# Patient Record
Sex: MALE | Race: WHITE | NOT HISPANIC OR LATINO | Employment: OTHER | URBAN - METROPOLITAN AREA
[De-identification: names, ages, dates, MRNs, and addresses within clinical notes are randomized per-mention and may not be internally consistent; named-entity substitution may affect disease eponyms.]

---

## 2018-07-25 ENCOUNTER — HOSPITAL ENCOUNTER (EMERGENCY)
Facility: HOSPITAL | Age: 53
Discharge: HOME | End: 2018-07-25
Payer: MEDICAID

## 2018-07-25 VITALS
WEIGHT: 181 LBS | SYSTOLIC BLOOD PRESSURE: 153 MMHG | RESPIRATION RATE: 16 BRPM | DIASTOLIC BLOOD PRESSURE: 96 MMHG | OXYGEN SATURATION: 97 % | TEMPERATURE: 97 F | HEIGHT: 65 IN | BODY MASS INDEX: 30.16 KG/M2 | HEART RATE: 67 BPM

## 2018-07-25 DIAGNOSIS — R52 PAIN: ICD-10-CM

## 2018-07-25 DIAGNOSIS — M25.562 LEFT KNEE PAIN, UNSPECIFIED CHRONICITY: Primary | ICD-10-CM

## 2018-07-25 PROCEDURE — 99283 EMERGENCY DEPT VISIT LOW MDM: CPT | Mod: 25

## 2018-07-25 RX ORDER — NAPROXEN 500 MG/1
500 TABLET ORAL 2 TIMES DAILY WITH MEALS
Qty: 20 TABLET | Refills: 0 | OUTPATIENT
Start: 2018-07-25 | End: 2020-12-06

## 2018-07-26 NOTE — ED PROVIDER NOTES
Encounter Date: 7/25/2018       History     Chief Complaint   Patient presents with    Knee Pain     L knee. Onset approx 3-4 days ago. Denies injury or trauma. Hx of knee surgery to same knee. No treatment pta.      54 yo presents with left knee pain for several days. Had previous fx and orif in 2005. Has had problems with knee since with knee popping, but not usually associated with pain.  Was told he may need left knee replacement          Review of patient's allergies indicates:  No Known Allergies  Past Medical History:   Diagnosis Date    Diabetes mellitus     GERD (gastroesophageal reflux disease)     High cholesterol     Hypertension      Past Surgical History:   Procedure Laterality Date    ANKLE SURGERY      right    ELBOW SURGERY Left     KNEE SURGERY      TOTAL HIP ARTHROPLASTY      ines     History reviewed. No pertinent family history.  Social History   Substance Use Topics    Smoking status: Never Smoker    Smokeless tobacco: Never Used    Alcohol use No     Review of Systems   Constitutional: Negative for fever.   HENT: Negative for sore throat.    Respiratory: Negative for shortness of breath.    Cardiovascular: Negative for chest pain.   Gastrointestinal: Negative for nausea.   Genitourinary: Negative for dysuria.   Musculoskeletal: Negative for back pain.   Skin: Negative for rash.   Neurological: Negative for weakness.   Hematological: Does not bruise/bleed easily.       Physical Exam     Initial Vitals [07/25/18 2015]   BP Pulse Resp Temp SpO2   (!) 153/96 67 16 97.3 °F (36.3 °C) 97 %      MAP       --         Physical Exam    Nursing note and vitals reviewed.  Constitutional: He appears well-developed and well-nourished. No distress.   HENT:   Head: Normocephalic and atraumatic.   Mouth/Throat: Oropharynx is clear and moist.   Eyes: Conjunctivae and EOM are normal.   Neck: Normal range of motion. Neck supple.   Cardiovascular: Normal rate, regular rhythm and normal heart sounds.   No  murmur heard.  Pulmonary/Chest: Breath sounds normal. No respiratory distress. He has no wheezes. He has no rhonchi. He has no rales.   Abdominal: Soft. Bowel sounds are normal. There is no tenderness. There is no rebound and no guarding.   Musculoskeletal: Normal range of motion. He exhibits tenderness (mild tenderness to left knee with surgical lateral deformity. no swellin, no erythema, full rom. distal pulses intact, sensation intact). He exhibits no edema.   Neurological: He is alert and oriented to person, place, and time. He has normal strength. No sensory deficit.   Skin: Skin is warm and dry.   Psychiatric: He has a normal mood and affect. Thought content normal.         ED Course   Procedures  Labs Reviewed - No data to display       Imaging Results    None                               Clinical Impression:   The primary encounter diagnosis was Left knee pain, unspecified chronicity. A diagnosis of Pain was also pertinent to this visit.      Disposition:   Disposition: Discharged  Condition: Stable                        FELIX Hamm  07/25/18 2053       FELIX Hamm  07/25/18 2114

## 2020-12-06 ENCOUNTER — HOSPITAL ENCOUNTER (EMERGENCY)
Facility: HOSPITAL | Age: 55
Discharge: HOME | End: 2020-12-06
Attending: EMERGENCY MEDICINE
Payer: MEDICAID

## 2020-12-06 VITALS
DIASTOLIC BLOOD PRESSURE: 87 MMHG | TEMPERATURE: 98 F | OXYGEN SATURATION: 96 % | SYSTOLIC BLOOD PRESSURE: 141 MMHG | WEIGHT: 184.06 LBS | RESPIRATION RATE: 20 BRPM | BODY MASS INDEX: 28.89 KG/M2 | HEIGHT: 67 IN | HEART RATE: 81 BPM

## 2020-12-06 DIAGNOSIS — S39.012A STRAIN OF LUMBAR REGION, INITIAL ENCOUNTER: Primary | ICD-10-CM

## 2020-12-06 PROCEDURE — 99284 EMERGENCY DEPT VISIT MOD MDM: CPT | Mod: 25,ER

## 2020-12-06 PROCEDURE — 86803 HEPATITIS C AB TEST: CPT

## 2020-12-06 PROCEDURE — 86703 HIV-1/HIV-2 1 RESULT ANTBDY: CPT

## 2020-12-06 PROCEDURE — 63600175 PHARM REV CODE 636 W HCPCS: Mod: ER | Performed by: NURSE PRACTITIONER

## 2020-12-06 PROCEDURE — 96372 THER/PROPH/DIAG INJ SC/IM: CPT | Mod: ER

## 2020-12-06 RX ORDER — ORPHENADRINE CITRATE 30 MG/ML
60 INJECTION INTRAMUSCULAR; INTRAVENOUS
Status: COMPLETED | OUTPATIENT
Start: 2020-12-06 | End: 2020-12-06

## 2020-12-06 RX ORDER — KETOROLAC TROMETHAMINE 30 MG/ML
10 INJECTION, SOLUTION INTRAMUSCULAR; INTRAVENOUS
Status: COMPLETED | OUTPATIENT
Start: 2020-12-06 | End: 2020-12-06

## 2020-12-06 RX ORDER — NAPROXEN 500 MG/1
500 TABLET ORAL
Status: DISCONTINUED | OUTPATIENT
Start: 2020-12-06 | End: 2020-12-06

## 2020-12-06 RX ORDER — NAPROXEN 500 MG/1
500 TABLET ORAL 2 TIMES DAILY WITH MEALS
Qty: 14 TABLET | Refills: 0 | Status: ON HOLD | OUTPATIENT
Start: 2020-12-06 | End: 2023-01-28 | Stop reason: HOSPADM

## 2020-12-06 RX ORDER — CYCLOBENZAPRINE HCL 10 MG
10 TABLET ORAL 3 TIMES DAILY PRN
Qty: 15 TABLET | Refills: 0 | Status: SHIPPED | OUTPATIENT
Start: 2020-12-06 | End: 2020-12-11

## 2020-12-06 RX ADMIN — KETOROLAC TROMETHAMINE 10 MG: 30 INJECTION, SOLUTION INTRAMUSCULAR; INTRAVENOUS at 04:12

## 2020-12-06 RX ADMIN — ORPHENADRINE CITRATE 60 MG: 30 INJECTION INTRAMUSCULAR; INTRAVENOUS at 04:12

## 2020-12-06 NOTE — ED PROVIDER NOTES
Encounter Date: 12/6/2020       History     Chief Complaint   Patient presents with    Back Pain     left lower back pain lifting and pain since fri     The history is provided by the patient.   Back Pain   This is a new (after lifting up on bed of truck ) problem. The current episode started two days ago. The problem occurs most days. The problem has been waxing and waning. The pain is associated with lifting heavy objects. The pain is present in the lumbar spine. The quality of the pain is described as shooting and aching. The pain does not radiate. The pain is at a severity of 8/10. The symptoms are aggravated by bending, twisting and certain positions. The pain is the same all the time. Stiffness is present all day. Pertinent negatives include no chest pain, no fever, no numbness, no weight loss, no headaches, no abdominal pain, no abdominal swelling, no bowel incontinence, no perianal numbness, no bladder incontinence, no dysuria, no pelvic pain, no leg pain, no paresis, no tingling and no weakness. He has tried nothing for the symptoms. The treatment provided no relief. Risk factors include obesity and lack of exercise.     Review of patient's allergies indicates:  No Known Allergies  Past Medical History:   Diagnosis Date    Diabetes mellitus     GERD (gastroesophageal reflux disease)     High cholesterol     Hypertension      Past Surgical History:   Procedure Laterality Date    ANKLE SURGERY      right    ELBOW SURGERY Left     KNEE SURGERY      TOTAL HIP ARTHROPLASTY      ines     History reviewed. No pertinent family history.  Social History     Tobacco Use    Smoking status: Never Smoker    Smokeless tobacco: Never Used   Substance Use Topics    Alcohol use: No    Drug use: No     Review of Systems   Constitutional: Negative for fever and weight loss.   HENT: Negative for sore throat.    Respiratory: Negative for shortness of breath.    Cardiovascular: Negative for chest pain.    Gastrointestinal: Negative for abdominal pain, bowel incontinence and nausea.   Genitourinary: Negative for bladder incontinence, dysuria and pelvic pain.   Musculoskeletal: Positive for back pain.   Skin: Negative for rash.   Neurological: Negative for tingling, weakness, numbness and headaches.   Hematological: Does not bruise/bleed easily.   All other systems reviewed and are negative.      Physical Exam     Initial Vitals [12/06/20 1554]   BP Pulse Resp Temp SpO2   (!) 141/87 81 20 98.1 °F (36.7 °C) 96 %      MAP       --         Physical Exam    Nursing note and vitals reviewed.  Constitutional: Vital signs are normal. He appears well-nourished. He is not diaphoretic. He is cooperative.  Non-toxic appearance. He does not have a sickly appearance. He does not appear ill. No distress.   HENT:   Head: Normocephalic. Head is without laceration.   Right Ear: External ear normal. No swelling or tenderness.   Left Ear: External ear normal. No swelling or tenderness.   Eyes: Conjunctivae and lids are normal. Lids are everted and swept, no foreign bodies found.   Neck: Trachea normal, normal range of motion, full passive range of motion without pain and phonation normal. Neck supple. No thyromegaly present.   Cardiovascular: Regular rhythm, normal heart sounds, intact distal pulses and normal pulses.   Pulmonary/Chest: Breath sounds normal.   Abdominal: Soft. Normal appearance and bowel sounds are normal. He exhibits no distension, no ascites and no mass. There is no abdominal tenderness.   Musculoskeletal:      Lumbar back: He exhibits decreased range of motion, tenderness, pain and spasm. He exhibits no bony tenderness, no swelling, no edema, no deformity, no laceration and normal pulse.        Back:    Lymphadenopathy:     He has no cervical adenopathy.     He has no axillary adenopathy.   Neurological: He is alert and oriented to person, place, and time. He has normal reflexes. No cranial nerve deficit or sensory  deficit. GCS eye subscore is 4. GCS verbal subscore is 5. GCS motor subscore is 6.   Skin: Skin is warm, dry and intact. Capillary refill takes less than 2 seconds. No laceration, no rash and no abscess noted. No erythema.   Psychiatric: He has a normal mood and affect. His speech is normal and behavior is normal. Cognition and memory are normal.         ED Course   Procedures  Labs Reviewed   HIV 1 / 2 ANTIBODY   HEPATITIS C ANTIBODY          Imaging Results          X-Ray Lumbar Spine Ap And Lateral (Final result)  Result time 12/06/20 16:21:45    Final result by Deuce Sellers MD (12/06/20 16:21:45)                 Impression:      Degenerative changes without acute abnormality.      Electronically signed by: Deuce Sellers  Date:    12/06/2020  Time:    16:21             Narrative:    EXAMINATION:  XR LUMBAR SPINE AP AND LATERAL    CLINICAL HISTORY:  Back pain or radiculopathy, trauma;    TECHNIQUE:  Five views of the lumbar spine were performed.    COMPARISON:  None    FINDINGS:  Alignment: No anterolisthesis or retrolisthesis.    Vertebrae: Vertebral body heights are maintained.  No suspicious appearing lytic or blastic lesions.    Discs and facets: Disc height loss L5-S1.  Other levels are better maintained.  There is facet arthropathy most focal L4 through S1.    Miscellaneous: No additional findings.                                         Regarding LUMBAGO, the patient was instructed to work on exercises to improve posture and strengthen back to increase flexibility and to take medications only as prescribed.  Patient advised on the importance of maintaining/obtaining ideal weight. Recommended that patient stop normal physical activity only for the first few days to help reduce any swelling in the area of the pain; apply heat or ice to the painful area (ice for the first 48 to 72 hours, then use heat thereafter; take over-the-counter pain relievers such as ibuprofen or acetaminophen unless  contraindicated; sleep in a curled-up, fetal position with a pillow between legs to help relieve pressure; and to avoid performing activities that involve heavy lifting or twisting of the back.          All historical, clinical, radiographic, and laboratory findings were reviewed with the patient in detail.  Findings are consistent with a diagnosis of lumbar strain  All remaining questions and concerns were addressed at that time.  Patient has been counseled regarding the need for follow-up as well as the indication to return to the emergency room should new or worrisome developments occur.                  Clinical Impression:     ICD-10-CM ICD-9-CM   1. Strain of lumbar region, initial encounter  S39.012A 847.2                          ED Disposition Condition    Discharge Stable        ED Prescriptions     Medication Sig Dispense Start Date End Date Auth. Provider    naproxen (NAPROSYN) 500 MG tablet Take 1 tablet (500 mg total) by mouth 2 (two) times daily with meals. 14 tablet 12/6/2020  Keny Doshi NP    cyclobenzaprine (FLEXERIL) 10 MG tablet Take 1 tablet (10 mg total) by mouth 3 (three) times daily as needed. 15 tablet 12/6/2020 12/11/2020 Keny Doshi NP        Follow-up Information     Follow up With Specialties Details Why Contact Info    Gustavo Calhoun MD Family Medicine In 3 days  [address] [facility] FAMILY MEDICINE  [facility] [address]  [phone]      [facility] Emergency Medicine  If symptoms worsen [address]  [facility] [address] [phone]                                       Keny Doshi NP  12/10/20 1650       Keny Doshi NP  12/26/20 0866

## 2020-12-06 NOTE — ED NOTES
Aaox3, skin warm and dry, resp unlabored and even. amb with steady gait and curtis well. C/o left lower back pain since fri and worse with sitting.

## 2020-12-07 LAB
HCV AB SERPL QL IA: NEGATIVE
HIV 1+2 AB+HIV1 P24 AG SERPL QL IA: NEGATIVE

## 2020-12-29 ENCOUNTER — HOSPITAL ENCOUNTER (EMERGENCY)
Facility: HOSPITAL | Age: 55
Discharge: HOME | End: 2020-12-29
Attending: STUDENT IN AN ORGANIZED HEALTH CARE EDUCATION/TRAINING PROGRAM
Payer: MEDICAID

## 2020-12-29 VITALS
DIASTOLIC BLOOD PRESSURE: 95 MMHG | HEART RATE: 86 BPM | TEMPERATURE: 98 F | BODY MASS INDEX: 29 KG/M2 | WEIGHT: 185.19 LBS | OXYGEN SATURATION: 96 % | RESPIRATION RATE: 18 BRPM | SYSTOLIC BLOOD PRESSURE: 168 MMHG

## 2020-12-29 DIAGNOSIS — S60.552A FOREIGN BODY, HAND, SUPERFICIAL, LEFT, INITIAL ENCOUNTER: Primary | ICD-10-CM

## 2020-12-29 PROCEDURE — 63600175 PHARM REV CODE 636 W HCPCS: Mod: ER | Performed by: STUDENT IN AN ORGANIZED HEALTH CARE EDUCATION/TRAINING PROGRAM

## 2020-12-29 PROCEDURE — 90471 IMMUNIZATION ADMIN: CPT | Mod: ER | Performed by: STUDENT IN AN ORGANIZED HEALTH CARE EDUCATION/TRAINING PROGRAM

## 2020-12-29 PROCEDURE — 25000003 PHARM REV CODE 250: Mod: ER | Performed by: STUDENT IN AN ORGANIZED HEALTH CARE EDUCATION/TRAINING PROGRAM

## 2020-12-29 PROCEDURE — 99284 EMERGENCY DEPT VISIT MOD MDM: CPT | Mod: 25,ER

## 2020-12-29 PROCEDURE — 90714 TD VACC NO PRESV 7 YRS+ IM: CPT | Mod: ER | Performed by: STUDENT IN AN ORGANIZED HEALTH CARE EDUCATION/TRAINING PROGRAM

## 2020-12-29 PROCEDURE — 10120 INC&RMVL FB SUBQ TISS SMPL: CPT | Mod: ER

## 2020-12-29 RX ORDER — OXYCODONE AND ACETAMINOPHEN 10; 325 MG/1; MG/1
1 TABLET ORAL
Status: COMPLETED | OUTPATIENT
Start: 2020-12-29 | End: 2020-12-29

## 2020-12-29 RX ORDER — LIDOCAINE HYDROCHLORIDE 10 MG/ML
5 INJECTION, SOLUTION EPIDURAL; INFILTRATION; INTRACAUDAL; PERINEURAL
Status: COMPLETED | OUTPATIENT
Start: 2020-12-29 | End: 2020-12-29

## 2020-12-29 RX ORDER — CEPHALEXIN 500 MG/1
500 CAPSULE ORAL EVERY 12 HOURS
Qty: 14 CAPSULE | Refills: 0 | Status: SHIPPED | OUTPATIENT
Start: 2020-12-29 | End: 2021-01-05

## 2020-12-29 RX ADMIN — OXYCODONE AND ACETAMINOPHEN 1 TABLET: 10; 325 TABLET ORAL at 06:12

## 2020-12-29 RX ADMIN — LIDOCAINE HYDROCHLORIDE 50 MG: 10 INJECTION, SOLUTION EPIDURAL; INFILTRATION; INTRACAUDAL at 06:12

## 2020-12-29 RX ADMIN — CLOSTRIDIUM TETANI TOXOID ANTIGEN (FORMALDEHYDE INACTIVATED) AND CORYNEBACTERIUM DIPHTHERIAE TOXOID ANTIGEN (FORMALDEHYDE INACTIVATED) 0.5 ML: 5; 2 INJECTION, SUSPENSION INTRAMUSCULAR at 06:12

## 2020-12-30 NOTE — ED PROVIDER NOTES
Encounter Date: 12/29/2020       History     Chief Complaint   Patient presents with    Foreign Body     c/o fishing hook stuck in left hand     55-year-old male with fishhook injury to the dorsum of the left hand.  Had ordered removed 1 fishhook from his left wrist.  Cut part of the fishhook off the left remainder in his skin.  No other complaints.    The history is provided by the patient.   Foreign Body   The current episode started 3 to 5 hours ago. Intake: Left Hand. Suspected object: [place]. The incident was witnessed/reported by the patient. Causes for concern: Fishing. Pertinent negatives include no chest pain, no fever, no abdominal pain, no vomiting, no congestion, no sore throat and no cough. He has been behaving normally. Past medical history comments: Removed another fishhook earlier today..     Review of patient's allergies indicates:  No Known Allergies  Past Medical History:   Diagnosis Date    Diabetes mellitus     GERD (gastroesophageal reflux disease)     High cholesterol     Hypertension      Past Surgical History:   Procedure Laterality Date    ANKLE SURGERY      right    ELBOW SURGERY Left     KNEE SURGERY      TOTAL HIP ARTHROPLASTY      ines     No family history on file.  Social History     Tobacco Use    Smoking status: Never Smoker    Smokeless tobacco: Never Used   Substance Use Topics    Alcohol use: No    Drug use: No     Review of Systems   Constitutional: Negative for chills and fever.   HENT: Negative for congestion, facial swelling, rhinorrhea and sore throat.    Respiratory: Negative for cough and shortness of breath.    Cardiovascular: Negative for chest pain.   Gastrointestinal: Negative for abdominal pain, constipation, nausea and vomiting.   Genitourinary: Negative for dysuria, frequency and hematuria.   Musculoskeletal: Negative for arthralgias, back pain, myalgias and neck pain.   Skin: Positive for wound. Negative for rash.   Neurological: Negative for weakness  and numbness.   Hematological: Does not bruise/bleed easily.   Psychiatric/Behavioral: Negative for hallucinations and suicidal ideas.       Physical Exam     Initial Vitals [20 1804]   BP Pulse Resp Temp SpO2   (!) 168/95 86 18 98 °F (36.7 °C) 96 %      MAP       --         Physical Exam    Nursing note and vitals reviewed.  Constitutional: He appears well-developed and well-nourished. No distress.   HENT:   Head: Normocephalic and atraumatic.   Right Ear: External ear normal.   Left Ear: External ear normal.   Nose: Nose normal.   Mouth/Throat: Oropharynx is clear and moist.   Eyes: Conjunctivae and EOM are normal. Pupils are equal, round, and reactive to light.   Neck: Normal range of motion. Neck supple.   Cardiovascular: Normal rate and regular rhythm.   No murmur heard.  Pulmonary/Chest: Breath sounds normal. No stridor. No respiratory distress. He has no wheezes. He has no rhonchi. He has no rales.   Abdominal: Soft. Bowel sounds are normal. There is no abdominal tenderness.   Musculoskeletal: Normal range of motion. No edema.   Neurological: He is alert and oriented to person, place, and time. He has normal strength. No cranial nerve deficit or sensory deficit.   Skin: Skin is warm and dry. No rash noted.   Wound to dorsum of Left hand with [place] remaining.   Psychiatric: He has a normal mood and affect. Thought content normal.         ED Course   Foreign Body    Date/Time: 2020 6:35 PM  Performed by: Acosta Kelley MD  Authorized by: Acosta Kelley MD   Consent Done: Yes  Consent: Verbal consent obtained.  Risks and benefits: risks, benefits and alternatives were discussed  Consent given by: patient  Patient understanding: patient states understanding of the procedure being performed  Patient identity confirmed:  and name  Intake: Hand.  Anesthesia: local infiltration    Anesthesia:  Local Anesthetic: lidocaine 1% without epinephrine  Anesthetic total: 1 mL  Patient sedated: no  Patient  restrained: no  Patient cooperative: yes  Complexity: complex (Required point incision via #11 scalpal. )  1 objects recovered.  Objects recovered: Preemption  Post-procedure assessment: foreign body removed  Patient tolerance: Patient tolerated the procedure well with no immediate complications      Labs Reviewed - No data to display       Imaging Results    None                                      Clinical Impression:     ICD-10-CM ICD-9-CM   1. Foreign body, hand, superficial, left, initial encounter  S60.552A 914.6                      Disposition:   Disposition: Discharged  Condition: Stable     ED Disposition Condition    Discharge Stable        ED Prescriptions     Medication Sig Dispense Start Date End Date Auth. Provider    cephALEXin (KEFLEX) 500 MG capsule Take 1 capsule (500 mg total) by mouth every 12 (twelve) hours. for 7 days 14 capsule 12/29/2020 1/5/2021 Acosta Kelley MD        Follow-up Information     Follow up With Specialties Details Why Contact Info    Gustavo Calhoun MD Family Medicine In 1 week For wound re-check [address] [place] [address] FAMILY MEDICINE  [facility] [address]  [phone]      [facility] Emergency Medicine Go to  As needed, If symptoms worsen [address] [facility] [address]  [facility] [address] [phone]                                       Acosta Kelley MD  12/29/20 6569

## 2021-05-17 ENCOUNTER — HOSPITAL ENCOUNTER (EMERGENCY)
Facility: HOSPITAL | Age: 56
Discharge: HOME | End: 2021-05-17
Attending: EMERGENCY MEDICINE
Payer: MEDICAID

## 2021-05-17 VITALS
OXYGEN SATURATION: 98 % | RESPIRATION RATE: 20 BRPM | HEART RATE: 69 BPM | DIASTOLIC BLOOD PRESSURE: 88 MMHG | WEIGHT: 179.88 LBS | BODY MASS INDEX: 28.18 KG/M2 | SYSTOLIC BLOOD PRESSURE: 135 MMHG | TEMPERATURE: 98 F

## 2021-05-17 DIAGNOSIS — R10.9 LEFT FLANK PAIN: Primary | ICD-10-CM

## 2021-05-17 DIAGNOSIS — N30.01 ACUTE CYSTITIS WITH HEMATURIA: ICD-10-CM

## 2021-05-17 DIAGNOSIS — N20.1 URETEROLITHIASIS: ICD-10-CM

## 2021-05-17 LAB
ALBUMIN SERPL BCP-MCNC: 4.2 G/DL (ref 3.5–5.2)
ALP SERPL-CCNC: 55 U/L (ref 55–135)
ALT SERPL W/O P-5'-P-CCNC: 33 U/L (ref 10–44)
ANION GAP SERPL CALC-SCNC: 12 MMOL/L (ref 8–16)
AST SERPL-CCNC: 22 U/L (ref 10–40)
BACTERIA #/AREA URNS AUTO: ABNORMAL /HPF
BASOPHILS # BLD AUTO: 0.03 K/UL (ref 0–0.2)
BASOPHILS NFR BLD: 0.4 % (ref 0–1.9)
BILIRUB SERPL-MCNC: 0.5 MG/DL (ref 0.1–1)
BILIRUB UR QL STRIP: NEGATIVE
BUN SERPL-MCNC: 17 MG/DL (ref 6–20)
CALCIUM SERPL-MCNC: 9.2 MG/DL (ref 8.7–10.5)
CHLORIDE SERPL-SCNC: 107 MMOL/L (ref 95–110)
CLARITY UR REFRACT.AUTO: ABNORMAL
CO2 SERPL-SCNC: 24 MMOL/L (ref 23–29)
COLOR UR AUTO: YELLOW
CREAT SERPL-MCNC: 1.2 MG/DL (ref 0.5–1.4)
DIFFERENTIAL METHOD: NORMAL
EOSINOPHIL # BLD AUTO: 0.1 K/UL (ref 0–0.5)
EOSINOPHIL NFR BLD: 1.2 % (ref 0–8)
ERYTHROCYTE [DISTWIDTH] IN BLOOD BY AUTOMATED COUNT: 12.5 % (ref 11.5–14.5)
EST. GFR  (AFRICAN AMERICAN): >60 ML/MIN/1.73 M^2
EST. GFR  (NON AFRICAN AMERICAN): >60 ML/MIN/1.73 M^2
GLUCOSE SERPL-MCNC: 143 MG/DL (ref 70–110)
GLUCOSE UR QL STRIP: NEGATIVE
HCT VFR BLD AUTO: 43.6 % (ref 40–54)
HGB BLD-MCNC: 14.6 G/DL (ref 14–18)
HGB UR QL STRIP: ABNORMAL
HYALINE CASTS UR QL AUTO: 0 /LPF
IMM GRANULOCYTES # BLD AUTO: 0.02 K/UL (ref 0–0.04)
IMM GRANULOCYTES NFR BLD AUTO: 0.3 % (ref 0–0.5)
KETONES UR QL STRIP: NEGATIVE
LEUKOCYTE ESTERASE UR QL STRIP: NEGATIVE
LYMPHOCYTES # BLD AUTO: 1.9 K/UL (ref 1–4.8)
LYMPHOCYTES NFR BLD: 24.1 % (ref 18–48)
MCH RBC QN AUTO: 29.7 PG (ref 27–31)
MCHC RBC AUTO-ENTMCNC: 33.5 G/DL (ref 32–36)
MCV RBC AUTO: 89 FL (ref 82–98)
MICROSCOPIC COMMENT: ABNORMAL
MONOCYTES # BLD AUTO: 0.5 K/UL (ref 0.3–1)
MONOCYTES NFR BLD: 7 % (ref 4–15)
NEUTROPHILS # BLD AUTO: 5.1 K/UL (ref 1.8–7.7)
NEUTROPHILS NFR BLD: 67 % (ref 38–73)
NITRITE UR QL STRIP: NEGATIVE
NRBC BLD-RTO: 0 /100 WBC
PH UR STRIP: 5 [PH] (ref 5–8)
PLATELET # BLD AUTO: 238 K/UL (ref 150–450)
PMV BLD AUTO: 9.4 FL (ref 9.2–12.9)
POTASSIUM SERPL-SCNC: 4.3 MMOL/L (ref 3.5–5.1)
PROT SERPL-MCNC: 7.4 G/DL (ref 6–8.4)
PROT UR QL STRIP: ABNORMAL
RBC # BLD AUTO: 4.91 M/UL (ref 4.6–6.2)
RBC #/AREA URNS AUTO: 100 /HPF (ref 0–4)
SODIUM SERPL-SCNC: 143 MMOL/L (ref 136–145)
SP GR UR STRIP: >=1.03 (ref 1–1.03)
SQUAMOUS #/AREA URNS AUTO: 1 /HPF
URN SPEC COLLECT METH UR: ABNORMAL
UROBILINOGEN UR STRIP-ACNC: NEGATIVE EU/DL
WBC # BLD AUTO: 7.67 K/UL (ref 3.9–12.7)
WBC #/AREA URNS AUTO: 3 /HPF (ref 0–5)

## 2021-05-17 PROCEDURE — 85025 COMPLETE CBC W/AUTO DIFF WBC: CPT | Mod: ER | Performed by: EMERGENCY MEDICINE

## 2021-05-17 PROCEDURE — 99284 EMERGENCY DEPT VISIT MOD MDM: CPT | Mod: 25,ER

## 2021-05-17 PROCEDURE — 96374 THER/PROPH/DIAG INJ IV PUSH: CPT | Mod: ER

## 2021-05-17 PROCEDURE — 81000 URINALYSIS NONAUTO W/SCOPE: CPT | Mod: ER | Performed by: EMERGENCY MEDICINE

## 2021-05-17 PROCEDURE — 80053 COMPREHEN METABOLIC PANEL: CPT | Mod: ER | Performed by: EMERGENCY MEDICINE

## 2021-05-17 PROCEDURE — 63600175 PHARM REV CODE 636 W HCPCS: Mod: ER | Performed by: EMERGENCY MEDICINE

## 2021-05-17 PROCEDURE — 96375 TX/PRO/DX INJ NEW DRUG ADDON: CPT | Mod: ER

## 2021-05-17 RX ORDER — SEMAGLUTIDE 1.34 MG/ML
0.25 INJECTION, SOLUTION SUBCUTANEOUS
COMMUNITY
Start: 2021-05-06

## 2021-05-17 RX ORDER — CEFUROXIME AXETIL 500 MG/1
500 TABLET ORAL 2 TIMES DAILY
Qty: 20 TABLET | Refills: 0 | Status: SHIPPED | OUTPATIENT
Start: 2021-05-17 | End: 2021-05-27

## 2021-05-17 RX ORDER — KETOROLAC TROMETHAMINE 30 MG/ML
15 INJECTION, SOLUTION INTRAMUSCULAR; INTRAVENOUS
Status: COMPLETED | OUTPATIENT
Start: 2021-05-17 | End: 2021-05-17

## 2021-05-17 RX ORDER — TAMSULOSIN HYDROCHLORIDE 0.4 MG/1
0.4 CAPSULE ORAL DAILY
Qty: 30 CAPSULE | Refills: 0 | Status: SHIPPED | OUTPATIENT
Start: 2021-05-17 | End: 2022-05-17

## 2021-05-17 RX ORDER — PROMETHAZINE HYDROCHLORIDE 25 MG/1
25 TABLET ORAL EVERY 6 HOURS PRN
Qty: 15 TABLET | Refills: 0 | Status: SHIPPED | OUTPATIENT
Start: 2021-05-17

## 2021-05-17 RX ORDER — MORPHINE SULFATE 4 MG/ML
4 INJECTION, SOLUTION INTRAMUSCULAR; INTRAVENOUS
Status: COMPLETED | OUTPATIENT
Start: 2021-05-17 | End: 2021-05-17

## 2021-05-17 RX ORDER — HYDROCODONE BITARTRATE AND ACETAMINOPHEN 5; 325 MG/1; MG/1
1 TABLET ORAL EVERY 4 HOURS PRN
Qty: 11 TABLET | Refills: 0 | Status: SHIPPED | OUTPATIENT
Start: 2021-05-17 | End: 2021-05-20

## 2021-05-17 RX ORDER — ONDANSETRON 2 MG/ML
4 INJECTION INTRAMUSCULAR; INTRAVENOUS
Status: COMPLETED | OUTPATIENT
Start: 2021-05-17 | End: 2021-05-17

## 2021-05-17 RX ADMIN — MORPHINE SULFATE 4 MG: 4 INJECTION, SOLUTION INTRAMUSCULAR; INTRAVENOUS at 12:05

## 2021-05-17 RX ADMIN — KETOROLAC TROMETHAMINE 15 MG: 30 INJECTION, SOLUTION INTRAMUSCULAR at 11:05

## 2021-05-17 RX ADMIN — ONDANSETRON 4 MG: 2 INJECTION INTRAMUSCULAR; INTRAVENOUS at 11:05

## 2022-12-17 ENCOUNTER — HOSPITAL ENCOUNTER (EMERGENCY)
Facility: HOSPITAL | Age: 57
Discharge: HOME | End: 2022-12-17
Attending: EMERGENCY MEDICINE
Payer: MEDICAID

## 2022-12-17 VITALS
WEIGHT: 179.13 LBS | HEIGHT: 65 IN | SYSTOLIC BLOOD PRESSURE: 130 MMHG | RESPIRATION RATE: 16 BRPM | HEART RATE: 84 BPM | BODY MASS INDEX: 29.84 KG/M2 | DIASTOLIC BLOOD PRESSURE: 76 MMHG | OXYGEN SATURATION: 97 % | TEMPERATURE: 98 F

## 2022-12-17 DIAGNOSIS — M25.562 LEFT KNEE PAIN: Primary | ICD-10-CM

## 2022-12-17 PROCEDURE — 29505 APPLICATION LONG LEG SPLINT: CPT | Mod: LT,ER

## 2022-12-17 PROCEDURE — 99283 EMERGENCY DEPT VISIT LOW MDM: CPT | Mod: ER

## 2022-12-17 PROCEDURE — 25000003 PHARM REV CODE 250: Mod: ER | Performed by: EMERGENCY MEDICINE

## 2022-12-17 RX ORDER — NAPROXEN 500 MG/1
500 TABLET ORAL 2 TIMES DAILY WITH MEALS
Qty: 60 TABLET | Refills: 0 | Status: ON HOLD | OUTPATIENT
Start: 2022-12-17 | End: 2023-01-28 | Stop reason: HOSPADM

## 2022-12-17 RX ORDER — NAPROXEN 500 MG/1
500 TABLET ORAL
Status: COMPLETED | OUTPATIENT
Start: 2022-12-17 | End: 2022-12-17

## 2022-12-17 RX ADMIN — NAPROXEN 500 MG: 500 TABLET ORAL at 09:12

## 2022-12-18 NOTE — ED PROVIDER NOTES
Encounter Date: 12/17/2022       History     Chief Complaint   Patient presents with    Knee Pain     Left knee pain, supposed to have it replaced. Locked up on him climbing a ladder earlier.      The history is provided by the patient.   Knee Pain  This is a chronic problem. The current episode started 3 to 5 hours ago. The problem occurs constantly. The problem has not changed since onset.Pertinent negatives include no chest pain, no abdominal pain, no headaches and no shortness of breath. The symptoms are aggravated by bending, exertion and twisting. He has tried rest for the symptoms. The treatment provided no relief.   Review of patient's allergies indicates:  No Known Allergies  Past Medical History:   Diagnosis Date    Diabetes mellitus     GERD (gastroesophageal reflux disease)     High cholesterol     Hypertension      Past Surgical History:   Procedure Laterality Date    ANKLE SURGERY      right    ELBOW SURGERY Left     KNEE SURGERY      TOTAL HIP ARTHROPLASTY      ines     No family history on file.  Social History     Tobacco Use    Smoking status: Never    Smokeless tobacco: Never   Substance Use Topics    Alcohol use: No    Drug use: No     Review of Systems   Constitutional:  Negative for fever.   HENT:  Negative for congestion.    Respiratory:  Negative for shortness of breath.    Cardiovascular:  Negative for chest pain.   Gastrointestinal:  Negative for abdominal pain.   Musculoskeletal:  Negative for back pain.        Left knee pain   Neurological:  Negative for headaches.   Hematological:  Does not bruise/bleed easily.     Physical Exam     Initial Vitals [12/17/22 1953]   BP Pulse Resp Temp SpO2   130/76 84 16 98 °F (36.7 °C) 97 %      MAP       --         Physical Exam    Nursing note and vitals reviewed.  Constitutional: He appears well-developed and well-nourished.   HENT:   Head: Normocephalic and atraumatic.   Mouth/Throat: Oropharynx is clear and moist.   Eyes: Conjunctivae and EOM are  normal. Pupils are equal, round, and reactive to light.   Neck: Neck supple.   Normal range of motion.  Cardiovascular:  Normal rate, regular rhythm and normal heart sounds.           Pulmonary/Chest: Breath sounds normal.   Abdominal: Abdomen is soft. Bowel sounds are normal.   Musculoskeletal:         General: Normal range of motion.      Cervical back: Normal range of motion and neck supple.      Left knee: No swelling, deformity, effusion, erythema, ecchymosis, lacerations or crepitus. Normal range of motion. Tenderness present. No LCL laxity, MCL laxity, ACL laxity or PCL laxity.Normal alignment, normal meniscus and normal patellar mobility. Normal pulse.      Instability Tests: Anterior drawer test negative. Posterior drawer test negative.      Comments: Left knee:Healed surgical scar     Neurological: He is alert and oriented to person, place, and time. He has normal strength.   Skin: Skin is warm and dry.   Psychiatric: He has a normal mood and affect. Thought content normal.       ED Course   Procedures  Labs Reviewed - No data to display       Imaging Results              X-Ray Knee 3 View Left (Final result)  Result time 12/17/22 20:35:18      Final result by Deuce Sellers MD (12/17/22 20:35:18)                   Impression:      Degenerative changes without definite acute abnormality.      Electronically signed by: Deuce Sellers  Date:    12/17/2022  Time:    20:35               Narrative:    EXAMINATION:  XR KNEE 3 VIEW LEFT    CLINICAL HISTORY:  Pain in left knee    TECHNIQUE:  AP, lateral, and Merchant views of the left knee were performed.    COMPARISON:  None    FINDINGS:  No fracture.  No traumatic malalignment.  No osseous destructive process.  Prior surgical changes.    Moderate degenerative change suspected.  No joint effusion.                                  8:54 PM - Counseling: Spoke with the patient and discussed todays findings, in addition to providing specific details for the plan  of care and counseling regarding the diagnosis and prognosis. Questions are answered at this time.  Pt placed in knee immobilizer and instructed to follow up with Ortho as he has scheduled or PCP for chronic knee pain.       Medications   naproxen tablet 500 mg (has no administration in time range)                              Clinical Impression:   Final diagnoses:  [M25.562] Left knee pain (Primary)        ED Disposition Condition    Discharge Stable          ED Prescriptions       Medication Sig Dispense Start Date End Date Auth. Provider    naproxen (NAPROSYN) 500 MG tablet Take 1 tablet (500 mg total) by mouth 2 (two) times daily with meals. 60 tablet 12/17/2022 -- Raman Betancur MD          Follow-up Information       Follow up With Specialties Details Why Contact Info    Gustavo Calhoun MD Family Medicine Call in 2 days  [address] [place] [address] FAMILY MEDICINE  [facility] [address] [phone]      [facility] Emergency Dept Emergency Medicine  If symptoms worsen [address] [address] [address] [phone]             Raman Betancur MD  12/17/22 6672

## 2023-01-25 ENCOUNTER — HOSPITAL ENCOUNTER (OUTPATIENT)
Facility: HOSPITAL | Age: 58
Discharge: HOME | DRG: 603 | End: 2023-01-28
Attending: EMERGENCY MEDICINE | Admitting: INTERNAL MEDICINE
Payer: MEDICAID

## 2023-01-25 DIAGNOSIS — R07.9 CHEST PAIN: ICD-10-CM

## 2023-01-25 DIAGNOSIS — L02.512 ABSCESS OF HAND, LEFT: ICD-10-CM

## 2023-01-25 DIAGNOSIS — N17.9 AKI (ACUTE KIDNEY INJURY): ICD-10-CM

## 2023-01-25 DIAGNOSIS — Z86.39 HISTORY OF DIABETES MELLITUS: ICD-10-CM

## 2023-01-25 DIAGNOSIS — M79.642 LEFT HAND PAIN: ICD-10-CM

## 2023-01-25 DIAGNOSIS — L02.512 ABSCESS OF LEFT HAND: ICD-10-CM

## 2023-01-25 DIAGNOSIS — L03.114 CELLULITIS OF LEFT HAND: Primary | ICD-10-CM

## 2023-01-25 LAB
ALBUMIN SERPL BCP-MCNC: 4.4 G/DL (ref 3.5–5.2)
ALP SERPL-CCNC: 72 U/L (ref 55–135)
ALT SERPL W/O P-5'-P-CCNC: 43 U/L (ref 10–44)
ANION GAP SERPL CALC-SCNC: 11 MMOL/L (ref 8–16)
AST SERPL-CCNC: 27 U/L (ref 10–40)
BASOPHILS # BLD AUTO: 0.03 K/UL (ref 0–0.2)
BASOPHILS NFR BLD: 0.4 % (ref 0–1.9)
BILIRUB SERPL-MCNC: 0.5 MG/DL (ref 0.1–1)
BUN SERPL-MCNC: 20 MG/DL (ref 6–20)
CALCIUM SERPL-MCNC: 9.4 MG/DL (ref 8.7–10.5)
CHLORIDE SERPL-SCNC: 105 MMOL/L (ref 95–110)
CO2 SERPL-SCNC: 25 MMOL/L (ref 23–29)
CREAT SERPL-MCNC: 1.5 MG/DL (ref 0.5–1.4)
DIFFERENTIAL METHOD: NORMAL
EOSINOPHIL # BLD AUTO: 0.1 K/UL (ref 0–0.5)
EOSINOPHIL NFR BLD: 1.3 % (ref 0–8)
ERYTHROCYTE [DISTWIDTH] IN BLOOD BY AUTOMATED COUNT: 13.1 % (ref 11.5–14.5)
EST. GFR  (NO RACE VARIABLE): 54 ML/MIN/1.73 M^2
GLUCOSE SERPL-MCNC: 117 MG/DL (ref 70–110)
HCT VFR BLD AUTO: 42.7 % (ref 40–54)
HGB BLD-MCNC: 14.3 G/DL (ref 14–18)
IMM GRANULOCYTES # BLD AUTO: 0.02 K/UL (ref 0–0.04)
IMM GRANULOCYTES NFR BLD AUTO: 0.3 % (ref 0–0.5)
LACTATE SERPL-SCNC: 1 MMOL/L (ref 0.5–2.2)
LYMPHOCYTES # BLD AUTO: 2.1 K/UL (ref 1–4.8)
LYMPHOCYTES NFR BLD: 28.1 % (ref 18–48)
MCH RBC QN AUTO: 29.9 PG (ref 27–31)
MCHC RBC AUTO-ENTMCNC: 33.5 G/DL (ref 32–36)
MCV RBC AUTO: 89 FL (ref 82–98)
MONOCYTES # BLD AUTO: 0.8 K/UL (ref 0.3–1)
MONOCYTES NFR BLD: 10.1 % (ref 4–15)
NEUTROPHILS # BLD AUTO: 4.5 K/UL (ref 1.8–7.7)
NEUTROPHILS NFR BLD: 59.8 % (ref 38–73)
NRBC BLD-RTO: 0 /100 WBC
PLATELET # BLD AUTO: 279 K/UL (ref 150–450)
PMV BLD AUTO: 9.6 FL (ref 9.2–12.9)
POTASSIUM SERPL-SCNC: 4.4 MMOL/L (ref 3.5–5.1)
PROT SERPL-MCNC: 8 G/DL (ref 6–8.4)
RBC # BLD AUTO: 4.79 M/UL (ref 4.6–6.2)
SODIUM SERPL-SCNC: 141 MMOL/L (ref 136–145)
WBC # BLD AUTO: 7.55 K/UL (ref 3.9–12.7)

## 2023-01-25 PROCEDURE — 99291 CRITICAL CARE FIRST HOUR: CPT | Mod: ER

## 2023-01-25 PROCEDURE — 25000003 PHARM REV CODE 250: Mod: ER | Performed by: EMERGENCY MEDICINE

## 2023-01-25 PROCEDURE — 63600175 PHARM REV CODE 636 W HCPCS: Mod: ER | Performed by: EMERGENCY MEDICINE

## 2023-01-25 PROCEDURE — 87040 BLOOD CULTURE FOR BACTERIA: CPT | Performed by: EMERGENCY MEDICINE

## 2023-01-25 PROCEDURE — 83605 ASSAY OF LACTIC ACID: CPT | Mod: ER | Performed by: EMERGENCY MEDICINE

## 2023-01-25 PROCEDURE — 21400001 HC TELEMETRY ROOM

## 2023-01-25 PROCEDURE — 96365 THER/PROPH/DIAG IV INF INIT: CPT | Mod: ER

## 2023-01-25 PROCEDURE — 80053 COMPREHEN METABOLIC PANEL: CPT | Mod: ER | Performed by: EMERGENCY MEDICINE

## 2023-01-25 PROCEDURE — 96366 THER/PROPH/DIAG IV INF ADDON: CPT | Mod: ER

## 2023-01-25 PROCEDURE — 85025 COMPLETE CBC W/AUTO DIFF WBC: CPT | Mod: ER | Performed by: EMERGENCY MEDICINE

## 2023-01-25 PROCEDURE — 96367 TX/PROPH/DG ADDL SEQ IV INF: CPT | Mod: ER

## 2023-01-25 RX ORDER — RAMIPRIL 2.5 MG/1
2.5 CAPSULE ORAL
COMMUNITY
Start: 2022-05-16

## 2023-01-25 RX ORDER — VANCOMYCIN HCL IN 5 % DEXTROSE 1.5G/250ML
1500 PLASTIC BAG, INJECTION (ML) INTRAVENOUS ONCE
Status: DISCONTINUED | OUTPATIENT
Start: 2023-01-25 | End: 2023-01-25

## 2023-01-25 RX ORDER — METFORMIN HYDROCHLORIDE 500 MG/1
500 TABLET ORAL
Status: COMPLETED | OUTPATIENT
Start: 2023-01-25 | End: 2023-01-25

## 2023-01-25 RX ORDER — DULAGLUTIDE 0.75 MG/.5ML
INJECTION, SOLUTION SUBCUTANEOUS
COMMUNITY
Start: 2022-05-23

## 2023-01-25 RX ORDER — ATORVASTATIN CALCIUM 40 MG/1
40 TABLET, FILM COATED ORAL
Status: COMPLETED | OUTPATIENT
Start: 2023-01-25 | End: 2023-01-25

## 2023-01-25 RX ADMIN — VANCOMYCIN HYDROCHLORIDE 1000 MG: 1 INJECTION, POWDER, LYOPHILIZED, FOR SOLUTION INTRAVENOUS at 10:01

## 2023-01-25 RX ADMIN — ATORVASTATIN CALCIUM 40 MG: 40 TABLET, FILM COATED ORAL at 10:01

## 2023-01-25 RX ADMIN — VANCOMYCIN HYDROCHLORIDE 500 MG: 500 INJECTION, POWDER, LYOPHILIZED, FOR SOLUTION INTRAVENOUS at 11:01

## 2023-01-25 RX ADMIN — METFORMIN HYDROCHLORIDE 500 MG: 500 TABLET, FILM COATED ORAL at 10:01

## 2023-01-25 RX ADMIN — CEFTRIAXONE 1 G: 1 INJECTION, POWDER, FOR SOLUTION INTRAMUSCULAR; INTRAVENOUS at 09:01

## 2023-01-26 PROBLEM — R97.20 INCREASED PROSTATE SPECIFIC ANTIGEN (PSA) VELOCITY: Status: ACTIVE | Noted: 2021-06-29

## 2023-01-26 PROBLEM — E11.69 OBESITY, DIABETES, AND HYPERTENSION SYNDROME: Status: ACTIVE | Noted: 2018-08-02

## 2023-01-26 PROBLEM — I15.2 OBESITY, DIABETES, AND HYPERTENSION SYNDROME: Status: ACTIVE | Noted: 2018-08-02

## 2023-01-26 PROBLEM — R74.8 ELEVATED LIVER ENZYMES: Status: ACTIVE | Noted: 2022-09-11

## 2023-01-26 PROBLEM — G47.00 INSOMNIA, UNSPECIFIED: Status: ACTIVE | Noted: 2023-01-26

## 2023-01-26 PROBLEM — Z98.890 STATUS POST OPEN REDUCTION AND INTERNAL FIXATION (ORIF) OF FRACTURE: Status: ACTIVE | Noted: 2022-04-25

## 2023-01-26 PROBLEM — E11.69 DM TYPE 2 WITH DIABETIC MIXED HYPERLIPIDEMIA: Status: ACTIVE | Noted: 2023-01-26

## 2023-01-26 PROBLEM — Z87.81 STATUS POST OPEN REDUCTION AND INTERNAL FIXATION (ORIF) OF FRACTURE: Status: ACTIVE | Noted: 2022-04-25

## 2023-01-26 PROBLEM — L02.512 ABSCESS OF HAND, LEFT: Status: ACTIVE | Noted: 2023-01-26

## 2023-01-26 PROBLEM — E78.2 DM TYPE 2 WITH DIABETIC MIXED HYPERLIPIDEMIA: Status: ACTIVE | Noted: 2023-01-26

## 2023-01-26 PROBLEM — E11.59 OBESITY, DIABETES, AND HYPERTENSION SYNDROME: Status: ACTIVE | Noted: 2018-08-02

## 2023-01-26 PROBLEM — R79.89 LOW TESTOSTERONE IN MALE: Status: ACTIVE | Noted: 2018-08-02

## 2023-01-26 PROBLEM — E66.9 OBESITY, DIABETES, AND HYPERTENSION SYNDROME: Status: ACTIVE | Noted: 2018-08-02

## 2023-01-26 PROBLEM — I10 HYPERTENSION, ESSENTIAL: Status: ACTIVE | Noted: 2023-01-26

## 2023-01-26 PROBLEM — M17.9 OSTEOARTHRITIS OF KNEE: Status: ACTIVE | Noted: 2022-12-18

## 2023-01-26 LAB
ALBUMIN SERPL BCP-MCNC: 3.6 G/DL (ref 3.5–5.2)
ALP SERPL-CCNC: 61 U/L (ref 55–135)
ALT SERPL W/O P-5'-P-CCNC: 36 U/L (ref 10–44)
ANION GAP SERPL CALC-SCNC: 9 MMOL/L (ref 8–16)
AST SERPL-CCNC: 22 U/L (ref 10–40)
BASOPHILS # BLD AUTO: 0.03 K/UL (ref 0–0.2)
BASOPHILS NFR BLD: 0.5 % (ref 0–1.9)
BILIRUB SERPL-MCNC: 0.5 MG/DL (ref 0.1–1)
BUN SERPL-MCNC: 19 MG/DL (ref 6–20)
CALCIUM SERPL-MCNC: 8.8 MG/DL (ref 8.7–10.5)
CHLORIDE SERPL-SCNC: 108 MMOL/L (ref 95–110)
CO2 SERPL-SCNC: 24 MMOL/L (ref 23–29)
CREAT SERPL-MCNC: 1.3 MG/DL (ref 0.5–1.4)
CREAT SERPL-MCNC: 1.3 MG/DL (ref 0.5–1.4)
CRP SERPL-MCNC: 14 MG/L (ref 0–8.2)
DIFFERENTIAL METHOD: ABNORMAL
EOSINOPHIL # BLD AUTO: 0.1 K/UL (ref 0–0.5)
EOSINOPHIL NFR BLD: 1.8 % (ref 0–8)
ERYTHROCYTE [DISTWIDTH] IN BLOOD BY AUTOMATED COUNT: 13.1 % (ref 11.5–14.5)
EST. GFR  (NO RACE VARIABLE): >60 ML/MIN/1.73 M^2
EST. GFR  (NO RACE VARIABLE): >60 ML/MIN/1.73 M^2
GLUCOSE SERPL-MCNC: 104 MG/DL (ref 70–110)
HCT VFR BLD AUTO: 37 % (ref 40–54)
HGB BLD-MCNC: 12.4 G/DL (ref 14–18)
IMM GRANULOCYTES # BLD AUTO: 0.01 K/UL (ref 0–0.04)
IMM GRANULOCYTES NFR BLD AUTO: 0.2 % (ref 0–0.5)
LYMPHOCYTES # BLD AUTO: 2.3 K/UL (ref 1–4.8)
LYMPHOCYTES NFR BLD: 34.7 % (ref 18–48)
MAGNESIUM SERPL-MCNC: 1.8 MG/DL (ref 1.6–2.6)
MCH RBC QN AUTO: 29.7 PG (ref 27–31)
MCHC RBC AUTO-ENTMCNC: 33.5 G/DL (ref 32–36)
MCV RBC AUTO: 89 FL (ref 82–98)
MONOCYTES # BLD AUTO: 0.9 K/UL (ref 0.3–1)
MONOCYTES NFR BLD: 13.3 % (ref 4–15)
NEUTROPHILS # BLD AUTO: 3.2 K/UL (ref 1.8–7.7)
NEUTROPHILS NFR BLD: 49.5 % (ref 38–73)
NRBC BLD-RTO: 0 /100 WBC
PHOSPHATE SERPL-MCNC: 3.5 MG/DL (ref 2.7–4.5)
PLATELET # BLD AUTO: 218 K/UL (ref 150–450)
PLATELET BLD QL SMEAR: ABNORMAL
PMV BLD AUTO: 10.1 FL (ref 9.2–12.9)
POCT GLUCOSE: 154 MG/DL (ref 70–110)
POCT GLUCOSE: 83 MG/DL (ref 70–110)
POCT GLUCOSE: 97 MG/DL (ref 70–110)
POTASSIUM SERPL-SCNC: 4.8 MMOL/L (ref 3.5–5.1)
PROT SERPL-MCNC: 6.5 G/DL (ref 6–8.4)
RBC # BLD AUTO: 4.17 M/UL (ref 4.6–6.2)
SODIUM SERPL-SCNC: 141 MMOL/L (ref 136–145)
VANCOMYCIN SERPL-MCNC: 5.7 UG/ML
WBC # BLD AUTO: 6.52 K/UL (ref 3.9–12.7)

## 2023-01-26 PROCEDURE — 80202 ASSAY OF VANCOMYCIN: CPT | Performed by: EMERGENCY MEDICINE

## 2023-01-26 PROCEDURE — 99285 EMERGENCY DEPT VISIT HI MDM: CPT | Mod: 25,ER

## 2023-01-26 PROCEDURE — G0378 HOSPITAL OBSERVATION PER HR: HCPCS

## 2023-01-26 PROCEDURE — 63600175 PHARM REV CODE 636 W HCPCS: Mod: ER | Performed by: EMERGENCY MEDICINE

## 2023-01-26 PROCEDURE — 96375 TX/PRO/DX INJ NEW DRUG ADDON: CPT | Mod: ER

## 2023-01-26 PROCEDURE — 80053 COMPREHEN METABOLIC PANEL: CPT | Mod: ER | Performed by: NURSE PRACTITIONER

## 2023-01-26 PROCEDURE — 96361 HYDRATE IV INFUSION ADD-ON: CPT | Mod: ER

## 2023-01-26 PROCEDURE — 82565 ASSAY OF CREATININE: CPT | Mod: 59,ER | Performed by: EMERGENCY MEDICINE

## 2023-01-26 PROCEDURE — 36415 COLL VENOUS BLD VENIPUNCTURE: CPT | Performed by: EMERGENCY MEDICINE

## 2023-01-26 PROCEDURE — 86140 C-REACTIVE PROTEIN: CPT | Mod: ER | Performed by: NURSE PRACTITIONER

## 2023-01-26 PROCEDURE — 63600175 PHARM REV CODE 636 W HCPCS: Mod: TB,JG | Performed by: NURSE PRACTITIONER

## 2023-01-26 PROCEDURE — 96366 THER/PROPH/DIAG IV INF ADDON: CPT

## 2023-01-26 PROCEDURE — 96366 THER/PROPH/DIAG IV INF ADDON: CPT | Mod: ER

## 2023-01-26 PROCEDURE — 84100 ASSAY OF PHOSPHORUS: CPT | Mod: ER | Performed by: NURSE PRACTITIONER

## 2023-01-26 PROCEDURE — 83735 ASSAY OF MAGNESIUM: CPT | Mod: ER | Performed by: NURSE PRACTITIONER

## 2023-01-26 PROCEDURE — 25000003 PHARM REV CODE 250: Mod: ER | Performed by: EMERGENCY MEDICINE

## 2023-01-26 PROCEDURE — 99223 1ST HOSP IP/OBS HIGH 75: CPT | Mod: ,,, | Performed by: ORTHOPAEDIC SURGERY

## 2023-01-26 PROCEDURE — 85025 COMPLETE CBC W/AUTO DIFF WBC: CPT | Mod: ER | Performed by: NURSE PRACTITIONER

## 2023-01-26 PROCEDURE — 99223 PR INITIAL HOSPITAL CARE,LEVL III: ICD-10-PCS | Mod: ,,, | Performed by: ORTHOPAEDIC SURGERY

## 2023-01-26 PROCEDURE — 96367 TX/PROPH/DG ADDL SEQ IV INF: CPT

## 2023-01-26 PROCEDURE — 25000003 PHARM REV CODE 250: Performed by: NURSE PRACTITIONER

## 2023-01-26 PROCEDURE — 21400001 HC TELEMETRY ROOM

## 2023-01-26 RX ORDER — LISINOPRIL 10 MG/1
10 TABLET ORAL DAILY
Status: DISCONTINUED | OUTPATIENT
Start: 2023-01-26 | End: 2023-01-26

## 2023-01-26 RX ORDER — MORPHINE SULFATE 4 MG/ML
4 INJECTION, SOLUTION INTRAMUSCULAR; INTRAVENOUS
Status: COMPLETED | OUTPATIENT
Start: 2023-01-26 | End: 2023-01-26

## 2023-01-26 RX ORDER — IBUPROFEN 200 MG
24 TABLET ORAL
Status: DISCONTINUED | OUTPATIENT
Start: 2023-01-26 | End: 2023-01-28 | Stop reason: HOSPADM

## 2023-01-26 RX ORDER — ZOLPIDEM TARTRATE 5 MG/1
10 TABLET ORAL NIGHTLY PRN
Status: DISCONTINUED | OUTPATIENT
Start: 2023-01-26 | End: 2023-01-28 | Stop reason: HOSPADM

## 2023-01-26 RX ORDER — ACETAMINOPHEN 325 MG/1
650 TABLET ORAL EVERY 4 HOURS PRN
Status: DISCONTINUED | OUTPATIENT
Start: 2023-01-26 | End: 2023-01-28 | Stop reason: HOSPADM

## 2023-01-26 RX ORDER — ACETAMINOPHEN 500 MG
1000 TABLET ORAL
Status: COMPLETED | OUTPATIENT
Start: 2023-01-26 | End: 2023-01-26

## 2023-01-26 RX ORDER — SODIUM CHLORIDE 0.9 % (FLUSH) 0.9 %
10 SYRINGE (ML) INJECTION EVERY 12 HOURS PRN
Status: DISCONTINUED | OUTPATIENT
Start: 2023-01-26 | End: 2023-01-28 | Stop reason: HOSPADM

## 2023-01-26 RX ORDER — IBUPROFEN 200 MG
16 TABLET ORAL
Status: DISCONTINUED | OUTPATIENT
Start: 2023-01-26 | End: 2023-01-28 | Stop reason: HOSPADM

## 2023-01-26 RX ORDER — NALOXONE HCL 0.4 MG/ML
0.02 VIAL (ML) INJECTION
Status: DISCONTINUED | OUTPATIENT
Start: 2023-01-26 | End: 2023-01-28 | Stop reason: HOSPADM

## 2023-01-26 RX ORDER — GLUCAGON 1 MG
1 KIT INJECTION
Status: DISCONTINUED | OUTPATIENT
Start: 2023-01-26 | End: 2023-01-28 | Stop reason: HOSPADM

## 2023-01-26 RX ORDER — INSULIN ASPART 100 [IU]/ML
0-5 INJECTION, SOLUTION INTRAVENOUS; SUBCUTANEOUS
Status: DISCONTINUED | OUTPATIENT
Start: 2023-01-26 | End: 2023-01-28 | Stop reason: HOSPADM

## 2023-01-26 RX ORDER — PROCHLORPERAZINE EDISYLATE 5 MG/ML
5 INJECTION INTRAMUSCULAR; INTRAVENOUS EVERY 6 HOURS PRN
Status: DISCONTINUED | OUTPATIENT
Start: 2023-01-26 | End: 2023-01-28 | Stop reason: HOSPADM

## 2023-01-26 RX ORDER — ATORVASTATIN CALCIUM 40 MG/1
40 TABLET, FILM COATED ORAL DAILY
Status: DISCONTINUED | OUTPATIENT
Start: 2023-01-26 | End: 2023-01-28 | Stop reason: HOSPADM

## 2023-01-26 RX ORDER — ENOXAPARIN SODIUM 100 MG/ML
40 INJECTION SUBCUTANEOUS EVERY 24 HOURS
Status: DISCONTINUED | OUTPATIENT
Start: 2023-01-26 | End: 2023-01-26

## 2023-01-26 RX ORDER — AMOXICILLIN 250 MG
1 CAPSULE ORAL 2 TIMES DAILY
Status: DISCONTINUED | OUTPATIENT
Start: 2023-01-26 | End: 2023-01-28 | Stop reason: HOSPADM

## 2023-01-26 RX ORDER — ONDANSETRON 2 MG/ML
4 INJECTION INTRAMUSCULAR; INTRAVENOUS EVERY 8 HOURS PRN
Status: DISCONTINUED | OUTPATIENT
Start: 2023-01-26 | End: 2023-01-28 | Stop reason: HOSPADM

## 2023-01-26 RX ORDER — CEFEPIME HYDROCHLORIDE 1 G/50ML
1 INJECTION, SOLUTION INTRAVENOUS
Status: DISCONTINUED | OUTPATIENT
Start: 2023-01-26 | End: 2023-01-28 | Stop reason: HOSPADM

## 2023-01-26 RX ORDER — OXYCODONE HYDROCHLORIDE 5 MG/1
5 TABLET ORAL EVERY 6 HOURS PRN
Status: DISCONTINUED | OUTPATIENT
Start: 2023-01-26 | End: 2023-01-28 | Stop reason: HOSPADM

## 2023-01-26 RX ADMIN — SENNOSIDES AND DOCUSATE SODIUM 1 TABLET: 50; 8.6 TABLET ORAL at 10:01

## 2023-01-26 RX ADMIN — OXYCODONE HYDROCHLORIDE 5 MG: 5 TABLET ORAL at 10:01

## 2023-01-26 RX ADMIN — SODIUM CHLORIDE, POTASSIUM CHLORIDE, SODIUM LACTATE AND CALCIUM CHLORIDE 1000 ML: 600; 310; 30; 20 INJECTION, SOLUTION INTRAVENOUS at 12:01

## 2023-01-26 RX ADMIN — OXYCODONE HYDROCHLORIDE 5 MG: 5 TABLET ORAL at 04:01

## 2023-01-26 RX ADMIN — ZOLPIDEM TARTRATE 10 MG: 5 TABLET ORAL at 10:01

## 2023-01-26 RX ADMIN — CEFEPIME HYDROCHLORIDE 1 G: 1 INJECTION, SOLUTION INTRAVENOUS at 10:01

## 2023-01-26 RX ADMIN — ATORVASTATIN CALCIUM 40 MG: 40 TABLET, FILM COATED ORAL at 10:01

## 2023-01-26 RX ADMIN — ACETAMINOPHEN 1000 MG: 500 TABLET ORAL at 12:01

## 2023-01-26 RX ADMIN — SENNOSIDES AND DOCUSATE SODIUM 1 TABLET: 50; 8.6 TABLET ORAL at 08:01

## 2023-01-26 RX ADMIN — MORPHINE SULFATE 4 MG: 4 INJECTION INTRAVENOUS at 12:01

## 2023-01-26 RX ADMIN — CEFEPIME HYDROCHLORIDE 1 G: 1 INJECTION, SOLUTION INTRAVENOUS at 05:01

## 2023-01-26 NOTE — HPI
The patient is a 56 yo male with HTN, DM, HLD who presented to ED with Left hand erythema, swelling, warmth, and pain - onset 2 days ago that progressively worsened. Pt was seen at  and prescribed oral Doxycycline without improvement.     In the ED, Afebrile, WBC normal. Serum Cr 1.5>1.3 after IVFs, CRP 14. Left hand Xray showed a Foreign body in the palm side of the hand near the 3rd metacarpophalangeal junction. Ultrasound of hand showed an abscess abutting the extensor tendon.   Pt received IV Vanco and Cefepime.     The patient will be placed in observation under hospital medicine

## 2023-01-26 NOTE — PROGRESS NOTES
Pharmacokinetic Initial Assessment: IV Vancomycin    Assessment/Plan:    Initiate intravenous vancomycin with loading dose of 1500 mg once with subsequent doses when random concentrations are less than 20 mcg/mL  Desired empiric serum trough concentration is 10 to 20 mcg/mL  Draw vancomycin random level on 1/26/23 at 2300.  Pharmacy will continue to follow and monitor vancomycin.      Please contact pharmacy at extension [phone] with any questions regarding this assessment.     Thank you for the consult,   Puma Church       Patient brief summary:  Venancio Barragan is a 57 y.o. male initiated on antimicrobial therapy with IV Vancomycin for treatment of suspected skin & soft tissue infection    Drug Allergies:   Review of patient's allergies indicates:  No Known Allergies    Actual Body Weight:   81.8 kg    Renal Function:   Estimated Creatinine Clearance: 53.5 mL/min (A) (based on SCr of 1.5 mg/dL (H)).,     Dialysis Method (if applicable):  N/A    CBC (last 72 hours):  Recent Labs   Lab Result Units 01/25/23  2110   WBC K/uL 7.55   Hemoglobin g/dL 14.3   Hematocrit % 42.7   Platelets K/uL 279   Gran % % 59.8   Lymph % % 28.1   Mono % % 10.1   Eosinophil % % 1.3   Basophil % % 0.4   Differential Method  Automated       Metabolic Panel (last 72 hours):  Recent Labs   Lab Result Units 01/25/23  2110   Sodium mmol/L 141   Potassium mmol/L 4.4   Chloride mmol/L 105   CO2 mmol/L 25   Glucose mg/dL 117*   BUN mg/dL 20   Creatinine mg/dL 1.5*   Albumin g/dL 4.4   Total Bilirubin mg/dL 0.5   Alkaline Phosphatase U/L 72   AST U/L 27   ALT U/L 43       Drug levels (last 3 results):  No results for input(s): VANCOMYCINRA, VANCORANDOM, VANCOMYCINPE, VANCOPEAK, VANCOMYCINTR, VANCOTROUGH in the last 72 hours.    Microbiologic Results:  Microbiology Results (last 7 days)       Procedure Component Value Units Date/Time    Blood culture #1 **CANNOT BE ORDERED STAT** [[id number]] Collected: 01/25/23 2105    Order Status: Sent Specimen:  Blood from Peripheral, Wrist, Right Updated: 01/25/23 2110    Blood culture #2 **CANNOT BE ORDERED STAT** [[id number]] Collected: 01/25/23 2059    Order Status: Sent Specimen: Blood from Peripheral, Antecubital, Right Updated: 01/25/23 2108

## 2023-01-26 NOTE — H&P
[facility] Medicine  History & Physical    Patient Name: Venancio Barragan  MRN: [id number]  Patient Class: OP- Observation  Admission Date: 1/25/2023  Attending Physician: Laila Lau MD   Primary Care Provider: Gustavo Calhoun MD         Patient information was obtained from patient and ER records.     Subjective:     Principal Problem:Abscess of hand, left    Chief Complaint:   Chief Complaint   Patient presents with    Hand Pain     L hand pain and swelling, onset Tuesday, seen and treated at urgent care earlier today, prescribe antibiotics but have only took one dose          HPI: The patient is a 56 yo male with HTN, DM, HLD who presented to ED with Left hand erythema, swelling, warmth, and pain - onset 2 days ago that progressively worsened. Pt was seen at  and prescribed oral Doxycycline without improvement.     In the ED, Afebrile, WBC normal. Serum Cr 1.5>1.3 after IVFs, CRP 14. Left hand Xray showed a Foreign body in the palm side of the hand near the 3rd metacarpophalangeal junction. Ultrasound of hand showed an abscess abutting the extensor tendon.   Pt received IV Vanco and Cefepime.     The patient will be placed in observation under hospital medicine      Past Medical History:   Diagnosis Date    Diabetes mellitus     GERD (gastroesophageal reflux disease)     High cholesterol     Hypertension        Past Surgical History:   Procedure Laterality Date    ANKLE SURGERY      right    ELBOW SURGERY Left     KNEE SURGERY      TOTAL HIP ARTHROPLASTY      ines       Review of patient's allergies indicates:  No Known Allergies    No current facility-administered medications on file prior to encounter.     Current Outpatient Medications on File Prior to Encounter   Medication Sig    atorvastatin (LIPITOR) 40 MG tablet Take 40 mg by mouth once daily.    dulaglutide (TRULICITY) 0.75 mg/0.5 mL pen injector INECT 0.5 ML INTO THE SKIN EVERY 7 DAYS    metformin (GLUCOPHAGE)  500 MG tablet Take 1,000 mg by mouth 2 (two) times daily with meals.     ramipriL (ALTACE) 2.5 MG capsule Take 2.5 mg by mouth.    zolpidem (AMBIEN) 10 mg Tab Take 10 mg by mouth nightly as needed.    multivitamin capsule Take 1 capsule by mouth once daily.    naproxen (NAPROSYN) 500 MG tablet Take 1 tablet (500 mg total) by mouth 2 (two) times daily with meals.    naproxen (NAPROSYN) 500 MG tablet Take 1 tablet (500 mg total) by mouth 2 (two) times daily with meals.    promethazine (PHENERGAN) 25 MG tablet Take 1 tablet (25 mg total) by mouth every 6 (six) hours as needed for Nausea.    ramipril (ALTACE) 1.25 MG capsule Take 2.5 mg by mouth once daily.     semaglutide (OZEMPIC) 0.25 mg or 0.5 mg(2 mg/1.5 mL) PnIj Inject 0.25 mg into the skin.    tamsulosin (FLOMAX) 0.4 mg Cap Take 1 capsule (0.4 mg total) by mouth once daily.     Family History       Problem Relation (Age of Onset)    Diabetes Mother    No Known Problems Father          Tobacco Use    Smoking status: Never    Smokeless tobacco: Never   Substance and Sexual Activity    Alcohol use: No    Drug use: No    Sexual activity: Yes     Partners: Female     Review of Systems   Constitutional:  Negative for appetite change, chills, diaphoresis, fatigue and fever.   HENT:  Negative for congestion, nosebleeds, sore throat and trouble swallowing.    Eyes:  Negative for pain, discharge and visual disturbance.   Respiratory:  Negative for apnea, cough, chest tightness, shortness of breath, wheezing and stridor.    Cardiovascular:  Negative for chest pain, palpitations and leg swelling.   Gastrointestinal:  Negative for abdominal distention, abdominal pain, blood in stool, constipation, diarrhea, nausea and vomiting.   Endocrine: Negative for cold intolerance and heat intolerance.   Genitourinary:  Negative for difficulty urinating, dysuria, flank pain, frequency and urgency.   Musculoskeletal:  Positive for arthralgias (Left hand pain). Negative for  back pain, joint swelling, myalgias, neck pain and neck stiffness.   Skin:  Positive for color change and wound. Negative for rash.        Erythema, warmth, and swelling Left hand    Allergic/Immunologic: Negative for food allergies and immunocompromised state.   Neurological:  Negative for dizziness, seizures, syncope, facial asymmetry, weakness, light-headedness and headaches.   Hematological:  Negative for adenopathy.   Psychiatric/Behavioral:  Negative for agitation, behavioral problems and confusion. The patient is not nervous/anxious.    Objective:     Vital Signs (Most Recent):  Temp: 97.8 °F (36.6 °C) (01/26/23 0857)  Pulse: 69 (01/26/23 0857)  Resp: 18 (01/26/23 0857)  BP: 114/76 (01/26/23 0857)  SpO2: 97 % (01/26/23 0857) Vital Signs (24h Range):  Temp:  [97.8 °F (36.6 °C)-98.8 °F (37.1 °C)] 97.8 °F (36.6 °C)  Pulse:  [62-73] 69  Resp:  [16-18] 18  SpO2:  [97 %-99 %] 97 %  BP: (111-162)/(66-94) 114/76     Weight: 81.8 kg (180 lb 5.4 oz)  Body mass index is 30.01 kg/m².    Physical Exam  Vitals and nursing note reviewed.   Constitutional:       General: He is not in acute distress.     Appearance: He is well-developed. He is not diaphoretic.   HENT:      Head: Normocephalic and atraumatic.      Nose: Nose normal.   Eyes:      General: No scleral icterus.     Conjunctiva/sclera: Conjunctivae normal.   Cardiovascular:      Rate and Rhythm: Normal rate and regular rhythm.      Heart sounds: Normal heart sounds. No murmur heard.    No friction rub. No gallop.   Pulmonary:      Effort: Pulmonary effort is normal. No respiratory distress.      Breath sounds: Normal breath sounds. No stridor. No wheezing or rales.   Chest:      Chest wall: No tenderness.   Abdominal:      General: Bowel sounds are normal. There is no distension.      Palpations: Abdomen is soft.      Tenderness: There is no abdominal tenderness. There is no guarding or rebound.   Musculoskeletal:         General: Swelling (left hand) present. No  tenderness (left hand) or deformity.      Cervical back: Normal range of motion and neck supple.      Comments: Some limited ROM Left 2nd and 3rd finger    Skin:     General: Skin is warm and dry.      Coloration: Skin is not pale.      Findings: No erythema or rash.      Comments: Leftt hand TTP with erythema, warmth, and swelling.   Neurological:      Mental Status: He is alert and oriented to person, place, and time.      Cranial Nerves: No cranial nerve deficit.      Motor: No abnormal muscle tone.      Coordination: Coordination normal.      Deep Tendon Reflexes: Reflexes are normal and symmetric.   Psychiatric:         Behavior: Behavior normal.         Thought Content: Thought content normal.           Significant Labs: All pertinent labs within the past 24 hours have been reviewed.    Significant Imaging: I have reviewed all pertinent imaging results/findings within the past 24 hours.    Assessment/Plan:     * Cellulitis and abscess of hand, left with Foreign body   IV Vanco and Cefepime  Orthopedics consulted  Pain control   NPO for now         Primary hypertension  Cont home medication -Altace changed to Lisinopril       DM type 2 with diabetic mixed hyperlipidemia  Patient's FSGs are controlled on current medication regimen.  Last A1c reviewed-   Lab Results   Component Value Date    HGBA1C 6.0 (H) 01/03/2023     Most recent fingerstick glucose reviewed- No results for input(s): POCTGLUCOSE in the last 24 hours.  Current correctional scale  Low  Maintain anti-hyperglycemic dose as follows-   Antihyperglycemics (From admission, onward)    Start     Stop Route Frequency Ordered    01/26/23 0205  insulin aspart U-100 pen 0-5 Units         -- SubQ Before meals & nightly PRN 01/26/23 0106        Hold Oral hypoglycemics while patient is in the hospital.      VTE Risk Mitigation (From admission, onward)         Ordered     IP VTE HIGH RISK PATIENT  Once         01/26/23 0106     Place sequential compression  device  Until discontinued         01/26/23 0106                   Eugenia Melendez NP  Department of Hospital Medicine   '[place] - Telemetry ([facility])

## 2023-01-26 NOTE — CONSULTS
[facility] Surg  Orthopedics  Consult Note    Patient Name: Venancio Barragan  MRN: [id number]  Admission Date: 1/25/2023  Hospital Length of Stay: 0 days  Attending Provider: Laila Lau MD  Primary Care Provider: Gustavo Calhoun MD    Patient information was obtained from patient, spouse/SO, past medical records, ER records, and primary team.     Inpatient consult to Orthopedic Surgery  Consult performed by: Leopoldo Bradford PA-C  Consult ordered by: Franko Simmons MD    Seen/examined/consult performed by Hever Alegria MD    Subjective:     Principal Problem:Abscess of hand, left    Chief Complaint:   Chief Complaint   Patient presents with    Hand Pain     L hand pain and swelling, onset Tuesday, seen and treated at urgent care earlier today, prescribe antibiotics but have only took one dose          HPI: Venancio Barragan is a 57-year-old male with past medical history significant for type 2 diabetes, hyperlipidemia, hypertension, elevated liver enzymes, elevated PSA, insomnia, low testosterone, and obesity who is admitted for an abscess of the left hand.  Patient reports that pain and swelling began 2 days ago.  He was seen at urgent care and was started on antibiotics but only took 1 dose before presenting to the emergency department due to worsening condition.  Patient reports that upon evaluation in the emergency department he was told that the ultrasound showed fluid close to the tendon and because of this I and D in the emergency department was not performed.  Patient reports that he is experiencing pain and swelling at the left 3rd metacarpophalangeal joint dorsally.  He denies history of injury or trauma to the digit.  Patient denies drainage.  Patient also denies nausea, vomiting, fever, or chills.  He does not take blood thinners.  He has been NPO since 7:00 p.m. yesterday    Past Medical History:   Diagnosis Date    Diabetes mellitus     GERD (gastroesophageal reflux disease)     High cholesterol      Hypertension        Past Surgical History:   Procedure Laterality Date    ANKLE SURGERY      right    ELBOW SURGERY Left     KNEE SURGERY      TOTAL HIP ARTHROPLASTY      ines       Review of patient's allergies indicates:  No Known Allergies    Current Facility-Administered Medications   Medication    acetaminophen tablet 650 mg    atorvastatin tablet 40 mg    cefepime in dextrose 5 % 1 gram/50 mL IVPB 1 g    dextrose 10% bolus 125 mL 125 mL    dextrose 10% bolus 250 mL 250 mL    glucagon (human recombinant) injection 1 mg    glucose chewable tablet 16 g    glucose chewable tablet 24 g    insulin aspart U-100 pen 0-5 Units    naloxone 0.4 mg/mL injection 0.02 mg    ondansetron injection 4 mg    oxyCODONE immediate release tablet 5 mg    pneumoc 20-yang conj-dip cr(PF) (PREVNAR-20 (PF)) injection Syrg 0.5 mL    prochlorperazine injection Soln 5 mg    senna-docusate 8.6-50 mg per tablet 1 tablet    sodium chloride 0.9% flush 10 mL    vancomycin - pharmacy to dose    zolpidem tablet 10 mg     Family History       Problem Relation (Age of Onset)    Diabetes Mother    No Known Problems Father          Tobacco Use    Smoking status: Never    Smokeless tobacco: Never   Substance and Sexual Activity    Alcohol use: No    Drug use: No    Sexual activity: Yes     Partners: Female     Review of Systems   Constitutional: Negative for chills, decreased appetite, fever and weight loss.   HENT:  Negative for congestion, hoarse voice and sore throat.    Eyes:  Negative for blurred vision, double vision, vision loss in left eye and vision loss in right eye.   Cardiovascular:  Negative for chest pain, palpitations and syncope.   Respiratory:  Negative for cough, shortness of breath and wheezing.    Endocrine: Negative for cold intolerance and heat intolerance.   Hematologic/Lymphatic: Negative for bleeding problem. Does not bruise/bleed easily.   Skin:  Negative for dry skin, flushing, itching and suspicious lesions.  "  Musculoskeletal:  Positive for joint pain and joint swelling. Negative for falls.   Gastrointestinal:  Negative for abdominal pain, diarrhea, nausea and vomiting.   Genitourinary:  Negative for dysuria, frequency and urgency.   Neurological:  Negative for dizziness, headaches, numbness and paresthesias.   Psychiatric/Behavioral:  Negative for altered mental status and memory loss.    Objective:     Vital Signs (Most Recent):  Temp: 97.9 °F (36.6 °C) (01/26/23 1231)  Pulse: 62 (01/26/23 1231)  Resp: 18 (01/26/23 1231)  BP: 114/74 (01/26/23 1231)  SpO2: 95 % (01/26/23 1231) Vital Signs (24h Range):  Temp:  [97.8 °F (36.6 °C)-98.8 °F (37.1 °C)] 97.9 °F (36.6 °C)  Pulse:  [62-73] 62  Resp:  [16-18] 18  SpO2:  [95 %-99 %] 95 %  BP: (111-162)/(66-94) 114/74     Weight: 79.1 kg (174 lb 6.1 oz)  Height: 5' 5" (165.1 cm)  Body mass index is 29.02 kg/m².      Intake/Output Summary (Last 24 hours) at 1/26/2023 1258  Last data filed at 1/26/2023 0208  Gross per 24 hour   Intake 1350 ml   Output 600 ml   Net 750 ml       Ortho/SPM Exam  Left hand:  There is a small scab wound at the proximal portion of the dorsal aspect of the 3rd MCP joint  No drainage   Erythema and edema surround the joint, approximately 1.5cm diameter. Slight induration in that area. No fluctuance or obvious fluid collection.  No pain with passive stretch (ext or flex)  Full ROM of the MCP joint with only pain at the extremes of flexion  No ttp along extensor tendon track  Compartments are soft and compressible   Intact extensor pollicis longus, flexor pollicis longus, finger flexion, finger extension, finger abduction and adduction. Sensation intact to radial, median, ulnar, and axillary nerve distributions. Hand warm and well perfused with capillary refill of less than 2 seconds, and palpable distal radial pulses.      GEN: Well developed, well nourished male. AAOX3. No acute distress.   Head: Normocephalic, atraumatic.   Eyes: REX  Neck: Trachea is " midline, no adenopathy  Resp: Breathing unlabored.  Neuro: Motor function normal, Cranial nerves intact  Psych: Mood and affect appropriate.      Significant Labs:   Recent Lab Results  (Last 5 results in the past 24 hours)        01/26/23  1230   01/26/23  0512   01/25/23  2110   01/25/23  2105 01/25/23 2059        Albumin   3.6   4.4           Alkaline Phosphatase   61   72           ALT   36   43           Anion Gap   9   11           AST   22   27           Baso #   0.03   0.03           Basophil %   0.5   0.4           BILIRUBIN TOTAL   0.5  Comment: For infants and newborns, interpretation of results should be based  on gestational age, weight and in agreement with clinical  observations.    Premature Infant recommended reference ranges:  Up to 24 hours.............<8.0 mg/dL  Up to 48 hours............<12.0 mg/dL  3-5 days..................<15.0 mg/dL  6-29 days.................<15.0 mg/dL     0.5  Comment: For infants and newborns, interpretation of results should be based  on gestational age, weight and in agreement with clinical  observations.    Premature Infant recommended reference ranges:  Up to 24 hours.............<8.0 mg/dL  Up to 48 hours............<12.0 mg/dL  3-5 days..................<15.0 mg/dL  6-29 days.................<15.0 mg/dL             Blood Culture, Routine       No Growth to date  [P]   No Growth to date  [P]       BUN   19   20           Calcium   8.8   9.4           Chloride   108   105           CO2   24   25           Creatinine   1.3   1.5              1.3             CRP   14.0             Differential Method   Automated   Automated           eGFR   >60.0   54.0              >60.0             Eos #   0.1   0.1           Eosinophil %   1.8   1.3           Glucose   104   117           Gran # (ANC)   3.2   4.5           Gran %   49.5   59.8           Hematocrit   37.0   42.7           Hemoglobin   12.4   14.3           Immature Grans (Abs)   0.01  Comment: Mild elevation in  immature granulocytes is non specific and   can be seen in a variety of conditions including stress response,   acute inflammation, trauma and pregnancy. Correlation with other   laboratory and clinical findings is essential.     0.02  Comment: Mild elevation in immature granulocytes is non specific and   can be seen in a variety of conditions including stress response,   acute inflammation, trauma and pregnancy. Correlation with other   laboratory and clinical findings is essential.             Immature Granulocytes   0.2   0.3           Lactate, Dustin     1.0  Comment: Falsely low lactic acid results can be found in samples   containing >=13.0 mg/dL total bilirubin and/or >=3.5 mg/dL   direct bilirubin.             Lymph #   2.3   2.1           Lymph %   34.7   28.1           Magnesium   1.8             MCH   29.7   29.9           MCHC   33.5   33.5           MCV   89   89           Mono #   0.9   0.8           Mono %   13.3   10.1           MPV   10.1   9.6           nRBC   0   0           Phosphorus   3.5             Platelet Estimate   Appears normal             Platelets   218   279           POCT Glucose 97               Potassium   4.8   4.4           PROTEIN TOTAL   6.5   8.0           RBC   4.17   4.79           RDW   13.1   13.1           Sodium   141   141           WBC   6.52   7.55                                   [P] - Preliminary Result             All pertinent labs within the past 24 hours have been reviewed.    Significant Imaging: X-Ray: I have reviewed all pertinent results/findings and my personal findings are:  X-ray left hand shows no acute fracture or dislocation.  There is a 3 mm irregularity adjacent to the 3rd MCP that may represent foreign body, but previous radiographs for comparison are unavailable    Narrative & Impression  EXAM: MRI HAND FINGERS WITHOUT CONTRAST LEFT     CLINICAL HISTORY: Left hand pain.  Concern for third digit MCP septic arthritis and flexor tenosynovitis.      TECHNIQUE: Multiplanar, multisequence MR imaging of the hand without the use of IV contrast.  Examination mildly degraded by incomplete fat saturation artifact on axial PD fat-sat sequence and metallic artifact at the palmar hand, third digit MCP level.     FINDINGS:     Osseous: No acute fracture or suspicious osseous lesion.     Articular: Anatomic joint alignment.  No significant effusion.  Mild degenerative changes at the thumb MCP joint with mild subchondral cystic change.  Remaining articulations demonstrate no significant degenerative arthrosis.     Musculotendinous: No significant fluid in the flexor tendon sheaths to suggest significant tenosynovitis.  Possible mild fluid in the third digit extensor tendon sheath.     Ligamentous: Intact.     Miscellaneous: Mild/moderate edema throughout the dorsal hand, greatest at the third MCP region.  No definite focal fluid collection is visualized allowing for artifact        Impression:        Examination mildly degraded as above.     1.  No convincing evidence of osteomyelitis or septic arthritis on this examination.  Questionable mild third digit extensor tenosynovitis.  2.  Mild/moderate dorsal hand edema, possible cellulitis.  No focal fluid collection visualized to suggest abscess.  Consider further evaluation/follow-up as warranted.     Finalized on: 1/26/2023 3:02 PM By:  Artem Chiu III, MD  [facility]# [id number]      2023-01-26 15:04:[id number]    [facility]    Assessment/Plan:     Active Diagnoses:    Diagnosis Date Noted POA    PRINCIPAL PROBLEM:  Cellulitis and abscess of hand, left with Foreign body  [L02.512] 01/26/2023 Yes    DM type 2 with diabetic mixed hyperlipidemia [E11.69, E78.2] 01/26/2023 Yes    Primary hypertension [I10] 01/26/2023 Unknown      Problems Resolved During this Admission:       Assessment:   57-year-old male with past medical history significant for type 2 diabetes, hyperlipidemia, hypertension, elevated liver enzymes, elevated PSA, insomnia,  low testosterone, and obesity who is admitted for an abscess of the left hand consulted for possible for septic 3rd metacarpophalangeal joint and possible extensor tenosynovitis    Plan:   Reviewed the results of the radiographs with the patient.  Reviewed MRI results.  Patient endorses that he has improved significantly from last night is even had noticeable improvement from earlier in the day today.  His range of motion is nearly full at this point in the erythema that he reported having before that extended more proximally up the hand has now gone.  There was really only 1 small area of erythema and some induration right 3rd MCP dorsal knuckle.  However clinically there are no signs consistent with septic arthritis or with 10 tenosynovitis.  Considering his clinical picture and how rapidly it has improved with intravenous antibiotics we are going to allow him to eat tonight and continue to monitor him closely.  I discussed with this with the patient at length and he is agreeable to this.  We will make him NPO in the morning and recheck his condition in the morning and if he is continued to improve we will continue to monitor him through full resolution.  If he does not improve or if he worsens we will consider operative I and D. the patient was able to post questions and all questions were answered.    Hever Alegria MD  Orthopedics  [facility]

## 2023-01-26 NOTE — ED PROVIDER NOTES
Encounter Date: 1/25/2023       History     Chief Complaint   Patient presents with    Hand Pain     L hand pain and swelling, onset Tuesday, seen and treated at urgent care earlier today, prescribe antibiotics but have only took one dose       57-year-old male with past medical history of diabetes, hypertension, hyperlipidemia presents to the emergency department with complaints of left hand pain and swelling starting around a skin defect over the left 3rd MCP joint.  This started 1 week ago, and patient was seen at an urgent care today and started on doxycycline with Bactroban ointment.  He is had a total of 2 doses of the doxycycline.  Over the course of the day the swelling has rapidly worsened and the redness has started tracking up his left arm up to the mid forearm region.  Additionally he is having worsening pain.  He denies any purulent drainage, and does report his wife tried to squeeze the region to drain any pus from it recently.      Review of patient's allergies indicates:  No Known Allergies  Past Medical History:   Diagnosis Date    Diabetes mellitus     GERD (gastroesophageal reflux disease)     High cholesterol     Hypertension      Past Surgical History:   Procedure Laterality Date    ANKLE SURGERY      right    ELBOW SURGERY Left     KNEE SURGERY      TOTAL HIP ARTHROPLASTY      ines     History reviewed. No pertinent family history.  Social History     Tobacco Use    Smoking status: Never    Smokeless tobacco: Never   Substance Use Topics    Alcohol use: No    Drug use: No     Review of Systems   Constitutional:  Negative for chills and fever.   HENT:  Negative for congestion and dental problem.    Eyes:  Negative for pain and visual disturbance.   Respiratory:  Negative for cough and shortness of breath.    Cardiovascular:  Negative for chest pain and palpitations.   Gastrointestinal:  Negative for abdominal pain, diarrhea, nausea and vomiting.   Genitourinary:  Negative for dysuria and flank  pain.   Musculoskeletal:  Negative for back pain and neck pain.        Hand pain   Skin:  Positive for wound. Negative for rash.        Left-hand infection   Neurological:  Negative for weakness, numbness and headaches.     Physical Exam     Initial Vitals [01/25/23 2012]   BP Pulse Resp Temp SpO2   (!) 162/83 73 18 98.8 °F (37.1 °C) 99 %      MAP       --         Physical Exam    Constitutional: He appears well-developed and well-nourished. No distress.   HENT:   Head: Normocephalic and atraumatic.   Mouth/Throat: Oropharynx is clear and moist.   Eyes: EOM are normal. Pupils are equal, round, and reactive to light.   Neck: Neck supple.   Normal range of motion.  Cardiovascular:  Normal rate and regular rhythm.           Pulmonary/Chest: Breath sounds normal. No respiratory distress.   Abdominal: He exhibits no distension. There is no abdominal tenderness.   Musculoskeletal:         General: Tenderness present.      Cervical back: Normal range of motion and neck supple.      Comments: Over the left 3rd MCP joint there is a small abrasion with surrounding erythema and edema with underlying fluctuance.  The erythema travels over the entirety of the dorsal hand up to the mid forearm.  The edema involves the entire hand, and is pitting at the level of the wrist.  Patient has limited range of motion of the left fingers due to the swelling, but movement of the 3rd digit does not reproduce the pain.  Patient has full range of motion of his left wrist.  There is no purulent drainage.     Neurological: He is alert and oriented to person, place, and time.   Skin: Skin is warm and dry.   Psychiatric: He has a normal mood and affect.         ED Course   ED US LTD Soft Tissue/Skin    Date/Time: 1/25/2023 8:56 PM  Performed by: Franko Simmons MD  Authorized by: Franko Simmons MD     Procedure:  Focused Soft Tissue Ultrasound Exam (The skin overlying the 3rd MCP joint was examined using bedside US. There is a 1 cmx.5 cm abscess  abutting the extensor tendon, but does not encase the extensor tendon. This is characteriztic of a small abscess. Soft tissue edema present as well.)  Charge?:  Yes  Critical Care    Date/Time: 1/26/2023 12:34 AM  Performed by: Franko Simmons MD  Authorized by: Franko Simmons MD   Direct patient critical care time: 6 minutes  Additional history critical care time: 7 minutes  Ordering / reviewing critical care time: 5 minutes  Documentation critical care time: 4 minutes  Consulting other physicians critical care time: 5 minutes  Consult with family critical care time: 6 minutes  Other critical care time: 3 minutes  Total critical care time (exclusive of procedural time) : 36 minutes  Critical care time was exclusive of separately billable procedures and treating other patients and teaching time.  Critical care was necessary to treat or prevent imminent or life-threatening deterioration of the following conditions: LISSY and Bacterial infection requiring IV antibiotics.  Critical care was time spent personally by me on the following activities: blood draw for specimens, development of treatment plan with patient or surrogate, discussions with consultants, interpretation of cardiac output measurements, evaluation of patient's response to treatment, examination of patient, obtaining history from patient or surrogate, ordering and performing treatments and interventions, ordering and review of laboratory studies, pulse oximetry, ordering and review of radiographic studies, re-evaluation of patient's condition and review of old charts.                Labs Reviewed   COMPREHENSIVE METABOLIC PANEL - Abnormal; Notable for the following components:       Result Value    Glucose 117 (*)     Creatinine 1.5 (*)     eGFR 54.0 (*)     All other components within normal limits   CULTURE, BLOOD   CULTURE, BLOOD   CBC W/ AUTO DIFFERENTIAL   LACTIC ACID, PLASMA          Imaging Results               X-Ray Hand 3 view Left (Final result)   Result time 01/25/23 21:27:03      Final result by Deuce Sellers MD (01/25/23 21:27:03)                   Impression:      As above.    This report was flagged in Epic as abnormal.      Electronically signed by: Deuce Sellers  Date:    01/25/2023  Time:    21:27               Narrative:    EXAMINATION:  XR HAND COMPLETE 3 VIEW LEFT    CLINICAL HISTORY:  Left hand swelling;.    TECHNIQUE:  PA, lateral, and oblique views of the left hand were performed.    COMPARISON:  None    FINDINGS:  3 mm high density focus in the palm side of the hand near the 3rd metacarpophalangeal junction.  This may be acute or remote.    Dorsal soft tissue swelling.    No fracture.  No traumatic malalignment.  No osseous destructive process.                                       Medications   vancomycin - pharmacy to dose (has no administration in time range)   vancomycin (VANCOCIN) 500 mg in dextrose 5 % in water (D5W) 5 % 100 mL IVPB (MB+) (500 mg Intravenous New Bag 1/25/23 2346)   lactated ringers bolus 1,000 mL (has no administration in time range)   morphine injection 4 mg (has no administration in time range)   acetaminophen tablet 1,000 mg (has no administration in time range)   cefTRIAXone (ROCEPHIN) 1 g in dextrose 5 % in water (D5W) 5 % 50 mL IVPB (MB+) (0 g Intravenous Stopped 1/25/23 2154)   vancomycin (VANCOCIN) 1,000 mg in dextrose 5 % (D5W) 250 mL IVPB (Vial-Mate) (0 mg Intravenous Stopped 1/25/23 2340)   metFORMIN tablet 500 mg (500 mg Oral Given 1/25/23 2217)   atorvastatin tablet 40 mg (40 mg Oral Given 1/25/23 2217)                 ED Course as of 01/26/23 0035   Wed Jan 25, 2023   2136 All historical, clinical, radiographic, and laboratory findings were reviewed with the patient/family in detail along with the indications for transport to the facility in [place] in order to receive hospitalist, and ortho consultations as well as IV abx.  All remaining questions and concerns were addressed at this time and the  patient/family communicates understanding and agrees to proceed accordingly.  Similarly, all pertinent details of the encounter were discussed with Dr. Bartlett who agrees to receive the patient at [facility] for further care as outlined above.  The patient will be transferred by [facility] ambulance services secondary to a need for ongoing observation en route.  Franko Simmons MD  9:36 PM       [BA]      ED Course User Index  [BA] Franko Simmons MD                 Clinical Impression:   Final diagnoses:  [L03.114] Cellulitis of left hand (Primary)  [M79.642] Left hand pain  [L02.512] Abscess of left hand  [Z86.39] History of diabetes mellitus  [N17.9] LISSY (acute kidney injury)        ED Disposition Condition    Observation Stable                Franko Simmons MD  01/25/23 4073       Franko Simmons MD  01/26/23 0035

## 2023-01-26 NOTE — CARE UPDATE
[facility] ED Transfer of Care Note       Referring facility:  [facility] ED  Referring provider: Franko Simmons MD  Accepting facility:  [facility]  Accepting provider:  Den Bartlett MD  Admitting provider: MD Den High MD  Reason for transfer:  Cellulitis  Transfer diagnosis:  Cellulitis  Transfer specialty requested:  Hospital medicine  Transfer specialty notified:  Yes  Transfer level:  Med/tele  Bed type requested: Standard  Isolation status: No active isolations   Admission class or status:  Observation      Narrative     57-year-old male with past medical history of diabetes, hypertension, hyperlipidemia presents to the emergency department with complaints of left hand pain and swelling starting around a skin defect over the left 3rd MCP joint.  This started 1 week ago, and patient was seen at an urgent care today and started on doxycycline with Bactroban ointment.  He is had a total of 2 doses of the doxycycline.  Over the course of the day the swelling has rapidly worsened and the redness has started tracking up his left arm up to the mid forearm region.  Additionally he is having worsening pain.  He denies any purulent drainage, and does report his wife tried to squeeze the region to drain any pus from it recently. In the ED:  Labs:  Creatinine elevated, lactic acid normal, x-ray left hand:  3 mm high-density focus on left hand near 3rd metacarpophalangeal junction.  Vital stable.  Treated with: Pain medication, IV antibiotics, IV fluids.  Accepted for transfer to [facility] by [facility] medicine for management of cellulitis.    Objective     Vitals: Temp: 98.4 °F (36.9 °C) (01/25/23 2201)  Pulse: 69 (01/25/23 2201)  Resp: 17 (01/26/23 0039)  BP: 111/74 (01/25/23 2201)  SpO2: 97 % (01/25/23 2201)  Recent Labs: All pertinent labs within the past 24 hours have been reviewed.  CBC:   Recent Labs   Lab 01/25/23 2110   WBC 7.55   HGB 14.3   HCT 42.7        CMP:    Recent Labs   Lab 01/25/23 2110      K 4.4      CO2 25   *   BUN 20   CREATININE 1.5*   CALCIUM 9.4   PROT 8.0   ALBUMIN 4.4   BILITOT 0.5   ALKPHOS 72   AST 27   ALT 43   ANIONGAP 11     Recent imaging:  I have reviewed all pertinent imaging results/findings within the past 24 hours.   Airway:     Vent settings:     IV access:        Peripheral IV - Single Lumen 01/25/23 2055 20 G Right Antecubital (Active)   Site Assessment Clean;Dry;Intact 01/25/23 2105   Extremity Assessment Distal to IV No abnormal discoloration;No redness;No swelling;No warmth 01/25/23 2105   Line Status Blood return noted;Flushed;Saline locked 01/25/23 2105   Dressing Status Clean;Dry;Intact 01/25/23 2105   Dressing Intervention First dressing 01/25/23 2105   Reason Not Rotated Not due 01/25/23 2105     Infusions:  None  Allergies: Review of patient's allergies indicates:  No Known Allergies   NPO: No    Anticoagulation:   Anticoagulants       None             Instructions      [facility]  Admit to Hospital Medicine  Upon patient arrival to floor, please contact Hospital Medicine on call.

## 2023-01-26 NOTE — SUBJECTIVE & OBJECTIVE
Past Medical History:   Diagnosis Date    Diabetes mellitus     GERD (gastroesophageal reflux disease)     High cholesterol     Hypertension        Past Surgical History:   Procedure Laterality Date    ANKLE SURGERY      right    ELBOW SURGERY Left     KNEE SURGERY      TOTAL HIP ARTHROPLASTY      ines       Review of patient's allergies indicates:  No Known Allergies    No current facility-administered medications on file prior to encounter.     Current Outpatient Medications on File Prior to Encounter   Medication Sig    atorvastatin (LIPITOR) 40 MG tablet Take 40 mg by mouth once daily.    dulaglutide (TRULICITY) 0.75 mg/0.5 mL pen injector INECT 0.5 ML INTO THE SKIN EVERY 7 DAYS    metformin (GLUCOPHAGE) 500 MG tablet Take 1,000 mg by mouth 2 (two) times daily with meals.     ramipriL (ALTACE) 2.5 MG capsule Take 2.5 mg by mouth.    zolpidem (AMBIEN) 10 mg Tab Take 10 mg by mouth nightly as needed.    multivitamin capsule Take 1 capsule by mouth once daily.    naproxen (NAPROSYN) 500 MG tablet Take 1 tablet (500 mg total) by mouth 2 (two) times daily with meals.    naproxen (NAPROSYN) 500 MG tablet Take 1 tablet (500 mg total) by mouth 2 (two) times daily with meals.    promethazine (PHENERGAN) 25 MG tablet Take 1 tablet (25 mg total) by mouth every 6 (six) hours as needed for Nausea.    ramipril (ALTACE) 1.25 MG capsule Take 2.5 mg by mouth once daily.     semaglutide (OZEMPIC) 0.25 mg or 0.5 mg(2 mg/1.5 mL) PnIj Inject 0.25 mg into the skin.    tamsulosin (FLOMAX) 0.4 mg Cap Take 1 capsule (0.4 mg total) by mouth once daily.     Family History       Problem Relation (Age of Onset)    Diabetes Mother    No Known Problems Father          Tobacco Use    Smoking status: Never    Smokeless tobacco: Never   Substance and Sexual Activity    Alcohol use: No    Drug use: No    Sexual activity: Yes     Partners: Female     Review of Systems   Constitutional:  Negative for appetite change, chills, diaphoresis, fatigue and  fever.   HENT:  Negative for congestion, nosebleeds, sore throat and trouble swallowing.    Eyes:  Negative for pain, discharge and visual disturbance.   Respiratory:  Negative for apnea, cough, chest tightness, shortness of breath, wheezing and stridor.    Cardiovascular:  Negative for chest pain, palpitations and leg swelling.   Gastrointestinal:  Negative for abdominal distention, abdominal pain, blood in stool, constipation, diarrhea, nausea and vomiting.   Endocrine: Negative for cold intolerance and heat intolerance.   Genitourinary:  Negative for difficulty urinating, dysuria, flank pain, frequency and urgency.   Musculoskeletal:  Positive for arthralgias (Left hand pain). Negative for back pain, joint swelling, myalgias, neck pain and neck stiffness.   Skin:  Positive for color change and wound. Negative for rash.        Erythema, warmth, and swelling Left hand    Allergic/Immunologic: Negative for food allergies and immunocompromised state.   Neurological:  Negative for dizziness, seizures, syncope, facial asymmetry, weakness, light-headedness and headaches.   Hematological:  Negative for adenopathy.   Psychiatric/Behavioral:  Negative for agitation, behavioral problems and confusion. The patient is not nervous/anxious.    Objective:     Vital Signs (Most Recent):  Temp: 97.8 °F (36.6 °C) (01/26/23 0857)  Pulse: 69 (01/26/23 0857)  Resp: 18 (01/26/23 0857)  BP: 114/76 (01/26/23 0857)  SpO2: 97 % (01/26/23 0857) Vital Signs (24h Range):  Temp:  [97.8 °F (36.6 °C)-98.8 °F (37.1 °C)] 97.8 °F (36.6 °C)  Pulse:  [62-73] 69  Resp:  [16-18] 18  SpO2:  [97 %-99 %] 97 %  BP: (111-162)/(66-94) 114/76     Weight: 81.8 kg (180 lb 5.4 oz)  Body mass index is 30.01 kg/m².    Physical Exam  Vitals and nursing note reviewed.   Constitutional:       General: He is not in acute distress.     Appearance: He is well-developed. He is not diaphoretic.   HENT:      Head: Normocephalic and atraumatic.      Nose: Nose normal.    Eyes:      General: No scleral icterus.     Conjunctiva/sclera: Conjunctivae normal.   Cardiovascular:      Rate and Rhythm: Normal rate and regular rhythm.      Heart sounds: Normal heart sounds. No murmur heard.    No friction rub. No gallop.   Pulmonary:      Effort: Pulmonary effort is normal. No respiratory distress.      Breath sounds: Normal breath sounds. No stridor. No wheezing or rales.   Chest:      Chest wall: No tenderness.   Abdominal:      General: Bowel sounds are normal. There is no distension.      Palpations: Abdomen is soft.      Tenderness: There is no abdominal tenderness. There is no guarding or rebound.   Musculoskeletal:         General: Swelling (left hand) present. No tenderness (left hand) or deformity.      Cervical back: Normal range of motion and neck supple.      Comments: Some limited ROM Left 2nd and 3rd finger    Skin:     General: Skin is warm and dry.      Coloration: Skin is not pale.      Findings: No erythema or rash.      Comments: Leftt hand TTP with erythema, warmth, and swelling.   Neurological:      Mental Status: He is alert and oriented to person, place, and time.      Cranial Nerves: No cranial nerve deficit.      Motor: No abnormal muscle tone.      Coordination: Coordination normal.      Deep Tendon Reflexes: Reflexes are normal and symmetric.   Psychiatric:         Behavior: Behavior normal.         Thought Content: Thought content normal.           Significant Labs: All pertinent labs within the past 24 hours have been reviewed.    Significant Imaging: I have reviewed all pertinent imaging results/findings within the past 24 hours.

## 2023-01-26 NOTE — ED NOTES
Report received from Lilian DONALD, pt in bed sr up x 2 call button near pt, pt has no c/o, pt awaiting on room. Will continue to monitor.

## 2023-01-26 NOTE — ED NOTES
Patient taking home dose of ambien per MD/pharmacy discussion. Placed in hospital bed and moved to overnight stay room number 17. Given blankets, providing comfort. Daughter at bedside.

## 2023-01-26 NOTE — ASSESSMENT & PLAN NOTE
Patient's FSGs are controlled on current medication regimen.  Last A1c reviewed-   Lab Results   Component Value Date    HGBA1C 6.0 (H) 01/03/2023     Most recent fingerstick glucose reviewed- No results for input(s): POCTGLUCOSE in the last 24 hours.  Current correctional scale  Low  Maintain anti-hyperglycemic dose as follows-   Antihyperglycemics (From admission, onward)    Start     Stop Route Frequency Ordered    01/26/23 0205  insulin aspart U-100 pen 0-5 Units         -- SubQ Before meals & nightly PRN 01/26/23 0106        Hold Oral hypoglycemics while patient is in the hospital.

## 2023-01-27 LAB
ALBUMIN SERPL BCP-MCNC: 3.5 G/DL (ref 3.5–5.2)
ALP SERPL-CCNC: 68 U/L (ref 55–135)
ALT SERPL W/O P-5'-P-CCNC: 31 U/L (ref 10–44)
ANION GAP SERPL CALC-SCNC: 11 MMOL/L (ref 8–16)
AST SERPL-CCNC: 23 U/L (ref 10–40)
BASOPHILS # BLD AUTO: 0.03 K/UL (ref 0–0.2)
BASOPHILS NFR BLD: 0.5 % (ref 0–1.9)
BILIRUB SERPL-MCNC: 0.4 MG/DL (ref 0.1–1)
BUN SERPL-MCNC: 23 MG/DL (ref 6–20)
CALCIUM SERPL-MCNC: 8.4 MG/DL (ref 8.7–10.5)
CHLORIDE SERPL-SCNC: 104 MMOL/L (ref 95–110)
CO2 SERPL-SCNC: 23 MMOL/L (ref 23–29)
CREAT SERPL-MCNC: 1.3 MG/DL (ref 0.5–1.4)
DIFFERENTIAL METHOD: ABNORMAL
EOSINOPHIL # BLD AUTO: 0.1 K/UL (ref 0–0.5)
EOSINOPHIL NFR BLD: 2.1 % (ref 0–8)
ERYTHROCYTE [DISTWIDTH] IN BLOOD BY AUTOMATED COUNT: 12.8 % (ref 11.5–14.5)
EST. GFR  (NO RACE VARIABLE): >60 ML/MIN/1.73 M^2
GLUCOSE SERPL-MCNC: 142 MG/DL (ref 70–110)
HCT VFR BLD AUTO: 38.4 % (ref 40–54)
HGB BLD-MCNC: 12.8 G/DL (ref 14–18)
IMM GRANULOCYTES # BLD AUTO: 0.01 K/UL (ref 0–0.04)
IMM GRANULOCYTES NFR BLD AUTO: 0.2 % (ref 0–0.5)
LYMPHOCYTES # BLD AUTO: 1.8 K/UL (ref 1–4.8)
LYMPHOCYTES NFR BLD: 32.2 % (ref 18–48)
MAGNESIUM SERPL-MCNC: 1.8 MG/DL (ref 1.6–2.6)
MCH RBC QN AUTO: 29.2 PG (ref 27–31)
MCHC RBC AUTO-ENTMCNC: 33.3 G/DL (ref 32–36)
MCV RBC AUTO: 88 FL (ref 82–98)
MONOCYTES # BLD AUTO: 0.8 K/UL (ref 0.3–1)
MONOCYTES NFR BLD: 13.3 % (ref 4–15)
NEUTROPHILS # BLD AUTO: 3 K/UL (ref 1.8–7.7)
NEUTROPHILS NFR BLD: 51.7 % (ref 38–73)
NRBC BLD-RTO: 0 /100 WBC
PHOSPHATE SERPL-MCNC: 4.2 MG/DL (ref 2.7–4.5)
PLATELET # BLD AUTO: 246 K/UL (ref 150–450)
PMV BLD AUTO: 9.5 FL (ref 9.2–12.9)
POCT GLUCOSE: 113 MG/DL (ref 70–110)
POCT GLUCOSE: 135 MG/DL (ref 70–110)
POCT GLUCOSE: 185 MG/DL (ref 70–110)
POCT GLUCOSE: 96 MG/DL (ref 70–110)
POTASSIUM SERPL-SCNC: 4.2 MMOL/L (ref 3.5–5.1)
PROT SERPL-MCNC: 6.8 G/DL (ref 6–8.4)
RBC # BLD AUTO: 4.39 M/UL (ref 4.6–6.2)
SODIUM SERPL-SCNC: 138 MMOL/L (ref 136–145)
WBC # BLD AUTO: 5.71 K/UL (ref 3.9–12.7)

## 2023-01-27 PROCEDURE — 96366 THER/PROPH/DIAG IV INF ADDON: CPT

## 2023-01-27 PROCEDURE — 99233 SBSQ HOSP IP/OBS HIGH 50: CPT | Mod: ,,, | Performed by: ORTHOPAEDIC SURGERY

## 2023-01-27 PROCEDURE — 63600175 PHARM REV CODE 636 W HCPCS: Mod: TB,JG | Performed by: NURSE PRACTITIONER

## 2023-01-27 PROCEDURE — 85025 COMPLETE CBC W/AUTO DIFF WBC: CPT | Performed by: NURSE PRACTITIONER

## 2023-01-27 PROCEDURE — G0378 HOSPITAL OBSERVATION PER HR: HCPCS

## 2023-01-27 PROCEDURE — 84100 ASSAY OF PHOSPHORUS: CPT | Performed by: NURSE PRACTITIONER

## 2023-01-27 PROCEDURE — 83735 ASSAY OF MAGNESIUM: CPT | Performed by: NURSE PRACTITIONER

## 2023-01-27 PROCEDURE — 80053 COMPREHEN METABOLIC PANEL: CPT | Performed by: NURSE PRACTITIONER

## 2023-01-27 PROCEDURE — 63600175 PHARM REV CODE 636 W HCPCS: Performed by: EMERGENCY MEDICINE

## 2023-01-27 PROCEDURE — 36415 COLL VENOUS BLD VENIPUNCTURE: CPT | Performed by: NURSE PRACTITIONER

## 2023-01-27 PROCEDURE — 99233 PR SUBSEQUENT HOSPITAL CARE,LEVL III: ICD-10-PCS | Mod: ,,, | Performed by: ORTHOPAEDIC SURGERY

## 2023-01-27 PROCEDURE — 25000003 PHARM REV CODE 250: Performed by: EMERGENCY MEDICINE

## 2023-01-27 PROCEDURE — 21400001 HC TELEMETRY ROOM

## 2023-01-27 PROCEDURE — 25000003 PHARM REV CODE 250: Performed by: NURSE PRACTITIONER

## 2023-01-27 RX ADMIN — CEFEPIME HYDROCHLORIDE 1 G: 1 INJECTION, SOLUTION INTRAVENOUS at 01:01

## 2023-01-27 RX ADMIN — CEFEPIME HYDROCHLORIDE 1 G: 1 INJECTION, SOLUTION INTRAVENOUS at 09:01

## 2023-01-27 RX ADMIN — CEFEPIME HYDROCHLORIDE 1 G: 1 INJECTION, SOLUTION INTRAVENOUS at 05:01

## 2023-01-27 RX ADMIN — SENNOSIDES AND DOCUSATE SODIUM 1 TABLET: 50; 8.6 TABLET ORAL at 09:01

## 2023-01-27 RX ADMIN — ATORVASTATIN CALCIUM 40 MG: 40 TABLET, FILM COATED ORAL at 09:01

## 2023-01-27 RX ADMIN — ZOLPIDEM TARTRATE 10 MG: 5 TABLET ORAL at 09:01

## 2023-01-27 RX ADMIN — VANCOMYCIN HYDROCHLORIDE 2000 MG: 1 INJECTION, POWDER, LYOPHILIZED, FOR SOLUTION INTRAVENOUS at 01:01

## 2023-01-27 NOTE — PLAN OF CARE
[facility] - Telemetry (Hospital)  Discharge Assessment    Primary Care Provider: Gustavo Calhoun MD     Discharge Assessment (most recent)       BRIEF DISCHARGE ASSESSMENT - 01/27/23 1226          Discharge Planning    Assessment Type Discharge Planning Brief Assessment     Resource/Environmental Concerns none     Support Systems Spouse/significant other     Equipment Currently Used at Home none     Current Living Arrangements home     Patient/Family Anticipates Transition to home     Patient/Family Anticipated Services at Transition none     DME Needed Upon Discharge  none     Discharge Plan A Home     Discharge Plan B Home Health

## 2023-01-27 NOTE — HOSPITAL COURSE
57-year-old male who was placed in observation for left hand cellulitis.  MRI of left-handed not show evidence to suggest septic arthritis or abscess.  He was seen by Orthopedic surgery who agree with IV antibiotics.  Need for I&D will depend on patient's progress    1/28  Examination done bedside, patient denied acute issues, noted to have improvement in redness and swelling.    Denied headache, fever, chills, chest discomfort, bowel or bladder issues.    Discussed with ortho, recommended patient to stay until complete resolution, however patient adamantly prefers to go home given improvement in symptoms, the under recommended outpatient follow-up in ortho clinic on Monday, if symptoms worsens then to present to ED;  Updated plan to the patient, agreed to the plan.    Antibiotics sent to patient preferred pharmacy.

## 2023-01-27 NOTE — SUBJECTIVE & OBJECTIVE
Interval History:  Swelling and erythema improvement with IV antibiotics.  Continue conservative management no surgery indicated at this time.      Review of Systems   Constitutional:  Negative for appetite change, chills, diaphoresis, fatigue and fever.   HENT:  Negative for congestion, nosebleeds, sore throat and trouble swallowing.    Eyes:  Negative for pain, discharge and visual disturbance.   Respiratory:  Negative for apnea, cough, chest tightness, shortness of breath, wheezing and stridor.    Cardiovascular:  Negative for chest pain, palpitations and leg swelling.   Gastrointestinal:  Negative for abdominal distention, abdominal pain, blood in stool, constipation, diarrhea, nausea and vomiting.   Endocrine: Negative for cold intolerance and heat intolerance.   Genitourinary:  Negative for difficulty urinating, dysuria, flank pain, frequency and urgency.   Musculoskeletal:  Positive for arthralgias (Left hand pain-improving). Negative for back pain, joint swelling, myalgias, neck pain and neck stiffness.   Skin:  Positive for color change and wound. Negative for rash.        Erythema, warmth, and swelling Left hand-improving     Allergic/Immunologic: Negative for food allergies and immunocompromised state.   Neurological:  Negative for dizziness, seizures, syncope, facial asymmetry, weakness, light-headedness and headaches.   Hematological:  Negative for adenopathy.   Psychiatric/Behavioral:  Negative for agitation, behavioral problems and confusion. The patient is not nervous/anxious.    Objective:     Vital Signs (Most Recent):  Temp: 97.7 °F (36.5 °C) (01/27/23 1216)  Pulse: 68 (01/27/23 1216)  Resp: 18 (01/27/23 1216)  BP: 121/77 (01/27/23 1216)  SpO2: (!) 94 % (01/27/23 1216)   Vital Signs (24h Range):  Temp:  [97.7 °F (36.5 °C)-98.7 °F (37.1 °C)] 97.7 °F (36.5 °C)  Pulse:  [67-84] 68  Resp:  [14-18] 18  SpO2:  [93 %-96 %] 94 %  BP: (111-144)/(72-89) 121/77     Weight: 77.5 kg (170 lb 13.7 oz)  Body mass  index is 28.43 kg/m².    Intake/Output Summary (Last 24 hours) at 1/27/2023 1531  Last data filed at 1/26/2023 1839  Gross per 24 hour   Intake 93.07 ml   Output --   Net 93.07 ml      Physical Exam  Vitals and nursing note reviewed.   Constitutional:       General: He is not in acute distress.     Appearance: He is well-developed. He is not diaphoretic.   HENT:      Head: Normocephalic and atraumatic.      Nose: Nose normal.   Eyes:      General: No scleral icterus.     Conjunctiva/sclera: Conjunctivae normal.   Cardiovascular:      Rate and Rhythm: Normal rate and regular rhythm.      Heart sounds: Normal heart sounds. No murmur heard.    No friction rub. No gallop.   Pulmonary:      Effort: Pulmonary effort is normal. No respiratory distress.      Breath sounds: Normal breath sounds. No stridor. No wheezing or rales.   Chest:      Chest wall: No tenderness.   Abdominal:      General: Bowel sounds are normal. There is no distension.      Palpations: Abdomen is soft.      Tenderness: There is no abdominal tenderness. There is no guarding or rebound.   Musculoskeletal:         General: Swelling (left hand-improved) present. No tenderness (left hand) or deformity.      Cervical back: Normal range of motion and neck supple.      Comments: ROM in 2 and 3rd digit has improved   Skin:     General: Skin is warm and dry.      Coloration: Skin is not pale.      Findings: No erythema or rash.      Comments: Leftt hand TTP with erythema, warmth, and swelling-improved     Neurological:      Mental Status: He is alert and oriented to person, place, and time.      Cranial Nerves: No cranial nerve deficit.      Motor: No abnormal muscle tone.      Coordination: Coordination normal.      Deep Tendon Reflexes: Reflexes are normal and symmetric.   Psychiatric:         Behavior: Behavior normal.         Thought Content: Thought content normal.         Significant Labs: All pertinent labs within the past 24 hours have been  reviewed.  CBC:   Recent Labs   Lab 01/25/23 2110 01/26/23  0512 01/27/23  0500   WBC 7.55 6.52 5.71   HGB 14.3 12.4* 12.8*   HCT 42.7 37.0* 38.4*    218 246     CMP:   Recent Labs   Lab 01/25/23 2110 01/26/23  0512 01/27/23  0500    141 138   K 4.4 4.8 4.2    108 104   CO2 25 24 23   * 104 142*   BUN 20 19 23*   CREATININE 1.5* 1.3  1.3 1.3   CALCIUM 9.4 8.8 8.4*   PROT 8.0 6.5 6.8   ALBUMIN 4.4 3.6 3.5   BILITOT 0.5 0.5 0.4   ALKPHOS 72 61 68   AST 27 22 23   ALT 43 36 31   ANIONGAP 11 9 11       Significant Imaging:   Imaging Results               X-Ray Hand 3 view Left (Final result)  Result time 01/25/23 21:27:03      Final result by Deuce Sellers MD (01/25/23 21:27:03)                   Impression:      As above.    This report was flagged in Epic as abnormal.      Electronically signed by: Deuce Sellers  Date:    01/25/2023  Time:    21:27               Narrative:    EXAMINATION:  XR HAND COMPLETE 3 VIEW LEFT    CLINICAL HISTORY:  Left hand swelling;.    TECHNIQUE:  PA, lateral, and oblique views of the left hand were performed.    COMPARISON:  None    FINDINGS:  3 mm high density focus in the palm side of the hand near the 3rd metacarpophalangeal junction.  This may be acute or remote.    Dorsal soft tissue swelling.    No fracture.  No traumatic malalignment.  No osseous destructive process.

## 2023-01-27 NOTE — PLAN OF CARE
Pt AAOx4. No tele monitor ordered. On room air; tolerating well. Pt voids spontaneously without difficulty. Pt turned and repositioned independently with use of pillows. 20 G PIV in right AC CDI with no redness, swelling, warmth, or drainage saline locked. Blood glucose monitored. Bed low, wheels locked, alarms audible. Plan of care reviewed. Vital signs monitored. Fall precautions in place. Pain assessed. Safety promoted. Infection risks reduced.

## 2023-01-27 NOTE — PROGRESS NOTES
[facility])  Orthopedics  Progress Note    Patient Name: Venancio Barragan  MRN: [id number]  Admission Date: 1/25/2023  Hospital Length of Stay: 0 days  Attending Provider: Yany Yang, *  Primary Care Provider: Gustavo Calhoun MD  Follow-up For: Procedure(s) (LRB):  INCISION AND DRAINAGE, HAND (Left)    Post-Operative Day: 1 Day Post-Op  Subjective:     Principal Problem:Abscess of hand, left    Principal Orthopedic Problem:  Left hand abscess    Interval History: Venancio Barragan is a 57-year-old male with past medical history significant for type 2 diabetes, hyperlipidemia, hypertension, elevated liver enzymes, elevated PSA, insomnia, low testosterone, and obesity who is admitted for an abscess of the left hand.  Patient reports improvement with IV antibiotic treatment.  He denies nausea, vomiting, fever, or chills    Review of patient's allergies indicates:  No Known Allergies    Current Facility-Administered Medications   Medication    acetaminophen tablet 650 mg    atorvastatin tablet 40 mg    cefepime in dextrose 5 % 1 gram/50 mL IVPB 1 g    dextrose 10% bolus 125 mL 125 mL    dextrose 10% bolus 250 mL 250 mL    glucagon (human recombinant) injection 1 mg    glucose chewable tablet 16 g    glucose chewable tablet 24 g    insulin aspart U-100 pen 0-5 Units    naloxone 0.4 mg/mL injection 0.02 mg    ondansetron injection 4 mg    oxyCODONE immediate release tablet 5 mg    pneumoc 20-yang conj-dip cr(PF) (PREVNAR-20 (PF)) injection Syrg 0.5 mL    prochlorperazine injection Soln 5 mg    senna-docusate 8.6-50 mg per tablet 1 tablet    sodium chloride 0.9% flush 10 mL    vancomycin - pharmacy to dose    vancomycin 2 g in dextrose 5 % 500 mL IVPB    zolpidem tablet 10 mg     Objective:     Vital Signs (Most Recent):  Temp: 98.1 °F (36.7 °C) (01/27/23 0737)  Pulse: 67 (01/27/23 0737)  Resp: 18 (01/27/23 0737)  BP: (!) 144/76 (01/27/23 0737)  SpO2: 95 % (01/27/23 0737)   Vital Signs (24h Range):  Temp:   "[97.9 °F (36.6 °C)-98.7 °F (37.1 °C)] 98.1 °F (36.7 °C)  Pulse:  [62-84] 67  Resp:  [14-18] 18  SpO2:  [93 %-96 %] 95 %  BP: (111-144)/(72-89) 144/76     Weight: 77.5 kg (170 lb 13.7 oz)  Height: 5' 5" (165.1 cm)  Body mass index is 28.43 kg/m².      Intake/Output Summary (Last 24 hours) at 1/27/2023 0907  Last data filed at 1/26/2023 1839  Gross per 24 hour   Intake 93.07 ml   Output --   Net 93.07 ml       Ortho/SPM Exam  Left hand:  Small scab wound at the proximal portion of the distal aspect of the 3rd MCP joint   No drainage on exam   Erythema, edema, and induration surrounding the joint  No fluctuance noted  No pain with passive range of motion   Third MCP joint has full range of motion with pain near end range of motion in flexion   Compartments are soft and compressible   Motor exam normal  Sensation and pulses intact  Cap refill brisk    GEN: Well developed, well nourished male. AAOX3. No acute distress.   Head: Normocephalic, atraumatic.   Eyes: REX  Neck: Trachea is midline, no adenopathy  Resp: Breathing unlabored.  Neuro: Motor function normal, Cranial nerves intact  Psych: Mood and affect appropriate.      Significant Labs:   Recent Lab Results  (Last 5 results in the past 24 hours)        01/27/23  0551   01/27/23  0500   01/26/23  2252   01/26/23 2025 01/26/23  1558        Albumin   3.5             Alkaline Phosphatase   68             ALT   31             Anion Gap   11             AST   23             Baso #   0.03             Basophil %   0.5             BILIRUBIN TOTAL   0.4  Comment: For infants and newborns, interpretation of results should be based  on gestational age, weight and in agreement with clinical  observations.    Premature Infant recommended reference ranges:  Up to 24 hours.............<8.0 mg/dL  Up to 48 hours............<12.0 mg/dL  3-5 days..................<15.0 mg/dL  6-29 days.................<15.0 mg/dL               BUN   23             Calcium   8.4             " Chloride   104             CO2   23             Creatinine   1.3             Differential Method   Automated             eGFR   >60             Eos #   0.1             Eosinophil %   2.1             Glucose   142             Gran # (ANC)   3.0             Gran %   51.7             Hematocrit   38.4             Hemoglobin   12.8             Immature Grans (Abs)   0.01  Comment: Mild elevation in immature granulocytes is non specific and   can be seen in a variety of conditions including stress response,   acute inflammation, trauma and pregnancy. Correlation with other   laboratory and clinical findings is essential.               Immature Granulocytes   0.2             Lymph #   1.8             Lymph %   32.2             Magnesium   1.8             MCH   29.2             MCHC   33.3             MCV   88             Mono #   0.8             Mono %   13.3             MPV   9.5             nRBC   0             Phosphorus   4.2             Platelets   246             POCT Glucose 135       154   83       Potassium   4.2             PROTEIN TOTAL   6.8             RBC   4.39             RDW   12.8             Sodium   138             Vancomycin, Random     5.7           WBC   5.71                                  All pertinent labs within the past 24 hours have been reviewed.    Significant Imaging: I have reviewed and interpreted all pertinent imaging results/findings.    Assessment/Plan:     Active Diagnoses:    Diagnosis Date Noted POA    PRINCIPAL PROBLEM:  Cellulitis and abscess of hand, left with Foreign body  [L02.512] 01/26/2023 Yes    DM type 2 with diabetic mixed hyperlipidemia [E11.69, E78.2] 01/26/2023 Yes    Primary hypertension [I10] 01/26/2023 Unknown      Problems Resolved During this Admission:     Assessment:   57-year-old male with past medical history significant for type 2 diabetes, hyperlipidemia, hypertension, elevated liver enzymes, elevated PSA, insomnia, low testosterone, and obesity who is  admitted for cellulitis of the left hand     Plan:  Patient is improving with IV antibiotic treatment.  We will continue with this at this time and monitor his progression.  We will keep the patient NPO and check on him around mid day.  If he continues to improve we will allow him to eat and continue with IV antibiotics.  Patient should remain admitted until infection has resolved.  We will continue to follow him    Leopoldo Bradford PA-C  Orthopedics  [facility] - Telemetry ([facility])

## 2023-01-27 NOTE — ASSESSMENT & PLAN NOTE
IV Vanco and Cefepime  Orthopedics consulted  Pain control   NPO for now     1/27/23  Orthopedic input appreciated continue IV antibiotics for now.

## 2023-01-27 NOTE — PROGRESS NOTES
Pharmacokinetic Assessment Follow Up: IV Vancomycin    Vancomycin serum concentration assessment(s):    The random level was drawn correctly and can be used to guide therapy at this time. The measurement is below the desired definitive target range of 10 to 20 mcg/mL.    Vancomycin Regimen Plan:    Change regimen to Vancomycin 2000 mg IV every 24 hours with next serum trough concentration measured at 0000 prior to third new dose on 1/29/23.    Drug levels (last 3 results):  Recent Labs   Lab Result Units 01/26/23  2252   Vancomycin, Random ug/mL 5.7       Pharmacy will continue to follow and monitor vancomycin.    Please contact pharmacy at extension [phone] for questions regarding this assessment.    Thank you for the consult,   Puma Finneganry       Patient brief summary:  Venancio Barragan is a 57 y.o. male initiated on antimicrobial therapy with IV Vancomycin for treatment of skin & soft tissue infection        Drug Allergies:   Review of patient's allergies indicates:  No Known Allergies    Actual Body Weight:   79.1 kg    Renal Function:   Estimated Creatinine Clearance: 60.7 mL/min (based on SCr of 1.3 mg/dL).,     Dialysis Method (if applicable):  N/A    CBC (last 72 hours):  Recent Labs   Lab Result Units 01/25/23 2110 01/26/23  0512   WBC K/uL 7.55 6.52   Hemoglobin g/dL 14.3 12.4*   Hematocrit % 42.7 37.0*   Platelets K/uL 279 218   Gran % % 59.8 49.5   Lymph % % 28.1 34.7   Mono % % 10.1 13.3   Eosinophil % % 1.3 1.8   Basophil % % 0.4 0.5   Differential Method  Automated Automated       Metabolic Panel (last 72 hours):  Recent Labs   Lab Result Units 01/25/23 2110 01/26/23  0512   Sodium mmol/L 141 141   Potassium mmol/L 4.4 4.8   Chloride mmol/L 105 108   CO2 mmol/L 25 24   Glucose mg/dL 117* 104   BUN mg/dL 20 19   Creatinine mg/dL 1.5* 1.3  1.3   Albumin g/dL 4.4 3.6   Total Bilirubin mg/dL 0.5 0.5   Alkaline Phosphatase U/L 72 61   AST U/L 27 22   ALT U/L 43 36   Magnesium mg/dL  --  1.8   Phosphorus  mg/dL  --  3.5       Vancomycin Administrations:  vancomycin given in the last 96 hours                     vancomycin (VANCOCIN) 500 mg in dextrose 5 % in water (D5W) 5 % 100 mL IVPB (MB+) (mg) 500 mg New Bag 01/25/23 2346    vancomycin (VANCOCIN) 1,000 mg in dextrose 5 % (D5W) 250 mL IVPB (Vial-Mate) (mg) 1,000 mg New Bag 01/25/23 2206                    Microbiologic Results:  Microbiology Results (last 7 days)       Procedure Component Value Units Date/Time    Blood culture #1 **CANNOT BE ORDERED STAT** [[id number]] Collected: 01/25/23 2105    Order Status: Completed Specimen: Blood from Peripheral, Wrist, Right Updated: 01/26/23 0915     Blood Culture, Routine No Growth to date    Blood culture #2 **CANNOT BE ORDERED STAT** [[id number]] Collected: 01/25/23 2059    Order Status: Completed Specimen: Blood from Peripheral, Antecubital, Right Updated: 01/26/23 0915     Blood Culture, Routine No Growth to date

## 2023-01-28 VITALS
RESPIRATION RATE: 18 BRPM | TEMPERATURE: 99 F | BODY MASS INDEX: 28.47 KG/M2 | HEIGHT: 65 IN | OXYGEN SATURATION: 96 % | HEART RATE: 73 BPM | SYSTOLIC BLOOD PRESSURE: 132 MMHG | WEIGHT: 170.88 LBS | DIASTOLIC BLOOD PRESSURE: 83 MMHG

## 2023-01-28 LAB
ALBUMIN SERPL BCP-MCNC: 3.4 G/DL (ref 3.5–5.2)
ALP SERPL-CCNC: 70 U/L (ref 55–135)
ALT SERPL W/O P-5'-P-CCNC: 29 U/L (ref 10–44)
ANION GAP SERPL CALC-SCNC: 9 MMOL/L (ref 8–16)
AST SERPL-CCNC: 20 U/L (ref 10–40)
BASOPHILS # BLD AUTO: 0.03 K/UL (ref 0–0.2)
BASOPHILS NFR BLD: 0.6 % (ref 0–1.9)
BILIRUB SERPL-MCNC: 0.3 MG/DL (ref 0.1–1)
BUN SERPL-MCNC: 22 MG/DL (ref 6–20)
CALCIUM SERPL-MCNC: 9.1 MG/DL (ref 8.7–10.5)
CHLORIDE SERPL-SCNC: 105 MMOL/L (ref 95–110)
CO2 SERPL-SCNC: 25 MMOL/L (ref 23–29)
CREAT SERPL-MCNC: 1.2 MG/DL (ref 0.5–1.4)
DIFFERENTIAL METHOD: ABNORMAL
EOSINOPHIL # BLD AUTO: 0.1 K/UL (ref 0–0.5)
EOSINOPHIL NFR BLD: 1.9 % (ref 0–8)
ERYTHROCYTE [DISTWIDTH] IN BLOOD BY AUTOMATED COUNT: 12.7 % (ref 11.5–14.5)
EST. GFR  (NO RACE VARIABLE): >60 ML/MIN/1.73 M^2
GLUCOSE SERPL-MCNC: 142 MG/DL (ref 70–110)
HCT VFR BLD AUTO: 38.9 % (ref 40–54)
HGB BLD-MCNC: 12.9 G/DL (ref 14–18)
IMM GRANULOCYTES # BLD AUTO: 0.01 K/UL (ref 0–0.04)
IMM GRANULOCYTES NFR BLD AUTO: 0.2 % (ref 0–0.5)
LYMPHOCYTES # BLD AUTO: 1.8 K/UL (ref 1–4.8)
LYMPHOCYTES NFR BLD: 33 % (ref 18–48)
MAGNESIUM SERPL-MCNC: 1.9 MG/DL (ref 1.6–2.6)
MCH RBC QN AUTO: 29.2 PG (ref 27–31)
MCHC RBC AUTO-ENTMCNC: 33.2 G/DL (ref 32–36)
MCV RBC AUTO: 88 FL (ref 82–98)
MONOCYTES # BLD AUTO: 0.8 K/UL (ref 0.3–1)
MONOCYTES NFR BLD: 14.1 % (ref 4–15)
NEUTROPHILS # BLD AUTO: 2.7 K/UL (ref 1.8–7.7)
NEUTROPHILS NFR BLD: 50.2 % (ref 38–73)
NRBC BLD-RTO: 0 /100 WBC
PHOSPHATE SERPL-MCNC: 3.5 MG/DL (ref 2.7–4.5)
PLATELET # BLD AUTO: 257 K/UL (ref 150–450)
PMV BLD AUTO: 9.8 FL (ref 9.2–12.9)
POCT GLUCOSE: 135 MG/DL (ref 70–110)
POCT GLUCOSE: 155 MG/DL (ref 70–110)
POTASSIUM SERPL-SCNC: 4 MMOL/L (ref 3.5–5.1)
PROT SERPL-MCNC: 6.8 G/DL (ref 6–8.4)
RBC # BLD AUTO: 4.42 M/UL (ref 4.6–6.2)
SODIUM SERPL-SCNC: 139 MMOL/L (ref 136–145)
WBC # BLD AUTO: 5.33 K/UL (ref 3.9–12.7)

## 2023-01-28 PROCEDURE — 80053 COMPREHEN METABOLIC PANEL: CPT | Performed by: NURSE PRACTITIONER

## 2023-01-28 PROCEDURE — 25000003 PHARM REV CODE 250: Performed by: NURSE PRACTITIONER

## 2023-01-28 PROCEDURE — 85025 COMPLETE CBC W/AUTO DIFF WBC: CPT | Performed by: NURSE PRACTITIONER

## 2023-01-28 PROCEDURE — 36415 COLL VENOUS BLD VENIPUNCTURE: CPT | Performed by: NURSE PRACTITIONER

## 2023-01-28 PROCEDURE — 99232 PR SUBSEQUENT HOSPITAL CARE,LEVL II: ICD-10-PCS | Mod: ,,, | Performed by: PHYSICIAN ASSISTANT

## 2023-01-28 PROCEDURE — 63600175 PHARM REV CODE 636 W HCPCS: Mod: TB,JG | Performed by: NURSE PRACTITIONER

## 2023-01-28 PROCEDURE — 84100 ASSAY OF PHOSPHORUS: CPT | Performed by: NURSE PRACTITIONER

## 2023-01-28 PROCEDURE — G0378 HOSPITAL OBSERVATION PER HR: HCPCS

## 2023-01-28 PROCEDURE — 25000003 PHARM REV CODE 250: Performed by: STUDENT IN AN ORGANIZED HEALTH CARE EDUCATION/TRAINING PROGRAM

## 2023-01-28 PROCEDURE — 83735 ASSAY OF MAGNESIUM: CPT | Performed by: NURSE PRACTITIONER

## 2023-01-28 PROCEDURE — 25000003 PHARM REV CODE 250: Performed by: EMERGENCY MEDICINE

## 2023-01-28 PROCEDURE — 99232 SBSQ HOSP IP/OBS MODERATE 35: CPT | Mod: ,,, | Performed by: PHYSICIAN ASSISTANT

## 2023-01-28 PROCEDURE — 63600175 PHARM REV CODE 636 W HCPCS: Performed by: EMERGENCY MEDICINE

## 2023-01-28 PROCEDURE — 96366 THER/PROPH/DIAG IV INF ADDON: CPT

## 2023-01-28 RX ORDER — SODIUM CHLORIDE 9 MG/ML
INJECTION, SOLUTION INTRAVENOUS
Status: DISCONTINUED | OUTPATIENT
Start: 2023-01-28 | End: 2023-01-28 | Stop reason: HOSPADM

## 2023-01-28 RX ORDER — METRONIDAZOLE 500 MG/1
500 TABLET ORAL EVERY 12 HOURS
Qty: 10 TABLET | Refills: 0 | Status: SHIPPED | OUTPATIENT
Start: 2023-01-28 | End: 2023-02-02

## 2023-01-28 RX ORDER — DOXYCYCLINE 100 MG/1
100 CAPSULE ORAL 2 TIMES DAILY
Qty: 10 CAPSULE | Refills: 0 | Status: SHIPPED | OUTPATIENT
Start: 2023-01-28 | End: 2023-02-02

## 2023-01-28 RX ADMIN — CEFEPIME HYDROCHLORIDE 1 G: 1 INJECTION, SOLUTION INTRAVENOUS at 10:01

## 2023-01-28 RX ADMIN — VANCOMYCIN HYDROCHLORIDE 2000 MG: 1 INJECTION, POWDER, LYOPHILIZED, FOR SOLUTION INTRAVENOUS at 01:01

## 2023-01-28 RX ADMIN — ATORVASTATIN CALCIUM 40 MG: 40 TABLET, FILM COATED ORAL at 08:01

## 2023-01-28 RX ADMIN — SODIUM CHLORIDE: 9 INJECTION, SOLUTION INTRAVENOUS at 10:01

## 2023-01-28 RX ADMIN — CEFEPIME HYDROCHLORIDE 1 G: 1 INJECTION, SOLUTION INTRAVENOUS at 01:01

## 2023-01-28 RX ADMIN — SENNOSIDES AND DOCUSATE SODIUM 1 TABLET: 50; 8.6 TABLET ORAL at 08:01

## 2023-01-28 NOTE — DISCHARGE SUMMARY
[facility]  Discharge Summary      Patient Name: Venancio Barragan  MRN: [id number]  [facility]: [id number]  Patient Class: IP- Inpatient  Admission Date: 1/25/2023  Hospital Length of Stay: 1 days  Discharge Date and Time: 1/28/2023  Attending Physician: Yany Yang, *   Discharging Provider: Yany Yang MD  Primary Care Provider: Gustavo Calhoun MD    Primary Care Team: Networked reference to record PCT     HPI:   The patient is a 56 yo male with HTN, DM, HLD who presented to ED with Left hand erythema, swelling, warmth, and pain - onset 2 days ago that progressively worsened. Pt was seen at  and prescribed oral Doxycycline without improvement.     In the ED, Afebrile, WBC normal. Serum Cr 1.5>1.3 after IVFs, CRP 14. Left hand Xray showed a Foreign body in the palm side of the hand near the 3rd metacarpophalangeal junction. Ultrasound of hand showed an abscess abutting the extensor tendon.   Pt received IV Vanco and Cefepime.     The patient will be placed in observation under hospital medicine      Procedure(s) (LRB):  INCISION AND DRAINAGE, HAND (Left)      Hospital Course:   57-year-old male who was placed in observation for left hand cellulitis.  MRI of left-handed not show evidence to suggest septic arthritis or abscess.  He was seen by Orthopedic surgery who agree with IV antibiotics.  Need for I&D will depend on patient's progress    1/28  Examination done bedside, patient denied acute issues, noted to have improvement in redness and swelling.    Denied headache, fever, chills, chest discomfort, bowel or bladder issues.    Discussed with ortho, recommended patient to stay until complete resolution, however patient adamantly prefers to go home given improvement in symptoms, the under recommended outpatient follow-up in ortho clinic on Monday, if symptoms worsens then to present to ED;  Updated plan to the patient, agreed to the plan.    Antibiotics sent to  patient preferred pharmacy.           Review of Systems     Constitutional:  Negative for appetite change, chills, diaphoresis, fatigue and fever.   HENT:  Negative for congestion, nosebleeds, sore throat and trouble swallowing.    Eyes:  Negative for pain, discharge and visual disturbance.   Respiratory:  Negative for apnea, cough, chest tightness, shortness of breath, wheezing and stridor.    Cardiovascular:  Negative for chest pain, palpitations and leg swelling.   Gastrointestinal:  Negative for abdominal distention, abdominal pain, blood in stool, constipation, diarrhea, nausea and vomiting.   Endocrine: Negative for cold intolerance and heat intolerance.   Genitourinary:  Negative for difficulty urinating, dysuria, flank pain, frequency and urgency.   Musculoskeletal:   Negative for back pain, joint swelling, myalgias, neck pain and neck stiffness.   Skin:   Negative for rash.        Erythema, warmth, and swelling Left hand-improving     Allergic/Immunologic: Negative for food allergies and immunocompromised state.   Neurological:  Negative for dizziness, seizures, syncope, facial asymmetry, weakness, light-headedness and headaches.   Hematological:  Negative for adenopathy.   Psychiatric/Behavioral:  Negative for agitation, behavioral problems and confusion. The patient is not nervous/anxious.      Physical Exam     Constitutional:       General: He is not in acute distress.     Appearance: He is well-developed. He is not diaphoretic.   HENT:      Head: Normocephalic and atraumatic.      Nose: Nose normal.   Eyes:      General: No scleral icterus.     Conjunctiva/sclera: Conjunctivae normal.   Cardiovascular:      Rate and Rhythm: Normal rate and regular rhythm.      Heart sounds: Normal heart sounds. No murmur heard.    No friction rub. No gallop.   Pulmonary:      Effort: Pulmonary effort is normal. No respiratory distress.      Breath sounds: Normal breath sounds. No stridor. No wheezing or rales.   Chest:       Chest wall: No tenderness.   Abdominal:      General: Bowel sounds are normal. There is no distension.      Palpations: Abdomen is soft.      Tenderness: There is no abdominal tenderness. There is no guarding or rebound.   Musculoskeletal:         General: swelling and erythema improved present. No tenderness (left hand) or deformity.      Cervical back: Normal range of motion and neck supple.      Comments: ROM in 2 and 3rd digit has improved   Skin:     General: Skin is warm and dry.      Coloration: Skin is not pale.      Findings: No erythema or rash.      Comments: Leftt hand TTP with erythema, warmth, and swelling-improved     Neurological:      Mental Status: He is alert and oriented to person, place, and time.      Cranial Nerves: No cranial nerve deficit.      Motor: No abnormal muscle tone.      Coordination: Coordination normal.      Deep Tendon Reflexes: Reflexes are normal and symmetric.   Psychiatric:         Behavior: Behavior normal.         Thought Content: Thought content normal.     Goals of Care Treatment Preferences:  Code Status: Full Code      Consults:   Consults (From admission, onward)        Status Ordering Provider     Inpatient consult to Orthopedic Surgery  Once        Provider:  Hever Alegria MD    Completed FELICIA MARTEL     Pharmacy to dose Vancomycin consult  Once        Provider:  (Not yet assigned)   See [facility] for full Linked Orders Report.    Acknowledged FELICIA MARTEL          No new Assessment & Plan notes have been filed under this hospital service since the last note was generated.  Service: Hospital Medicine    Final Active Diagnoses:    Diagnosis Date Noted POA    PRINCIPAL PROBLEM:  Cellulitis and abscess of hand, left with Foreign body  [L02.512] 01/26/2023 Yes    DM type 2 with diabetic mixed hyperlipidemia [E11.69, E78.2] 01/26/2023 Yes    Primary hypertension [I10] 01/26/2023 Yes      Problems Resolved During this Admission:       Discharged  Condition: stable    Disposition: Home or Self Care    Follow Up:   Follow-up Information     Gustavo Calhoun MD Follow up in 1 week(s).    Specialty: Family Medicine  Contact information:  [address] [facility] [address] [facility]  [facility] [address] [phone]             Curtis Cedeño MD, FIDSA Follow up in 1 week(s).    Specialties: Infectious Diseases, Hospitalist  Contact information:  [address] [facility] [address] [phone]             Calvin Still MD Follow up.    Specialty: Orthopedic Surgery  Why: on monday  Contact information:  [address] [facility] [facility]  [address]  [facility] [address] [phone]                       Patient Instructions:   No discharge procedures on file.    Significant Diagnostic Studies:     Results for orders placed or performed during the hospital encounter of 01/25/23   Blood culture #1 **CANNOT BE ORDERED STAT**    Specimen: Peripheral, Wrist, Right; Blood   Result Value Ref Range    Blood Culture, Routine No Growth to date     Blood Culture, Routine No Growth to date     Blood Culture, Routine No Growth to date    Blood culture #2 **CANNOT BE ORDERED STAT**    Specimen: Peripheral, Antecubital, Right; Blood   Result Value Ref Range    Blood Culture, Routine No Growth to date     Blood Culture, Routine No Growth to date     Blood Culture, Routine No Growth to date    CBC auto differential   Result Value Ref Range    WBC 7.55 3.90 - 12.70 K/uL    RBC 4.79 4.60 - 6.20 M/uL    Hemoglobin 14.3 14.0 - 18.0 g/dL    Hematocrit 42.7 40.0 - 54.0 %    MCV 89 82 - 98 fL    MCH 29.9 27.0 - 31.0 pg    MCHC 33.5 32.0 - 36.0 g/dL    RDW 13.1 11.5 - 14.5 %    Platelets 279 150 - 450 K/uL    MPV 9.6 9.2 - 12.9 fL    Immature Granulocytes 0.3 0.0 - 0.5 %    Gran # (ANC) 4.5 1.8 - 7.7 K/uL    Immature Grans (Abs) 0.02 0.00 - 0.04 K/uL    Lymph # 2.1 1.0 - 4.8 K/uL    Mono # 0.8 0.3 - 1.0 K/uL    Eos # 0.1 0.0 - 0.5 K/uL    Baso # 0.03 0.00 -  0.20 K/uL    nRBC 0 0 /100 WBC    Gran % 59.8 38.0 - 73.0 %    Lymph % 28.1 18.0 - 48.0 %    Mono % 10.1 4.0 - 15.0 %    Eosinophil % 1.3 0.0 - 8.0 %    Basophil % 0.4 0.0 - 1.9 %    Differential Method Automated    Comprehensive metabolic panel   Result Value Ref Range    Sodium 141 136 - 145 mmol/L    Potassium 4.4 3.5 - 5.1 mmol/L    Chloride 105 95 - 110 mmol/L    CO2 25 23 - 29 mmol/L    Glucose 117 (H) 70 - 110 mg/dL    BUN 20 6 - 20 mg/dL    Creatinine 1.5 (H) 0.5 - 1.4 mg/dL    Calcium 9.4 8.7 - 10.5 mg/dL    Total Protein 8.0 6.0 - 8.4 g/dL    Albumin 4.4 3.5 - 5.2 g/dL    Total Bilirubin 0.5 0.1 - 1.0 mg/dL    Alkaline Phosphatase 72 55 - 135 U/L    AST 27 10 - 40 U/L    ALT 43 10 - 44 U/L    Anion Gap 11 8 - 16 mmol/L    eGFR 54.0 (A) >60 mL/min/1.73 m^2   Lactic acid, plasma   Result Value Ref Range    Lactate (Lactic Acid) 1.0 0.5 - 2.2 mmol/L   Creatinine, serum   Result Value Ref Range    Creatinine 1.3 0.5 - 1.4 mg/dL    eGFR >60.0 >60 mL/min/1.73 m^2   Comprehensive Metabolic Panel (CMP)   Result Value Ref Range    Sodium 141 136 - 145 mmol/L    Potassium 4.8 3.5 - 5.1 mmol/L    Chloride 108 95 - 110 mmol/L    CO2 24 23 - 29 mmol/L    Glucose 104 70 - 110 mg/dL    BUN 19 6 - 20 mg/dL    Creatinine 1.3 0.5 - 1.4 mg/dL    Calcium 8.8 8.7 - 10.5 mg/dL    Total Protein 6.5 6.0 - 8.4 g/dL    Albumin 3.6 3.5 - 5.2 g/dL    Total Bilirubin 0.5 0.1 - 1.0 mg/dL    Alkaline Phosphatase 61 55 - 135 U/L    AST 22 10 - 40 U/L    ALT 36 10 - 44 U/L    Anion Gap 9 8 - 16 mmol/L    eGFR >60.0 >60 mL/min/1.73 m^2   Magnesium   Result Value Ref Range    Magnesium 1.8 1.6 - 2.6 mg/dL   Phosphorus   Result Value Ref Range    Phosphorus 3.5 2.7 - 4.5 mg/dL   CBC with Automated Differential   Result Value Ref Range    WBC 6.52 3.90 - 12.70 K/uL    RBC 4.17 (L) 4.60 - 6.20 M/uL    Hemoglobin 12.4 (L) 14.0 - 18.0 g/dL    Hematocrit 37.0 (L) 40.0 - 54.0 %    MCV 89 82 - 98 fL    MCH 29.7 27.0 - 31.0 pg    MCHC 33.5 32.0 -  36.0 g/dL    RDW 13.1 11.5 - 14.5 %    Platelets 218 150 - 450 K/uL    MPV 10.1 9.2 - 12.9 fL    Immature Granulocytes 0.2 0.0 - 0.5 %    Gran # (ANC) 3.2 1.8 - 7.7 K/uL    Immature Grans (Abs) 0.01 0.00 - 0.04 K/uL    Lymph # 2.3 1.0 - 4.8 K/uL    Mono # 0.9 0.3 - 1.0 K/uL    Eos # 0.1 0.0 - 0.5 K/uL    Baso # 0.03 0.00 - 0.20 K/uL    nRBC 0 0 /100 WBC    Gran % 49.5 38.0 - 73.0 %    Lymph % 34.7 18.0 - 48.0 %    Mono % 13.3 4.0 - 15.0 %    Eosinophil % 1.8 0.0 - 8.0 %    Basophil % 0.5 0.0 - 1.9 %    Platelet Estimate Appears normal     Differential Method Automated    C-Reactive Protein   Result Value Ref Range    CRP 14.0 (H) 0.0 - 8.2 mg/L   Vancomycin, random   Result Value Ref Range    Vancomycin, Random 5.7 Not established ug/mL   Comprehensive Metabolic Panel (CMP)   Result Value Ref Range    Sodium 138 136 - 145 mmol/L    Potassium 4.2 3.5 - 5.1 mmol/L    Chloride 104 95 - 110 mmol/L    CO2 23 23 - 29 mmol/L    Glucose 142 (H) 70 - 110 mg/dL    BUN 23 (H) 6 - 20 mg/dL    Creatinine 1.3 0.5 - 1.4 mg/dL    Calcium 8.4 (L) 8.7 - 10.5 mg/dL    Total Protein 6.8 6.0 - 8.4 g/dL    Albumin 3.5 3.5 - 5.2 g/dL    Total Bilirubin 0.4 0.1 - 1.0 mg/dL    Alkaline Phosphatase 68 55 - 135 U/L    AST 23 10 - 40 U/L    ALT 31 10 - 44 U/L    Anion Gap 11 8 - 16 mmol/L    eGFR >60 >60 mL/min/1.73 m^2   Magnesium   Result Value Ref Range    Magnesium 1.8 1.6 - 2.6 mg/dL   Phosphorus   Result Value Ref Range    Phosphorus 4.2 2.7 - 4.5 mg/dL   CBC with Automated Differential   Result Value Ref Range    WBC 5.71 3.90 - 12.70 K/uL    RBC 4.39 (L) 4.60 - 6.20 M/uL    Hemoglobin 12.8 (L) 14.0 - 18.0 g/dL    Hematocrit 38.4 (L) 40.0 - 54.0 %    MCV 88 82 - 98 fL    MCH 29.2 27.0 - 31.0 pg    MCHC 33.3 32.0 - 36.0 g/dL    RDW 12.8 11.5 - 14.5 %    Platelets 246 150 - 450 K/uL    MPV 9.5 9.2 - 12.9 fL    Immature Granulocytes 0.2 0.0 - 0.5 %    Gran # (ANC) 3.0 1.8 - 7.7 K/uL    Immature Grans (Abs) 0.01 0.00 - 0.04 K/uL    Lymph #  1.8 1.0 - 4.8 K/uL    Mono # 0.8 0.3 - 1.0 K/uL    Eos # 0.1 0.0 - 0.5 K/uL    Baso # 0.03 0.00 - 0.20 K/uL    nRBC 0 0 /100 WBC    Gran % 51.7 38.0 - 73.0 %    Lymph % 32.2 18.0 - 48.0 %    Mono % 13.3 4.0 - 15.0 %    Eosinophil % 2.1 0.0 - 8.0 %    Basophil % 0.5 0.0 - 1.9 %    Differential Method Automated    Comprehensive Metabolic Panel (CMP)   Result Value Ref Range    Sodium 139 136 - 145 mmol/L    Potassium 4.0 3.5 - 5.1 mmol/L    Chloride 105 95 - 110 mmol/L    CO2 25 23 - 29 mmol/L    Glucose 142 (H) 70 - 110 mg/dL    BUN 22 (H) 6 - 20 mg/dL    Creatinine 1.2 0.5 - 1.4 mg/dL    Calcium 9.1 8.7 - 10.5 mg/dL    Total Protein 6.8 6.0 - 8.4 g/dL    Albumin 3.4 (L) 3.5 - 5.2 g/dL    Total Bilirubin 0.3 0.1 - 1.0 mg/dL    Alkaline Phosphatase 70 55 - 135 U/L    AST 20 10 - 40 U/L    ALT 29 10 - 44 U/L    Anion Gap 9 8 - 16 mmol/L    eGFR >60 >60 mL/min/1.73 m^2   Magnesium   Result Value Ref Range    Magnesium 1.9 1.6 - 2.6 mg/dL   Phosphorus   Result Value Ref Range    Phosphorus 3.5 2.7 - 4.5 mg/dL   CBC with Automated Differential   Result Value Ref Range    WBC 5.33 3.90 - 12.70 K/uL    RBC 4.42 (L) 4.60 - 6.20 M/uL    Hemoglobin 12.9 (L) 14.0 - 18.0 g/dL    Hematocrit 38.9 (L) 40.0 - 54.0 %    MCV 88 82 - 98 fL    MCH 29.2 27.0 - 31.0 pg    MCHC 33.2 32.0 - 36.0 g/dL    RDW 12.7 11.5 - 14.5 %    Platelets 257 150 - 450 K/uL    MPV 9.8 9.2 - 12.9 fL    Immature Granulocytes 0.2 0.0 - 0.5 %    Gran # (ANC) 2.7 1.8 - 7.7 K/uL    Immature Grans (Abs) 0.01 0.00 - 0.04 K/uL    Lymph # 1.8 1.0 - 4.8 K/uL    Mono # 0.8 0.3 - 1.0 K/uL    Eos # 0.1 0.0 - 0.5 K/uL    Baso # 0.03 0.00 - 0.20 K/uL    nRBC 0 0 /100 WBC    Gran % 50.2 38.0 - 73.0 %    Lymph % 33.0 18.0 - 48.0 %    Mono % 14.1 4.0 - 15.0 %    Eosinophil % 1.9 0.0 - 8.0 %    Basophil % 0.6 0.0 - 1.9 %    Differential Method Automated    POCT glucose   Result Value Ref Range    POCT Glucose 97 70 - 110 mg/dL   POCT glucose   Result Value Ref Range    POCT  Glucose 83 70 - 110 mg/dL   POCT glucose   Result Value Ref Range    POCT Glucose 154 (H) 70 - 110 mg/dL   POCT glucose   Result Value Ref Range    POCT Glucose 135 (H) 70 - 110 mg/dL   POCT glucose   Result Value Ref Range    POCT Glucose 113 (H) 70 - 110 mg/dL   POCT glucose   Result Value Ref Range    POCT Glucose 96 70 - 110 mg/dL   POCT glucose   Result Value Ref Range    POCT Glucose 185 (H) 70 - 110 mg/dL   POCT glucose   Result Value Ref Range    POCT Glucose 135 (H) 70 - 110 mg/dL   POCT glucose   Result Value Ref Range    POCT Glucose 155 (H) 70 - 110 mg/dL       Imaging Results           X-Ray Hand 3 view Left (Final result)  Result time 01/25/23 21:27:03    Final result by Deuce Sellers MD (01/25/23 21:27:03)                 Impression:      As above.    This report was flagged in Epic as abnormal.      Electronically signed by: Deuce Sellers  Date:    01/25/2023  Time:    21:27             Narrative:    EXAMINATION:  XR HAND COMPLETE 3 VIEW LEFT    CLINICAL HISTORY:  Left hand swelling;.    TECHNIQUE:  PA, lateral, and oblique views of the left hand were performed.    COMPARISON:  None    FINDINGS:  3 mm high density focus in the palm side of the hand near the 3rd metacarpophalangeal junction.  This may be acute or remote.    Dorsal soft tissue swelling.    No fracture.  No traumatic malalignment.  No osseous destructive process.                                Pending Diagnostic Studies:     None         Medications:         Medication List      START taking these medications    doxycycline 100 MG Cap  Commonly known as: VIBRAMYCIN  Take 1 capsule (100 mg total) by mouth 2 (two) times daily. for 5 days     metroNIDAZOLE 500 MG tablet  Commonly known as: FLAGYL  Take 1 tablet (500 mg total) by mouth every 12 (twelve) hours. for 5 days        CHANGE how you take these medications    ramipriL 2.5 MG capsule  Commonly known as: ALTACE  What changed: Another medication with the same name was removed.  Continue taking this medication, and follow the directions you see here.        CONTINUE taking these medications    atorvastatin 40 MG tablet  Commonly known as: LIPITOR     metFORMIN 500 MG tablet  Commonly known as: GLUCOPHAGE     multivitamin capsule     OZEMPIC 0.25 mg or 0.5 mg(2 mg/1.5 mL) pen injector  Generic drug: semaglutide     promethazine 25 MG tablet  Commonly known as: PHENERGAN  Take 1 tablet (25 mg total) by mouth every 6 (six) hours as needed for Nausea.     tamsulosin 0.4 mg Cap  Commonly known as: FLOMAX  Take 1 capsule (0.4 mg total) by mouth once daily.     TRULICITY 0.75 mg/0.5 mL pen injector  Generic drug: dulaglutide     zolpidem 10 mg Tab  Commonly known as: AMBIEN        STOP taking these medications    naproxen 500 MG tablet  Commonly known as: NAPROSYN           Where to Get Your Medications      These medications were sent to [id number] - [place] [address] AT [address] [address] [place] [address], [facility] [address]    Phone: [phone]   · doxycycline 100 MG Cap  · metroNIDAZOLE 500 MG tablet         Indwelling Lines/Drains at time of discharge:   Lines/Drains/Airways     None                 Time spent on the discharge of patient: 82 minutes         Yany Yang MD  Department of Hospital Medicine  [facility] - Telemetry ([facility])

## 2023-01-28 NOTE — PLAN OF CARE
Discharge instructions received and reviewed with pt and family at bedside.  Pt voiced understanding and all questions answered to satisfaction.  Stressed importance to making and keeping all follow up appointments.  Medications sent to pt pharmacy and reviewed with pt.  IV d/c'd with tip intact, pressure dressing applied.  Pt transported to front of hospital via w/c by PCT to be discharged home.

## 2023-01-28 NOTE — PROGRESS NOTES
[facility] Medicine  Progress Note    Patient Name: Venancio Barragan  MRN: [id number]  Patient Class: IP- Inpatient   Admission Date: 1/25/2023  Length of Stay: 0 days  Attending Physician: Yany Yang, *  Primary Care Provider: Gustavo Calhoun MD        Subjective:     Principal Problem:Abscess of hand, left        HPI:  The patient is a 56 yo male with HTN, DM, HLD who presented to ED with Left hand erythema, swelling, warmth, and pain - onset 2 days ago that progressively worsened. Pt was seen at  and prescribed oral Doxycycline without improvement.     In the ED, Afebrile, WBC normal. Serum Cr 1.5>1.3 after IVFs, CRP 14. Left hand Xray showed a Foreign body in the palm side of the hand near the 3rd metacarpophalangeal junction. Ultrasound of hand showed an abscess abutting the extensor tendon.   Pt received IV Vanco and Cefepime.     The patient will be placed in observation under hospital medicine      Overview/Hospital Course:  57-year-old male who was placed in observation for left hand cellulitis.  MRI of left-handed not show evidence to suggest septic arthritis or abscess.  He was seen by Orthopedic surgery who agree with IV antibiotics.  Need for I&D will depend on patient's progress      Interval History:  Swelling and erythema improvement with IV antibiotics.  Continue conservative management no surgery indicated at this time.      Review of Systems   Constitutional:  Negative for appetite change, chills, diaphoresis, fatigue and fever.   HENT:  Negative for congestion, nosebleeds, sore throat and trouble swallowing.    Eyes:  Negative for pain, discharge and visual disturbance.   Respiratory:  Negative for apnea, cough, chest tightness, shortness of breath, wheezing and stridor.    Cardiovascular:  Negative for chest pain, palpitations and leg swelling.   Gastrointestinal:  Negative for abdominal distention, abdominal pain, blood in stool, constipation, diarrhea,  nausea and vomiting.   Endocrine: Negative for cold intolerance and heat intolerance.   Genitourinary:  Negative for difficulty urinating, dysuria, flank pain, frequency and urgency.   Musculoskeletal:  Positive for arthralgias (Left hand pain-improving). Negative for back pain, joint swelling, myalgias, neck pain and neck stiffness.   Skin:  Positive for color change and wound. Negative for rash.        Erythema, warmth, and swelling Left hand-improving     Allergic/Immunologic: Negative for food allergies and immunocompromised state.   Neurological:  Negative for dizziness, seizures, syncope, facial asymmetry, weakness, light-headedness and headaches.   Hematological:  Negative for adenopathy.   Psychiatric/Behavioral:  Negative for agitation, behavioral problems and confusion. The patient is not nervous/anxious.    Objective:     Vital Signs (Most Recent):  Temp: 97.7 °F (36.5 °C) (01/27/23 1216)  Pulse: 68 (01/27/23 1216)  Resp: 18 (01/27/23 1216)  BP: 121/77 (01/27/23 1216)  SpO2: (!) 94 % (01/27/23 1216)   Vital Signs (24h Range):  Temp:  [97.7 °F (36.5 °C)-98.7 °F (37.1 °C)] 97.7 °F (36.5 °C)  Pulse:  [67-84] 68  Resp:  [14-18] 18  SpO2:  [93 %-96 %] 94 %  BP: (111-144)/(72-89) 121/77     Weight: 77.5 kg (170 lb 13.7 oz)  Body mass index is 28.43 kg/m².    Intake/Output Summary (Last 24 hours) at 1/27/2023 1531  Last data filed at 1/26/2023 1839  Gross per 24 hour   Intake 93.07 ml   Output --   Net 93.07 ml      Physical Exam  Vitals and nursing note reviewed.   Constitutional:       General: He is not in acute distress.     Appearance: He is well-developed. He is not diaphoretic.   HENT:      Head: Normocephalic and atraumatic.      Nose: Nose normal.   Eyes:      General: No scleral icterus.     Conjunctiva/sclera: Conjunctivae normal.   Cardiovascular:      Rate and Rhythm: Normal rate and regular rhythm.      Heart sounds: Normal heart sounds. No murmur heard.    No friction rub. No gallop.   Pulmonary:       Effort: Pulmonary effort is normal. No respiratory distress.      Breath sounds: Normal breath sounds. No stridor. No wheezing or rales.   Chest:      Chest wall: No tenderness.   Abdominal:      General: Bowel sounds are normal. There is no distension.      Palpations: Abdomen is soft.      Tenderness: There is no abdominal tenderness. There is no guarding or rebound.   Musculoskeletal:         General: Swelling (left hand-improved) present. No tenderness (left hand) or deformity.      Cervical back: Normal range of motion and neck supple.      Comments: ROM in 2 and 3rd digit has improved   Skin:     General: Skin is warm and dry.      Coloration: Skin is not pale.      Findings: No erythema or rash.      Comments: Leftt hand TTP with erythema, warmth, and swelling-improved     Neurological:      Mental Status: He is alert and oriented to person, place, and time.      Cranial Nerves: No cranial nerve deficit.      Motor: No abnormal muscle tone.      Coordination: Coordination normal.      Deep Tendon Reflexes: Reflexes are normal and symmetric.   Psychiatric:         Behavior: Behavior normal.         Thought Content: Thought content normal.         Significant Labs: All pertinent labs within the past 24 hours have been reviewed.  CBC:   Recent Labs   Lab 01/25/23 2110 01/26/23  0512 01/27/23  0500   WBC 7.55 6.52 5.71   HGB 14.3 12.4* 12.8*   HCT 42.7 37.0* 38.4*    218 246     CMP:   Recent Labs   Lab 01/25/23 2110 01/26/23  0512 01/27/23  0500    141 138   K 4.4 4.8 4.2    108 104   CO2 25 24 23   * 104 142*   BUN 20 19 23*   CREATININE 1.5* 1.3  1.3 1.3   CALCIUM 9.4 8.8 8.4*   PROT 8.0 6.5 6.8   ALBUMIN 4.4 3.6 3.5   BILITOT 0.5 0.5 0.4   ALKPHOS 72 61 68   AST 27 22 23   ALT 43 36 31   ANIONGAP 11 9 11       Significant Imaging:   Imaging Results               X-Ray Hand 3 view Left (Final result)  Result time 01/25/23 21:27:03      Final result by Deuce Sellers MD  (01/25/23 21:27:03)                   Impression:      As above.    This report was flagged in Epic as abnormal.      Electronically signed by: Deuce Sellers  Date:    01/25/2023  Time:    21:27               Narrative:    EXAMINATION:  XR HAND COMPLETE 3 VIEW LEFT    CLINICAL HISTORY:  Left hand swelling;.    TECHNIQUE:  PA, lateral, and oblique views of the left hand were performed.    COMPARISON:  None    FINDINGS:  3 mm high density focus in the palm side of the hand near the 3rd metacarpophalangeal junction.  This may be acute or remote.    Dorsal soft tissue swelling.    No fracture.  No traumatic malalignment.  No osseous destructive process.                                          Assessment/Plan:      * Cellulitis and abscess of hand, left with Foreign body   IV Vanco and Cefepime  Orthopedics consulted  Pain control   NPO for now     1/27/23  Orthopedic input appreciated continue IV antibiotics for now.    Primary hypertension  Cont home medication -Altace changed to Lisinopril       DM type 2 with diabetic mixed hyperlipidemia  Patient's FSGs are controlled on current medication regimen.  Last A1c reviewed-   Lab Results   Component Value Date    HGBA1C 6.0 (H) 01/03/2023     Most recent fingerstick glucose reviewed-   Recent Labs   Lab 01/26/23 2025 01/27/23  0551 01/27/23  1215 01/27/23  1605   POCTGLUCOSE 154* 135* 113* 96     Current correctional scale  Low  Maintain anti-hyperglycemic dose as follows-   Antihyperglycemics (From admission, onward)    Start     Stop Route Frequency Ordered    01/26/23 0205  insulin aspart U-100 pen 0-5 Units         -- SubQ Before meals & nightly PRN 01/26/23 0106        Hold Oral hypoglycemics while patient is in the hospital.      VTE Risk Mitigation (From admission, onward)         Ordered     IP VTE HIGH RISK PATIENT  Once         01/26/23 0106     Place sequential compression device  Until discontinued         01/26/23 0106                Discharge Planning    GABRIELLE:      Code Status: Full Code   Is the patient medically ready for discharge?:     Reason for patient still in hospital (select all that apply): Treatment  Discharge Plan A: Home        Magdalena Burgess NP  Department of Hospital Medicine   [facility] ([facility])

## 2023-01-28 NOTE — ASSESSMENT & PLAN NOTE
Patient's FSGs are controlled on current medication regimen.  Last A1c reviewed-   Lab Results   Component Value Date    HGBA1C 6.0 (H) 01/03/2023     Most recent fingerstick glucose reviewed-   Recent Labs   Lab 01/26/23 2025 01/27/23  0551 01/27/23  1215 01/27/23  1605   POCTGLUCOSE 154* 135* 113* 96     Current correctional scale  Low  Maintain anti-hyperglycemic dose as follows-   Antihyperglycemics (From admission, onward)    Start     Stop Route Frequency Ordered    01/26/23 0205  insulin aspart U-100 pen 0-5 Units         -- SubQ Before meals & nightly PRN 01/26/23 0106        Hold Oral hypoglycemics while patient is in the hospital.

## 2023-01-28 NOTE — PROGRESS NOTES
[facility])  Orthopedics  Progress Note    Patient Name: Venancio Barragan  MRN: [id number]  Admission Date: 1/25/2023  Hospital Length of Stay: 1 days  Attending Provider: Yany Yang, *  Primary Care Provider: Gustavo Calhoun MD  Follow-up For: Procedure(s) (LRB):  INCISION AND DRAINAGE, HAND (Left)    Post-Operative Day: 2 Days Post-Op  Subjective:     Principal Problem:Abscess of hand, left    Principal Orthopedic Problem:  Left hand abscess    Interval History: Venancio Barragan is a 57-year-old male with past medical history significant for type 2 diabetes, hyperlipidemia, hypertension, elevated liver enzymes, elevated PSA, insomnia, low testosterone, and obesity who is admitted for an abscess of the left hand.  Patient reports continued improvement with IV antibiotics.  No new complaints at this time    Review of patient's allergies indicates:  No Known Allergies    Current Facility-Administered Medications   Medication    acetaminophen tablet 650 mg    atorvastatin tablet 40 mg    cefepime in dextrose 5 % 1 gram/50 mL IVPB 1 g    dextrose 10% bolus 125 mL 125 mL    dextrose 10% bolus 250 mL 250 mL    glucagon (human recombinant) injection 1 mg    glucose chewable tablet 16 g    glucose chewable tablet 24 g    insulin aspart U-100 pen 0-5 Units    naloxone 0.4 mg/mL injection 0.02 mg    ondansetron injection 4 mg    oxyCODONE immediate release tablet 5 mg    pneumoc 20-yang conj-dip cr(PF) (PREVNAR-20 (PF)) injection Syrg 0.5 mL    prochlorperazine injection Soln 5 mg    senna-docusate 8.6-50 mg per tablet 1 tablet    sodium chloride 0.9% flush 10 mL    vancomycin - pharmacy to dose    vancomycin 2 g in dextrose 5 % 500 mL IVPB    zolpidem tablet 10 mg     Objective:     Vital Signs (Most Recent):  Temp: 97.8 °F (36.6 °C) (01/28/23 0721)  Pulse: 68 (01/28/23 0721)  Resp: 18 (01/28/23 0721)  BP: 110/68 (01/28/23 0721)  SpO2: 95 % (01/28/23 0721) Vital Signs (24h Range):  Temp:  [97.7 °F (36.5  "°C)-99 °F (37.2 °C)] 97.8 °F (36.6 °C)  Pulse:  [68-75] 68  Resp:  [17-18] 18  SpO2:  [94 %-95 %] 95 %  BP: (110-139)/(65-89) 110/68     Weight: 77.5 kg (170 lb 13.7 oz)  Height: 5' 5" (165.1 cm)  Body mass index is 28.43 kg/m².      Intake/Output Summary (Last 24 hours) at 1/28/2023 0827  Last data filed at 1/27/2023 1953  Gross per 24 hour   Intake 85.28 ml   Output --   Net 85.28 ml       Ortho/SPM Exam  Left hand:  Small scab wound of the proximal portion of the distal aspect of the 3rd MCP joint   No drainage on exam   Erythema, edema, and induration surrounding the joint continues to improve   No fluctuance noted   No pain with range of motion which is full   Compartments are soft and compressible   Motor exam normal   Sensation and pulses intact  Cap refill brisk    GEN: Well developed, well nourished male. AAOX3. No acute distress.   Head: Normocephalic, atraumatic.   Eyes: REX  Neck: Trachea is midline, no adenopathy  Resp: Breathing unlabored.  Neuro: Motor function normal, Cranial nerves intact  Psych: Mood and affect appropriate.      Significant Labs:   Recent Lab Results  (Last 5 results in the past 24 hours)        01/28/23  0527   01/28/23  0512   01/27/23  2048   01/27/23  1605   01/27/23  1215        Albumin 3.4               Alkaline Phosphatase 70               ALT 29               Anion Gap 9               AST 20               Baso # 0.03               Basophil % 0.6               BILIRUBIN TOTAL 0.3  Comment: For infants and newborns, interpretation of results should be based  on gestational age, weight and in agreement with clinical  observations.    Premature Infant recommended reference ranges:  Up to 24 hours.............<8.0 mg/dL  Up to 48 hours............<12.0 mg/dL  3-5 days..................<15.0 mg/dL  6-29 days.................<15.0 mg/dL                 BUN 22               Calcium 9.1               Chloride 105               CO2 25               Creatinine 1.2               " Differential Method Automated               eGFR >60               Eos # 0.1               Eosinophil % 1.9               Glucose 142               Gran # (ANC) 2.7               Gran % 50.2               Hematocrit 38.9               Hemoglobin 12.9               Immature Grans (Abs) 0.01  Comment: Mild elevation in immature granulocytes is non specific and   can be seen in a variety of conditions including stress response,   acute inflammation, trauma and pregnancy. Correlation with other   laboratory and clinical findings is essential.                 Immature Granulocytes 0.2               Lymph # 1.8               Lymph % 33.0               Magnesium 1.9               MCH 29.2               MCHC 33.2               MCV 88               Mono # 0.8               Mono % 14.1               MPV 9.8               nRBC 0               Phosphorus 3.5               Platelets 257               POCT Glucose   135   185   96   113       Potassium 4.0               PROTEIN TOTAL 6.8               RBC 4.42               RDW 12.7               Sodium 139               WBC 5.33                                    All pertinent labs within the past 24 hours have been reviewed.    Significant Imaging: I have reviewed and interpreted all pertinent imaging results/findings.    Assessment/Plan:     Active Diagnoses:    Diagnosis Date Noted POA    PRINCIPAL PROBLEM:  Cellulitis and abscess of hand, left with Foreign body  [L02.512] 01/26/2023 Yes    DM type 2 with diabetic mixed hyperlipidemia [E11.69, E78.2] 01/26/2023 Yes    Primary hypertension [I10] 01/26/2023 Yes      Problems Resolved During this Admission:     Assessment:  57-year-old male with past medical history significant for type 2 diabetes, hyperlipidemia, hypertension, elevated liver enzymes, elevated PSA, insomnia, low testosterone, and obesity who is admitted for cellulitis of the left hand     Plan:  Patient continues to improve with IV antibiotics.  We will continue  with this current treatment plan and monitor his progression.  Patient may have a diet.  No surgical intervention is planned at this time.  Patient should remain admitted until his infection has resolved.  We will continue to follow him and monitor his progress    Leopoldo Bradford PA-C  Orthopedics  [facility] ([facility])

## 2023-01-28 NOTE — PLAN OF CARE
[facility] (Hospital)  Discharge Final Note    Primary Care Provider: Gustavo Calhoun MD    Expected Discharge Date: 1/28/2023    Final Discharge Note (most recent)       Final Note - 01/28/23 1456          Final Note    Assessment Type Final Discharge Note     Anticipated Discharge Disposition Home or Self Care        Post-Acute Status    Discharge Delays None known at this time                     Important Message from Medicare             Contact Info       Gustavo Calhoun MD   Specialty: Family Medicine   Relationship: PCP - General    [address] [place] [address] FAMILY MEDICINE  [facility] [address]   Phone: [phone]       Next Steps: Follow up in 1 week(s)    Curtis Cedeño MD, [facility]   Specialty: Infectious Diseases, Hospitalist    [id number] [place] [address]   Phone: [phone]       Next Steps: Follow up in 1 week(s)    Calvin Still MD   Specialty: Orthopedic Surgery    [address] [facility]  [address]  [facility] [address]   Phone: [phone]       Next Steps: Follow up    Instructions: on monday          Request sent to Dr Cedeño office for follow up appt.    No home health or dme orders noted.

## 2023-01-28 NOTE — PROGRESS NOTES
[facility] Medicine  Progress Note    Patient Name: Venancio Barragan  MRN: [id number]  Patient Class: IP- Inpatient   Admission Date: 1/25/2023  Length of Stay: 1 days  Attending Physician: Yany Yang, *  Primary Care Provider: Gustavo Calhoun MD        Subjective:     Principal Problem:Abscess of hand, left        HPI:  The patient is a 56 yo male with HTN, DM, HLD who presented to ED with Left hand erythema, swelling, warmth, and pain - onset 2 days ago that progressively worsened. Pt was seen at  and prescribed oral Doxycycline without improvement.     In the ED, Afebrile, WBC normal. Serum Cr 1.5>1.3 after IVFs, CRP 14. Left hand Xray showed a Foreign body in the palm side of the hand near the 3rd metacarpophalangeal junction. Ultrasound of hand showed an abscess abutting the extensor tendon.   Pt received IV Vanco and Cefepime.     The patient will be placed in observation under hospital medicine      Overview/Hospital Course:  57-year-old male who was placed in observation for left hand cellulitis.  MRI of left-handed not show evidence to suggest septic arthritis or abscess.  He was seen by Orthopedic surgery who agree with IV antibiotics.  Need for I&D will depend on patient's progress    1/28  Examination done bedside, patient denied acute issues, noted to have improvement in redness and swelling.    Currently on IV antibiotics, ortho on board, no plans for surgical intervention, will monitor for improvement, recommend to continue IV antibiotics.            Interval History: NAEON      Review of Systems    Constitutional:  Negative for appetite change, chills, diaphoresis, fatigue and fever.   HENT:  Negative for congestion, nosebleeds, sore throat and trouble swallowing.    Eyes:  Negative for pain, discharge and visual disturbance.   Respiratory:  Negative for apnea, cough, chest tightness, shortness of breath, wheezing and stridor.    Cardiovascular:  Negative for  chest pain, palpitations and leg swelling.   Gastrointestinal:  Negative for abdominal distention, abdominal pain, blood in stool, constipation, diarrhea, nausea and vomiting.   Endocrine: Negative for cold intolerance and heat intolerance.   Genitourinary:  Negative for difficulty urinating, dysuria, flank pain, frequency and urgency.   Musculoskeletal:  Positive for arthralgias (Left hand pain-improving). Negative for back pain, joint swelling, myalgias, neck pain and neck stiffness.   Skin:  Positive for color change and wound. Negative for rash.        Erythema, warmth, and swelling Left hand-improving     Allergic/Immunologic: Negative for food allergies and immunocompromised state.   Neurological:  Negative for dizziness, seizures, syncope, facial asymmetry, weakness, light-headedness and headaches.   Hematological:  Negative for adenopathy.   Psychiatric/Behavioral:  Negative for agitation, behavioral problems and confusion. The patient is not nervous/anxious.        Objective:     Vital Signs (Most Recent):  Temp: 98.5 °F (36.9 °C) (01/28/23 1115)  Pulse: 73 (01/28/23 1115)  Resp: 18 (01/28/23 1115)  BP: 132/83 (01/28/23 1115)  SpO2: 96 % (01/28/23 1115) Vital Signs (24h Range):  Temp:  [97.8 °F (36.6 °C)-99 °F (37.2 °C)] 98.5 °F (36.9 °C)  Pulse:  [68-75] 73  Resp:  [17-18] 18  SpO2:  [94 %-96 %] 96 %  BP: (110-139)/(65-89) 132/83     Weight: 77.5 kg (170 lb 13.7 oz)  Body mass index is 28.43 kg/m².    Intake/Output Summary (Last 24 hours) at 1/28/2023 1226  Last data filed at 1/27/2023 1953  Gross per 24 hour   Intake 85.28 ml   Output --   Net 85.28 ml      Physical Exam    Constitutional:       General: He is not in acute distress.     Appearance: He is well-developed. He is not diaphoretic.   HENT:      Head: Normocephalic and atraumatic.      Nose: Nose normal.   Eyes:      General: No scleral icterus.     Conjunctiva/sclera: Conjunctivae normal.   Cardiovascular:      Rate and Rhythm: Normal rate and  regular rhythm.      Heart sounds: Normal heart sounds. No murmur heard.    No friction rub. No gallop.   Pulmonary:      Effort: Pulmonary effort is normal. No respiratory distress.      Breath sounds: Normal breath sounds. No stridor. No wheezing or rales.   Chest:      Chest wall: No tenderness.   Abdominal:      General: Bowel sounds are normal. There is no distension.      Palpations: Abdomen is soft.      Tenderness: There is no abdominal tenderness. There is no guarding or rebound.   Musculoskeletal:         General: Swelling (left hand-improved) present. No tenderness (left hand) or deformity.      Cervical back: Normal range of motion and neck supple.      Comments: ROM in 2 and 3rd digit has improved   Skin:     General: Skin is warm and dry.      Coloration: Skin is not pale.      Findings: No erythema or rash.      Comments: Leftt hand TTP with erythema, warmth, and swelling-improved     Neurological:      Mental Status: He is alert and oriented to person, place, and time.      Cranial Nerves: No cranial nerve deficit.      Motor: No abnormal muscle tone.      Coordination: Coordination normal.      Deep Tendon Reflexes: Reflexes are normal and symmetric.   Psychiatric:         Behavior: Behavior normal.         Thought Content: Thought content normal.       Significant Labs: All pertinent labs within the past 24 hours have been reviewed.  CBC:   Recent Labs   Lab 01/27/23  0500 01/28/23  0527   WBC 5.71 5.33   HGB 12.8* 12.9*   HCT 38.4* 38.9*    257     CMP:   Recent Labs   Lab 01/27/23  0500 01/28/23  0527    139   K 4.2 4.0    105   CO2 23 25   * 142*   BUN 23* 22*   CREATININE 1.3 1.2   CALCIUM 8.4* 9.1   PROT 6.8 6.8   ALBUMIN 3.5 3.4*   BILITOT 0.4 0.3   ALKPHOS 68 70   AST 23 20   ALT 31 29   ANIONGAP 11 9       Significant Imaging:     Imaging Results               X-Ray Hand 3 view Left (Final result)  Result time 01/25/23 21:27:03      Final result by Deuce NELSON  MD Marlo (01/25/23 21:27:03)                   Impression:      As above.    This report was flagged in Epic as abnormal.      Electronically signed by: Deuce Sellers  Date:    01/25/2023  Time:    21:27               Narrative:    EXAMINATION:  XR HAND COMPLETE 3 VIEW LEFT    CLINICAL HISTORY:  Left hand swelling;.    TECHNIQUE:  PA, lateral, and oblique views of the left hand were performed.    COMPARISON:  None    FINDINGS:  3 mm high density focus in the palm side of the hand near the 3rd metacarpophalangeal junction.  This may be acute or remote.    Dorsal soft tissue swelling.    No fracture.  No traumatic malalignment.  No osseous destructive process.                                         Assessment/Plan:      * Cellulitis and abscess of hand, left with Foreign body   IV Vanco and Cefepime  Orthopedics consulted  Pain control   NPO for now     1/27/23  Orthopedic input appreciated continue IV antibiotics for now.    Primary hypertension  Cont home medication -Altace changed to Lisinopril       DM type 2 with diabetic mixed hyperlipidemia  Patient's FSGs are controlled on current medication regimen.  Last A1c reviewed-   Lab Results   Component Value Date    HGBA1C 6.0 (H) 01/03/2023     Most recent fingerstick glucose reviewed-   Recent Labs   Lab 01/26/23 2025 01/27/23  0551 01/27/23  1215 01/27/23  1605   POCTGLUCOSE 154* 135* 113* 96     Current correctional scale  Low  Maintain anti-hyperglycemic dose as follows-   Antihyperglycemics (From admission, onward)    Start     Stop Route Frequency Ordered    01/26/23 0205  insulin aspart U-100 pen 0-5 Units         -- SubQ Before meals & nightly PRN 01/26/23 0106        Hold Oral hypoglycemics while patient is in the hospital.      VTE Risk Mitigation (From admission, onward)         Ordered     IP VTE HIGH RISK PATIENT  Once         01/26/23 0106     Place sequential compression device  Until discontinued         01/26/23 0106                Discharge  Planning   GABRIELLE:      Code Status: Full Code   Is the patient medically ready for discharge?:     Reason for patient still in hospital (select all that apply): IV antibiotics   Discharge Plan A: Home                  Yany Yang MD  Department of Hospital Medicine   [facility] ([facility])

## 2023-01-28 NOTE — SUBJECTIVE & OBJECTIVE
Interval History: NAEON      Review of Systems    Constitutional:  Negative for appetite change, chills, diaphoresis, fatigue and fever.   HENT:  Negative for congestion, nosebleeds, sore throat and trouble swallowing.    Eyes:  Negative for pain, discharge and visual disturbance.   Respiratory:  Negative for apnea, cough, chest tightness, shortness of breath, wheezing and stridor.    Cardiovascular:  Negative for chest pain, palpitations and leg swelling.   Gastrointestinal:  Negative for abdominal distention, abdominal pain, blood in stool, constipation, diarrhea, nausea and vomiting.   Endocrine: Negative for cold intolerance and heat intolerance.   Genitourinary:  Negative for difficulty urinating, dysuria, flank pain, frequency and urgency.   Musculoskeletal:  Positive for arthralgias (Left hand pain-improving). Negative for back pain, joint swelling, myalgias, neck pain and neck stiffness.   Skin:  Positive for color change and wound. Negative for rash.        Erythema, warmth, and swelling Left hand-improving     Allergic/Immunologic: Negative for food allergies and immunocompromised state.   Neurological:  Negative for dizziness, seizures, syncope, facial asymmetry, weakness, light-headedness and headaches.   Hematological:  Negative for adenopathy.   Psychiatric/Behavioral:  Negative for agitation, behavioral problems and confusion. The patient is not nervous/anxious.        Objective:     Vital Signs (Most Recent):  Temp: 98.5 °F (36.9 °C) (01/28/23 1115)  Pulse: 73 (01/28/23 1115)  Resp: 18 (01/28/23 1115)  BP: 132/83 (01/28/23 1115)  SpO2: 96 % (01/28/23 1115) Vital Signs (24h Range):  Temp:  [97.8 °F (36.6 °C)-99 °F (37.2 °C)] 98.5 °F (36.9 °C)  Pulse:  [68-75] 73  Resp:  [17-18] 18  SpO2:  [94 %-96 %] 96 %  BP: (110-139)/(65-89) 132/83     Weight: 77.5 kg (170 lb 13.7 oz)  Body mass index is 28.43 kg/m².    Intake/Output Summary (Last 24 hours) at 1/28/2023 1226  Last data filed at 1/27/2023  1953  Gross per 24 hour   Intake 85.28 ml   Output --   Net 85.28 ml      Physical Exam    Constitutional:       General: He is not in acute distress.     Appearance: He is well-developed. He is not diaphoretic.   HENT:      Head: Normocephalic and atraumatic.      Nose: Nose normal.   Eyes:      General: No scleral icterus.     Conjunctiva/sclera: Conjunctivae normal.   Cardiovascular:      Rate and Rhythm: Normal rate and regular rhythm.      Heart sounds: Normal heart sounds. No murmur heard.    No friction rub. No gallop.   Pulmonary:      Effort: Pulmonary effort is normal. No respiratory distress.      Breath sounds: Normal breath sounds. No stridor. No wheezing or rales.   Chest:      Chest wall: No tenderness.   Abdominal:      General: Bowel sounds are normal. There is no distension.      Palpations: Abdomen is soft.      Tenderness: There is no abdominal tenderness. There is no guarding or rebound.   Musculoskeletal:         General: Swelling (left hand-improved) present. No tenderness (left hand) or deformity.      Cervical back: Normal range of motion and neck supple.      Comments: ROM in 2 and 3rd digit has improved   Skin:     General: Skin is warm and dry.      Coloration: Skin is not pale.      Findings: No erythema or rash.      Comments: Leftt hand TTP with erythema, warmth, and swelling-improved     Neurological:      Mental Status: He is alert and oriented to person, place, and time.      Cranial Nerves: No cranial nerve deficit.      Motor: No abnormal muscle tone.      Coordination: Coordination normal.      Deep Tendon Reflexes: Reflexes are normal and symmetric.   Psychiatric:         Behavior: Behavior normal.         Thought Content: Thought content normal.       Significant Labs: All pertinent labs within the past 24 hours have been reviewed.  CBC:   Recent Labs   Lab 01/27/23  0500 01/28/23  0527   WBC 5.71 5.33   HGB 12.8* 12.9*   HCT 38.4* 38.9*    257     CMP:   Recent Labs    Lab 01/27/23  0500 01/28/23  0527    139   K 4.2 4.0    105   CO2 23 25   * 142*   BUN 23* 22*   CREATININE 1.3 1.2   CALCIUM 8.4* 9.1   PROT 6.8 6.8   ALBUMIN 3.5 3.4*   BILITOT 0.4 0.3   ALKPHOS 68 70   AST 23 20   ALT 31 29   ANIONGAP 11 9       Significant Imaging:     Imaging Results               X-Ray Hand 3 view Left (Final result)  Result time 01/25/23 21:27:03      Final result by Deuce Sellers MD (01/25/23 21:27:03)                   Impression:      As above.    This report was flagged in Epic as abnormal.      Electronically signed by: Deuce Sellers  Date:    01/25/2023  Time:    21:27               Narrative:    EXAMINATION:  XR HAND COMPLETE 3 VIEW LEFT    CLINICAL HISTORY:  Left hand swelling;.    TECHNIQUE:  PA, lateral, and oblique views of the left hand were performed.    COMPARISON:  None    FINDINGS:  3 mm high density focus in the palm side of the hand near the 3rd metacarpophalangeal junction.  This may be acute or remote.    Dorsal soft tissue swelling.    No fracture.  No traumatic malalignment.  No osseous destructive process.

## 2023-01-31 LAB
BACTERIA BLD CULT: NORMAL
BACTERIA BLD CULT: NORMAL

## 2023-02-01 ENCOUNTER — TELEPHONE (OUTPATIENT)
Dept: INFECTIOUS DISEASES | Facility: CLINIC | Age: 58
End: 2023-02-01
Payer: MEDICAID

## 2023-02-01 ENCOUNTER — TELEPHONE (OUTPATIENT)
Dept: ORTHOPEDICS | Facility: CLINIC | Age: 58
End: 2023-02-01
Payer: MEDICAID

## 2023-02-01 NOTE — TELEPHONE ENCOUNTER
Spoke with patient's wife and scheduled his ER follow up appointment. Understanding verbalized of appointment date, time and location.

## 2023-02-01 NOTE — TELEPHONE ENCOUNTER
----- Message from Zainab Saldaña sent at 2/1/2023  9:42 AM CST -----  States he needs to schedule a hosp f/u. Please call Ana Liliachristine Barragan (wife) [phone]. Thank you

## 2023-02-03 ENCOUNTER — OFFICE VISIT (OUTPATIENT)
Dept: ORTHOPEDICS | Facility: CLINIC | Age: 58
End: 2023-02-03
Payer: MEDICAID

## 2023-02-03 VITALS
WEIGHT: 170.88 LBS | HEART RATE: 68 BPM | SYSTOLIC BLOOD PRESSURE: 123 MMHG | BODY MASS INDEX: 28.47 KG/M2 | DIASTOLIC BLOOD PRESSURE: 84 MMHG | HEIGHT: 65 IN | TEMPERATURE: 98 F

## 2023-02-03 DIAGNOSIS — L03.114 CELLULITIS OF LEFT HAND: Primary | ICD-10-CM

## 2023-02-03 PROCEDURE — 1160F PR REVIEW ALL MEDS BY PRESCRIBER/CLIN PHARMACIST DOCUMENTED: ICD-10-PCS | Mod: CPTII,,, | Performed by: PHYSICIAN ASSISTANT

## 2023-02-03 PROCEDURE — 99999 PR PBB SHADOW E&M-EST. PATIENT-LVL IV: ICD-10-PCS | Mod: PBBFAC,,, | Performed by: PHYSICIAN ASSISTANT

## 2023-02-03 PROCEDURE — 99214 OFFICE O/P EST MOD 30 MIN: CPT | Mod: S$PBB,,, | Performed by: PHYSICIAN ASSISTANT

## 2023-02-03 PROCEDURE — 4010F PR ACE/ARB THEARPY RXD/TAKEN: ICD-10-PCS | Mod: CPTII,,, | Performed by: PHYSICIAN ASSISTANT

## 2023-02-03 PROCEDURE — 3079F DIAST BP 80-89 MM HG: CPT | Mod: CPTII,,, | Performed by: PHYSICIAN ASSISTANT

## 2023-02-03 PROCEDURE — 3079F PR MOST RECENT DIASTOLIC BLOOD PRESSURE 80-89 MM HG: ICD-10-PCS | Mod: CPTII,,, | Performed by: PHYSICIAN ASSISTANT

## 2023-02-03 PROCEDURE — 3008F BODY MASS INDEX DOCD: CPT | Mod: CPTII,,, | Performed by: PHYSICIAN ASSISTANT

## 2023-02-03 PROCEDURE — 3074F PR MOST RECENT SYSTOLIC BLOOD PRESSURE < 130 MM HG: ICD-10-PCS | Mod: CPTII,,, | Performed by: PHYSICIAN ASSISTANT

## 2023-02-03 PROCEDURE — 99214 OFFICE O/P EST MOD 30 MIN: CPT | Mod: PBBFAC | Performed by: PHYSICIAN ASSISTANT

## 2023-02-03 PROCEDURE — 3008F PR BODY MASS INDEX (BMI) DOCUMENTED: ICD-10-PCS | Mod: CPTII,,, | Performed by: PHYSICIAN ASSISTANT

## 2023-02-03 PROCEDURE — 99999 PR PBB SHADOW E&M-EST. PATIENT-LVL IV: CPT | Mod: PBBFAC,,, | Performed by: PHYSICIAN ASSISTANT

## 2023-02-03 PROCEDURE — 1111F DSCHRG MED/CURRENT MED MERGE: CPT | Mod: CPTII,,, | Performed by: PHYSICIAN ASSISTANT

## 2023-02-03 PROCEDURE — 1159F PR MEDICATION LIST DOCUMENTED IN MEDICAL RECORD: ICD-10-PCS | Mod: CPTII,,, | Performed by: PHYSICIAN ASSISTANT

## 2023-02-03 PROCEDURE — 1160F RVW MEDS BY RX/DR IN RCRD: CPT | Mod: CPTII,,, | Performed by: PHYSICIAN ASSISTANT

## 2023-02-03 PROCEDURE — 1159F MED LIST DOCD IN RCRD: CPT | Mod: CPTII,,, | Performed by: PHYSICIAN ASSISTANT

## 2023-02-03 PROCEDURE — 3074F SYST BP LT 130 MM HG: CPT | Mod: CPTII,,, | Performed by: PHYSICIAN ASSISTANT

## 2023-02-03 PROCEDURE — 4010F ACE/ARB THERAPY RXD/TAKEN: CPT | Mod: CPTII,,, | Performed by: PHYSICIAN ASSISTANT

## 2023-02-03 PROCEDURE — 99214 PR OFFICE/OUTPT VISIT, EST, LEVL IV, 30-39 MIN: ICD-10-PCS | Mod: S$PBB,,, | Performed by: PHYSICIAN ASSISTANT

## 2023-02-03 PROCEDURE — 1111F PR DISCHARGE MEDS RECONCILED W/ CURRENT OUTPATIENT MED LIST: ICD-10-PCS | Mod: CPTII,,, | Performed by: PHYSICIAN ASSISTANT

## 2023-02-03 RX ORDER — SULFAMETHOXAZOLE AND TRIMETHOPRIM 800; 160 MG/1; MG/1
1 TABLET ORAL 2 TIMES DAILY
Qty: 20 TABLET | Refills: 0 | Status: SHIPPED | OUTPATIENT
Start: 2023-02-03 | End: 2023-02-13

## 2023-02-08 ENCOUNTER — ANESTHESIA (OUTPATIENT)
Dept: SURGERY | Facility: HOSPITAL | Age: 58
End: 2023-02-08
Payer: MEDICAID

## 2023-02-08 ENCOUNTER — HOSPITAL ENCOUNTER (OUTPATIENT)
Facility: HOSPITAL | Age: 58
Discharge: HOME | End: 2023-02-08
Attending: ORTHOPAEDIC SURGERY | Admitting: ORTHOPAEDIC SURGERY
Payer: MEDICAID

## 2023-02-08 ENCOUNTER — ANESTHESIA EVENT (OUTPATIENT)
Dept: SURGERY | Facility: HOSPITAL | Age: 58
End: 2023-02-08
Payer: MEDICAID

## 2023-02-08 ENCOUNTER — OFFICE VISIT (OUTPATIENT)
Dept: ORTHOPEDICS | Facility: CLINIC | Age: 58
End: 2023-02-08
Payer: MEDICAID

## 2023-02-08 VITALS
BODY MASS INDEX: 28.32 KG/M2 | WEIGHT: 170 LBS | SYSTOLIC BLOOD PRESSURE: 127 MMHG | HEART RATE: 62 BPM | HEIGHT: 65 IN | DIASTOLIC BLOOD PRESSURE: 76 MMHG

## 2023-02-08 DIAGNOSIS — L02.512 ABSCESS OF LEFT HAND: Primary | ICD-10-CM

## 2023-02-08 DIAGNOSIS — Z01.810 PREOP CARDIOVASCULAR EXAM: ICD-10-CM

## 2023-02-08 DIAGNOSIS — L03.114 CELLULITIS OF LEFT HAND: ICD-10-CM

## 2023-02-08 LAB
GRAM STN SPEC: NORMAL
GRAM STN SPEC: NORMAL
POCT GLUCOSE: 105 MG/DL (ref 70–110)
POCT GLUCOSE: 75 MG/DL (ref 70–110)

## 2023-02-08 PROCEDURE — 99214 OFFICE O/P EST MOD 30 MIN: CPT | Mod: S$PBB,,, | Performed by: PHYSICIAN ASSISTANT

## 2023-02-08 PROCEDURE — 99214 PR OFFICE/OUTPT VISIT, EST, LEVL IV, 30-39 MIN: ICD-10-PCS | Mod: S$PBB,,, | Performed by: PHYSICIAN ASSISTANT

## 2023-02-08 PROCEDURE — 01810 ANES PX NRV MUSC F/ARM WRST: CPT | Performed by: ORTHOPAEDIC SURGERY

## 2023-02-08 PROCEDURE — 63600175 PHARM REV CODE 636 W HCPCS: Performed by: NURSE ANESTHETIST, CERTIFIED REGISTERED

## 2023-02-08 PROCEDURE — 1159F MED LIST DOCD IN RCRD: CPT | Mod: CPTII,,, | Performed by: PHYSICIAN ASSISTANT

## 2023-02-08 PROCEDURE — 26115 PR EXC TUM/VASC MAL SFT TISS HAND/FNGR SUBQ <1.5CM: ICD-10-PCS | Mod: RT,,, | Performed by: ORTHOPAEDIC SURGERY

## 2023-02-08 PROCEDURE — 3008F BODY MASS INDEX DOCD: CPT | Mod: CPTII,,, | Performed by: PHYSICIAN ASSISTANT

## 2023-02-08 PROCEDURE — 26115 EXC HAND LES SC < 1.5 CM: CPT | Mod: RT,,, | Performed by: ORTHOPAEDIC SURGERY

## 2023-02-08 PROCEDURE — 1160F RVW MEDS BY RX/DR IN RCRD: CPT | Mod: CPTII,,, | Performed by: PHYSICIAN ASSISTANT

## 2023-02-08 PROCEDURE — 3078F PR MOST RECENT DIASTOLIC BLOOD PRESSURE < 80 MM HG: ICD-10-PCS | Mod: CPTII,,, | Performed by: PHYSICIAN ASSISTANT

## 2023-02-08 PROCEDURE — 25000003 PHARM REV CODE 250: Performed by: NURSE ANESTHETIST, CERTIFIED REGISTERED

## 2023-02-08 PROCEDURE — 3074F PR MOST RECENT SYSTOLIC BLOOD PRESSURE < 130 MM HG: ICD-10-PCS | Mod: CPTII,,, | Performed by: PHYSICIAN ASSISTANT

## 2023-02-08 PROCEDURE — 63600175 PHARM REV CODE 636 W HCPCS: Performed by: ANESTHESIOLOGY

## 2023-02-08 PROCEDURE — 88307 PR  SURG PATH,LEVEL V: ICD-10-PCS | Mod: 26,,, | Performed by: PATHOLOGY

## 2023-02-08 PROCEDURE — 1160F PR REVIEW ALL MEDS BY PRESCRIBER/CLIN PHARMACIST DOCUMENTED: ICD-10-PCS | Mod: CPTII,,, | Performed by: PHYSICIAN ASSISTANT

## 2023-02-08 PROCEDURE — 1111F DSCHRG MED/CURRENT MED MERGE: CPT | Mod: CPTII,,, | Performed by: PHYSICIAN ASSISTANT

## 2023-02-08 PROCEDURE — 3074F SYST BP LT 130 MM HG: CPT | Mod: CPTII,,, | Performed by: PHYSICIAN ASSISTANT

## 2023-02-08 PROCEDURE — 87116 MYCOBACTERIA CULTURE: CPT | Performed by: ORTHOPAEDIC SURGERY

## 2023-02-08 PROCEDURE — 88307 TISSUE EXAM BY PATHOLOGIST: CPT | Performed by: PATHOLOGY

## 2023-02-08 PROCEDURE — 4010F ACE/ARB THERAPY RXD/TAKEN: CPT | Mod: CPTII,,, | Performed by: PHYSICIAN ASSISTANT

## 2023-02-08 PROCEDURE — 93010 ELECTROCARDIOGRAM REPORT: CPT | Mod: ,,, | Performed by: INTERNAL MEDICINE

## 2023-02-08 PROCEDURE — 87205 SMEAR GRAM STAIN: CPT | Performed by: ORTHOPAEDIC SURGERY

## 2023-02-08 PROCEDURE — 87070 CULTURE OTHR SPECIMN AEROBIC: CPT | Performed by: ORTHOPAEDIC SURGERY

## 2023-02-08 PROCEDURE — 93010 EKG 12-LEAD: ICD-10-PCS | Mod: ,,, | Performed by: INTERNAL MEDICINE

## 2023-02-08 PROCEDURE — 87102 FUNGUS ISOLATION CULTURE: CPT | Performed by: ORTHOPAEDIC SURGERY

## 2023-02-08 PROCEDURE — 3008F PR BODY MASS INDEX (BMI) DOCUMENTED: ICD-10-PCS | Mod: CPTII,,, | Performed by: PHYSICIAN ASSISTANT

## 2023-02-08 PROCEDURE — 3078F DIAST BP <80 MM HG: CPT | Mod: CPTII,,, | Performed by: PHYSICIAN ASSISTANT

## 2023-02-08 PROCEDURE — 36000705 HC OR TIME LEV I EA ADD 15 MIN: Performed by: ORTHOPAEDIC SURGERY

## 2023-02-08 PROCEDURE — 36000704 HC OR TIME LEV I 1ST 15 MIN: Performed by: ORTHOPAEDIC SURGERY

## 2023-02-08 PROCEDURE — 99999 PR PBB SHADOW E&M-EST. PATIENT-LVL V: CPT | Mod: PBBFAC,,, | Performed by: PHYSICIAN ASSISTANT

## 2023-02-08 PROCEDURE — 37000008 HC ANESTHESIA 1ST 15 MINUTES: Performed by: ORTHOPAEDIC SURGERY

## 2023-02-08 PROCEDURE — 88307 TISSUE EXAM BY PATHOLOGIST: CPT | Mod: 26,,, | Performed by: PATHOLOGY

## 2023-02-08 PROCEDURE — 71000015 HC POSTOP RECOV 1ST HR: Performed by: ORTHOPAEDIC SURGERY

## 2023-02-08 PROCEDURE — 71000033 HC RECOVERY, INTIAL HOUR: Performed by: ORTHOPAEDIC SURGERY

## 2023-02-08 PROCEDURE — 25000003 PHARM REV CODE 250: Performed by: ORTHOPAEDIC SURGERY

## 2023-02-08 PROCEDURE — 4010F PR ACE/ARB THEARPY RXD/TAKEN: ICD-10-PCS | Mod: CPTII,,, | Performed by: PHYSICIAN ASSISTANT

## 2023-02-08 PROCEDURE — 1111F PR DISCHARGE MEDS RECONCILED W/ CURRENT OUTPATIENT MED LIST: ICD-10-PCS | Mod: CPTII,,, | Performed by: PHYSICIAN ASSISTANT

## 2023-02-08 PROCEDURE — 93005 ELECTROCARDIOGRAM TRACING: CPT | Mod: 59

## 2023-02-08 PROCEDURE — 87075 CULTR BACTERIA EXCEPT BLOOD: CPT | Performed by: ORTHOPAEDIC SURGERY

## 2023-02-08 PROCEDURE — 87206 SMEAR FLUORESCENT/ACID STAI: CPT | Performed by: ORTHOPAEDIC SURGERY

## 2023-02-08 PROCEDURE — 99215 OFFICE O/P EST HI 40 MIN: CPT | Mod: PBBFAC,25 | Performed by: PHYSICIAN ASSISTANT

## 2023-02-08 PROCEDURE — 1159F PR MEDICATION LIST DOCUMENTED IN MEDICAL RECORD: ICD-10-PCS | Mod: CPTII,,, | Performed by: PHYSICIAN ASSISTANT

## 2023-02-08 PROCEDURE — 99999 PR PBB SHADOW E&M-EST. PATIENT-LVL V: ICD-10-PCS | Mod: PBBFAC,,, | Performed by: PHYSICIAN ASSISTANT

## 2023-02-08 PROCEDURE — 37000009 HC ANESTHESIA EA ADD 15 MINS: Performed by: ORTHOPAEDIC SURGERY

## 2023-02-08 RX ORDER — CEFAZOLIN SODIUM 1 G/3ML
INJECTION, POWDER, FOR SOLUTION INTRAMUSCULAR; INTRAVENOUS
Status: DISCONTINUED | OUTPATIENT
Start: 2023-02-08 | End: 2023-02-08

## 2023-02-08 RX ORDER — ALBUTEROL SULFATE 0.83 MG/ML
2.5 SOLUTION RESPIRATORY (INHALATION) EVERY 4 HOURS PRN
Status: DISCONTINUED | OUTPATIENT
Start: 2023-02-08 | End: 2023-02-08 | Stop reason: HOSPADM

## 2023-02-08 RX ORDER — ONDANSETRON 2 MG/ML
4 INJECTION INTRAMUSCULAR; INTRAVENOUS DAILY PRN
Status: DISCONTINUED | OUTPATIENT
Start: 2023-02-08 | End: 2023-02-08 | Stop reason: HOSPADM

## 2023-02-08 RX ORDER — SODIUM CHLORIDE 0.9 % (FLUSH) 0.9 %
3 SYRINGE (ML) INJECTION
Status: DISCONTINUED | OUTPATIENT
Start: 2023-02-08 | End: 2023-02-08 | Stop reason: HOSPADM

## 2023-02-08 RX ORDER — HYDROCODONE BITARTRATE AND ACETAMINOPHEN 5; 325 MG/1; MG/1
1 TABLET ORAL EVERY 6 HOURS PRN
Qty: 20 TABLET | Refills: 0 | Status: SHIPPED | OUTPATIENT
Start: 2023-02-08

## 2023-02-08 RX ORDER — MEPERIDINE HYDROCHLORIDE 25 MG/ML
12.5 INJECTION INTRAMUSCULAR; INTRAVENOUS; SUBCUTANEOUS ONCE
Status: COMPLETED | OUTPATIENT
Start: 2023-02-08 | End: 2023-02-08

## 2023-02-08 RX ORDER — MUPIROCIN 20 MG/G
OINTMENT TOPICAL
Status: CANCELLED | OUTPATIENT
Start: 2023-02-08

## 2023-02-08 RX ORDER — MIDAZOLAM HYDROCHLORIDE 1 MG/ML
INJECTION, SOLUTION INTRAMUSCULAR; INTRAVENOUS
Status: DISCONTINUED | OUTPATIENT
Start: 2023-02-08 | End: 2023-02-08

## 2023-02-08 RX ORDER — HYDROMORPHONE HYDROCHLORIDE 2 MG/ML
0.2 INJECTION, SOLUTION INTRAMUSCULAR; INTRAVENOUS; SUBCUTANEOUS EVERY 5 MIN PRN
Status: DISCONTINUED | OUTPATIENT
Start: 2023-02-08 | End: 2023-02-08 | Stop reason: HOSPADM

## 2023-02-08 RX ORDER — PROPOFOL 10 MG/ML
VIAL (ML) INTRAVENOUS CONTINUOUS PRN
Status: DISCONTINUED | OUTPATIENT
Start: 2023-02-08 | End: 2023-02-08

## 2023-02-08 RX ORDER — MUPIROCIN 20 MG/G
OINTMENT TOPICAL
Status: DISCONTINUED | OUTPATIENT
Start: 2023-02-08 | End: 2023-02-08 | Stop reason: HOSPADM

## 2023-02-08 RX ORDER — PROPOFOL 10 MG/ML
VIAL (ML) INTRAVENOUS
Status: DISCONTINUED | OUTPATIENT
Start: 2023-02-08 | End: 2023-02-08

## 2023-02-08 RX ORDER — LIDOCAINE HYDROCHLORIDE 10 MG/ML
INJECTION, SOLUTION EPIDURAL; INFILTRATION; INTRACAUDAL; PERINEURAL
Status: DISCONTINUED | OUTPATIENT
Start: 2023-02-08 | End: 2023-02-08 | Stop reason: HOSPADM

## 2023-02-08 RX ORDER — FENTANYL CITRATE 50 UG/ML
INJECTION, SOLUTION INTRAMUSCULAR; INTRAVENOUS
Status: DISCONTINUED | OUTPATIENT
Start: 2023-02-08 | End: 2023-02-08

## 2023-02-08 RX ORDER — DIPHENHYDRAMINE HYDROCHLORIDE 50 MG/ML
25 INJECTION INTRAMUSCULAR; INTRAVENOUS EVERY 6 HOURS PRN
Status: DISCONTINUED | OUTPATIENT
Start: 2023-02-08 | End: 2023-02-08 | Stop reason: HOSPADM

## 2023-02-08 RX ORDER — PROCHLORPERAZINE EDISYLATE 5 MG/ML
5 INJECTION INTRAMUSCULAR; INTRAVENOUS EVERY 30 MIN PRN
Status: DISCONTINUED | OUTPATIENT
Start: 2023-02-08 | End: 2023-02-08 | Stop reason: HOSPADM

## 2023-02-08 RX ORDER — OXYCODONE HYDROCHLORIDE 5 MG/1
5 TABLET ORAL
Status: DISCONTINUED | OUTPATIENT
Start: 2023-02-08 | End: 2023-02-08 | Stop reason: HOSPADM

## 2023-02-08 RX ADMIN — HYDROMORPHONE HYDROCHLORIDE 0.2 MG: 2 INJECTION INTRAMUSCULAR; INTRAVENOUS; SUBCUTANEOUS at 03:02

## 2023-02-08 RX ADMIN — SODIUM CHLORIDE, POTASSIUM CHLORIDE, SODIUM LACTATE AND CALCIUM CHLORIDE: 600; 310; 30; 20 INJECTION, SOLUTION INTRAVENOUS at 02:02

## 2023-02-08 RX ADMIN — PROPOFOL 30 MG: 10 INJECTION, EMULSION INTRAVENOUS at 02:02

## 2023-02-08 RX ADMIN — CEFAZOLIN 2 G: 1 INJECTION, POWDER, FOR SOLUTION INTRAMUSCULAR; INTRAVENOUS at 03:02

## 2023-02-08 RX ADMIN — FENTANYL CITRATE 25 MCG: 50 INJECTION, SOLUTION INTRAMUSCULAR; INTRAVENOUS at 02:02

## 2023-02-08 RX ADMIN — PROPOFOL 75 MCG/KG/MIN: 10 INJECTION, EMULSION INTRAVENOUS at 02:02

## 2023-02-08 RX ADMIN — GLYCOPYRROLATE 0.2 MG: 0.2 INJECTION, SOLUTION INTRAMUSCULAR; INTRAVENOUS at 02:02

## 2023-02-08 RX ADMIN — PROPOFOL 40 MG: 10 INJECTION, EMULSION INTRAVENOUS at 02:02

## 2023-02-08 RX ADMIN — MEPERIDINE HYDROCHLORIDE 12.5 MG: 25 INJECTION INTRAMUSCULAR; INTRAVENOUS; SUBCUTANEOUS at 03:02

## 2023-02-08 RX ADMIN — MIDAZOLAM 1 MG: 1 INJECTION INTRAMUSCULAR; INTRAVENOUS at 02:02

## 2023-02-08 NOTE — ANESTHESIA PREPROCEDURE EVALUATION
02/08/2023  Venancio Barragan is a 57 y.o., male.    Patient Active Problem List   Diagnosis    DM type 2 with diabetic mixed hyperlipidemia    Elevated liver enzymes    Primary hypertension    Increased prostate specific antigen (PSA) velocity    Insomnia, unspecified    Low testosterone in male    Obesity, diabetes, and hypertension syndrome    Osteoarthritis of knee    Status post open reduction and internal fixation (ORIF) of fracture    Cellulitis and abscess of hand, left with Foreign body      Pre-op Assessment    I have reviewed the Patient Summary Reports.     I have reviewed the Nursing Notes. I have reviewed the NPO Status.   I have reviewed the Medications.     Review of Systems  Anesthesia Hx:  Denies Family Hx of Anesthesia complications.   Denies Personal Hx of Anesthesia complications.   Hematology/Oncology:  Hematology Normal   Oncology Normal     EENT/Dental:EENT/Dental Normal   Cardiovascular:   Hypertension    Pulmonary:  Pulmonary Normal    Renal/:  Renal/ Normal     Hepatic/GI:   GERD    Musculoskeletal:   Arthritis     Neurological:  Neurology Normal    Endocrine:   Diabetes    Psych:  Psychiatric Normal           Physical Exam  General: Alert and Oriented    Airway:  Mallampati: II   Mouth Opening: Normal  TM Distance: Normal  Tongue: Normal  Neck ROM: Normal ROM        Anesthesia Plan  Type of Anesthesia, risks & benefits discussed:    Anesthesia Type: MAC  Intra-op Monitoring Plan: Standard ASA Monitors  Informed Consent: Informed consent signed with the Patient and all parties understand the risks and agree with anesthesia plan.  All questions answered.   ASA Score: 3  Day of Surgery Review of History & Physical: I have interviewed and examined the patient. I have reviewed the patient's H&P dated:   Anesthesia Plan Notes: EKG PENDING  IF IT IS SATISFACTORY PATIENT MAY HAVE  A GENERAL ANESTHETIC OR MAC    Ready For Surgery From Anesthesia Perspective.     .

## 2023-02-08 NOTE — PROGRESS NOTES
Subjective:      Patient ID: Venancio Barragan is a 57 y.o. male.    Chief Complaint: Pain of the Left Hand      HPI: Venancio Barragan is a 57-year-old male in clinic today for follow-up of left hand cellulitis.  Patient was seen for this problem while admitted to the hospital.  He was treated with IV antibiotics and then discharged home on doxycycline and Flagyl.  Patient reports that he is not experiencing any pain, but the redness is persistent.  Patient denies numbness or tingling in the extremity    Past Medical History:   Diagnosis Date    Diabetes mellitus     GERD (gastroesophageal reflux disease)     High cholesterol     Hypertension        Current Outpatient Medications:     atorvastatin (LIPITOR) 40 MG tablet, Take 40 mg by mouth once daily., Disp: , Rfl:     dulaglutide (TRULICITY) 0.75 mg/0.5 mL pen injector, INECT 0.5 ML INTO THE SKIN EVERY 7 DAYS, Disp: , Rfl:     metformin (GLUCOPHAGE) 500 MG tablet, Take 1,000 mg by mouth 2 (two) times daily with meals. , Disp: , Rfl:     multivitamin capsule, Take 1 capsule by mouth once daily., Disp: , Rfl:     promethazine (PHENERGAN) 25 MG tablet, Take 1 tablet (25 mg total) by mouth every 6 (six) hours as needed for Nausea., Disp: 15 tablet, Rfl: 0    ramipriL (ALTACE) 2.5 MG capsule, Take 2.5 mg by mouth., Disp: , Rfl:     semaglutide (OZEMPIC) 0.25 mg or 0.5 mg(2 mg/1.5 mL) PnIj, Inject 0.25 mg into the skin., Disp: , Rfl:     zolpidem (AMBIEN) 10 mg Tab, Take 10 mg by mouth nightly as needed., Disp: , Rfl:     sulfamethoxazole-trimethoprim 800-160mg (BACTRIM DS) 800-160 mg Tab, Take 1 tablet by mouth 2 (two) times daily. for 10 days, Disp: 20 tablet, Rfl: 0    tamsulosin (FLOMAX) 0.4 mg Cap, Take 1 capsule (0.4 mg total) by mouth once daily., Disp: 30 capsule, Rfl: 0  Review of patient's allergies indicates:  No Known Allergies    /84 (BP Location: Right arm, Patient Position: Sitting, BP Method: Medium (Automatic))   Pulse 68   Temp 97.9 °F (36.6 °C)   Ht 5'  "5" (1.651 m)   Wt 77.5 kg (170 lb 13.7 oz)   BMI 28.43 kg/m²     ROS      Objective:    Ortho Exam   Left hand:  Small scab wound of the proximal portion of the distal aspect of the 3rd MCP joint   No drainage on exam   Erythema, edema, and induration surrounding the joint are similar in appearance to previous exam  No fluctuance noted   No pain with range of motion which is full   Compartments are soft and compressible   Motor exam normal   Sensation and pulses intact  Cap refill brisk    GEN: Well developed, well nourished male. AAOX3. No acute distress.   Head: Normocephalic, atraumatic.   Eyes: REX  Neck: Trachea is midline, no adenopathy  Resp: Breathing unlabored.  Neuro: Motor function normal, Cranial nerves intact  Psych: Mood and affect appropriate.       Assessment:     Imaging:  No new imaging ordered today        1. Cellulitis of left hand          Plan:       Discussed this case with Dr. Church who agrees with the following plan.  We will start the patient on Bactrim today.  Continue to monitor for worsening infection or development of systemic symptoms.  Patient should return to clinic in 5 days for further evaluation and we may consider I&D at that time if his appearance is still not improved.    Orders Placed This Encounter    sulfamethoxazole-trimethoprim 800-160mg (BACTRIM DS) 800-160 mg Tab     Follow up in about 5 days (around 2/8/2023).          Patient note was created using MModal Dictation.  Any errors in syntax or even information may not have been identified and edited on initial review prior to signing this note.    "

## 2023-02-08 NOTE — ANESTHESIA POSTPROCEDURE EVALUATION
Anesthesia Post Evaluation    Patient: Venancio Barragan    Procedure(s) Performed: Procedure(s) (LRB):  INCISION AND DRAINAGE, HAND (Left)    Final Anesthesia Type: general      Patient location during evaluation: PACU  Patient participation: Yes- Able to Participate  Level of consciousness: awake and alert  Post-procedure vital signs: reviewed and stable  Pain management: adequate  Airway patency: patent    PONV status at discharge: No PONV  Anesthetic complications: no      Cardiovascular status: hemodynamically stable  Respiratory status: spontaneous ventilation  Hydration status: euvolemic  Follow-up not needed.          Vitals Value Taken Time   /86 02/08/23 1545   Temp  02/08/23 1738   Pulse 74 02/08/23 1549   Resp 18 02/08/23 1548   SpO2 97 % 02/08/23 1549   Vitals shown include unvalidated device data.      Event Time   Out of Recovery 15:51:37         Pain/Holli Score: Pain Rating Prior to Med Admin: 8 (2/8/2023  3:45 PM)  Holli Score: 10 (2/8/2023  4:00 PM)

## 2023-02-08 NOTE — TRANSFER OF CARE
"Anesthesia Transfer of Care Note    Patient: Venancio Barragan    Procedure(s) Performed: Procedure(s) (LRB):  INCISION AND DRAINAGE, HAND (Left)    Patient location: PACU    Anesthesia Type: general    Transport from OR: Transported from OR on 2-3 L/min O2 by NC with adequate spontaneous ventilation    Post pain: adequate analgesia    Post assessment: no apparent anesthetic complications and tolerated procedure well    Post vital signs: stable    Level of consciousness: awake    Nausea/Vomiting: no nausea/vomiting    Complications: none    Transfer of care protocol was followed      Last vitals:   Visit Vitals  /80 (BP Location: Right arm, Patient Position: Lying)   Pulse 74   Temp 37.1 °C (98.8 °F) (Temporal)   Resp 16   Ht 5' 5" (1.651 m)   Wt 79.6 kg (175 lb 7.8 oz)   SpO2 100%   BMI 29.20 kg/m²     "

## 2023-02-08 NOTE — PROGRESS NOTES
Subjective:      Patient ID: Venancio Barragan is a 57 y.o. male.    Chief Complaint: Pain and Swelling of the Left Hand      HPI: Venancio Barragan is a 57-year-old male in clinic today for follow-up of left hand cellulitis.  Patient was 1st seen for this problem on 01/26/2023 in the hospital as a consult.  He was treated with IV antibiotics and discharged home on oral antibiotics.  He was last seen in this clinic 5 days ago and his antibiotic was changed to Bactrim.  Patient denies any improvement or worsening since last visit.  Patient says that he does have pain with direct pressure and certain movements.  Patient denies numbness or tingling in the extremity.  Patient has not had anything to eat or drink since before midnight.    Past Medical History:   Diagnosis Date    Diabetes mellitus     GERD (gastroesophageal reflux disease)     High cholesterol     Hypertension        Current Outpatient Medications:     atorvastatin (LIPITOR) 40 MG tablet, Take 40 mg by mouth once daily., Disp: , Rfl:     dulaglutide (TRULICITY) 0.75 mg/0.5 mL pen injector, INECT 0.5 ML INTO THE SKIN EVERY 7 DAYS, Disp: , Rfl:     metformin (GLUCOPHAGE) 500 MG tablet, Take 1,000 mg by mouth 2 (two) times daily with meals. , Disp: , Rfl:     multivitamin capsule, Take 1 capsule by mouth once daily., Disp: , Rfl:     promethazine (PHENERGAN) 25 MG tablet, Take 1 tablet (25 mg total) by mouth every 6 (six) hours as needed for Nausea., Disp: 15 tablet, Rfl: 0    ramipriL (ALTACE) 2.5 MG capsule, Take 2.5 mg by mouth., Disp: , Rfl:     semaglutide (OZEMPIC) 0.25 mg or 0.5 mg(2 mg/1.5 mL) PnIj, Inject 0.25 mg into the skin., Disp: , Rfl:     sulfamethoxazole-trimethoprim 800-160mg (BACTRIM DS) 800-160 mg Tab, Take 1 tablet by mouth 2 (two) times daily. for 10 days, Disp: 20 tablet, Rfl: 0    zolpidem (AMBIEN) 10 mg Tab, Take 10 mg by mouth nightly as needed., Disp: , Rfl:     tamsulosin (FLOMAX) 0.4 mg Cap, Take 1 capsule (0.4 mg total) by mouth once  "daily., Disp: 30 capsule, Rfl: 0  Review of patient's allergies indicates:  No Known Allergies    /76   Pulse 62   Ht 5' 5" (1.651 m)   Wt 77.1 kg (170 lb)   BMI 28.29 kg/m²     ROS      Objective:    Ortho Exam   Left hand:   There is an area of erythema and edema over the dorsal aspect of the 3rd MCP joint  No fluctuance is noted on exam   Mild TTP over this area   ROM of the wrist and fingers is full  Compartments are soft and compressible   Motor exam normal   Sensation and pulses intact   Cap refill brisk    GEN: Well developed, well nourished male. AAOX3. No acute distress.   Head: Normocephalic, atraumatic.   Eyes: REX  Neck: Trachea is midline, no adenopathy  Resp: Breathing unlabored.  Neuro: Motor function normal, Cranial nerves intact  Psych: Mood and affect appropriate.       Assessment:     Imaging:  No new imaging ordered today        1. Abscess of left hand    2. Cellulitis of left hand          Plan:       Discussed this case with Dr. Colon who agrees with the following plan.  Discussed options with the patient including repeat MRI or going to the operating room for incision and drainage.  Patient is interested in incision and drainage.  We discussed the procedure with the patient and explained the risks and benefits.  Questions were asked and answered.  Patient voiced understanding and willingness to proceed.  We will plan to take patient to the operating room later this afternoon for incision and drainage of abscess of the left hand.  We will follow-up with patient in our clinic one week postop for wound check.       Follow up in about 1 week (around 2/15/2023).          Patient note was created using MModal Dictation.  Any errors in syntax or even information may not have been identified and edited on initial review prior to signing this note.    "

## 2023-02-09 ENCOUNTER — TELEPHONE (OUTPATIENT)
Dept: ORTHOPEDICS | Facility: CLINIC | Age: 58
End: 2023-02-09
Payer: MEDICAID

## 2023-02-09 ENCOUNTER — HOSPITAL ENCOUNTER (EMERGENCY)
Facility: HOSPITAL | Age: 58
Discharge: HOME | End: 2023-02-10
Attending: EMERGENCY MEDICINE
Payer: MEDICAID

## 2023-02-09 VITALS
BODY MASS INDEX: 29.24 KG/M2 | SYSTOLIC BLOOD PRESSURE: 143 MMHG | HEIGHT: 65 IN | RESPIRATION RATE: 15 BRPM | HEART RATE: 77 BPM | TEMPERATURE: 99 F | WEIGHT: 175.5 LBS | OXYGEN SATURATION: 97 % | DIASTOLIC BLOOD PRESSURE: 86 MMHG

## 2023-02-09 DIAGNOSIS — Z48.89 ENCOUNTER FOR POST SURGICAL WOUND CHECK: Primary | ICD-10-CM

## 2023-02-09 LAB
ALBUMIN SERPL BCP-MCNC: 4 G/DL (ref 3.5–5.2)
ALP SERPL-CCNC: 59 U/L (ref 55–135)
ALT SERPL W/O P-5'-P-CCNC: 30 U/L (ref 10–44)
ANION GAP SERPL CALC-SCNC: 11 MMOL/L (ref 8–16)
AST SERPL-CCNC: 23 U/L (ref 10–40)
BASOPHILS # BLD AUTO: 0.03 K/UL (ref 0–0.2)
BASOPHILS NFR BLD: 0.3 % (ref 0–1.9)
BILIRUB SERPL-MCNC: 0.6 MG/DL (ref 0.1–1)
BUN SERPL-MCNC: 16 MG/DL (ref 6–20)
CALCIUM SERPL-MCNC: 9.2 MG/DL (ref 8.7–10.5)
CHLORIDE SERPL-SCNC: 103 MMOL/L (ref 95–110)
CO2 SERPL-SCNC: 24 MMOL/L (ref 23–29)
CREAT SERPL-MCNC: 1.3 MG/DL (ref 0.5–1.4)
DIFFERENTIAL METHOD: ABNORMAL
EOSINOPHIL # BLD AUTO: 0 K/UL (ref 0–0.5)
EOSINOPHIL NFR BLD: 0.4 % (ref 0–8)
ERYTHROCYTE [DISTWIDTH] IN BLOOD BY AUTOMATED COUNT: 12.8 % (ref 11.5–14.5)
EST. GFR  (NO RACE VARIABLE): >60 ML/MIN/1.73 M^2
GLUCOSE SERPL-MCNC: 99 MG/DL (ref 70–110)
HCT VFR BLD AUTO: 38.8 % (ref 40–54)
HGB BLD-MCNC: 13.1 G/DL (ref 14–18)
IMM GRANULOCYTES # BLD AUTO: 0.02 K/UL (ref 0–0.04)
IMM GRANULOCYTES NFR BLD AUTO: 0.2 % (ref 0–0.5)
LYMPHOCYTES # BLD AUTO: 1.9 K/UL (ref 1–4.8)
LYMPHOCYTES NFR BLD: 20.6 % (ref 18–48)
MCH RBC QN AUTO: 29.5 PG (ref 27–31)
MCHC RBC AUTO-ENTMCNC: 33.8 G/DL (ref 32–36)
MCV RBC AUTO: 87 FL (ref 82–98)
MONOCYTES # BLD AUTO: 0.8 K/UL (ref 0.3–1)
MONOCYTES NFR BLD: 8.6 % (ref 4–15)
NEUTROPHILS # BLD AUTO: 6.5 K/UL (ref 1.8–7.7)
NEUTROPHILS NFR BLD: 69.9 % (ref 38–73)
NRBC BLD-RTO: 0 /100 WBC
PLATELET # BLD AUTO: 290 K/UL (ref 150–450)
PMV BLD AUTO: 9.4 FL (ref 9.2–12.9)
POTASSIUM SERPL-SCNC: 4.3 MMOL/L (ref 3.5–5.1)
PROT SERPL-MCNC: 7.4 G/DL (ref 6–8.4)
RBC # BLD AUTO: 4.44 M/UL (ref 4.6–6.2)
SODIUM SERPL-SCNC: 138 MMOL/L (ref 136–145)
WBC # BLD AUTO: 9.28 K/UL (ref 3.9–12.7)

## 2023-02-09 PROCEDURE — 99284 EMERGENCY DEPT VISIT MOD MDM: CPT

## 2023-02-09 PROCEDURE — 80053 COMPREHEN METABOLIC PANEL: CPT | Performed by: NURSE PRACTITIONER

## 2023-02-09 PROCEDURE — 85025 COMPLETE CBC W/AUTO DIFF WBC: CPT | Performed by: NURSE PRACTITIONER

## 2023-02-09 NOTE — TELEPHONE ENCOUNTER
----- Message from Bo Meredith sent at 2/9/2023  4:52 PM CST -----  Contact: Ana Lilia/Wife  Patients wife Ana Lilia is calling to speak with the nurse in regards to questions and concerns. Reports patient surgery area on hand is red and swollen and he is running a fever of 100.8. Please give Ana Lilia a callback at [phone]

## 2023-02-09 NOTE — OP NOTE
DATE OF PROCEDURE: 2/8/2023    PREOPERATIVE DIAGNOSIS:  Abscess of left hand [L02.512]    POSTOPERATIVE DIAGNOSIS: Left hand mass    PROCEDURES PERFORMED: Incision and drainage of left hand mass    SURGEON:  Adele Colon MD, YADI    ASSISTANT:  scrub tech     ANESTHESIA: Local MAC    SPECIMENS: cultures & permanent section    BLOOD LOSS: minimal    DRAINS: none    IMPLANTS: * No implants in log *    COMPLICATIONS: none    INDICATIONS: Venancio Barragan is a 57-year-old right hand dominant  male who presented to our clinic for follow-up earlier today.  Patient has been treated for 2 weeks on p.o. antibiotics for a left hand cellulitis and possible abscess.  The cellulitis has improved however patient remained concerned about a mass over his 3rd metacarpophalangeal joint that has not resolved continues to be tender.  Patient works as a  need to both hands.  The patient would like to proceed with incision and drainage of the mass.    PROCEDURE IN DETAIL:     After consents were verified and site was marked, patient was transported to the operative suite where appropriate anesthesia was administered.     A time-out was performed verifying the procedure location.  2 cc of lidocaine was used as a field block around the mass.  The hand was then prepped and draped in normal sterile fashion.  A linear incision was made directly over the mass.  There was no gross purulence noted however patient is found to have a friable type material with in the mass with somewhat of a cystic type structure.  Tissue Cultures were taken of this material.  And some of this material was sent for permanent section to rule out any other etiology for the mass.  The wound was then copiously irrigated with normal saline and loosely approximated with nylon suture and packed with quarter-inch iodoform gauze.  The wound was dressed with 4x4s and a Coban.  Patient emerged from anesthesia without complications.    POSTOP PLAN:     Wound care:   Leave dressings in place for 2 days.  Patient may then remove dressings and clean hand with antibiotic soap and wound with Betadine.  Then packed the wound with quarter-inch iodoform gauze.  Then reapplied gauze dressing.   Antibiotics: complete current antibiotic regimen.  Clinic follow-up:  Return to clinic in 1 week for wound check.

## 2023-02-09 NOTE — TELEPHONE ENCOUNTER
Spoke with patient and his wife and advised them that if he needs to go to the ER to come to [facility] [address] or if they choose to wait until morning that he can come to the clinic for 8:00am and to not eat or drink anything after midnight incase a procedure needs to be done. Understanding verbalized.

## 2023-02-09 NOTE — BRIEF OP NOTE
[facility] - Surgery (Hospital)  Brief Operative Note    Surgery Date: 2/8/2023     Surgeon(s) and Role:     * Enoc Cavanaugh MD - Primary    Assisting Surgeon: None    Pre-op Diagnosis:  Abscess of left hand [L02.512]    Post-op Diagnosis:  Post-Op Diagnosis Codes:     * Abscess of left hand [L02.512]    Procedure(s) (LRB):  INCISION AND DRAINAGE, HAND (Left)    Anesthesia: Local MAC    Operative Findings: caseous material in mass    Estimated Blood Loss: * No values recorded between 2/8/2023  2:36 PM and 2/8/2023  3:07 PM *         Specimens:   Specimen (24h ago, onward)       Start     Ordered    02/08/23 1458  Specimen to Pathology, Surgery Orthopedics  Once        Comments: Pre-op Diagnosis: Abscess of left hand [L02.512]Procedure(s):INCISION AND DRAINAGE, HAND Number of specimens: 1Name of specimens: 1. Left Long Finger Mass-perm     References:    Click here for ordering Quick Tip   Question Answer Comment   Procedure Type: Orthopedics    Specimen Class: Known or suspected malignancy    Which provider would you like to cc? ENOC CAVANAUGH    Release to patient Immediate        02/08/23 1459                      Discharge Note    OUTCOME: Patient tolerated treatment/procedure well without complication and is now ready for discharge.    DISPOSITION: Home or Self Care    FINAL DIAGNOSIS:  left 3rd MCP joint mass    FOLLOWUP: In clinic    DISCHARGE INSTRUCTIONS:    Discharge Procedure Orders   Diet Adult Regular     Change dressing (specify)   Order Comments: Dressing change:  - may change after 48 hours  - wash hand with antibacterial soap  - pack with iodoform, apply gauze and wrap with coban     Keep surgical extremity elevated     Activity as tolerated        Clinical Reference Documents Added to Patient Instructions         Document    ABSCESS INCISION AND DRAINAGE DISCHARGE INSTRUCTIONS (ENGLISH)    HYDROCODONE AND ACETAMINOPHEN, ADULT (ENGLISH)

## 2023-02-10 VITALS
BODY MASS INDEX: 29.42 KG/M2 | RESPIRATION RATE: 20 BRPM | DIASTOLIC BLOOD PRESSURE: 77 MMHG | SYSTOLIC BLOOD PRESSURE: 108 MMHG | HEART RATE: 79 BPM | HEIGHT: 65 IN | OXYGEN SATURATION: 98 % | WEIGHT: 176.56 LBS | TEMPERATURE: 100 F

## 2023-02-10 PROCEDURE — 63600175 PHARM REV CODE 636 W HCPCS: Performed by: EMERGENCY MEDICINE

## 2023-02-10 PROCEDURE — 96372 THER/PROPH/DIAG INJ SC/IM: CPT | Performed by: EMERGENCY MEDICINE

## 2023-02-10 RX ORDER — MORPHINE SULFATE 10 MG/ML
10 INJECTION INTRAMUSCULAR; INTRAVENOUS; SUBCUTANEOUS
Status: COMPLETED | OUTPATIENT
Start: 2023-02-10 | End: 2023-02-10

## 2023-02-10 RX ADMIN — MORPHINE SULFATE 10 MG: 10 INJECTION INTRAVENOUS at 01:02

## 2023-02-10 NOTE — FIRST PROVIDER EVALUATION
"Medical screening examination initiated.  I have conducted a focused provider triage encounter, findings are as follows:    Brief history of present illness:  Patient presents with worsened swelling and drainage from the left hand where an I and D was performed by ortho yesterday.    Vitals:    02/09/23 1913   BP: 107/69   BP Location: Right arm   Patient Position: Sitting   Pulse: 87   Resp: 16   Temp: 98.6 °F (37 °C)   TempSrc: Oral   SpO2: 95%   Weight: 80.1 kg (176 lb 9.4 oz)   Height: 5' 5" (1.651 m)       Pertinent physical exam:      Brief workup plan:      Preliminary workup initiated; this workup will be continued and followed by the physician or advanced practice provider that is assigned to the patient when roomed.  "

## 2023-02-10 NOTE — ED PROVIDER NOTES
SCRIBE #1 NOTE: I, Sandra Forbes, am scribing for, and in the presence of, Nik Lomeli MD. I have scribed the entire note.       History     Chief Complaint   Patient presents with    Post-op Problem     Pt had I&D  on abscess on L hand yesterday. Reports that hand is swelling and painful. Drainage noted to abscess. Pt on abx currently.     Review of patient's allergies indicates:  No Known Allergies      History of Present Illness     HPI    2/10/2023, 1:22 AM  History obtained from the patient      History of Present Illness: Venancio Barragan is a 57 y.o. male patient with a PMHx of diabetes, GERD, high cholesterol and hypertension who presents to the Emergency Department for evaluation of post-op problem. Symptoms are constant and moderate in severity. Pt noticed his left hand had an abscess and swelling for about a week and was prescribed bactrim. Pt had an I and D on 2/8  and is now c/o left hand pain and swelling with drainage. Pt was advised to come to ED if swelling became worse and/or has a fever to go to ED. Associated sxs include fever (Tmax 101). Patient denies any n/v, cough, sore throat, rhinorrhea, congestion, dysuria, and all other sxs at this time. Pt is still on bactrim and was told to f/u on 2/10. No further complaints or concerns at this time.       Arrival mode: Personal vehicle      PCP: Gustavo Calhoun MD        Past Medical History:  Past Medical History:   Diagnosis Date    Diabetes mellitus     GERD (gastroesophageal reflux disease)     High cholesterol     Hypertension        Past Surgical History:  Past Surgical History:   Procedure Laterality Date    ANKLE SURGERY      right    ELBOW SURGERY Left     INCISION AND DRAINAGE OF HAND Left 2/8/2023    Procedure: INCISION AND DRAINAGE, HAND;  Surgeon: Adele Colon MD;  Location: [facility] OR;  Service: Orthopedics;  Laterality: Left;    KNEE SURGERY      TOTAL HIP ARTHROPLASTY      ines         Family History:  Family History   Problem Relation  Age of Onset    Diabetes Mother     No Known Problems Father        Social History:  Social History     Tobacco Use    Smoking status: Never    Smokeless tobacco: Never   Substance and Sexual Activity    Alcohol use: No    Drug use: No    Sexual activity: Yes     Partners: Female        Review of Systems     Review of Systems   Constitutional:  Positive for fever (Tmax 101).   HENT:  Negative for congestion, rhinorrhea and sore throat.    Respiratory:  Negative for cough and shortness of breath.    Cardiovascular:  Negative for chest pain.   Gastrointestinal:  Negative for nausea and vomiting.   Genitourinary:  Negative for dysuria.   Musculoskeletal:  Positive for joint swelling (left hand) and myalgias (left hand). Negative for back pain.   Skin:  Negative for rash.   Neurological:  Negative for weakness.   Hematological:  Does not bruise/bleed easily.   All other systems reviewed and are negative.     Physical Exam     Initial Vitals [02/09/23 1913]   BP Pulse Resp Temp SpO2   107/69 87 16 98.6 °F (37 °C) 95 %      MAP       --          Physical Exam  Nursing Notes and Vital Signs Reviewed.  Constitutional: Patient is in no acute distress. Well-developed and well-nourished.  Head: Atraumatic. Normocephalic.  Eyes: PERRL. EOM intact. Conjunctivae are not pale. No scleral icterus.  ENT: Mucous membranes are moist. Oropharynx is clear and symmetric.    Neck: Supple. Full ROM. No lymphadenopathy.  Cardiovascular: Regular rate. Regular rhythm. No murmurs, rubs, or gallops. Distal pulses are 2+ and symmetric.  Pulmonary/Chest: No respiratory distress. Clear to auscultation bilaterally. No wheezing or rales.  Abdominal: Soft and non-distended.  There is no tenderness.  No rebound, guarding, or rigidity. Good bowel sounds.  Genitourinary: No CVA tenderness  Musculoskeletal: Moves all extremities. No obvious deformities. No edema. No calf tenderness.  Skin: See images below  Neurological:  Alert, awake, and appropriate.   "Normal speech.  No acute focal neurological deficits are appreciated.  Psychiatric: Normal affect. Good eye contact. Appropriate in content.                 ED Course   Procedures  ED Vital Signs:  Vitals:    02/09/23 1913 02/10/23 0140 02/10/23 0144   BP: 107/69  108/77   Pulse: 87  79   Resp: 16 20    Temp: 98.6 °F (37 °C)  99.7 °F (37.6 °C)   TempSrc: Oral  Oral   SpO2: 95%  98%   Weight: 80.1 kg (176 lb 9.4 oz)     Height: 5' 5" (1.651 m)         Abnormal Lab Results:  Labs Reviewed   CBC W/ AUTO DIFFERENTIAL - Abnormal; Notable for the following components:       Result Value    RBC 4.44 (*)     Hemoglobin 13.1 (*)     Hematocrit 38.8 (*)     All other components within normal limits   COMPREHENSIVE METABOLIC PANEL        All Lab Results:  Results for orders placed or performed during the hospital encounter of 02/09/23   CBC auto differential   Result Value Ref Range    WBC 9.28 3.90 - 12.70 K/uL    RBC 4.44 (L) 4.60 - 6.20 M/uL    Hemoglobin 13.1 (L) 14.0 - 18.0 g/dL    Hematocrit 38.8 (L) 40.0 - 54.0 %    MCV 87 82 - 98 fL    MCH 29.5 27.0 - 31.0 pg    MCHC 33.8 32.0 - 36.0 g/dL    RDW 12.8 11.5 - 14.5 %    Platelets 290 150 - 450 K/uL    MPV 9.4 9.2 - 12.9 fL    Immature Granulocytes 0.2 0.0 - 0.5 %    Gran # (ANC) 6.5 1.8 - 7.7 K/uL    Immature Grans (Abs) 0.02 0.00 - 0.04 K/uL    Lymph # 1.9 1.0 - 4.8 K/uL    Mono # 0.8 0.3 - 1.0 K/uL    Eos # 0.0 0.0 - 0.5 K/uL    Baso # 0.03 0.00 - 0.20 K/uL    nRBC 0 0 /100 WBC    Gran % 69.9 38.0 - 73.0 %    Lymph % 20.6 18.0 - 48.0 %    Mono % 8.6 4.0 - 15.0 %    Eosinophil % 0.4 0.0 - 8.0 %    Basophil % 0.3 0.0 - 1.9 %    Differential Method Automated    Comprehensive metabolic panel   Result Value Ref Range    Sodium 138 136 - 145 mmol/L    Potassium 4.3 3.5 - 5.1 mmol/L    Chloride 103 95 - 110 mmol/L    CO2 24 23 - 29 mmol/L    Glucose 99 70 - 110 mg/dL    BUN 16 6 - 20 mg/dL    Creatinine 1.3 0.5 - 1.4 mg/dL    Calcium 9.2 8.7 - 10.5 mg/dL    Total Protein 7.4 " 6.0 - 8.4 g/dL    Albumin 4.0 3.5 - 5.2 g/dL    Total Bilirubin 0.6 0.1 - 1.0 mg/dL    Alkaline Phosphatase 59 55 - 135 U/L    AST 23 10 - 40 U/L    ALT 30 10 - 44 U/L    Anion Gap 11 8 - 16 mmol/L    eGFR >60 >60 mL/min/1.73 m^2        Imaging Results:  Imaging Results              X-Ray Hand 3 View Left (Final result)  Result time 02/09/23 20:24:04      Final result by Faith Gong MD (02/09/23 20:24:04)                   Impression:      No acute or adverse osseous finding.      Electronically signed by: Faith Gong  Date:    02/09/2023  Time:    20:24               Narrative:    EXAMINATION:  XR HAND COMPLETE 3 VIEW LEFT    CLINICAL HISTORY:  XR HAND COMPLETE 3 VIEW LEFT    COMPARISON:  January 25, 2023    FINDINGS:  Three views of the left were obtained.    No acute fracture or dislocation identified.  Small linear radiodensity at the base of the 3rd digit palmar aspect unchanged.  Joint spaces are maintained.  Arthritic change mild.  No destructive bony finding.                                         The Emergency Provider reviewed the vital signs and test results, which are outlined above.     ED Discussion       1:23 AM: Discussed pt's case with Trish Vargas NP ([facility]) who recommends can he received 1 dose IV antibiotics and possibly discharge .  F/u with ortho clinic at 8 AM.     1:36 AM: Reassessed pt at this time.  Discussed with pt all pertinent ED information and results. Discussed pt dx and plan of tx. Gave pt all f/u and return to the ED instructions. All questions and concerns were addressed at this time. Pt expresses understanding of information and instructions, and is comfortable with plan to discharge. Pt is stable for discharge.    I discussed with patient and/or family/caretaker that evaluation in the ED does not suggest any emergent or life threatening medical conditions requiring immediate intervention beyond what was provided in the ED, and I believe patient  is safe for discharge.  Regardless, an unremarkable evaluation in the ED does not preclude the development or presence of a serious of life threatening condition. As such, patient was instructed to return immediately for any worsening or change in current symptoms.     ED Course as of 02/10/23 0614   Thu Feb 09, 2023   2156 Eosinophil %: 0.4 [BA]      ED Course User Index  [BA] Franko Simmons MD     Medical Decision Making:   Clinical Tests:   Lab Tests: Ordered and Reviewed  Radiological Study: Ordered and Reviewed         ED Medication(s):  Medications   morphine injection 10 mg (10 mg Intramuscular Given 2/10/23 0140)       Discharge Medication List as of 2/10/2023  1:29 AM           Follow-up Information       [facility] Today.    Contact information:  [address] [place] [id number] [phone]               [facility] - Emergency Dept..    Specialty: Emergency Medicine  Why: As needed, If symptoms worsen  Contact information:  [address] [address] [facility] [address] [phone]                               Scribe Attestation:   Scribe #1: I performed the above scribed service and the documentation accurately describes the services I performed. I attest to the accuracy of the note.     Attending:   Physician Attestation Statement for Scribe #1: I, Nik Lomeli MD, personally performed the services described in this documentation, as scribed by Sandra Forbes, in my presence, and it is both accurate and complete.           Clinical Impression       ICD-10-CM ICD-9-CM   1. Encounter for post surgical wound check  Z48.89 V58.49       Disposition:   Disposition: Discharged  Condition: Stable      Nik Lomeli MD  02/10/23 0614

## 2023-02-11 ENCOUNTER — HOSPITAL ENCOUNTER (EMERGENCY)
Facility: HOSPITAL | Age: 58
Discharge: HOME | End: 2023-02-11
Attending: EMERGENCY MEDICINE
Payer: MEDICAID

## 2023-02-11 VITALS
WEIGHT: 177 LBS | HEART RATE: 71 BPM | RESPIRATION RATE: 20 BRPM | OXYGEN SATURATION: 97 % | TEMPERATURE: 98 F | SYSTOLIC BLOOD PRESSURE: 124 MMHG | HEIGHT: 65 IN | BODY MASS INDEX: 29.49 KG/M2 | DIASTOLIC BLOOD PRESSURE: 77 MMHG

## 2023-02-11 DIAGNOSIS — Z09 ENCOUNTER FOR RECHECK OF ABSCESS FOLLOWING INCISION AND DRAINAGE: Primary | ICD-10-CM

## 2023-02-11 PROCEDURE — 99282 EMERGENCY DEPT VISIT SF MDM: CPT | Mod: ER

## 2023-02-11 PROCEDURE — 25000003 PHARM REV CODE 250: Mod: ER | Performed by: NURSE PRACTITIONER

## 2023-02-11 PROCEDURE — 99283 EMERGENCY DEPT VISIT LOW MDM: CPT | Mod: 25,ER

## 2023-02-11 RX ORDER — LIDOCAINE HYDROCHLORIDE 20 MG/ML
5 INJECTION, SOLUTION EPIDURAL; INFILTRATION; INTRACAUDAL; PERINEURAL
Status: COMPLETED | OUTPATIENT
Start: 2023-02-11 | End: 2023-02-11

## 2023-02-11 RX ADMIN — LIDOCAINE HYDROCHLORIDE 100 MG: 20 INJECTION, SOLUTION EPIDURAL; INFILTRATION; INTRACAUDAL; PERINEURAL at 04:02

## 2023-02-11 NOTE — ED PROVIDER NOTES
Encounter Date: 2/11/2023       History     Chief Complaint   Patient presents with    Wound Check     Left hand wound. Abscess on 2/8 then surgery 2/9 and told to pack but last night came out and unable to get any packing back in      Venancio H  is a 57 y.o. male presents to [facility] ED for wound check left hand status post 2/8 surgical intervention left hand abscess. Patient reports he is improving daily, less pain, swelling and redness. He presents since the packing fell out and he was not sure the opening was large enough to put packing. His wife has a nurse friend that was packing daily as instructed after surgery and she was not available today so patient wanted medical evaluation with re-packing. No drainage. No bleeding. Mild soft tissue swelling. No fever. No chills.     The history is provided by the patient.   Review of patient's allergies indicates:  No Known Allergies  Past Medical History:   Diagnosis Date    Diabetes mellitus     GERD (gastroesophageal reflux disease)     High cholesterol     Hypertension      Past Surgical History:   Procedure Laterality Date    ANKLE SURGERY      right    ELBOW SURGERY Left     INCISION AND DRAINAGE OF HAND Left 2/8/2023    Procedure: INCISION AND DRAINAGE, HAND;  Surgeon: Adele Colon MD;  Location: [facility];  Service: Orthopedics;  Laterality: Left;    KNEE SURGERY      TOTAL HIP ARTHROPLASTY      ines     Family History   Problem Relation Age of Onset    Diabetes Mother     No Known Problems Father      Social History     Tobacco Use    Smoking status: Never    Smokeless tobacco: Never   Substance Use Topics    Alcohol use: No    Drug use: No     Review of Systems   Constitutional:  Negative for fever.   HENT:  Negative for sore throat.    Respiratory:  Negative for shortness of breath.    Cardiovascular:  Negative for chest pain.   Gastrointestinal:  Negative for nausea.   Genitourinary:  Negative for dysuria.   Musculoskeletal:  Negative for back pain.         Left hand swelling post I and D    Skin:  Negative for rash.   Neurological:  Negative for weakness.   Hematological:  Does not bruise/bleed easily.     Physical Exam     Initial Vitals [02/11/23 1628]   BP Pulse Resp Temp SpO2   124/77 71 20 97.9 °F (36.6 °C) 97 %      MAP       --         Physical Exam    Nursing note and vitals reviewed.  Constitutional: He appears well-developed and well-nourished.   HENT:   Head: Normocephalic and atraumatic.   Eyes: Conjunctivae are normal.   Neck: Neck supple.   Normal range of motion.  Cardiovascular:  Normal rate, regular rhythm, normal heart sounds and intact distal pulses.           Pulmonary/Chest: Breath sounds normal.   Abdominal: Abdomen is soft.   Musculoskeletal:      Cervical back: Normal range of motion and neck supple.     Neurological: He is alert and oriented to person, place, and time. He has normal strength.   Skin: Skin is warm and dry.   Psychiatric: He has a normal mood and affect. Thought content normal.       ED Course   I & D - Incision and Drainage    Date/Time: 2/11/2023 5:11 PM  Location procedure was performed: [facility] EMERGENCY DEPARTMENT  Performed by: Elaina Mooney NP  Authorized by: Franko Simmons MD   Pre-operative diagnosis: healing abscess post surgical I and D  Post-operative diagnosis: healing abscess post surgical I and D  Consent Done: Emergent Situation  Type: abscess  Body area: upper extremity  Location details: left hand  Anesthesia: local infiltration    Anesthesia:  Local Anesthetic: lidocaine 2% without epinephrine  Anesthetic total: 3 mL    Patient sedated: no  Description of findings: healing incision with 1 suture, no purulent drainage, scant serosangious drainage, no bleeding, no erythema, mild diffuse soft tissue edema left hand and fingers. no additional incision performed   Risk factor: underlying major vessel, underlying major nerve and coagulopathy  Complexity: simple  Drainage: serosanguinous  Drainage amount:  scant  Wound treatment: wound packed  Packing material: 1/4 in gauze  Technical procedures used: packing of incision status post surgical i and d  Complications: No  Estimated blood loss (mL): 0  Specimens: No  Implants: No  Patient tolerance: Patient tolerated the procedure well with no immediate complications  Comments: Dressed with non adherent dressing and coban       Labs Reviewed - No data to display       Component 3 d ago    Aerobic Bacterial Culture No growth P    Resulting Agency [facility]           Narrative  Performed by: [facility]  1. Left long Finger      Specimen Collected: 02/08/23 15:02 Last Resulted: 02/10/23 07:11           Imaging Results    None          Medications   LIDOcaine (PF) 20 mg/mL (2%) injection 100 mg (100 mg Other Given 2/11/23 1653)                 ED Course as of 02/11/23 1800   Sat Feb 11, 2023   1717   5:17 PM Reassessed patient at this time.  Pt states condition has improved. Discussed with pt all pertinent ED information and results. Discussed pt dx and plan of tx. Gave patient or family all follow up and return to the ED instructions. All questions and concerns were addressed at this time. Patient or family express understanding of information and instructions, and is comfortable with plan to discharge. Patient is stable for discharge. Wound is healing no sign of additional antibiotic coverage. Patient completing antibiotic post 2/8/2023 surgical I and D.        [CG]      ED Course User Index  [CG] Elaina Mooney NP                 Clinical Impression:   Final diagnoses:  [Z09] Encounter for recheck of abscess following incision and drainage (Primary)        ED Disposition Condition    Discharge Stable          ED Prescriptions    None       Follow-up Information       Follow up With Specialties Details Why Contact Info    Adele Colon MD Orthopedic Surgery In 2 days For wound re-check [id number] [place] [id number]  [phone]      [facility] - Emergency Dept  Emergency Medicine  If symptoms worsen [phone]  [facility] [phone]             Elaina Mooney NP  02/11/23 1800

## 2023-02-13 LAB
BACTERIA SPEC AEROBE CULT: NO GROWTH
BACTERIA SPEC ANAEROBE CULT: NORMAL

## 2023-02-15 ENCOUNTER — OFFICE VISIT (OUTPATIENT)
Dept: ORTHOPEDICS | Facility: CLINIC | Age: 58
End: 2023-02-15
Payer: MEDICAID

## 2023-02-15 VITALS
HEIGHT: 65 IN | TEMPERATURE: 99 F | WEIGHT: 177 LBS | DIASTOLIC BLOOD PRESSURE: 80 MMHG | BODY MASS INDEX: 29.49 KG/M2 | SYSTOLIC BLOOD PRESSURE: 113 MMHG | HEART RATE: 83 BPM

## 2023-02-15 DIAGNOSIS — L02.512 ABSCESS OF LEFT HAND: Primary | ICD-10-CM

## 2023-02-15 LAB
FINAL PATHOLOGIC DIAGNOSIS: NORMAL
Lab: NORMAL

## 2023-02-15 PROCEDURE — 1160F PR REVIEW ALL MEDS BY PRESCRIBER/CLIN PHARMACIST DOCUMENTED: ICD-10-PCS | Mod: CPTII,,, | Performed by: PHYSICIAN ASSISTANT

## 2023-02-15 PROCEDURE — 99024 PR POST-OP FOLLOW-UP VISIT: ICD-10-PCS | Mod: ,,, | Performed by: PHYSICIAN ASSISTANT

## 2023-02-15 PROCEDURE — 99999 PR PBB SHADOW E&M-EST. PATIENT-LVL IV: ICD-10-PCS | Mod: PBBFAC,,, | Performed by: PHYSICIAN ASSISTANT

## 2023-02-15 PROCEDURE — 1160F RVW MEDS BY RX/DR IN RCRD: CPT | Mod: CPTII,,, | Performed by: PHYSICIAN ASSISTANT

## 2023-02-15 PROCEDURE — 4010F ACE/ARB THERAPY RXD/TAKEN: CPT | Mod: CPTII,,, | Performed by: PHYSICIAN ASSISTANT

## 2023-02-15 PROCEDURE — 4010F PR ACE/ARB THEARPY RXD/TAKEN: ICD-10-PCS | Mod: CPTII,,, | Performed by: PHYSICIAN ASSISTANT

## 2023-02-15 PROCEDURE — 1159F MED LIST DOCD IN RCRD: CPT | Mod: CPTII,,, | Performed by: PHYSICIAN ASSISTANT

## 2023-02-15 PROCEDURE — 1159F PR MEDICATION LIST DOCUMENTED IN MEDICAL RECORD: ICD-10-PCS | Mod: CPTII,,, | Performed by: PHYSICIAN ASSISTANT

## 2023-02-15 PROCEDURE — 99999 PR PBB SHADOW E&M-EST. PATIENT-LVL IV: CPT | Mod: PBBFAC,,, | Performed by: PHYSICIAN ASSISTANT

## 2023-02-15 PROCEDURE — 3079F PR MOST RECENT DIASTOLIC BLOOD PRESSURE 80-89 MM HG: ICD-10-PCS | Mod: CPTII,,, | Performed by: PHYSICIAN ASSISTANT

## 2023-02-15 PROCEDURE — 99024 POSTOP FOLLOW-UP VISIT: CPT | Mod: ,,, | Performed by: PHYSICIAN ASSISTANT

## 2023-02-15 PROCEDURE — 3074F PR MOST RECENT SYSTOLIC BLOOD PRESSURE < 130 MM HG: ICD-10-PCS | Mod: CPTII,,, | Performed by: PHYSICIAN ASSISTANT

## 2023-02-15 PROCEDURE — 99214 OFFICE O/P EST MOD 30 MIN: CPT | Mod: PBBFAC | Performed by: PHYSICIAN ASSISTANT

## 2023-02-15 PROCEDURE — 3079F DIAST BP 80-89 MM HG: CPT | Mod: CPTII,,, | Performed by: PHYSICIAN ASSISTANT

## 2023-02-15 PROCEDURE — 3008F PR BODY MASS INDEX (BMI) DOCUMENTED: ICD-10-PCS | Mod: CPTII,,, | Performed by: PHYSICIAN ASSISTANT

## 2023-02-15 PROCEDURE — 3008F BODY MASS INDEX DOCD: CPT | Mod: CPTII,,, | Performed by: PHYSICIAN ASSISTANT

## 2023-02-15 PROCEDURE — 3074F SYST BP LT 130 MM HG: CPT | Mod: CPTII,,, | Performed by: PHYSICIAN ASSISTANT

## 2023-02-15 RX ORDER — NIRMATRELVIR AND RITONAVIR 150-100 MG
2 KIT ORAL 2 TIMES DAILY
COMMUNITY
Start: 2023-02-13

## 2023-02-15 RX ORDER — DEXCHLORPHENIRAMINE MALEATE, DEXTROMETHORPHAN HBR, PHENYLEPHRINE HCL 1; 10; 5 MG/5ML; MG/5ML; MG/5ML
10 SYRUP ORAL 3 TIMES DAILY
COMMUNITY
Start: 2023-02-13

## 2023-02-15 NOTE — PROGRESS NOTES
Subjective:      Patient ID: Venancio Barragan is a 57 y.o. male.    Chief Complaint: Post-op Evaluation and Pain of the Left Hand      HPI: Venancio Barragan is a 57-year-old male in clinic today for postoperative follow-up.  Patient is 1 week status post incision and drainage of left hand abscess.  Patient went to the emergency department on postop day 1 because he was concerned about swelling and redness around the operative site.  Since then, he reports that his pain is greatly improved.  He has no complaints at this time.  Of note, the patient arrived to clinic today after testing positive for COVID 2 days ago    Past Medical History:   Diagnosis Date    Diabetes mellitus     GERD (gastroesophageal reflux disease)     High cholesterol     Hypertension        Current Outpatient Medications:     atorvastatin (LIPITOR) 40 MG tablet, Take 40 mg by mouth once daily., Disp: , Rfl:     dulaglutide (TRULICITY) 0.75 mg/0.5 mL pen injector, INECT 0.5 ML INTO THE SKIN EVERY 7 DAYS, Disp: , Rfl:     HYDROcodone-acetaminophen (NORCO) 5-325 mg per tablet, Take 1 tablet by mouth every 6 (six) hours as needed for Pain., Disp: 20 tablet, Rfl: 0    metformin (GLUCOPHAGE) 500 MG tablet, Take 1,000 mg by mouth 2 (two) times daily with meals. , Disp: , Rfl:     multivitamin capsule, Take 1 capsule by mouth once daily., Disp: , Rfl:     PAXLOVID, EUA, 150-100 mg DsPk, Take 2 tablets by mouth 2 (two) times daily., Disp: , Rfl:     promethazine (PHENERGAN) 25 MG tablet, Take 1 tablet (25 mg total) by mouth every 6 (six) hours as needed for Nausea., Disp: 15 tablet, Rfl: 0    ramipriL (ALTACE) 2.5 MG capsule, Take 2.5 mg by mouth., Disp: , Rfl:     semaglutide (OZEMPIC) 0.25 mg or 0.5 mg(2 mg/1.5 mL) PnIj, Inject 0.25 mg into the skin., Disp: , Rfl:     WESTUSSIN DM 1-5-10 mg/5 mL Syrp, Take 10 mLs by mouth 3 (three) times daily., Disp: , Rfl:     zolpidem (AMBIEN) 10 mg Tab, Take 10 mg by mouth nightly as needed., Disp: , Rfl:     tamsulosin  "(FLOMAX) 0.4 mg Cap, Take 1 capsule (0.4 mg total) by mouth once daily., Disp: 30 capsule, Rfl: 0  Review of patient's allergies indicates:  No Known Allergies    /80 (BP Location: Right arm, Patient Position: Sitting, BP Method: Medium (Automatic))   Pulse 83   Temp 98.5 °F (36.9 °C)   Ht 5' 5" (1.651 m)   Wt 80.3 kg (177 lb 0.5 oz)   BMI 29.46 kg/m²     ROS      Objective:    Ortho Exam   Left hand:  Dressing removed for exam   Packing is in place to open incision with 1 suture intact  Margins are very loosely approximated  Serosanguineous drainage is expressed from the wound  No purulence  No signs of infection   ROM is full  Motor exam normal   Sensation and pulses intact  Cap refill brisk    GEN: Well developed, well nourished male. AAOX3. No acute distress.   Head: Normocephalic, atraumatic.   Eyes: REX  Neck: Trachea is midline, no adenopathy  Resp: Breathing unlabored.  Neuro: Motor function normal, Cranial nerves intact  Psych: Mood and affect appropriate.       Assessment:     Imaging:  No new imaging ordered today        1. Abscess of left hand          Plan:       Packing was removed.  Wound was dressed with Xeroform, gauze, light wrap, and Ace bandage.  Patient was instructed to perform similar dressing changes daily.  Patient was given return precautions for worsening infection.  Otherwise patient will return to clinic in 2 weeks for further evaluation       Follow up in about 2 weeks (around 3/1/2023).          Patient note was created using MModal Dictation.  Any errors in syntax or even information may not have been identified and edited on initial review prior to signing this note.    "

## 2023-03-01 ENCOUNTER — OFFICE VISIT (OUTPATIENT)
Dept: ORTHOPEDICS | Facility: CLINIC | Age: 58
End: 2023-03-01
Payer: MEDICAID

## 2023-03-01 VITALS
SYSTOLIC BLOOD PRESSURE: 120 MMHG | HEART RATE: 64 BPM | WEIGHT: 174 LBS | TEMPERATURE: 98 F | DIASTOLIC BLOOD PRESSURE: 77 MMHG | HEIGHT: 65 IN | BODY MASS INDEX: 28.99 KG/M2

## 2023-03-01 DIAGNOSIS — L03.114 CELLULITIS OF LEFT HAND: Primary | ICD-10-CM

## 2023-03-01 DIAGNOSIS — L02.512 ABSCESS OF LEFT HAND: ICD-10-CM

## 2023-03-01 PROCEDURE — 3078F PR MOST RECENT DIASTOLIC BLOOD PRESSURE < 80 MM HG: ICD-10-PCS | Mod: CPTII,,, | Performed by: PHYSICIAN ASSISTANT

## 2023-03-01 PROCEDURE — 4010F PR ACE/ARB THEARPY RXD/TAKEN: ICD-10-PCS | Mod: CPTII,,, | Performed by: PHYSICIAN ASSISTANT

## 2023-03-01 PROCEDURE — 99024 PR POST-OP FOLLOW-UP VISIT: ICD-10-PCS | Mod: ,,, | Performed by: PHYSICIAN ASSISTANT

## 2023-03-01 PROCEDURE — 3074F SYST BP LT 130 MM HG: CPT | Mod: CPTII,,, | Performed by: PHYSICIAN ASSISTANT

## 2023-03-01 PROCEDURE — 1160F RVW MEDS BY RX/DR IN RCRD: CPT | Mod: CPTII,,, | Performed by: PHYSICIAN ASSISTANT

## 2023-03-01 PROCEDURE — 99024 POSTOP FOLLOW-UP VISIT: CPT | Mod: ,,, | Performed by: PHYSICIAN ASSISTANT

## 2023-03-01 PROCEDURE — 3074F PR MOST RECENT SYSTOLIC BLOOD PRESSURE < 130 MM HG: ICD-10-PCS | Mod: CPTII,,, | Performed by: PHYSICIAN ASSISTANT

## 2023-03-01 PROCEDURE — 4010F ACE/ARB THERAPY RXD/TAKEN: CPT | Mod: CPTII,,, | Performed by: PHYSICIAN ASSISTANT

## 2023-03-01 PROCEDURE — 3078F DIAST BP <80 MM HG: CPT | Mod: CPTII,,, | Performed by: PHYSICIAN ASSISTANT

## 2023-03-01 PROCEDURE — 1160F PR REVIEW ALL MEDS BY PRESCRIBER/CLIN PHARMACIST DOCUMENTED: ICD-10-PCS | Mod: CPTII,,, | Performed by: PHYSICIAN ASSISTANT

## 2023-03-01 PROCEDURE — 1159F PR MEDICATION LIST DOCUMENTED IN MEDICAL RECORD: ICD-10-PCS | Mod: CPTII,,, | Performed by: PHYSICIAN ASSISTANT

## 2023-03-01 PROCEDURE — 99999 PR PBB SHADOW E&M-EST. PATIENT-LVL IV: CPT | Mod: PBBFAC,,, | Performed by: PHYSICIAN ASSISTANT

## 2023-03-01 PROCEDURE — 99214 OFFICE O/P EST MOD 30 MIN: CPT | Mod: PBBFAC | Performed by: PHYSICIAN ASSISTANT

## 2023-03-01 PROCEDURE — 3008F PR BODY MASS INDEX (BMI) DOCUMENTED: ICD-10-PCS | Mod: CPTII,,, | Performed by: PHYSICIAN ASSISTANT

## 2023-03-01 PROCEDURE — 99999 PR PBB SHADOW E&M-EST. PATIENT-LVL IV: ICD-10-PCS | Mod: PBBFAC,,, | Performed by: PHYSICIAN ASSISTANT

## 2023-03-01 PROCEDURE — 3008F BODY MASS INDEX DOCD: CPT | Mod: CPTII,,, | Performed by: PHYSICIAN ASSISTANT

## 2023-03-01 PROCEDURE — 1159F MED LIST DOCD IN RCRD: CPT | Mod: CPTII,,, | Performed by: PHYSICIAN ASSISTANT

## 2023-03-03 NOTE — PROGRESS NOTES
Subjective:      Patient ID: Venancio Barragan is a 57 y.o. male.    Chief Complaint: Post-op Evaluation and Pain of the Left Hand      HPI: Venancio Barragan is a 57-year-old male in clinic today for postoperative follow-up.  Patient is 3 weeks status post incision and drainage of left hand abscess.  Patient is doing very well this time.  He still has some residual swelling and stiffness from the surgery, but other than that he has no new complaints at this time.  Patient denies numbness or tingling in the extremity.  He denies any recent drainage.  He denies systemic signs of infection.    Past Medical History:   Diagnosis Date    Diabetes mellitus     GERD (gastroesophageal reflux disease)     High cholesterol     Hypertension        Current Outpatient Medications:     atorvastatin (LIPITOR) 40 MG tablet, Take 40 mg by mouth once daily., Disp: , Rfl:     dulaglutide (TRULICITY) 0.75 mg/0.5 mL pen injector, INECT 0.5 ML INTO THE SKIN EVERY 7 DAYS, Disp: , Rfl:     HYDROcodone-acetaminophen (NORCO) 5-325 mg per tablet, Take 1 tablet by mouth every 6 (six) hours as needed for Pain., Disp: 20 tablet, Rfl: 0    metformin (GLUCOPHAGE) 500 MG tablet, Take 1,000 mg by mouth 2 (two) times daily with meals. , Disp: , Rfl:     multivitamin capsule, Take 1 capsule by mouth once daily., Disp: , Rfl:     ramipriL (ALTACE) 2.5 MG capsule, Take 2.5 mg by mouth., Disp: , Rfl:     zolpidem (AMBIEN) 10 mg Tab, Take 10 mg by mouth nightly as needed., Disp: , Rfl:     PAXLOVID, EUA, 150-100 mg DsPk, Take 2 tablets by mouth 2 (two) times daily., Disp: , Rfl:     promethazine (PHENERGAN) 25 MG tablet, Take 1 tablet (25 mg total) by mouth every 6 (six) hours as needed for Nausea. (Patient not taking: Reported on 3/1/2023), Disp: 15 tablet, Rfl: 0    semaglutide (OZEMPIC) 0.25 mg or 0.5 mg(2 mg/1.5 mL) PnIj, Inject 0.25 mg into the skin., Disp: , Rfl:     tamsulosin (FLOMAX) 0.4 mg Cap, Take 1 capsule (0.4 mg total) by mouth once daily., Disp: 30  "capsule, Rfl: 0    WESTUSSIN DM 1-5-10 mg/5 mL Syrp, Take 10 mLs by mouth 3 (three) times daily., Disp: , Rfl:   Review of patient's allergies indicates:  No Known Allergies    /77 (BP Location: Left arm, Patient Position: Sitting, BP Method: Medium (Automatic))   Pulse 64   Temp 98.1 °F (36.7 °C)   Ht 5' 5" (1.651 m)   Wt 78.9 kg (174 lb)   BMI 28.96 kg/m²     ROS      Objective:    Ortho Exam   Left hand:  Incisions well healed, no signs of infection   There is mild edema around the incision  No TTP   No calor   ROM is full  Compartments are soft and compressible  Motor exam normal   Sensation and pulses intact  Cap refill brisk     GEN: Well developed, well nourished male. AAOX3. No acute distress.   Head: Normocephalic, atraumatic.   Eyes: REX  Neck: Trachea is midline, no adenopathy  Resp: Breathing unlabored.  Neuro: Motor function normal, Cranial nerves intact  Psych: Mood and affect appropriate.       Assessment:     Imaging:  No new imaging ordered today        1. Cellulitis of left hand    2. Abscess of left hand          Plan:       Encouraged patient on the progress he is made thus far.  He is completed all antibiotic treatment and has not had any recurring signs of infection.  Patient understands he should continue to monitor for signs of infection and reach out to us or present to the emergency department if there are concerns.  Otherwise, the patient will return to clinic as needed       Follow up if symptoms worsen or fail to improve.          Patient note was created using MModal Dictation.  Any errors in syntax or even information may not have been identified and edited on initial review prior to signing this note.    "

## 2023-03-08 LAB — FUNGUS SPEC CULT: NORMAL

## 2023-03-14 ENCOUNTER — TELEPHONE (OUTPATIENT)
Dept: INFECTIOUS DISEASES | Facility: CLINIC | Age: 58
End: 2023-03-14
Payer: MEDICAID

## 2023-03-14 NOTE — TELEPHONE ENCOUNTER
----- Message from Natividad Thomson sent at 3/14/2023  1:14 PM CDT -----  Contact: wife(Ana Lilia)-[phone]  Wife is calling to set up an appt for patient , please call her back at . Thanks/ar

## 2023-03-21 ENCOUNTER — OFFICE VISIT (OUTPATIENT)
Dept: INFECTIOUS DISEASES | Facility: CLINIC | Age: 58
End: 2023-03-21
Payer: MEDICAID

## 2023-03-21 VITALS
HEIGHT: 65 IN | WEIGHT: 173.94 LBS | HEART RATE: 76 BPM | BODY MASS INDEX: 28.98 KG/M2 | SYSTOLIC BLOOD PRESSURE: 147 MMHG | DIASTOLIC BLOOD PRESSURE: 84 MMHG

## 2023-03-21 DIAGNOSIS — L02.512 ABSCESS OF HAND, LEFT: ICD-10-CM

## 2023-03-21 DIAGNOSIS — I10 PRIMARY HYPERTENSION: ICD-10-CM

## 2023-03-21 DIAGNOSIS — E11.69 DM TYPE 2 WITH DIABETIC MIXED HYPERLIPIDEMIA: ICD-10-CM

## 2023-03-21 DIAGNOSIS — E78.2 DM TYPE 2 WITH DIABETIC MIXED HYPERLIPIDEMIA: ICD-10-CM

## 2023-03-21 DIAGNOSIS — M17.12 OSTEOARTHRITIS OF LEFT KNEE, UNSPECIFIED OSTEOARTHRITIS TYPE: ICD-10-CM

## 2023-03-21 PROCEDURE — 4010F ACE/ARB THERAPY RXD/TAKEN: CPT | Mod: CPTII,,, | Performed by: INTERNAL MEDICINE

## 2023-03-21 PROCEDURE — 4010F PR ACE/ARB THEARPY RXD/TAKEN: ICD-10-PCS | Mod: CPTII,,, | Performed by: INTERNAL MEDICINE

## 2023-03-21 PROCEDURE — 3079F DIAST BP 80-89 MM HG: CPT | Mod: CPTII,,, | Performed by: INTERNAL MEDICINE

## 2023-03-21 PROCEDURE — 99999 PR PBB SHADOW E&M-EST. PATIENT-LVL III: CPT | Mod: PBBFAC,,, | Performed by: INTERNAL MEDICINE

## 2023-03-21 PROCEDURE — 3077F SYST BP >= 140 MM HG: CPT | Mod: CPTII,,, | Performed by: INTERNAL MEDICINE

## 2023-03-21 PROCEDURE — 3079F PR MOST RECENT DIASTOLIC BLOOD PRESSURE 80-89 MM HG: ICD-10-PCS | Mod: CPTII,,, | Performed by: INTERNAL MEDICINE

## 2023-03-21 PROCEDURE — 3008F PR BODY MASS INDEX (BMI) DOCUMENTED: ICD-10-PCS | Mod: CPTII,,, | Performed by: INTERNAL MEDICINE

## 2023-03-21 PROCEDURE — 99213 OFFICE O/P EST LOW 20 MIN: CPT | Mod: PBBFAC | Performed by: INTERNAL MEDICINE

## 2023-03-21 PROCEDURE — 3077F PR MOST RECENT SYSTOLIC BLOOD PRESSURE >= 140 MM HG: ICD-10-PCS | Mod: CPTII,,, | Performed by: INTERNAL MEDICINE

## 2023-03-21 PROCEDURE — 3008F BODY MASS INDEX DOCD: CPT | Mod: CPTII,,, | Performed by: INTERNAL MEDICINE

## 2023-03-21 PROCEDURE — 99204 PR OFFICE/OUTPT VISIT, NEW, LEVL IV, 45-59 MIN: ICD-10-PCS | Mod: S$PBB,,, | Performed by: INTERNAL MEDICINE

## 2023-03-21 PROCEDURE — 99999 PR PBB SHADOW E&M-EST. PATIENT-LVL III: ICD-10-PCS | Mod: PBBFAC,,, | Performed by: INTERNAL MEDICINE

## 2023-03-21 PROCEDURE — 1159F PR MEDICATION LIST DOCUMENTED IN MEDICAL RECORD: ICD-10-PCS | Mod: CPTII,,, | Performed by: INTERNAL MEDICINE

## 2023-03-21 PROCEDURE — 99204 OFFICE O/P NEW MOD 45 MIN: CPT | Mod: S$PBB,,, | Performed by: INTERNAL MEDICINE

## 2023-03-21 PROCEDURE — 1159F MED LIST DOCD IN RCRD: CPT | Mod: CPTII,,, | Performed by: INTERNAL MEDICINE

## 2023-03-21 NOTE — ASSESSMENT & PLAN NOTE
S/p I and D 02/09 /23  No evidence of active infection at this time  He is cleared from Infectious disease perspective to proceed with planned ortho procedure on the left knee with usual preop precautions

## 2023-03-21 NOTE — PROGRESS NOTES
"Subjective:       Patient ID: Venancio Barragan is a 58 y.o. male.    Chief Complaint: Follow-up     HPI  58 year old man with PMH as listed below who presented for "ID clearance" prior to left knee replacement .  He had  I and D of left hand abscess -02/09/23 by ortho.  The left hand has  now healed completely .  All cultures- 02/080 neg   At this time, he denies fever or chills     He was admitted -Admission Date: 1/25/2023  Indication -left hand cellulitis   Discharge Date and Time: 1/28/2023  Was given Vanco in the hospital and 5 days of po doxy.  Past Medical History:   Diagnosis Date    Diabetes mellitus     GERD (gastroesophageal reflux disease)     High cholesterol     Hypertension      Past Surgical History:   Procedure Laterality Date    ANKLE SURGERY      right    ELBOW SURGERY Left     INCISION AND DRAINAGE OF HAND Left 2/8/2023    Procedure: INCISION AND DRAINAGE, HAND;  Surgeon: Adele Colon MD;  Location: [facility];  Service: Orthopedics;  Laterality: Left;    KNEE SURGERY      TOTAL HIP ARTHROPLASTY      ines     Family History   Problem Relation Age of Onset    Diabetes Mother     No Known Problems Father      Social History     Socioeconomic History    Marital status: Single   Tobacco Use    Smoking status: Never    Smokeless tobacco: Never   Substance and Sexual Activity    Alcohol use: No    Drug use: No    Sexual activity: Yes     Partners: Female     Review of Systems   Constitutional:  Negative for activity change, appetite change, chills, diaphoresis and fatigue.   HENT:  Negative for congestion, dental problem, ear discharge, ear pain and facial swelling.    Eyes:  Negative for pain, discharge and itching.   Respiratory:  Negative for apnea, cough, chest tightness and shortness of breath.    Cardiovascular:  Negative for chest pain and leg swelling.   Gastrointestinal:  Negative for abdominal distention and abdominal pain.   Endocrine: Negative for cold intolerance, heat intolerance and " polydipsia.   Genitourinary:  Negative for difficulty urinating, dysuria and enuresis.   Musculoskeletal:  Negative for arthralgias and back pain.   Skin:  Negative for color change and pallor.   Allergic/Immunologic: Negative for environmental allergies and food allergies.   Neurological:  Negative for dizziness, facial asymmetry, light-headedness and headaches.   Hematological:  Negative for adenopathy. Does not bruise/bleed easily.   Psychiatric/Behavioral:  Negative for agitation and behavioral problems.      Objective:      Physical Exam  Vitals and nursing note reviewed.   HENT:      Head: Normocephalic and atraumatic.   Eyes:      Pupils: Pupils are equal, round, and reactive to light.   Neck:      Thyroid: No thyromegaly.   Cardiovascular:      Rate and Rhythm: Normal rate and regular rhythm.   Pulmonary:      Effort: Pulmonary effort is normal. No respiratory distress.      Breath sounds: No wheezing.   Abdominal:      General: Bowel sounds are normal.      Palpations: Abdomen is soft.      Tenderness: There is no abdominal tenderness.   Musculoskeletal:         General: No swelling.      Cervical back: Normal range of motion and neck supple.      Comments: See pics           Assessment:       1. DM type 2 with diabetic mixed hyperlipidemia        2. Primary hypertension        3. Cellulitis and abscess of hand, left with Foreign body         4. Osteoarthritis of left knee, unspecified osteoarthritis type             Plan:             Problem List Items Addressed This Visit       DM type 2 with diabetic mixed hyperlipidemia     Will continue Metformin          Primary hypertension     On Ramipril          Osteoarthritis of knee     follow ortho          Cellulitis and abscess of hand, left with Foreign body      S/p I and D 02/09 /23  No evidence of active infection at this time  He is cleared from Infectious disease perspective to proceed with planned ortho procedure on the left knee with usual preop  precautions

## 2023-03-30 LAB
ACID FAST MOD KINY STN SPEC: NORMAL
MYCOBACTERIUM SPEC QL CULT: NORMAL

## 2023-11-09 DIAGNOSIS — M25.562 LEFT KNEE PAIN, UNSPECIFIED CHRONICITY: Primary | ICD-10-CM

## 2023-11-21 ENCOUNTER — OFFICE VISIT (OUTPATIENT)
Dept: ORTHOPEDICS | Facility: CLINIC | Age: 58
End: 2023-11-21
Payer: MEDICAID

## 2023-11-21 ENCOUNTER — HOSPITAL ENCOUNTER (OUTPATIENT)
Dept: RADIOLOGY | Facility: HOSPITAL | Age: 58
Discharge: HOME | End: 2023-11-21
Attending: ORTHOPAEDIC SURGERY
Payer: MEDICAID

## 2023-11-21 VITALS
DIASTOLIC BLOOD PRESSURE: 73 MMHG | HEART RATE: 73 BPM | HEIGHT: 65 IN | WEIGHT: 173.94 LBS | SYSTOLIC BLOOD PRESSURE: 111 MMHG | BODY MASS INDEX: 28.98 KG/M2

## 2023-11-21 DIAGNOSIS — M25.562 CHRONIC PAIN OF LEFT KNEE: Primary | ICD-10-CM

## 2023-11-21 DIAGNOSIS — M25.562 LEFT KNEE PAIN, UNSPECIFIED CHRONICITY: ICD-10-CM

## 2023-11-21 DIAGNOSIS — G89.29 CHRONIC PAIN OF LEFT KNEE: Primary | ICD-10-CM

## 2023-11-21 DIAGNOSIS — Z96.652 PAIN DUE TO TOTAL LEFT KNEE REPLACEMENT, INITIAL ENCOUNTER: ICD-10-CM

## 2023-11-21 DIAGNOSIS — T84.84XA PAIN DUE TO TOTAL LEFT KNEE REPLACEMENT, INITIAL ENCOUNTER: ICD-10-CM

## 2023-11-21 PROCEDURE — 3044F HG A1C LEVEL LT 7.0%: CPT | Mod: CPTII,,, | Performed by: STUDENT IN AN ORGANIZED HEALTH CARE EDUCATION/TRAINING PROGRAM

## 2023-11-21 PROCEDURE — 4010F PR ACE/ARB THEARPY RXD/TAKEN: ICD-10-PCS | Mod: CPTII,,, | Performed by: STUDENT IN AN ORGANIZED HEALTH CARE EDUCATION/TRAINING PROGRAM

## 2023-11-21 PROCEDURE — 73562 X-RAY EXAM OF KNEE 3: CPT | Mod: TC,PN,LT

## 2023-11-21 PROCEDURE — 99213 OFFICE O/P EST LOW 20 MIN: CPT | Mod: PBBFAC,PN | Performed by: ORTHOPAEDIC SURGERY

## 2023-11-21 PROCEDURE — 77073 XR HIP TO ANKLE: ICD-10-PCS | Mod: 26,,, | Performed by: RADIOLOGY

## 2023-11-21 PROCEDURE — 1159F MED LIST DOCD IN RCRD: CPT | Mod: CPTII,,, | Performed by: STUDENT IN AN ORGANIZED HEALTH CARE EDUCATION/TRAINING PROGRAM

## 2023-11-21 PROCEDURE — 3008F PR BODY MASS INDEX (BMI) DOCUMENTED: ICD-10-PCS | Mod: CPTII,,, | Performed by: STUDENT IN AN ORGANIZED HEALTH CARE EDUCATION/TRAINING PROGRAM

## 2023-11-21 PROCEDURE — 3074F SYST BP LT 130 MM HG: CPT | Mod: CPTII,,, | Performed by: STUDENT IN AN ORGANIZED HEALTH CARE EDUCATION/TRAINING PROGRAM

## 2023-11-21 PROCEDURE — 3078F PR MOST RECENT DIASTOLIC BLOOD PRESSURE < 80 MM HG: ICD-10-PCS | Mod: CPTII,,, | Performed by: STUDENT IN AN ORGANIZED HEALTH CARE EDUCATION/TRAINING PROGRAM

## 2023-11-21 PROCEDURE — 3078F DIAST BP <80 MM HG: CPT | Mod: CPTII,,, | Performed by: STUDENT IN AN ORGANIZED HEALTH CARE EDUCATION/TRAINING PROGRAM

## 2023-11-21 PROCEDURE — 1159F PR MEDICATION LIST DOCUMENTED IN MEDICAL RECORD: ICD-10-PCS | Mod: CPTII,,, | Performed by: STUDENT IN AN ORGANIZED HEALTH CARE EDUCATION/TRAINING PROGRAM

## 2023-11-21 PROCEDURE — 4010F ACE/ARB THERAPY RXD/TAKEN: CPT | Mod: CPTII,,, | Performed by: STUDENT IN AN ORGANIZED HEALTH CARE EDUCATION/TRAINING PROGRAM

## 2023-11-21 PROCEDURE — 99214 PR OFFICE/OUTPT VISIT, EST, LEVL IV, 30-39 MIN: ICD-10-PCS | Mod: S$PBB,,, | Performed by: STUDENT IN AN ORGANIZED HEALTH CARE EDUCATION/TRAINING PROGRAM

## 2023-11-21 PROCEDURE — 3044F PR MOST RECENT HEMOGLOBIN A1C LEVEL <7.0%: ICD-10-PCS | Mod: CPTII,,, | Performed by: STUDENT IN AN ORGANIZED HEALTH CARE EDUCATION/TRAINING PROGRAM

## 2023-11-21 PROCEDURE — 99999 PR PBB SHADOW E&M-EST. PATIENT-LVL III: ICD-10-PCS | Mod: PBBFAC,,, | Performed by: ORTHOPAEDIC SURGERY

## 2023-11-21 PROCEDURE — 3008F BODY MASS INDEX DOCD: CPT | Mod: CPTII,,, | Performed by: STUDENT IN AN ORGANIZED HEALTH CARE EDUCATION/TRAINING PROGRAM

## 2023-11-21 PROCEDURE — 99214 OFFICE O/P EST MOD 30 MIN: CPT | Mod: S$PBB,,, | Performed by: STUDENT IN AN ORGANIZED HEALTH CARE EDUCATION/TRAINING PROGRAM

## 2023-11-21 PROCEDURE — 73562 XR KNEE 3 VIEW LEFT: ICD-10-PCS | Mod: 26,LT,, | Performed by: RADIOLOGY

## 2023-11-21 PROCEDURE — 3074F PR MOST RECENT SYSTOLIC BLOOD PRESSURE < 130 MM HG: ICD-10-PCS | Mod: CPTII,,, | Performed by: STUDENT IN AN ORGANIZED HEALTH CARE EDUCATION/TRAINING PROGRAM

## 2023-11-21 PROCEDURE — 99213 OFFICE O/P EST LOW 20 MIN: CPT | Mod: PBBFAC,PN | Performed by: STUDENT IN AN ORGANIZED HEALTH CARE EDUCATION/TRAINING PROGRAM

## 2023-11-21 PROCEDURE — 77073 BONE LENGTH STUDIES: CPT | Mod: TC,PN

## 2023-11-21 PROCEDURE — 77073 BONE LENGTH STUDIES: CPT | Mod: 26,,, | Performed by: RADIOLOGY

## 2023-11-21 PROCEDURE — 99999 PR PBB SHADOW E&M-EST. PATIENT-LVL III: CPT | Mod: PBBFAC,,, | Performed by: ORTHOPAEDIC SURGERY

## 2023-11-21 PROCEDURE — 73562 X-RAY EXAM OF KNEE 3: CPT | Mod: 26,LT,, | Performed by: RADIOLOGY

## 2023-11-21 NOTE — PROGRESS NOTES
[facility] [address]    Subjective:     Patient ID: Venancio Barragan is a 58 y.o. male.    Chief Complaint: Pain of the Right Knee and Pain of the Left Knee    HPI:    58-year-old male who was injured in 2008 and underwent a left ORIF tibial plateau.  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  He underwent hardware removal on 09/2022.  He then underwent a left total knee arthroplasty on 06/05/2023.  There was a delay of 6 weeks of getting into physical therapy.  His postoperative course was complicated by significant stiffness and pain.  Patient reports that his total knee has always been painful.  It is worse than prior to before surgery.  He has had no wound complications.  The pain is present at rest and at night.  It is slightly better if he stands or walks.  He is unable to specifically localize the pain but it is diffusely over the anterior knee and along the medial and lateral patella as well.  He does not feel any instability of his knee.  He had to undergo a manipulation under anesthesia on 08/02/2023.  This did not provide sustained improvement in his range of motion per the patient.  ESR and CRP were obtained in late July which were within normal limits.  Of note, he works as a .    Past Medical History:   Diagnosis Date    Diabetes mellitus     GERD (gastroesophageal reflux disease)     High cholesterol     Hypertension         Past Surgical History:   Procedure Laterality Date    ANKLE SURGERY      right    ELBOW SURGERY Left     INCISION AND DRAINAGE OF HAND Left 2/8/2023    Procedure: INCISION AND DRAINAGE, HAND;  Surgeon: Adele Colon MD;  Location: [facility];  Service: Orthopedics;  Laterality: Left;    KNEE SURGERY      TOTAL HIP ARTHROPLASTY      ines        Current Outpatient Medications   Medication Instructions    atorvastatin (LIPITOR) 40 mg, Oral, Daily    dulaglutide (TRULICITY) 0.75 mg/0.5 mL pen injector INECT 0.5 ML INTO THE SKIN EVERY 7 DAYS     HYDROcodone-acetaminophen (NORCO) 5-325 mg per tablet 1 tablet, Oral, Every 6 hours PRN    metFORMIN (GLUCOPHAGE) 1,000 mg, Oral, 2 times daily with meals    multivitamin capsule 1 capsule, Oral, Daily    OZEMPIC 0.25 mg, Subcutaneous    PAXLOVID, EUA, 150-100 mg DsPk 2 tablets, Oral, 2 times daily    promethazine (PHENERGAN) 25 mg, Oral, Every 6 hours PRN    ramipriL (ALTACE) 2.5 mg, Oral    tamsulosin (FLOMAX) 0.4 mg, Oral, Daily    WESTUSSIN DM 1-5-10 mg/5 mL Syrp 10 mLs, Oral, 3 times daily    zolpidem (AMBIEN) 10 mg, Oral, Nightly PRN        Review of patient's allergies indicates:  No Known Allergies    Social History     Socioeconomic History    Marital status: Single   Tobacco Use    Smoking status: Never    Smokeless tobacco: Never   Substance and Sexual Activity    Alcohol use: No    Drug use: No    Sexual activity: Yes     Partners: Female       Family History   Problem Relation Age of Onset    Diabetes Mother     No Known Problems Father                Objective:   Ortho/SPM Exam  Gen: AOx3, NAD  CV: RRR via resting pulse  Pulm: No increased work of breathing on room air  MSK:  Ambulation: non-antalgic gait without the use of assistive devices    Left lower extremity:   Well healed incision without signs of infection.  No knee effusion.  No erythema.  No swelling  Diffuse TTP over the anterior knee including the medial knee joint line, lateral knee joint line, medial patella, lateral patella, patellar tendon area   Motor intact: EHL/FHL/TA/GSC   [place]: DP/SP/T/[place]/SA   Knee range of motion:  5-80 degrees   No flexion or extension instability   Negative patellar grind test   Negative anterior drawer, negative posterior drawer   Foot WWP    Imaging:  XR of left knee:  S/p left cemented total knee arthroplasty.  Undersurface patella.  The hardware appears well fixed and in appropriate positioning.  No evidence of hardware failure or loosening.  Patella appropriately within the trochlea    Assessment:  Venancio KINGSLEY   is a 58 y.o. male s/p ORIF left tibial plateau (2008), hardware removal (9/2022), left total knee arthroplasty (6/5/2023), manipulation under anesthesia (8/2/2023) with a painful total knee    Plan :    Infection labs today  We will attempt to authorize [employer] for this patient's pain.   Follow up in 1 week to review labs and preauthorization      Nima Rendon MD   [employer] [facility] PGY-3  11/21/2023

## 2023-11-28 ENCOUNTER — OFFICE VISIT (OUTPATIENT)
Dept: ORTHOPEDICS | Facility: CLINIC | Age: 58
End: 2023-11-28
Payer: MEDICAID

## 2023-11-28 VITALS
DIASTOLIC BLOOD PRESSURE: 82 MMHG | HEART RATE: 77 BPM | SYSTOLIC BLOOD PRESSURE: 126 MMHG | BODY MASS INDEX: 28.98 KG/M2 | HEIGHT: 65 IN | WEIGHT: 173.94 LBS

## 2023-11-28 DIAGNOSIS — M25.669 POSTOPERATIVE STIFFNESS OF TOTAL KNEE REPLACEMENT, INITIAL ENCOUNTER: Primary | ICD-10-CM

## 2023-11-28 DIAGNOSIS — T84.89XA POSTOPERATIVE STIFFNESS OF TOTAL KNEE REPLACEMENT, INITIAL ENCOUNTER: Primary | ICD-10-CM

## 2023-11-28 DIAGNOSIS — Z96.659 POSTOPERATIVE STIFFNESS OF TOTAL KNEE REPLACEMENT, INITIAL ENCOUNTER: Primary | ICD-10-CM

## 2023-11-28 DIAGNOSIS — M25.562 CHRONIC PAIN OF LEFT KNEE: ICD-10-CM

## 2023-11-28 DIAGNOSIS — G89.29 CHRONIC PAIN OF LEFT KNEE: ICD-10-CM

## 2023-11-28 PROCEDURE — 4010F PR ACE/ARB THEARPY RXD/TAKEN: ICD-10-PCS | Mod: CPTII,,, | Performed by: ORTHOPAEDIC SURGERY

## 2023-11-28 PROCEDURE — 3074F PR MOST RECENT SYSTOLIC BLOOD PRESSURE < 130 MM HG: ICD-10-PCS | Mod: CPTII,,, | Performed by: ORTHOPAEDIC SURGERY

## 2023-11-28 PROCEDURE — 3079F DIAST BP 80-89 MM HG: CPT | Mod: CPTII,,, | Performed by: ORTHOPAEDIC SURGERY

## 2023-11-28 PROCEDURE — 3074F SYST BP LT 130 MM HG: CPT | Mod: CPTII,,, | Performed by: ORTHOPAEDIC SURGERY

## 2023-11-28 PROCEDURE — 3044F PR MOST RECENT HEMOGLOBIN A1C LEVEL <7.0%: ICD-10-PCS | Mod: CPTII,,, | Performed by: ORTHOPAEDIC SURGERY

## 2023-11-28 PROCEDURE — 3008F PR BODY MASS INDEX (BMI) DOCUMENTED: ICD-10-PCS | Mod: CPTII,,, | Performed by: ORTHOPAEDIC SURGERY

## 2023-11-28 PROCEDURE — 99213 OFFICE O/P EST LOW 20 MIN: CPT | Mod: PBBFAC,PN | Performed by: ORTHOPAEDIC SURGERY

## 2023-11-28 PROCEDURE — 1159F MED LIST DOCD IN RCRD: CPT | Mod: CPTII,,, | Performed by: ORTHOPAEDIC SURGERY

## 2023-11-28 PROCEDURE — 99213 OFFICE O/P EST LOW 20 MIN: CPT | Mod: S$PBB,,, | Performed by: ORTHOPAEDIC SURGERY

## 2023-11-28 PROCEDURE — 3079F PR MOST RECENT DIASTOLIC BLOOD PRESSURE 80-89 MM HG: ICD-10-PCS | Mod: CPTII,,, | Performed by: ORTHOPAEDIC SURGERY

## 2023-11-28 PROCEDURE — 4010F ACE/ARB THERAPY RXD/TAKEN: CPT | Mod: CPTII,,, | Performed by: ORTHOPAEDIC SURGERY

## 2023-11-28 PROCEDURE — 99999 PR PBB SHADOW E&M-EST. PATIENT-LVL III: ICD-10-PCS | Mod: PBBFAC,,, | Performed by: ORTHOPAEDIC SURGERY

## 2023-11-28 PROCEDURE — 3008F BODY MASS INDEX DOCD: CPT | Mod: CPTII,,, | Performed by: ORTHOPAEDIC SURGERY

## 2023-11-28 PROCEDURE — 3044F HG A1C LEVEL LT 7.0%: CPT | Mod: CPTII,,, | Performed by: ORTHOPAEDIC SURGERY

## 2023-11-28 PROCEDURE — 1159F PR MEDICATION LIST DOCUMENTED IN MEDICAL RECORD: ICD-10-PCS | Mod: CPTII,,, | Performed by: ORTHOPAEDIC SURGERY

## 2023-11-28 PROCEDURE — 99999 PR PBB SHADOW E&M-EST. PATIENT-LVL III: CPT | Mod: PBBFAC,,, | Performed by: ORTHOPAEDIC SURGERY

## 2023-11-28 PROCEDURE — 99213 PR OFFICE/OUTPT VISIT, EST, LEVL III, 20-29 MIN: ICD-10-PCS | Mod: S$PBB,,, | Performed by: ORTHOPAEDIC SURGERY

## 2023-11-28 NOTE — PROGRESS NOTES
[facility] [phone]    Subjective:     Patient ID: Venancio Barragan is a 58 y.o. male.    Chief Complaint: Pain of the Left Knee    HPI:    58-year-old male who was injured in 2008 and underwent a left ORIF tibial plateau.  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  He underwent hardware removal on 09/2022.  He then underwent a left total knee arthroplasty on 06/05/2023.  There was a delay of 6 weeks of getting into physical therapy.  His postoperative course was complicated by significant stiffness and pain.  Patient reports that his total knee has always been painful.  It is worse than prior to before surgery.  He has had no wound complications.  The pain is present at rest and at night.  It is slightly better if he stands or walks.  He is unable to specifically localize the pain but it is diffusely over the anterior knee and along the medial and lateral patella as well.  He does not feel any instability of his knee.  He had to undergo a manipulation under anesthesia on 08/02/2023.  This did not provide sustained improvement in his range of motion per the patient.  ESR and CRP were obtained in late July which were within normal limits.  Of note, he works as a .    Interval:  Patient is here today to review labs and discuss his insurance options.  ESR 10, CRP 0.9 which are not consistent with an infectious picture.  Patient is currently in the process of switching his insurance.  Once he switches his insurance, we will consider Iovera. Patient reports Dr. Harding is in the process of obtaining a CPM machine for him. He would also like to try physical therapy again.    Past Medical History:   Diagnosis Date    Diabetes mellitus     GERD (gastroesophageal reflux disease)     High cholesterol     Hypertension         Past Surgical History:   Procedure Laterality Date    ANKLE SURGERY      right    ELBOW SURGERY Left     INCISION AND DRAINAGE OF HAND Left 2/8/2023     Procedure: INCISION AND DRAINAGE, HAND;  Surgeon: Adele Colon MD;  Location: [facility];  Service: Orthopedics;  Laterality: Left;    KNEE SURGERY      TOTAL HIP ARTHROPLASTY      ines        Current Outpatient Medications   Medication Instructions    atorvastatin (LIPITOR) 40 mg, Oral, Daily    dulaglutide (TRULICITY) 0.75 mg/0.5 mL pen injector INECT 0.5 ML INTO THE SKIN EVERY 7 DAYS    HYDROcodone-acetaminophen (NORCO) 5-325 mg per tablet 1 tablet, Oral, Every 6 hours PRN    metFORMIN (GLUCOPHAGE) 1,000 mg, Oral, 2 times daily with meals    multivitamin capsule 1 capsule, Oral, Daily    OZEMPIC 0.25 mg, Subcutaneous    PAXLOVID, EUA, 150-100 mg DsPk 2 tablets, Oral, 2 times daily    promethazine (PHENERGAN) 25 mg, Oral, Every 6 hours PRN    ramipriL (ALTACE) 2.5 mg, Oral    tamsulosin (FLOMAX) 0.4 mg, Oral, Daily    WESTUSSIN DM 1-5-10 mg/5 mL Syrp 10 mLs, Oral, 3 times daily    zolpidem (AMBIEN) 10 mg, Oral, Nightly PRN        Review of patient's allergies indicates:  No Known Allergies    Social History     Socioeconomic History    Marital status: Single   Tobacco Use    Smoking status: Never    Smokeless tobacco: Never   Substance and Sexual Activity    Alcohol use: No    Drug use: No    Sexual activity: Yes     Partners: Female       Family History   Problem Relation Age of Onset    Diabetes Mother     No Known Problems Father                Objective:   Ortho/SPM Exam  Gen: AOx3, NAD  CV: RRR via resting pulse  Pulm: No increased work of breathing on room air  MSK:  Ambulation: non-antalgic gait without the use of assistive devices    Left lower extremity:   Well healed incision without signs of infection.  No knee effusion.  No erythema.  No swelling  Diffuse TTP over the anterior knee including the medial knee joint line, lateral knee joint line, medial patella, lateral patella, patellar tendon area   Motor intact: EHL/FHL/TA/GSC   [place]: DP/SP/T/Kemp/SA   Knee range of motion:  5-80 degrees   No flexion or  extension instability   Negative patellar grind test   Negative anterior drawer, negative posterior drawer   Foot WWP    Imaging:  XR of left knee:  S/p left cemented total knee arthroplasty.  Undersurface patella.  The hardware appears well fixed and in appropriate positioning.  No evidence of hardware failure or loosening.  Patella appropriately within the trochlea    Assessment:  Venancio Barragan is a 58 y.o. male s/p ORIF left tibial plateau (2008), hardware removal (9/2022), left total knee arthroplasty (6/5/2023), manipulation under anesthesia (8/2/2023) with a painful total knee    Plan :    Ordered physical therapy for left knee range of motion  We had a long discussion with the patient about reducing pain versus improving range of motion.  Given that the patient is over 5 months out and already s/p SHANNON, it will be difficult to increase his range of motion without performing a revision surgery.  Patient will try a CPM machine and another course of physical therapy.  Plan for bone scan at 1 year post op, patient may request a revision TKA if he is extremely unhappy with ROM at this point  Patient is currently in the process of switching his insurance.  He will call us to preauthorize iovera once he switches. We can potentially consider a peripheral nerve stimulator if iovera does not work.      Nima Rendon MD   [facility] PGY-3  11/28/2023

## 2024-02-01 ENCOUNTER — TELEPHONE (OUTPATIENT)
Dept: ORTHOPEDICS | Facility: CLINIC | Age: 59
End: 2024-02-01
Payer: MEDICAID

## 2024-02-01 NOTE — TELEPHONE ENCOUNTER
----- Message from Matthew Hahn sent at 2/1/2024 11:34 AM CST -----  Contact: Ana Lilia  Type: Requesting to speak with nurse        Who Called: Ana Lilia (wife)  Regarding: does pt need someone to drive him after his appointment   Would the patient rather a call back or a response via [employer]? Call back  Best Call Back Number: [phone]  Additional Information:

## 2024-02-01 NOTE — TELEPHONE ENCOUNTER
Spoke with wife.  Appt on Feb 8 is not for Iovera.  Will try to get Iovera approved for that day.  If not will need to reschedule.  Verbalized understanding.

## 2024-02-05 ENCOUNTER — TELEPHONE (OUTPATIENT)
Dept: ORTHOPEDICS | Facility: CLINIC | Age: 59
End: 2024-02-05
Payer: MEDICAID

## 2024-02-05 NOTE — TELEPHONE ENCOUNTER
Spoke with patient wife. Stated that no one called her back about scheduling Iovera.  Appt is made for Thursday, Feb 8 at 1000.  Insurance is still pending.  Will call day before if not approved. Verbalized understanding.

## 2024-02-05 NOTE — TELEPHONE ENCOUNTER
----- Message from Andrew Meredith sent at 2/5/2024 10:04 AM CST -----  Pt Requesting Call Back    Who called: pt's wife  Who called for pt:  Best call back #: [phone]  Add notes: caller said it's regarding the status of pt being able to get some nerves in his knee frozen

## 2024-02-07 ENCOUNTER — TELEPHONE (OUTPATIENT)
Dept: ORTHOPEDICS | Facility: CLINIC | Age: 59
End: 2024-02-07
Payer: MEDICAID

## 2024-02-07 NOTE — TELEPHONE ENCOUNTER
Spoke with patient. Informed him that Iovera is not approved. His insurance termed out in Dec 2023. Patient states he has new insurance now.  He will get his insurance updated and we will resubmit for approval. He will notify us when he does so.

## 2024-02-21 ENCOUNTER — TELEPHONE (OUTPATIENT)
Dept: ORTHOPEDICS | Facility: CLINIC | Age: 59
End: 2024-02-21
Payer: MEDICAID

## 2024-02-21 DIAGNOSIS — G89.29 CHRONIC PAIN OF LEFT KNEE: Primary | ICD-10-CM

## 2024-02-21 DIAGNOSIS — M25.562 CHRONIC PAIN OF LEFT KNEE: Primary | ICD-10-CM

## 2024-02-21 NOTE — TELEPHONE ENCOUNTER
----- Message from Tosin Prieto sent at 2/21/2024  1:17 PM CST -----  Type:  Needs Medical Advice    Who Called: pt wife riley butterfield  Would the patient rather a call back or a response via [facility]? call  Best Call Back Number:   Additional Information: would like to speak with office regarding scheduling procedure states insurance has been transferred over

## 2024-03-07 ENCOUNTER — PROCEDURE VISIT (OUTPATIENT)
Dept: ORTHOPEDICS | Facility: CLINIC | Age: 59
End: 2024-03-07
Payer: MEDICAID

## 2024-03-07 VITALS
DIASTOLIC BLOOD PRESSURE: 82 MMHG | SYSTOLIC BLOOD PRESSURE: 126 MMHG | HEART RATE: 77 BPM | BODY MASS INDEX: 26.81 KG/M2 | WEIGHT: 160.94 LBS | HEIGHT: 65 IN

## 2024-03-07 DIAGNOSIS — G89.29 CHRONIC PAIN OF LEFT KNEE: ICD-10-CM

## 2024-03-07 DIAGNOSIS — M25.562 CHRONIC PAIN OF LEFT KNEE: ICD-10-CM

## 2024-03-07 PROCEDURE — 99499 UNLISTED E&M SERVICE: CPT | Mod: S$PBB,,, | Performed by: ORTHOPAEDIC SURGERY

## 2024-03-07 PROCEDURE — 64640 INJECTION TREATMENT OF NERVE: CPT | Mod: S$PBB,LT,, | Performed by: ORTHOPAEDIC SURGERY

## 2024-03-07 PROCEDURE — 64640 INJECTION TREATMENT OF NERVE: CPT | Mod: PBBFAC,PN | Performed by: ORTHOPAEDIC SURGERY

## 2024-03-07 NOTE — PROCEDURES
Procedures  Procedure Note Iovera:    DATE OF PROCEDURE:  03/07/2024    PREOPERATIVE DIAGNOSIS: left knee pain.     POSTOPERATIVE DIAGNOSIS: left knee pain.     PROCEDURE: Iovera treatment of anterior femoral cutaneous nerve, Lateral femoral cut nerve, and both branches of infrapatellar saphenous nerve using at least 4 different punctures to treat all 4 nerves. (cpt 64640 x4)    ATTENDING SURGEON: Bony Menezes M.D.   ASSISTANT: anthony vanegas    COMPLICATIONS: None.     IMPLANTS:  None    ESTIMATED BLOOD LOSS:  < 5cc    SPECIMENS REMOVED:  None    ANESTHESIA: Local lidocaine    INDICATIONS FOR PROCEDURE: This is a 58 y.o. male with longstanding knee pain. They have failed non operative management including injections.I discussed a new treatment therapy called Iovera, which is cryotherapy, to provide symptomatic relief along the sensory distribution of the infrapatellar tendon branch of the saphenous nerve  and AFCN. The patient elected to move forward with this  We did discuss the fact that this is a fairly novel procedure and there is very limited scientific data around this.  However, it FDA approved.  The patient was given patient information and literature to review prior to the procedure as well.  Based on this, the patient agreed to move forward with doing the procedure.      PROCEDURE:    The patient was placed supine on the exam table and the proximal medial aspect of the left tibia and anterior aspect of distal femur was prepped with sterile Betadine and alcohol.  A line was drawn extending approximately 5 cm medial to inferior pole of the patella distally to a point approximately 5 cm medial to the tibial tubercle.  A second line was drawn in a medial to lateral direction the width of the patella approximately 7 cm proximal to the patella. We then infiltrated the skin with lidocaine along both lines using a 25g needle. We then introduced the Iovera device along these lines and this device penetrated the skin,  creating cryotherapy to both branches of the infrapatellar saphenous nerve, a third treatment to the anterior femoral cutaneous nerve, and fourth LFCN. 7 punctures of the skin were made to treat the 2 branches of the ISN and another 7 punctures were made to treat the AFCN and LFCN. There were a total of 4 nerves treated with iovera. The patient tolerated the procedure well with no problems.

## 2024-04-11 ENCOUNTER — OFFICE VISIT (OUTPATIENT)
Dept: ORTHOPEDICS | Facility: CLINIC | Age: 59
End: 2024-04-11
Payer: MEDICAID

## 2024-04-11 VITALS — WEIGHT: 160.94 LBS | BODY MASS INDEX: 26.81 KG/M2 | HEIGHT: 65 IN

## 2024-04-11 DIAGNOSIS — M25.562 CHRONIC PAIN OF LEFT KNEE: ICD-10-CM

## 2024-04-11 DIAGNOSIS — T84.89XD POSTOPERATIVE STIFFNESS OF TOTAL KNEE REPLACEMENT, SUBSEQUENT ENCOUNTER: Primary | ICD-10-CM

## 2024-04-11 DIAGNOSIS — G89.29 CHRONIC PAIN OF LEFT KNEE: ICD-10-CM

## 2024-04-11 DIAGNOSIS — M25.669 POSTOPERATIVE STIFFNESS OF TOTAL KNEE REPLACEMENT, SUBSEQUENT ENCOUNTER: Primary | ICD-10-CM

## 2024-04-11 DIAGNOSIS — Z96.659 POSTOPERATIVE STIFFNESS OF TOTAL KNEE REPLACEMENT, SUBSEQUENT ENCOUNTER: Primary | ICD-10-CM

## 2024-04-11 PROCEDURE — 4010F ACE/ARB THERAPY RXD/TAKEN: CPT | Mod: CPTII,,, | Performed by: ORTHOPAEDIC SURGERY

## 2024-04-11 PROCEDURE — 99213 OFFICE O/P EST LOW 20 MIN: CPT | Mod: S$PBB,,, | Performed by: ORTHOPAEDIC SURGERY

## 2024-04-11 PROCEDURE — 3008F BODY MASS INDEX DOCD: CPT | Mod: CPTII,,, | Performed by: ORTHOPAEDIC SURGERY

## 2024-04-11 PROCEDURE — 1159F MED LIST DOCD IN RCRD: CPT | Mod: CPTII,,, | Performed by: ORTHOPAEDIC SURGERY

## 2024-04-11 PROCEDURE — 99214 OFFICE O/P EST MOD 30 MIN: CPT | Mod: PBBFAC,PN | Performed by: ORTHOPAEDIC SURGERY

## 2024-04-11 PROCEDURE — 3044F HG A1C LEVEL LT 7.0%: CPT | Mod: CPTII,,, | Performed by: ORTHOPAEDIC SURGERY

## 2024-04-11 PROCEDURE — 99999 PR PBB SHADOW E&M-EST. PATIENT-LVL IV: CPT | Mod: PBBFAC,,, | Performed by: ORTHOPAEDIC SURGERY

## 2024-04-11 PROCEDURE — G2211 COMPLEX E/M VISIT ADD ON: HCPCS | Mod: S$PBB,,, | Performed by: ORTHOPAEDIC SURGERY

## 2024-04-11 NOTE — PROGRESS NOTES
Subjective:      Patient ID: Venancio Barragan is a 59 y.o. male.    Chief Complaint: Pain of the Left Knee    Patient presents today for follow-up after his I have very treatment for his painful stiff total knee replacement.  He had very little improvement with the very treatment.  He would like to move forward with revision total knee replacement.  His main issue is range of motion and that he can not participate in all the activities that he would like such as fishing.      Social History     Occupational History    Not on file   Tobacco Use    Smoking status: Never    Smokeless tobacco: Never   Substance and Sexual Activity    Alcohol use: No    Drug use: No    Sexual activity: Yes     Partners: Female      ROS      Objective:    Ortho/SPM Exam       Assessment:       1. Postoperative stiffness of total knee replacement, subsequent encounter    2. Chronic pain of left knee          Plan:       I had a very lengthy conversation with the patient.  He had posttraumatic arthritis from a previous treatment of a plateau fracture.  Improving the range of motion past 95° with a revision total knee replacement is going to be challenging.  He had a previous manipulation after the index knee replacement and had no significant improvement.  Revising this knee replacement to get past 95° we will be challenging.  I said there are no guarantees and in fact he could have more pain and stiffness after the revision.  Radiographically his x-rays look appropriate in all implants appears to be appropriately sized and well fixed.  Revising to improve flexion past 95° has significant risk given that this is his 3rd or 4th knee surgery.  Despite this conversation, the patient is fairly adamant that he would like to take the risk and undergo revision total knee replacement to try to improve his motion beyond his current flexion.  I told him we can go ahead and revise his knee replacement so long as that we are on the same page and a revision  surgery could worsen his stiffness and his pain.  He understands this and is willing to take that chance.

## 2024-06-28 ENCOUNTER — TELEPHONE (OUTPATIENT)
Dept: ORTHOPEDICS | Facility: CLINIC | Age: 59
End: 2024-06-28
Payer: MEDICAID

## 2024-09-09 ENCOUNTER — TELEPHONE (OUTPATIENT)
Dept: ORTHOPEDICS | Facility: CLINIC | Age: 59
End: 2024-09-09
Payer: MEDICAID

## 2024-09-09 NOTE — TELEPHONE ENCOUNTER
----- Message from Gertrude Fajardo sent at 9/9/2024  9:45 AM CDT -----  Type:  Needs Medical Advice    Who Called: riley      Would the patient rather a call back or a response via [facility]? Call   Best Call Back Number: [phone]  Additional Information: pt requesting to have appointments set on one day patient coming from 2 hours away for pre op appointments.

## 2024-09-10 ENCOUNTER — HOSPITAL ENCOUNTER (OUTPATIENT)
Dept: RADIOLOGY | Facility: HOSPITAL | Age: 59
Discharge: HOME | End: 2024-09-10
Attending: ORTHOPAEDIC SURGERY
Payer: MEDICAID

## 2024-09-10 ENCOUNTER — CLINICAL SUPPORT (OUTPATIENT)
Dept: LAB | Facility: HOSPITAL | Age: 59
End: 2024-09-10
Attending: ORTHOPAEDIC SURGERY
Payer: MEDICAID

## 2024-09-10 ENCOUNTER — OFFICE VISIT (OUTPATIENT)
Dept: ORTHOPEDICS | Facility: CLINIC | Age: 59
End: 2024-09-10
Payer: MEDICAID

## 2024-09-10 VITALS — BODY MASS INDEX: 26.81 KG/M2 | HEIGHT: 65 IN | WEIGHT: 160.94 LBS

## 2024-09-10 DIAGNOSIS — Z96.652 PAIN DUE TO TOTAL LEFT KNEE REPLACEMENT, SUBSEQUENT ENCOUNTER: ICD-10-CM

## 2024-09-10 DIAGNOSIS — T84.84XD PAIN DUE TO TOTAL LEFT KNEE REPLACEMENT, SUBSEQUENT ENCOUNTER: ICD-10-CM

## 2024-09-10 DIAGNOSIS — T84.84XD PAIN DUE TO TOTAL LEFT KNEE REPLACEMENT, SUBSEQUENT ENCOUNTER: Primary | ICD-10-CM

## 2024-09-10 DIAGNOSIS — Z96.652 PAIN DUE TO TOTAL LEFT KNEE REPLACEMENT, SUBSEQUENT ENCOUNTER: Primary | ICD-10-CM

## 2024-09-10 LAB
OHS QRS DURATION: 80 MS
OHS QTC CALCULATION: 403 MS

## 2024-09-10 PROCEDURE — 71045 X-RAY EXAM CHEST 1 VIEW: CPT | Mod: 26,,, | Performed by: RADIOLOGY

## 2024-09-10 PROCEDURE — 99999 PR PBB SHADOW E&M-EST. PATIENT-LVL V: CPT | Mod: PBBFAC,,, | Performed by: ORTHOPAEDIC SURGERY

## 2024-09-10 PROCEDURE — 4010F ACE/ARB THERAPY RXD/TAKEN: CPT | Mod: CPTII,,, | Performed by: ORTHOPAEDIC SURGERY

## 2024-09-10 PROCEDURE — 1159F MED LIST DOCD IN RCRD: CPT | Mod: CPTII,,, | Performed by: ORTHOPAEDIC SURGERY

## 2024-09-10 PROCEDURE — 99215 OFFICE O/P EST HI 40 MIN: CPT | Mod: PBBFAC,PN | Performed by: ORTHOPAEDIC SURGERY

## 2024-09-10 PROCEDURE — 99215 OFFICE O/P EST HI 40 MIN: CPT | Mod: S$PBB,,, | Performed by: ORTHOPAEDIC SURGERY

## 2024-09-10 PROCEDURE — 3008F BODY MASS INDEX DOCD: CPT | Mod: CPTII,,, | Performed by: ORTHOPAEDIC SURGERY

## 2024-09-10 PROCEDURE — 71045 X-RAY EXAM CHEST 1 VIEW: CPT | Mod: TC,FY

## 2024-09-10 PROCEDURE — G2211 COMPLEX E/M VISIT ADD ON: HCPCS | Mod: S$PBB,,, | Performed by: ORTHOPAEDIC SURGERY

## 2024-09-10 PROCEDURE — 93010 ELECTROCARDIOGRAM REPORT: CPT | Mod: ,,, | Performed by: INTERNAL MEDICINE

## 2024-09-10 PROCEDURE — 93005 ELECTROCARDIOGRAM TRACING: CPT

## 2024-09-10 PROCEDURE — 3044F HG A1C LEVEL LT 7.0%: CPT | Mod: CPTII,,, | Performed by: ORTHOPAEDIC SURGERY

## 2024-09-10 NOTE — PROGRESS NOTES
Subjective:      Patient ID: Venancio Barragan is a 59 y.o. male.    Chief Complaint: Pain of the Left Knee    This is a painful stiff left total knee replacement.  We again discussed the different treatment options.  I discussed revision total knee replacement and all its potential sequelae such as infection fractures and continued pain and stiffness.  I then reviewed the role of peripheral nerve stimulation and potentially avoiding a much more risky revision total knee replacement.  Based on this he has changed his mind.  He would like to try peripheral nerve stimulation 1st before considering a revision.  I think this is a very reasonable option for him.      Social History     Occupational History    Not on file   Tobacco Use    Smoking status: Never    Smokeless tobacco: Never   Substance and Sexual Activity    Alcohol use: No    Drug use: No    Sexual activity: Yes     Partners: Female      Social Determinants of Health     Tobacco Use: Low Risk  (9/10/2024)    Patient History     Smoking Tobacco Use: Never     Smokeless Tobacco Use: Never     Passive Exposure: Not on file   Alcohol Use: Not At Risk (8/2/2023)    Received from [employer] [facility] Its Subsidiaries and Affiliates, [employer] [facility]    AUDIT-C     Frequency of Alcohol Consumption: Never     Average Number of Drinks: Patient does not drink     Frequency of Binge Drinking: Never   Financial Resource Strain: Low Risk  (8/2/2023)    Received from [employer] [facility] and Its Subsidiaries and Affiliates, [employer] [facility] and Its Subsidiaries and Affiliates    Overall Financial Resource Strain (CARDIA)     Difficulty of Paying Living Expenses: Not hard at all   Food Insecurity: No Food Insecurity (7/1/2024)    [facility] [facility]  [facility]    Hunger Vital Sign     Worried About Running Out of Food in the Last Year: Never true     Ran Out of Food in the Last Year: Never true   Transportation Needs: No Transportation Needs (7/1/2024)    [facility]    PRAPARE - Transportation     Lack of Transportation (Medical): No     Lack of Transportation (Non-Medical): No   Physical Activity: Inactive (8/2/2023)    [facility], [facility]    Exercise Vital Sign     Days of Exercise per Week: 0 days     Minutes of Exercise per Session: 0 min   Stress: Stress Concern Present (8/2/2023)    [facility], [facility] Its Subsidiaries and Affiliates    [employer] - Occupational Stress Questionnaire     Feeling of Stress : Very much   Housing Stability: Low Risk  (8/2/2023)    [facility]    Housing Stability Vital Sign     Unable to Pay for Housing in the Last Year: No     Number of Places Lived in the Last Year: 1     In the last 12 months, was there a time when you did not have a steady place to sleep or slept in a shelter (including now)?: No   Depression: Not at risk (2/12/2024)    [facility], [facility]    PHQ-2     PHQ-2 Score: 0   Utilities: Not At Risk (7/1/2024)    Received from [facility] and Its Subsidiaries and Affiliates    [facility] Utilities     Threatened  with loss of utilities: No   Health Literacy: Not on file   Social Isolation: Not on file      Review of Systems   Constitutional: Negative for diaphoresis.   HENT:  Negative for ear discharge, nosebleeds and stridor.    Eyes:  Negative for photophobia.   Cardiovascular:  Negative for syncope.   Respiratory:  Negative for hemoptysis, shortness of breath and wheezing.    Neurological:  Negative for tremors.   Psychiatric/Behavioral: Negative.           Objective:    Ortho/SPM Exam     Left lower extremity:   Well healed incision without signs of infection.  No knee effusion.  No erythema.  No swelling  Diffuse TTP over the anterior knee including the medial knee joint line, lateral knee joint line, medial patella, lateral patella, patellar tendon area   Motor intact: EHL/FHL/TA/GSC   [place]: DP/SP/T/Kemp/SA   Knee range of motion:  5-80 degrees   No flexion or extension instability   Negative patellar grind test   Negative anterior drawer, negative posterior drawer   Foot WWP      Assessment:       1. Pain due to total left knee replacement, subsequent encounter          Plan:   We had a lengthy conversation about the benefits and risks of peripheral nerve stimulation.  they understand that the 1st phase is a trial where we will place leads and do a test to see if implantation helps improve their pain.  If it does improve the pain symptoms then they will be scheduled for the permanent placement. With respect to the trial they understand that leads will be coming out of the skin and we will activate the leads after they are awake from .  Risks include bleeding, continued pain, damage to the nerves, injury to the bone and soft tissues.  they understand this and feel that the benefits outweigh the risks.  We will go ahead and schedule this. Patient requesting to talk to  [employer] technology

## 2024-09-17 ENCOUNTER — HOSPITAL ENCOUNTER (OUTPATIENT)
Dept: PREADMISSION TESTING | Facility: HOSPITAL | Age: 59
Discharge: HOME | End: 2024-09-17
Attending: NURSE PRACTITIONER
Payer: MEDICAID

## 2024-09-17 NOTE — DISCHARGE INSTRUCTIONS

## 2024-09-17 NOTE — PRE-PROCEDURE INSTRUCTIONS
Wife Ana Lilia.    Allergies, medical, surgical, family and psychosocial histories reviewed with patient. Periop plan of care reviewed. Preop instructions given, including medications to take and to hold. Hibiclens soap and instructions on use given. Time allotted for questions to be addressed.      Arrival time 1000.    Instructed to stop Ozempic on 9/15/24.    Surgery instructions reviewed and surgery booklet sent or emailed to the patient via the portal.

## 2024-09-22 ENCOUNTER — ANESTHESIA EVENT (OUTPATIENT)
Dept: SURGERY | Facility: HOSPITAL | Age: 59
End: 2024-09-22
Payer: MEDICAID

## 2024-09-23 ENCOUNTER — ANESTHESIA (OUTPATIENT)
Dept: SURGERY | Facility: HOSPITAL | Age: 59
End: 2024-09-23
Payer: MEDICAID

## 2024-09-23 ENCOUNTER — HOSPITAL ENCOUNTER (OUTPATIENT)
Facility: HOSPITAL | Age: 59
Discharge: HOME | End: 2024-09-23
Attending: ORTHOPAEDIC SURGERY | Admitting: ORTHOPAEDIC SURGERY
Payer: MEDICAID

## 2024-09-23 DIAGNOSIS — M25.562 CHRONIC PAIN OF LEFT KNEE: ICD-10-CM

## 2024-09-23 DIAGNOSIS — G89.29 CHRONIC PAIN OF LEFT KNEE: ICD-10-CM

## 2024-09-23 DIAGNOSIS — Z96.652 PAIN DUE TO TOTAL LEFT KNEE REPLACEMENT, SUBSEQUENT ENCOUNTER: Primary | ICD-10-CM

## 2024-09-23 DIAGNOSIS — T84.84XD PAIN DUE TO TOTAL LEFT KNEE REPLACEMENT, SUBSEQUENT ENCOUNTER: Primary | ICD-10-CM

## 2024-09-23 PROCEDURE — 71000015 HC POSTOP RECOV 1ST HR: Performed by: ORTHOPAEDIC SURGERY

## 2024-09-23 PROCEDURE — 64555 IMPLANT NEUROELECTRODES: CPT | Mod: LT,,, | Performed by: ORTHOPAEDIC SURGERY

## 2024-09-23 PROCEDURE — 36000706: Performed by: ORTHOPAEDIC SURGERY

## 2024-09-23 PROCEDURE — 63600175 PHARM REV CODE 636 W HCPCS

## 2024-09-23 PROCEDURE — 37000008 HC ANESTHESIA 1ST 15 MINUTES: Performed by: ORTHOPAEDIC SURGERY

## 2024-09-23 PROCEDURE — 25000003 PHARM REV CODE 250

## 2024-09-23 PROCEDURE — 36000707: Performed by: ORTHOPAEDIC SURGERY

## 2024-09-23 PROCEDURE — 63600175 PHARM REV CODE 636 W HCPCS: Performed by: STUDENT IN AN ORGANIZED HEALTH CARE EDUCATION/TRAINING PROGRAM

## 2024-09-23 PROCEDURE — 27201423 OPTIME MED/SURG SUP & DEVICES STERILE SUPPLY: Performed by: ORTHOPAEDIC SURGERY

## 2024-09-23 PROCEDURE — 71000016 HC POSTOP RECOV ADDL HR: Performed by: ORTHOPAEDIC SURGERY

## 2024-09-23 PROCEDURE — 25000003 PHARM REV CODE 250: Performed by: ORTHOPAEDIC SURGERY

## 2024-09-23 PROCEDURE — C1778 LEAD, NEUROSTIMULATOR: HCPCS | Performed by: ORTHOPAEDIC SURGERY

## 2024-09-23 PROCEDURE — 25000003 PHARM REV CODE 250: Performed by: STUDENT IN AN ORGANIZED HEALTH CARE EDUCATION/TRAINING PROGRAM

## 2024-09-23 PROCEDURE — 37000009 HC ANESTHESIA EA ADD 15 MINS: Performed by: ORTHOPAEDIC SURGERY

## 2024-09-23 RX ORDER — FENTANYL CITRATE 50 UG/ML
INJECTION, SOLUTION INTRAMUSCULAR; INTRAVENOUS
Status: DISCONTINUED | OUTPATIENT
Start: 2024-09-23 | End: 2024-09-23

## 2024-09-23 RX ORDER — HYDROCODONE BITARTRATE AND ACETAMINOPHEN 5; 325 MG/1; MG/1
1 TABLET ORAL EVERY 4 HOURS PRN
Status: DISCONTINUED | OUTPATIENT
Start: 2024-09-23 | End: 2024-09-23 | Stop reason: HOSPADM

## 2024-09-23 RX ORDER — LIDOCAINE HYDROCHLORIDE 10 MG/ML
INJECTION, SOLUTION INFILTRATION; PERINEURAL
Status: DISCONTINUED | OUTPATIENT
Start: 2024-09-23 | End: 2024-09-23 | Stop reason: HOSPADM

## 2024-09-23 RX ORDER — LIDOCAINE HYDROCHLORIDE 20 MG/ML
INJECTION, SOLUTION EPIDURAL; INFILTRATION; INTRACAUDAL; PERINEURAL
Status: DISCONTINUED | OUTPATIENT
Start: 2024-09-23 | End: 2024-09-23

## 2024-09-23 RX ORDER — MIDAZOLAM HYDROCHLORIDE 1 MG/ML
INJECTION INTRAMUSCULAR; INTRAVENOUS
Status: DISCONTINUED | OUTPATIENT
Start: 2024-09-23 | End: 2024-09-23

## 2024-09-23 RX ORDER — GLUCAGON 1 MG
1 KIT INJECTION
Status: DISCONTINUED | OUTPATIENT
Start: 2024-09-23 | End: 2024-09-23 | Stop reason: HOSPADM

## 2024-09-23 RX ORDER — KETOROLAC TROMETHAMINE 30 MG/ML
15 INJECTION, SOLUTION INTRAMUSCULAR; INTRAVENOUS ONCE
Status: COMPLETED | OUTPATIENT
Start: 2024-09-23 | End: 2024-09-23

## 2024-09-23 RX ORDER — SODIUM CHLORIDE 0.9 % (FLUSH) 0.9 %
10 SYRINGE (ML) INJECTION
Status: DISCONTINUED | OUTPATIENT
Start: 2024-09-23 | End: 2024-09-23 | Stop reason: HOSPADM

## 2024-09-23 RX ORDER — PROPOFOL 10 MG/ML
VIAL (ML) INTRAVENOUS CONTINUOUS PRN
Status: DISCONTINUED | OUTPATIENT
Start: 2024-09-23 | End: 2024-09-23

## 2024-09-23 RX ORDER — HYDROMORPHONE HYDROCHLORIDE 2 MG/ML
0.2 INJECTION, SOLUTION INTRAMUSCULAR; INTRAVENOUS; SUBCUTANEOUS EVERY 5 MIN PRN
Status: DISCONTINUED | OUTPATIENT
Start: 2024-09-23 | End: 2024-09-23 | Stop reason: HOSPADM

## 2024-09-23 RX ORDER — OXYCODONE HYDROCHLORIDE 5 MG/1
5 TABLET ORAL
Status: DISCONTINUED | OUTPATIENT
Start: 2024-09-23 | End: 2024-09-23 | Stop reason: HOSPADM

## 2024-09-23 RX ORDER — ACETAMINOPHEN 10 MG/ML
1000 INJECTION, SOLUTION INTRAVENOUS ONCE
Status: DISCONTINUED | OUTPATIENT
Start: 2024-09-23 | End: 2024-09-23

## 2024-09-23 RX ADMIN — PROPOFOL 250 MCG/KG/MIN: 10 INJECTION, EMULSION INTRAVENOUS at 01:09

## 2024-09-23 RX ADMIN — LIDOCAINE HYDROCHLORIDE 100 MG: 20 INJECTION, SOLUTION EPIDURAL; INFILTRATION; INTRACAUDAL at 01:09

## 2024-09-23 RX ADMIN — SODIUM CHLORIDE: 0.9 INJECTION, SOLUTION INTRAVENOUS at 01:09

## 2024-09-23 RX ADMIN — CEFAZOLIN 2 G: 2 INJECTION, POWDER, FOR SOLUTION INTRAMUSCULAR; INTRAVENOUS at 01:09

## 2024-09-23 RX ADMIN — MIDAZOLAM 2 MG: 1 INJECTION INTRAMUSCULAR; INTRAVENOUS at 01:09

## 2024-09-23 RX ADMIN — HYDROCODONE BITARTRATE AND ACETAMINOPHEN 1 TABLET: 5; 325 TABLET ORAL at 03:09

## 2024-09-23 RX ADMIN — FENTANYL CITRATE 50 MCG: 50 INJECTION INTRAMUSCULAR; INTRAVENOUS at 01:09

## 2024-09-23 RX ADMIN — KETOROLAC TROMETHAMINE 15 MG: 30 INJECTION, SOLUTION INTRAMUSCULAR; INTRAVENOUS at 03:09

## 2024-09-23 NOTE — ANESTHESIA PREPROCEDURE EVALUATION
[facility]  Anesthesia Pre-Operative Evaluation         Patient Name: Venancio Barragan  YOB: 1965  MRN: [id number]    SUBJECTIVE:     Pre-operative evaluation for Procedure(s) (LRB):  INSERTION,ELECTRODE LEAD,NEUROSTIMULATOR,PERIPHERAL (Left)     09/23/2024    Venancio Barragan is a 59 y.o. male w/ a significant PMHx of DM2, HTN.  Stopped taking glp-1 agonist 1 week ago    Patient now presents for the above procedure(s).    TTE:   none documented.     Patient Active Problem List   Diagnosis    DM type 2 with diabetic mixed hyperlipidemia    Elevated liver enzymes    Primary hypertension    Increased prostate specific antigen (PSA) velocity    Insomnia, unspecified    Low testosterone in male    Obesity, diabetes, and hypertension syndrome    Osteoarthritis of knee    Status post open reduction and internal fixation (ORIF) of fracture    Cellulitis and abscess of hand, left with Foreign body     Chronic pain of left knee    Pain due to total left knee replacement    Postoperative stiffness of total knee replacement       Review of patient's allergies indicates:  No Known Allergies    Current Inpatient Medications:   ceFAZolin (Ancef) IV (PEDS and ADULTS)  2 g Intravenous Once       No current facility-administered medications on file prior to encounter.     Current Outpatient Medications on File Prior to Encounter   Medication Sig Dispense Refill    atorvastatin (LIPITOR) 40 MG tablet Take 40 mg by mouth once daily.      metformin (GLUCOPHAGE) 500 MG tablet Take 1,000 mg by mouth 2 (two) times daily with meals.       multivitamin capsule Take 1 capsule by mouth once daily.      ramipriL (ALTACE) 2.5 MG capsule Take 2.5 mg by mouth.      zolpidem (AMBIEN) 10 mg Tab Take 10 mg by mouth nightly as needed.      dulaglutide (TRULICITY) 0.75 mg/0.5 mL pen injector INECT 0.5 ML INTO THE SKIN EVERY 7 DAYS      HYDROcodone-acetaminophen (NORCO) 5-325 mg per tablet Take 1 tablet by mouth every 6 (six)  hours as needed for Pain. 20 tablet 0    PAXLOVID, EUA, 150-100 mg DsPk Take 2 tablets by mouth 2 (two) times daily.      promethazine (PHENERGAN) 25 MG tablet Take 1 tablet (25 mg total) by mouth every 6 (six) hours as needed for Nausea. 15 tablet 0    semaglutide (OZEMPIC) 0.25 mg or 0.5 mg(2 mg/1.5 mL) PnIj Inject 0.25 mg into the skin.      tamsulosin (FLOMAX) 0.4 mg Cap Take 1 capsule (0.4 mg total) by mouth once daily. 30 capsule 0    WESTUSSIN DM 1-5-10 mg/5 mL Syrp Take 10 mLs by mouth 3 (three) times daily.         Past Surgical History:   Procedure Laterality Date    ANKLE SURGERY      right    ELBOW SURGERY Left     INCISION AND DRAINAGE OF HAND Left 2/8/2023    Procedure: INCISION AND DRAINAGE, HAND;  Surgeon: Adele Colon MD;  Location: [facility];  Service: Orthopedics;  Laterality: Left;    KNEE SURGERY      TOTAL HIP ARTHROPLASTY      ines       OBJECTIVE:     Vital Signs Range (Last 24H):  Temp:  [37.2 °C (99 °F)]   Pulse:  [65]   Resp:  [18]   BP: (120)/(81)   SpO2:  [97 %]       Significant Labs:  Lab Results   Component Value Date    WBC 5.68 09/10/2024    HGB 12.9 (L) 09/10/2024    HCT 39.9 (L) 09/10/2024     09/10/2024    CHOL 170 02/12/2024    TRIG 152 (H) 02/12/2024    HDL 53 02/12/2024    ALT 37 04/04/2023    AST 27 04/04/2023     09/10/2024    K 4.3 09/10/2024     09/10/2024    CREATININE 1.3 09/10/2024    BUN 18 09/10/2024    CO2 24 09/10/2024    TSH 4.620 (H) 06/26/2024    INR 0.9 09/10/2024    HGBA1C 6.8 (H) 06/26/2024       Diagnostic Studies: No relevant studies.    EKG:   Results for orders placed or performed in visit on 09/10/24   EKG 12-lead    Collection Time: 09/10/24  9:19 AM   Result Value Ref Range    QRS Duration 80 ms    [facility] QTC Calculation 403 ms    Narrative    Test Reason : T84.84XD,Z96.652,    Vent. Rate : 063 BPM     Atrial Rate : 063 BPM     P-R Int : 168 ms          QRS Dur : 080 ms      QT Int : 394 ms       P-R-T Axes : 053 002 027 degrees     QTc  Int : 403 ms    Normal sinus rhythm  Normal ECG  When compared with ECG of 08-FEB-2023 11:23,  No significant change was found  Confirmed by Guero Troncoso ([id number]) on 9/10/2024 3:47:32 PM    Referred By: ANA ILLIA SERRANO           Confirmed By:Guero Troncoso       ASSESSMENT/PLAN:         Pre-op Assessment    I have reviewed the Patient Summary Reports.     I have reviewed the Nursing Notes. I have reviewed the NPO Status.   I have reviewed the Medications.     Review of Systems  Anesthesia Hx:  No problems with previous Anesthesia               Denies Personal Hx of Anesthesia complications.                    Social:  Non-Smoker       Hematology/Oncology:       -- Denies Anemia:                  Denies Current/Recent Cancer                Cardiovascular:  Exercise tolerance: good   Hypertension   Denies MI.  Denies CAD.     Denies Dysrhythmias.    Denies CHF.    no hyperlipidemia   ECG has been reviewed.  Functional Capacity good / => 4 METS                         Pulmonary:    Denies COPD.  Denies Asthma.   Denies Shortness of breath.   Denies Sleep Apnea.                Renal/:   Denies Chronic Renal Disease.                Hepatic/GI:      Denies GERD.             Musculoskeletal:  Arthritis               Neurological:  Denies TIA.  Denies CVA.    Denies Seizures.                                Endocrine:  Diabetes, type 2               Physical Exam  General: Well nourished, Cooperative, Alert and Oriented    Airway:  Mallampati: II   Mouth Opening: Normal  TM Distance: Normal  Tongue: Normal  Neck ROM: Normal ROM    Dental:  Intact        Anesthesia Plan  Type of Anesthesia, risks & benefits discussed:    Anesthesia Type: Gen Supraglottic Airway, Gen Natural Airway  Intra-op Monitoring Plan: Standard ASA Monitors  Post Op Pain Control Plan: multimodal analgesia and IV/PO Opioids PRN  Induction:  IV  Airway Plan: Video, Post-Induction  Informed Consent: Informed consent signed with the Patient and all parties  understand the risks and agree with anesthesia plan.  All questions answered.   ASA Score: 2  Day of Surgery Review of History & Physical: H&P Update referred to the surgeon/provider.    Ready For Surgery From Anesthesia Perspective.     .

## 2024-09-23 NOTE — OP NOTE
9/23/2024    Pre op dx:  left knee pain     Post op dx:  Same     Procedure:  Percutaneous implantation of neurostimulator electrode array; peripheral nerve:  Superior lateral geniculate nerve     Percutaneous implantation of neurostimulator electrode array; peripheral nerve:  Superomedial geniculate nerve     Percutaneous implantation of neurostimulator electrode array; peripheral nerve:  Inferomedial geniculate nerve     Fluoroscopic guidance       Attending Surgeon: Bony Menezes MD     Assistants:      Complications: none     Estimated bood loss: < 20cc     Implants: curonix 3 trial leads with nerve stimulator attachment point     Indication:  Patient has had significant knee pain.  They have failed various non surgical treatments including medications.      Findings: bony anatomy was intact. No significant abnormalities were found.      Procedure:  Patient is brought to operating room placed supine operative table conscious sedation was induced without any difficulties.  The left knee was then prepped draped in sterile fashion.  Prior to beginning the procedure proper site and procedure were verified.  Under fluoroscopy we infiltrated skin and subcutaneous tissue with Marcaine along the anticipated path of the stimulator needle.  Using the 1st stimulator needle we came along the midline of the lateral aspect of the femur under fluoroscopy and punctured the skin down to lateral femoral cortex.  We verified location of the introducer both on the AP and lateral x-rays.  The wire was placed at the flare at the mid point of the AP distance along the periosteum .  The internal trocar was then removed and the flexible wire placed through the introducer and secured to the skin using Tegaderm.  Next we went to the medial side of the knee injected Marcaine along the tract.  We then introduced the needle and introducer again along the posterior 3rd cortex of the femur at the level of the flare.  This was verified using  fluoroscopy both AP and lateral.  The needle trocar was then removed and the flexible wire placed through the cannula and locked into place on the lower lock and then secured to the skin using Tegaderm.  We then turned our attention to the proximal tibia.  In an anterior to posterior direction we placed the needle along the proximal tibia aiming to be at the midway point in AP direction.  This was done at the tibial flare.  Location was verified on fluoro both on the AP and lateral x-rays.  We then removed the needle stylus and placed the flexible wire and locked it into place.  We then used Tegaderm and anchored the wire to the skin.  All wires were then tested and a signal was achieved.  We then wrapped all the wires in place using an Ace wrap and patient was then transferred to recovery room in stable condition.

## 2024-09-23 NOTE — PLAN OF CARE
Discharge criteria met,voicing desire to go home.discharge instructions given to patient & significant other,verbalized understanding. Discharge home via wheelchair in care of significant other.

## 2024-09-23 NOTE — TRANSFER OF CARE
"Anesthesia Transfer of Care Note    Patient: Venancio Barragan    Procedure(s) Performed: Procedure(s) (LRB):  INSERTION,ELECTRODE LEAD,NEUROSTIMULATOR,PERIPHERAL (Left)    Patient location: PACU    Anesthesia Type: MAC    Transport from OR: Transported from OR on 2-3 L/min O2 by NC with adequate spontaneous ventilation    Post pain: adequate analgesia    Post assessment: no apparent anesthetic complications    Post vital signs: stable    Level of consciousness: awake and alert    Nausea/Vomiting: no nausea/vomiting    Complications: none    Transfer of care protocol was followed      Last vitals: Visit Vitals  /81 (BP Location: Right arm, Patient Position: Lying)   Pulse 65   Temp 37.2 °C (99 °F) (Skin)   Resp 18   Ht 5' 5" (1.651 m)   Wt 72.6 kg (160 lb)   SpO2 97%   BMI 26.63 kg/m²     "

## 2024-09-23 NOTE — BRIEF OP NOTE
[facility] - [facility] (Hospital)  Brief Operative Note    Surgery Date: 9/23/2024     Surgeons and Role:     * Bony Menezes MD - Primary    Assisting Surgeon: None    Pre-op Diagnosis:  Pain due to total left knee replacement, subsequent encounter [T84.84XD, Z96.652]    Post-op Diagnosis:  Post-Op Diagnosis Codes:     * Pain due to total left knee replacement, subsequent encounter [T84.84XD, Z96.652]    Procedure(s) (LRB):  INSERTION,ELECTRODE LEAD,NEUROSTIMULATOR,PERIPHERAL (Left)    Anesthesia: Monitor Anesthesia Care    Operative Findings: see op note    Estimated Blood Loss: * No values recorded between 9/23/2024 12:00 AM and 9/23/2024 12:57 PM *         Specimens:   Specimen (24h ago, onward)      None              Discharge Note    OUTCOME: Patient tolerated treatment/procedure well without complication and is now ready for discharge.    DISPOSITION: Home or Self Care    FINAL DIAGNOSIS:  <principal problem not specified>    FOLLOWUP: In clinic      DISCHARGE INSTRUCTIONS:    Discharge Procedure Orders   Diet general     Call MD for:  temperature >100.4     Call MD for:  persistent nausea and vomiting     Call MD for:  severe uncontrolled pain     Call MD for:  difficulty breathing, headache or visual disturbances     Call MD for:  redness, tenderness, or signs of infection (pain, swelling, redness, odor or green/yellow discharge around incision site)     Call MD for:  hives     Call MD for:  persistent dizziness or light-headedness     Call MD for:  extreme fatigue     Keep surgical extremity elevated     Ice to affected area   Order Comments: using barrier between ice and skin (specify duration&frequency)     No driving, operating heavy equipment or signing legal documents while taking pain medication     Leave dressing on - Keep it clean, dry, and intact until clinic visit

## 2024-09-23 NOTE — DISCHARGE INSTRUCTIONS
ANESTHESIA  -For the first 24 hours after surgery:  Do not drive, use heavy equipment, make important decisions, or drink alcohol  -It is normal to feel sleepy for several hours.  Rest until you are more awake.  -Have someone stay with you, if needed.  They can watch for problems and help keep you safe.  -Some possible post anesthesia side effects include: nausea and vomiting, sore throat and hoarseness, sleepiness, and dizziness.    PAIN  -If you have pain after surgery, pain medicine will help you feel better.  Take it as directed, before pain becomes severe.  Most pain relievers taken by mouth need at least 20-30 minutes to start working.  -Do not drive or drink alcohol while taking pain medicine.  -Pain medication can upset your stomach.  Taking them with a little food may help.  -Other ways to help control pain: elevation, ice, and relaxation  -Call your surgeon if still having unmanageable pain an hour after taking pain medicine.  -Pain medicine can cause constipation.  Taking an over-the counter stool softener while on prescription pain medicine and drinking plenty of fluids can prevent this side effect.  -Call your surgeon if you have severe side effects like: breathing problems, trouble waking up, dizziness, confusion, or severe constipation.    NAUSEA  -Some people have nausea after surgery.  This is often because of anesthesia, pain, pain medicine, or the stress of surgery.  -Do not push yourself to eat.  Start off with clear liquids and soup.  Slowly move to solid foods.  Don't eat fatty, rich, spicy foods at first.  Eat smaller amounts.  -If you develop persistent nausea and vomiting please notify your surgeon immediately.    BLEEDING  -Different types of surgery require different types of care and dressing changes.  It is important to follow all instructions and advice from your surgeon.  Change dressing as directed.  Call your surgeon for any concerns regarding postop bleeding.    SIGNS OF  INFECTION  -Signs of infection include: fever, swelling, drainage, and redness  -Notify your surgeon if you have a fever of 100.4 F (38.0 C) or higher.  -Notify your surgeon if you notice redness, swelling, increased pain, pus, or a foul smell at the incision site.  FOLLOW NEUROSTIMULATOR REPRESENTATIVE INSTRUCTIONS REGARDING CARE OF IT, AND CALL REPRESENTATIVE FOR ANY PROBLEMS REGARDING IT.

## 2024-09-23 NOTE — H&P
Interval H&P    No changes to history or exam other than noted below.    To OR today for Left PNS trial  (curonix)      Subjective:      Patient ID: Venancio Barragan is a 59 y.o. male.    Chief Complaint: No chief complaint on file.    This is a painful stiff left total knee replacement.  We again discussed the different treatment options.  I discussed revision total knee replacement and all its potential sequelae such as infection fractures and continued pain and stiffness.  I then reviewed the role of peripheral nerve stimulation and potentially avoiding a much more risky revision total knee replacement.  Based on this he has changed his mind.  He would like to try peripheral nerve stimulation 1st before considering a revision.  I think this is a very reasonable option for him.      Social History     Occupational History    Not on file   Tobacco Use    Smoking status: Never    Smokeless tobacco: Never   Substance and Sexual Activity    Alcohol use: No    Drug use: No    Sexual activity: Yes     Partners: Female      Social Determinants of Health     Tobacco Use: Low Risk  (9/23/2024)    Patient History     Smoking Tobacco Use: Never     Smokeless Tobacco Use: Never     Passive Exposure: Not on file   Alcohol Use: Not At Risk (8/2/2023)    Received from [facility] and Its Subsidiaries and Affiliates, [employer] [facility] Its Subsidiaries and Affiliates    AUDIT-C     Frequency of Alcohol Consumption: Never     Average Number of Drinks: Patient does not drink     Frequency of Binge Drinking: Never   Financial Resource Strain: Low Risk  (8/2/2023)    Received from [employer] and Its Subsidiaries and Affiliates, [employer] [facility] and Its Subsidiaries and Affiliates    Overall Financial Resource Strain (CARDIA)     Difficulty of Paying Living Expenses: Not hard at all   Food  Insecurity: No Food Insecurity (7/1/2024)    [facility]    Hunger Vital Sign     Worried About Running Out of Food in the Last Year: Never true     Ran Out of Food in the Last Year: Never true   Transportation Needs: No Transportation Needs (7/1/2024)    [facility]    PRAPARE - Transportation     Lack of Transportation (Medical): No     Lack of Transportation (Non-Medical): No   Physical Activity: Inactive (8/2/2023)    [facility], [facility]    Exercise Vital Sign     Days of Exercise per Week: 0 days     Minutes of Exercise per Session: 0 min   Stress: Stress Concern Present (8/2/2023)    [facility], [facility]    [employer] - Occupational Stress Questionnaire     Feeling of Stress : Very much   Housing Stability: Low Risk  (8/2/2023)    [facility]    Housing Stability Vital Sign     Unable to Pay for Housing in the Last Year: No     Number of Places Lived in the Last Year: 1     In the last 12 months, was there a time when you did not have a steady place to sleep or slept in a shelter (including now)?: No   Depression: Not at risk (2/12/2024)    [facility], [facility]    PHQ-2     PHQ-2 Score: 0   Utilities: Not At Risk (7/1/2024)     [facility] from [facility] Its Subsidiaries and Affiliates    [facility] Utilities     Threatened with loss of utilities: No   Health Literacy: Not on file   Social Isolation: Not on file      Review of Systems   Constitutional: Negative for diaphoresis.   HENT:  Negative for ear discharge, nosebleeds and stridor.    Eyes:  Negative for photophobia.   Cardiovascular:  Negative for syncope.   Respiratory:  Negative for hemoptysis, shortness of breath and wheezing.    Neurological:  Negative for tremors.   Psychiatric/Behavioral: Negative.           Objective:    Ortho/SPM Exam     Left lower extremity:   Well healed incision without signs of infection.  No knee effusion.  No erythema.  No swelling  Diffuse TTP over the anterior knee including the medial knee joint line, lateral knee joint line, medial patella, lateral patella, patellar tendon area   Motor intact: EHL/FHL/TA/GSC   [place]: DP/SP/T/Kemp/SA   Knee range of motion:  5-80 degrees   No flexion or extension instability   Negative patellar grind test   Negative anterior drawer, negative posterior drawer   Foot WWP      Assessment:       1. Pain due to total left knee replacement, subsequent encounter          Plan:   We had a lengthy conversation about the benefits and risks of peripheral nerve stimulation.  they understand that the 1st phase is a trial where we will place leads and do a test to see if implantation helps improve their pain.  If it does improve the pain symptoms then they will be scheduled for the permanent placement. With respect to the trial they understand that leads will be coming out of the skin and we will activate the leads after they are awake from .  Risks include bleeding, continued pain, damage to the nerves, injury to the bone and soft tissues.  they understand this and feel that the benefits outweigh the risks.  We will go ahead and schedule this. Patient requesting to talk to  re technology

## 2024-09-24 VITALS
TEMPERATURE: 98 F | WEIGHT: 160 LBS | SYSTOLIC BLOOD PRESSURE: 149 MMHG | RESPIRATION RATE: 20 BRPM | HEIGHT: 65 IN | BODY MASS INDEX: 26.66 KG/M2 | HEART RATE: 75 BPM | DIASTOLIC BLOOD PRESSURE: 89 MMHG | OXYGEN SATURATION: 98 %

## 2024-09-25 ENCOUNTER — TELEPHONE (OUTPATIENT)
Dept: ORTHOPEDICS | Facility: CLINIC | Age: 59
End: 2024-09-25
Payer: MEDICAID

## 2024-09-25 DIAGNOSIS — M25.562 CHRONIC PAIN OF LEFT KNEE: Primary | ICD-10-CM

## 2024-09-25 DIAGNOSIS — M25.562 LEFT KNEE PAIN, UNSPECIFIED CHRONICITY: ICD-10-CM

## 2024-09-25 DIAGNOSIS — G89.29 CHRONIC PAIN OF LEFT KNEE: Primary | ICD-10-CM

## 2024-09-25 NOTE — TELEPHONE ENCOUNTER
Spoke to patient regarding bleeding and swelling after curonix procedure on 9/23. Per pateint.. had one episode of bleeding when he stood up off a chair that has since resolved. Also c/o lower extremity swelling but does not seem that he is elevating his leg enough. Does not admit to fevers or chills. Do not believe this to be urgent. He has appt tomorrow at 1pm with Dr. Menezes. Recommend keeping that appointment at this time.

## 2024-09-25 NOTE — TELEPHONE ENCOUNTER
----- Message from Elle Ramachandran sent at 9/25/2024 12:18 PM CDT -----  Type:  Patient Returning Call    Who Called:Pt   Would the patient rather a call back or a response via [facility]? Call back   Best Call Back Number:[phone]  Additional Information: pt is requesting to be seen earlier on tomorrow.. pt is bleeding and is swollen ... Did send pictures in portal

## 2024-09-26 ENCOUNTER — OFFICE VISIT (OUTPATIENT)
Dept: ORTHOPEDICS | Facility: CLINIC | Age: 59
End: 2024-09-26
Payer: MEDICAID

## 2024-09-26 ENCOUNTER — HOSPITAL ENCOUNTER (OUTPATIENT)
Dept: RADIOLOGY | Facility: HOSPITAL | Age: 59
Discharge: HOME | End: 2024-09-26
Attending: ORTHOPAEDIC SURGERY
Payer: MEDICAID

## 2024-09-26 VITALS — HEIGHT: 65 IN | WEIGHT: 160.06 LBS | BODY MASS INDEX: 26.67 KG/M2

## 2024-09-26 DIAGNOSIS — M25.562 CHRONIC PAIN OF LEFT KNEE: Primary | ICD-10-CM

## 2024-09-26 DIAGNOSIS — Z96.652 PRESENCE OF LEFT ARTIFICIAL KNEE JOINT: ICD-10-CM

## 2024-09-26 DIAGNOSIS — T84.84XD PAIN DUE TO TOTAL LEFT KNEE REPLACEMENT, SUBSEQUENT ENCOUNTER: ICD-10-CM

## 2024-09-26 DIAGNOSIS — Z96.652 PAIN DUE TO TOTAL LEFT KNEE REPLACEMENT, SUBSEQUENT ENCOUNTER: ICD-10-CM

## 2024-09-26 DIAGNOSIS — M25.562 CHRONIC PAIN OF LEFT KNEE: ICD-10-CM

## 2024-09-26 DIAGNOSIS — G89.29 CHRONIC PAIN OF LEFT KNEE: ICD-10-CM

## 2024-09-26 DIAGNOSIS — M25.562 LEFT KNEE PAIN, UNSPECIFIED CHRONICITY: ICD-10-CM

## 2024-09-26 DIAGNOSIS — G89.29 CHRONIC PAIN OF LEFT KNEE: Primary | ICD-10-CM

## 2024-09-26 PROCEDURE — 3044F HG A1C LEVEL LT 7.0%: CPT | Mod: CPTII,,, | Performed by: ORTHOPAEDIC SURGERY

## 2024-09-26 PROCEDURE — 4010F ACE/ARB THERAPY RXD/TAKEN: CPT | Mod: CPTII,,, | Performed by: ORTHOPAEDIC SURGERY

## 2024-09-26 PROCEDURE — 73590 X-RAY EXAM OF LOWER LEG: CPT | Mod: TC,FY,LT

## 2024-09-26 PROCEDURE — 1159F MED LIST DOCD IN RCRD: CPT | Mod: CPTII,,, | Performed by: ORTHOPAEDIC SURGERY

## 2024-09-26 PROCEDURE — 99024 POSTOP FOLLOW-UP VISIT: CPT | Mod: ,,, | Performed by: ORTHOPAEDIC SURGERY

## 2024-09-26 PROCEDURE — 99213 OFFICE O/P EST LOW 20 MIN: CPT | Mod: PBBFAC,25,PN | Performed by: ORTHOPAEDIC SURGERY

## 2024-09-26 PROCEDURE — 73552 X-RAY EXAM OF FEMUR 2/>: CPT | Mod: TC,FY,LT

## 2024-09-26 PROCEDURE — 99999 PR PBB SHADOW E&M-EST. PATIENT-LVL III: CPT | Mod: PBBFAC,,, | Performed by: ORTHOPAEDIC SURGERY

## 2024-09-26 PROCEDURE — 73552 X-RAY EXAM OF FEMUR 2/>: CPT | Mod: 26,LT,, | Performed by: RADIOLOGY

## 2024-09-26 PROCEDURE — 73590 X-RAY EXAM OF LOWER LEG: CPT | Mod: 26,LT,, | Performed by: RADIOLOGY

## 2024-09-26 NOTE — PROGRESS NOTES
Subjective:      Patient ID: Venancio Barragan is a 59 y.o. male.    Chief Complaint: Pain and Post-op Evaluation of the Left Knee    Reports no improvement with peripheral nerve stimulation trial.  He is frustrated that he continues to have a painful stiff knee replacement.      Social History     Occupational History    Not on file   Tobacco Use    Smoking status: Never    Smokeless tobacco: Never   Substance and Sexual Activity    Alcohol use: No    Drug use: No    Sexual activity: Yes     Partners: Female      Social Determinants of Health     Tobacco Use: Low Risk  (9/26/2024)    Patient History     Smoking Tobacco Use: Never     Smokeless Tobacco Use: Never     Passive Exposure: Not on file   Alcohol Use: Not At Risk (8/2/2023)    Received from [employer] [facility] and Its Subsidiaries and Affiliates, [facility] and Its Subsidiaries and Affiliates    AUDIT-C     Frequency of Alcohol Consumption: Never     Average Number of Drinks: Patient does not drink     Frequency of Binge Drinking: Never   Financial Resource Strain: Low Risk  (8/2/2023)    Received from [employer] [facility] Its Subsidiaries and Affiliates, [facility] and Its Subsidiaries and Affiliates    Overall Financial Resource Strain (CARDIA)     Difficulty of Paying Living Expenses: Not hard at all   Food Insecurity: No Food Insecurity (7/1/2024)    Received from [employer] [facility] and Its Subsidiaries and Affiliates    Hunger Vital Sign     Worried About Running Out of Food in the Last Year: Never true     Ran Out of Food in the Last Year: Never true   Transportation Needs: No Transportation Needs (7/1/2024)    Received from [employer] [facility] and Its Subsidiaries and Affiliates    PRAPARE - Transportation     Lack of Transportation (Medical): No     Lack  of Transportation (Non-Medical): No   Physical Activity: Inactive (8/2/2023)    [facility], [facility] and Affiliates    Exercise Vital Sign     Days of Exercise per Week: 0 days     Minutes of Exercise per Session: 0 min   Stress: Stress Concern Present (8/2/2023)    Received from [facility] and [facility]iliates, [facility]    [place] [facility] [employer] - Occupational Stress Questionnaire     Feeling of Stress : Very much   Housing Stability: Low Risk  (8/2/2023)    [facility], [facility] and Affiliates    Housing Stability Vital Sign     Unable to Pay for Housing in the Last Year: No     Number of Places Lived in the Last Year: 1     Unstable Housing in the Last Year: No   Depression: Not at risk (2/12/2024)    [facility], [facility]    PHQ-2     PHQ-2 Score: 0   Utilities: Not At Risk (7/1/2024)    [facility]    [facility] Utilities     Threatened with loss of utilities: No   Health Literacy: Not on file   Social Isolation: Not on file      ROS      Objective:    Ortho/SPM Exam       Assessment:       1. Chronic pain of left knee    2. Pain due to total left knee replacement, subsequent encounter    3. Presence of left artificial knee joint          Plan:   I would like to order a 3 phase bone scan as well as CT scan to assess for any  loosening or malalignment.  We will see him back after this is complete.  If there are no significant findings next option would be to try a different peripheral nerve stimulator like stim router.  We removed all 3 leads in the office today.  They were easily pulled out with no bleeding.

## 2024-09-26 NOTE — ANESTHESIA POSTPROCEDURE EVALUATION
Anesthesia Post Evaluation    Patient: Venancio Barragan    Procedure(s) Performed: Procedure(s) (LRB):  INSERTION,ELECTRODE LEAD,NEUROSTIMULATOR,PERIPHERAL (Left)    Final Anesthesia Type: general      Patient location during evaluation: PACU  Patient participation: Yes- Able to Participate  Level of consciousness: awake and alert, oriented and awake  Post-procedure vital signs: reviewed and stable  Pain management: adequate  Airway patency: patent    PONV status at discharge: No PONV  Anesthetic complications: no      Cardiovascular status: stable  Respiratory status: unassisted and room air  Hydration status: euvolemic  Follow-up not needed.              Vitals Value Taken Time   /89 09/23/24 1600   Temp 36.8 °C (98.2 °F) 09/23/24 1600   Pulse 75 09/23/24 1600   Resp 20 09/23/24 1600   SpO2 98 % 09/23/24 1600         No case tracking events are documented in the log.      Pain/Holli Score: No data recorded

## 2024-10-02 ENCOUNTER — OFFICE VISIT (OUTPATIENT)
Dept: FAMILY MEDICINE | Facility: HOSPITAL | Age: 59
End: 2024-10-02
Payer: MEDICAID

## 2024-10-02 ENCOUNTER — TELEPHONE (OUTPATIENT)
Dept: ANESTHESIOLOGY | Facility: HOSPITAL | Age: 59
End: 2024-10-02
Payer: MEDICAID

## 2024-10-02 VITALS
WEIGHT: 178.13 LBS | DIASTOLIC BLOOD PRESSURE: 80 MMHG | SYSTOLIC BLOOD PRESSURE: 137 MMHG | HEIGHT: 65 IN | BODY MASS INDEX: 29.68 KG/M2 | HEART RATE: 71 BPM

## 2024-10-02 DIAGNOSIS — Z01.818 PRE-OP EXAM: Primary | ICD-10-CM

## 2024-10-02 PROCEDURE — 99214 OFFICE O/P EST MOD 30 MIN: CPT

## 2024-10-02 RX ORDER — TADALAFIL 5 MG/1
5 TABLET ORAL DAILY PRN
COMMUNITY
Start: 2024-09-16

## 2024-10-02 RX ORDER — SOLIFENACIN SUCCINATE 10 MG/1
10 TABLET, FILM COATED ORAL
COMMUNITY
Start: 2024-09-30 | End: 2025-03-29

## 2024-10-02 NOTE — PROGRESS NOTES
[facility]    Subjective:     Venancio Barragan is a 59 y.o. year old male who presents to clinic for preoperative evaluation for upcoming left knee revision scheduled on 10/21/24.  Patient denies any h/o blood dyscrasias, no family history of blood disorders, no negative reaction to anesthesia, and no history of stroke/MI.  Patient is not currently taking any anti-platelet and/or anticoagulation medication.  He denies any other acute complaints to address today. Denies N/V/F/C/CP/SOB/HA.  Patient endorses medication compliance with current medical management regimen. He denies any negative side effects with his current medication regimen.  Patient denies tobacco use.      Patient Active Problem List    Diagnosis Date Noted    Postoperative stiffness of total knee replacement 11/28/2023    Chronic pain of left knee 11/21/2023    Pain due to total left knee replacement 11/21/2023    DM type 2 with diabetic mixed hyperlipidemia 01/26/2023    Primary hypertension 01/26/2023    Insomnia, unspecified 01/26/2023    Cellulitis and abscess of hand, left with Foreign body  01/26/2023    Osteoarthritis of knee 12/18/2022    Elevated liver enzymes 09/11/2022    Status post open reduction and internal fixation (ORIF) of fracture 04/25/2022    Increased prostate specific antigen (PSA) velocity 06/29/2021    Low testosterone in male 08/02/2018    Obesity, diabetes, and hypertension syndrome 08/02/2018        Review of patient's allergies indicates:  No Known Allergies     Past Medical History:   Diagnosis Date    Diabetes mellitus     GERD (gastroesophageal reflux disease)     High cholesterol     Hypertension       Past Surgical History:   Procedure Laterality Date    ANKLE SURGERY      right    ELBOW SURGERY Left     INCISION AND DRAINAGE OF HAND Left 2/8/2023    Procedure: INCISION AND DRAINAGE, HAND;  Surgeon: Adele Colon MD;  [facility];  Service: Orthopedics;  Laterality: Left;    INSERTION, ELECTRODE LEAD,  "NEUROSTIMULATOR, PERIPHERAL Left 9/23/2024    Procedure: INSERTION,ELECTRODE LEAD,NEUROSTIMULATOR,PERIPHERAL;  Surgeon: Bony Menezes MD;  Location: [facility];  Service: Pain Management;  Laterality: Left;  curonix, trial    KNEE SURGERY      TOTAL HIP ARTHROPLASTY      ines      Family History   Problem Relation Name Age of Onset    Diabetes Mother      No Known Problems Father        Social History     Tobacco Use    Smoking status: Never    Smokeless tobacco: Never   Substance Use Topics    Alcohol use: No        Objective:     Vitals:    10/02/24 0833   BP: 137/80   BP Location: Right arm   Patient Position: Sitting   Pulse: 71   Weight: 80.8 kg (178 lb 2.1 oz)   Height: 5' 5" (1.651 m)     BMI: Body mass index is 29.64 kg/m².    Physical Exam  Vitals reviewed.   Constitutional:       General: He is not in acute distress.     Appearance: Normal appearance. He is obese. He is not ill-appearing, toxic-appearing or diaphoretic.   HENT:      Head: Normocephalic and atraumatic.   Eyes:      General: No scleral icterus.     Extraocular Movements: Extraocular movements intact.      Pupils: Pupils are equal, round, and reactive to light.   Cardiovascular:      Rate and Rhythm: Normal rate and regular rhythm.      Pulses: Normal pulses.      Heart sounds: Normal heart sounds. No murmur heard.     No friction rub. No gallop.   Pulmonary:      Effort: Pulmonary effort is normal. No respiratory distress.      Breath sounds: No wheezing or rales.   Abdominal:      General: Bowel sounds are normal. There is no distension.      Tenderness: There is no abdominal tenderness. There is no guarding.   Musculoskeletal:      Right lower leg: No edema.      Left lower leg: No edema.   Skin:     General: Skin is warm.      Coloration: Skin is not jaundiced.      Findings: No bruising or rash.   Neurological:      General: No focal deficit present.      Mental Status: He is alert and oriented to person, place, and time. Mental status is at " baseline.      Motor: No weakness.          Assessment/Plan:     Venancio Barragan is a 59 y.o. year old male who presents to clinic for pre op exam        Pre-operative evaluation with risk assessment              -           Scheduled for left knee revision on 10/21/24 by Dr. Menezes  -           DASI score: 58.2 w/ Functional Capacity in METS: 9.89 (>4) with a revised cardiac index   risk of 3.9%.    - not on antiplatelets/anticoagulation.    - no h/o blood dyscrasias or previous reaction to anesthesia    - isnot a smoker.   - has prior h/o HLD/CAD but no history of either MI or CVA. Continue medical management w/ statin  - has prior h/o DM. Last HgbA1C:~6.8  - Has no prior h/o thyroid disease. TSH:4.62  - has prior h/o HTN. BP: 137/80  - Has no prior h/o COPD/Asthma/RAMOS/OHS.   - has no prior h/o HF. Echo: none  - BMI: Body mass index is 29.64 kg/m².              -           The patient is medically optimized with a low to moderate risk to proceed with (per ACC/AHA nicky-operative guidelines) a moderate risk surgery.   - Please call our office for any additional questions or concerns.          Follow-up:PRN    A total of 15 minutes was spent on patient care during this encounter which included chart review, examining the patient, formulating a treatment plan and documentation.     Case discussed with staff: Dr. Patricia Lora MD  [facility], PGY-2  10/02/2024

## 2024-10-02 NOTE — TELEPHONE ENCOUNTER
Good morning!   Mr. Barragan is scheduled for PAT and joint camp today in preparation for a TKA revision on 10/21 but he is not showing up on the surgery schedule. Should he be scheduled for surgery on 10/21?

## 2024-10-03 ENCOUNTER — HOSPITAL ENCOUNTER (OUTPATIENT)
Dept: RADIOLOGY | Facility: HOSPITAL | Age: 59
Discharge: HOME | End: 2024-10-03
Attending: ORTHOPAEDIC SURGERY
Payer: MEDICAID

## 2024-10-03 ENCOUNTER — TELEPHONE (OUTPATIENT)
Dept: ORTHOPEDICS | Facility: CLINIC | Age: 59
End: 2024-10-03
Payer: MEDICAID

## 2024-10-03 DIAGNOSIS — Z96.652 PRESENCE OF LEFT ARTIFICIAL KNEE JOINT: ICD-10-CM

## 2024-10-03 PROCEDURE — A9503 TC99M MEDRONATE: HCPCS | Performed by: ORTHOPAEDIC SURGERY

## 2024-10-03 PROCEDURE — 73700 CT LOWER EXTREMITY W/O DYE: CPT | Mod: 26,LT,, | Performed by: RADIOLOGY

## 2024-10-03 PROCEDURE — 73700 CT LOWER EXTREMITY W/O DYE: CPT | Mod: TC,LT

## 2024-10-03 PROCEDURE — 78315 BONE IMAGING 3 PHASE: CPT | Mod: TC

## 2024-10-03 PROCEDURE — 78315 BONE IMAGING 3 PHASE: CPT | Mod: 26,,, | Performed by: STUDENT IN AN ORGANIZED HEALTH CARE EDUCATION/TRAINING PROGRAM

## 2024-10-03 RX ADMIN — TECHNETIUM TC 99M MEDRONATE 25 MILLICURIE: 20 INJECTION, POWDER, LYOPHILIZED, FOR SOLUTION INTRAVENOUS at 08:10

## 2024-10-03 NOTE — TELEPHONE ENCOUNTER
----- Message from [employer] sent at 10/3/2024 10:03 AM CDT -----  Contact: Patient  Type:  Sooner Apoointment Request    Caller is requesting a sooner appointment.  Caller declined first available appointment listed below.  Caller will not accept being placed on the waitlist and is requesting a message be sent to doctor.  Name of Caller:Venancio Barragan   When is the first available appointment? 10/31   Symptoms: pt was told to schedule a 1 week f/u once his scans were completed   Would the patient rather a call back or a response via [facility]?  Call  Best Call Back Number: [phone]  Additional Information:

## 2024-10-08 ENCOUNTER — OFFICE VISIT (OUTPATIENT)
Dept: ORTHOPEDICS | Facility: CLINIC | Age: 59
End: 2024-10-08
Payer: MEDICAID

## 2024-10-08 ENCOUNTER — HOSPITAL ENCOUNTER (OUTPATIENT)
Dept: RADIOLOGY | Facility: HOSPITAL | Age: 59
Discharge: HOME | End: 2024-10-08
Attending: ORTHOPAEDIC SURGERY
Payer: MEDICAID

## 2024-10-08 VITALS — WEIGHT: 178.13 LBS | BODY MASS INDEX: 29.68 KG/M2 | HEIGHT: 65 IN

## 2024-10-08 DIAGNOSIS — G89.29 CHRONIC PAIN OF LEFT KNEE: Primary | ICD-10-CM

## 2024-10-08 DIAGNOSIS — T84.84XD PAIN DUE TO TOTAL LEFT KNEE REPLACEMENT, SUBSEQUENT ENCOUNTER: ICD-10-CM

## 2024-10-08 DIAGNOSIS — M25.562 CHRONIC PAIN OF LEFT KNEE: Primary | ICD-10-CM

## 2024-10-08 DIAGNOSIS — E78.2 DM TYPE 2 WITH DIABETIC MIXED HYPERLIPIDEMIA: ICD-10-CM

## 2024-10-08 DIAGNOSIS — E11.69 DM TYPE 2 WITH DIABETIC MIXED HYPERLIPIDEMIA: ICD-10-CM

## 2024-10-08 DIAGNOSIS — M25.562 CHRONIC PAIN OF LEFT KNEE: ICD-10-CM

## 2024-10-08 DIAGNOSIS — G89.29 CHRONIC PAIN OF LEFT KNEE: ICD-10-CM

## 2024-10-08 DIAGNOSIS — M62.81 QUADRICEPS WEAKNESS: ICD-10-CM

## 2024-10-08 DIAGNOSIS — T84.033A LOOSENING OF PROSTHESIS OF LEFT TOTAL KNEE REPLACEMENT, INITIAL ENCOUNTER: ICD-10-CM

## 2024-10-08 DIAGNOSIS — Z96.652 PAIN DUE TO TOTAL LEFT KNEE REPLACEMENT, SUBSEQUENT ENCOUNTER: ICD-10-CM

## 2024-10-08 PROCEDURE — 77073 BONE LENGTH STUDIES: CPT | Mod: TC,PN

## 2024-10-08 PROCEDURE — 1159F MED LIST DOCD IN RCRD: CPT | Mod: CPTII,,, | Performed by: ORTHOPAEDIC SURGERY

## 2024-10-08 PROCEDURE — 4010F ACE/ARB THERAPY RXD/TAKEN: CPT | Mod: CPTII,,, | Performed by: ORTHOPAEDIC SURGERY

## 2024-10-08 PROCEDURE — 3044F HG A1C LEVEL LT 7.0%: CPT | Mod: CPTII,,, | Performed by: ORTHOPAEDIC SURGERY

## 2024-10-08 PROCEDURE — 99215 OFFICE O/P EST HI 40 MIN: CPT | Mod: S$PBB,,, | Performed by: ORTHOPAEDIC SURGERY

## 2024-10-08 PROCEDURE — 77073 BONE LENGTH STUDIES: CPT | Mod: 26,,, | Performed by: STUDENT IN AN ORGANIZED HEALTH CARE EDUCATION/TRAINING PROGRAM

## 2024-10-08 PROCEDURE — G2211 COMPLEX E/M VISIT ADD ON: HCPCS | Mod: S$PBB,,, | Performed by: ORTHOPAEDIC SURGERY

## 2024-10-08 PROCEDURE — 3008F BODY MASS INDEX DOCD: CPT | Mod: CPTII,,, | Performed by: ORTHOPAEDIC SURGERY

## 2024-10-08 PROCEDURE — 99214 OFFICE O/P EST MOD 30 MIN: CPT | Mod: PBBFAC,25,PN | Performed by: ORTHOPAEDIC SURGERY

## 2024-10-08 PROCEDURE — 99999 PR PBB SHADOW E&M-EST. PATIENT-LVL IV: CPT | Mod: PBBFAC,,, | Performed by: ORTHOPAEDIC SURGERY

## 2024-10-08 NOTE — PROGRESS NOTES
Subjective:      Patient ID: Venancio Barragan is a 59 y.o. male.    Chief Complaint: Pain and Results of the Left Knee    Save for follow-up after three-phase bone scan and CT scan of the left knee.  CT scan shows internal rotation of both the femoral and tibial components.  Bone scan also shows significant uptake at well over a year from index surgery.  Patient complains of continued pain and stiffness and would like to have revision knee replacement.      Social History     Occupational History    Not on file   Tobacco Use    Smoking status: Never    Smokeless tobacco: Never   Substance and Sexual Activity    Alcohol use: No    Drug use: No    Sexual activity: Yes     Partners: Female      Social Drivers of Health     Tobacco Use: Low Risk  (10/8/2024)    Patient History     Smoking Tobacco Use: Never     Smokeless Tobacco Use: Never     Passive Exposure: Not on file   Alcohol Use: Not At Risk (10/1/2024)    AUDIT-C     Frequency of Alcohol Consumption: Never     Average Number of Drinks: Patient declined     Frequency of Binge Drinking: Never   Financial Resource Strain: Low Risk  (10/1/2024)    Overall Financial Resource Strain (CARDIA)     Difficulty of Paying Living Expenses: Not hard at all   Food Insecurity: No Food Insecurity (10/1/2024)    Hunger Vital Sign     Worried About Running Out of Food in the Last Year: Never true     Ran Out of Food in the Last Year: Never true   Transportation Needs: No Transportation Needs (7/1/2024)    Received from [facility] and Its Subsidiaries and Affiliates    PRAPARE - Transportation     Lack of Transportation (Medical): No     Lack of Transportation (Non-Medical): No   Physical Activity: Sufficiently Active (10/1/2024)    Exercise Vital Sign     Days of Exercise per Week: 7 days     Minutes of Exercise per Session: 30 min   Stress: Stress Concern Present (10/1/2024)    [place] [employer] - Occupational Stress  Questionnaire     Feeling of Stress : Very much   Housing Stability: Unknown (10/1/2024)    Housing Stability Vital Sign     Unable to Pay for Housing in the Last Year: No     Homeless in the Last Year: Not on file   Depression: Low Risk  (10/2/2024)    Depression     Last PHQ-4: Flowsheet Data: 0   Utilities: Not At Risk (10/1/2024)    [facility] Utilities     Threatened with loss of utilities: No   Health Literacy: Inadequate Health Literacy (10/1/2024)     Health Literacy     Frequency of need for help with medical instructions: Often   Social Isolation: Not on file      Review of Systems   Constitutional: Negative for diaphoresis.   HENT:  Negative for ear discharge, nosebleeds and stridor.    Eyes:  Negative for photophobia.   Cardiovascular:  Negative for syncope.   Respiratory:  Negative for hemoptysis, shortness of breath and wheezing.    Neurological:  Negative for tremors.   Psychiatric/Behavioral: Negative.           Objective:    Ortho/SPM Exam       Assessment:       1. Chronic pain of left knee    2. Pain due to total left knee replacement, subsequent encounter    3. Loosening of prosthesis of left total knee replacement, initial encounter    4. Quadriceps weakness    5. DM type 2 with diabetic mixed hyperlipidemia          Plan:   The patient has failed non operative management and continued pain and swelling. The natural history of a failed replacement was discussed at length and nonoperative and operative treatment was reviewed. Due to the pain and associated disability, I recommend left revision total knee replacement.  The risks, benefits, convalescence, and alternatives were reviewed.  Numerous questions were asked and answered.  Models were used as an education tool.  Surgery will be scheduled at a convenient time.  The discussion with the patient included a description of a revision total knee replacement.  A revision total knee replacement (TKR) removing the old implants and resurfacing of all  three surfaces-the patella, femur, and tibia, with metal and plastic parts as appropriate. The prosthetic parts are usually cemented into position wuith metal rods in the middle of the bone and will allow a patient a full range of motion from. The postoperative motion, however, is determined by multiple factors, the most important of which is preoperative motion. In general, the better the motion preoperatively, the better the motion postoperatively.  In younger patients  I will generally leave the patella unresurfaced if possible, in an effort to preserve bone stock for any future revision surgeries. In those patients we discuss the risk of anterior knee pain in a small subset of patients (5-10%) with an unresurfaced patella. The operation, pending medical clearance, generally requires a hospitalization of 0-3 days for one knee (three-four days for a bilateral replacement). In general, we prefer to perform the procedure under epidural/spinal anesthesia.   Postoperatively, the patient is  walking the day after surgery and may be later in the week  if stable with a walker or cane.  The first couple of days are very painful and the pain medication will alleviate but not eliminate the pain.  Thus, the patient must really push hard to get the range of motion.   The cane is to be dispensed with once the patient is secure enough.  In general, there is no cast or brace required with a routine revision knee replacement. In the long term, we do not encourage our patients to run for the sake of running; although, pending their preoperative status, we often allow patients to play doubles tennis or comparable activities.  We also allow them to do gentle intermediate downhill skiing if they are truly an expert skier.  Biking is encouraged as well as swimming.  The follow-up periods are usually at three weeks, 3 months, six months, and yearly intervals.  Potential complications with total knee replacements include fracture,  damage to ligaments and tendons. infection (less than 2%), which is much higher in patients with obesity, diabetes, or other conditions which adversely affect the immune system is also a mjor complication.  (Patients with a previous history of osteomyelitis can have infection rates as high as 15%).  By nerve damage we mean a peroneal nerve palsy with a foot drop (a flapping foot with ambulation).  This is particularly more apt to occur with a bad valgus (knock knee) deformity.  The incidence can be quite high in this particular patient population.  There are always areas of skin numbness, but this is not an untoward effect, nor do we consider it a complication.  Other potential complications include dislocation of the patellar component (less than 2%).  The loosening of either the tibial or femoral component is much more infrequent.  It usually occurs with infection or long-term use in a patient population that is at extreme risk (e.g., markedly overweight and not conscientious about their exercises).  Major blood vessel damage is also extremely rare.  Theoretically, because of the anatomic proximity of the popliteal artery, it could be lacerated with subsequent repairs required.  Albeit unlikely, a disruption of the popliteal artery could theoretically result in an amputation.  Similarly, infection could theoretically result in an amputation if one were to grow out an organism that cannot be controlled with antibiotics.  General medical complications include phlebitis for which we prophylactically anticoagulate, but it could still occur and fatal pulmonary embolus has been reported.  Cardiovascular problems, such as a heart attack or ischemia, are always a concern with such hemodynamic changes in the blood vascular system.  Other general complications are very rare but in medicine anything could theoretically happen. We also discussed possibly performing a pre-operative peripheral sensory block of the bracnhes of  the AFCN and ISN of the same knee using iovera to help with pain control post surgery. This is a relatively new technology with early data demonstrating significant pain improvement and functional recovery. However the science defining all the associated risks is still developing. I think the patient understands the risk benefit ratio of a revision total knee replacement and the iovera treatment and would like to pursue the total knee replacement and iovera treatment. we will begin the pre-operative process. Additionally, this case will undergo peer review for appropriateness of care during our departmental weekly indications conference prior to surgery. We will also use the [employer] NMES device with garment prescribed for disuse atrophy as part of rehabilitation program to restore strength. Due to the large surface area and frequency of treatments, it is not feasible to use conventional electrodes, requiring the use of a conductive garment. This device is being used in conjunction with physical therapy. we will begin the pre-operative process.

## 2024-10-10 ENCOUNTER — TELEPHONE (OUTPATIENT)
Dept: ORTHOPEDICS | Facility: CLINIC | Age: 59
End: 2024-10-10
Payer: MEDICAID

## 2024-10-10 NOTE — TELEPHONE ENCOUNTER
Spoke with patient's wife.  Surgery is Nov 11 and the Iovera is scheduled for Nov 7.  Stated no one told them this.  Confirmed appt. VU

## 2024-10-10 NOTE — TELEPHONE ENCOUNTER
----- Message from [employer] sent at 10/10/2024  8:34 AM CDT -----  Type: General Call Back     Name of Caller:Yisel butterfield   Reason patient Is having surgery on his knee on 10/11, pt would like to know if the appt for 10/07 for [place]igo is correct   Would the patient rather a call back or a response via [facility]? Call   Best Call Back Number:[phone]  Additional Information:

## 2024-10-21 ENCOUNTER — TELEPHONE (OUTPATIENT)
Dept: ORTHOPEDICS | Facility: CLINIC | Age: 59
End: 2024-10-21
Payer: MEDICAID

## 2024-10-21 NOTE — TELEPHONE ENCOUNTER
----- Message from Gertrude sent at 10/21/2024  8:49 AM CDT -----  Type:  Needs Medical Advice    Who Called: riley butterfield wife     Would the patient rather a call back or a response via [facility]? Call     Best Call Back Number: [phone]    Additional Information: pt requesting a call back to discuss 3 appointments for pre admit already took classes.

## 2024-10-23 ENCOUNTER — ANESTHESIA EVENT (OUTPATIENT)
Dept: SURGERY | Facility: HOSPITAL | Age: 59
End: 2024-10-23
Payer: MEDICAID

## 2024-10-23 ENCOUNTER — HOSPITAL ENCOUNTER (OUTPATIENT)
Dept: PREADMISSION TESTING | Facility: HOSPITAL | Age: 59
Discharge: HOME | End: 2024-10-23
Attending: NURSE PRACTITIONER
Payer: MEDICAID

## 2024-10-23 ENCOUNTER — TELEPHONE (OUTPATIENT)
Dept: PREADMISSION TESTING | Facility: HOSPITAL | Age: 59
End: 2024-10-23
Payer: MEDICAID

## 2024-10-23 NOTE — ANESTHESIA PREPROCEDURE EVALUATION
11/11/2024  Venancio Barragan is a 59 y.o., male scheduled for REVISION, ARTHROPLASTY, KNEE (Left: Knee) 11/11/24.    Past Medical History:   Diagnosis Date    Diabetes mellitus     GERD (gastroesophageal reflux disease)     High cholesterol     Hypertension      Past Surgical History:   Procedure Laterality Date    ANKLE SURGERY      right    ELBOW SURGERY Left     INCISION AND DRAINAGE OF HAND Left 2/8/2023    Procedure: INCISION AND DRAINAGE, HAND;  Surgeon: Adele Colon MD;  Location: [facility] OR;  Service: Orthopedics;  Laterality: Left;    INSERTION, ELECTRODE LEAD, NEUROSTIMULATOR, PERIPHERAL Left 9/23/2024    Procedure: INSERTION,ELECTRODE LEAD,NEUROSTIMULATOR,PERIPHERAL;  Surgeon: Bony Menezes MD;  Location: [facility] OR;  Service: Pain Management;  Laterality: Left;  curonix, trial    KNEE SURGERY      TOTAL HIP ARTHROPLASTY      ines     Review of patient's allergies indicates:  No Known Allergies  Current Outpatient Medications   Medication Instructions    acetaminophen (TYLENOL) 325 mg, Oral, Every 4 hours    aspirin (ECOTRIN) 81 mg, Oral, 2 times daily    atorvastatin (LIPITOR) 40 mg, Daily    cephALEXin (KEFLEX) 500 mg, Oral, Every 6 hours    diclofenac (VOLTAREN) 75 mg, Oral, 2 times daily    dulaglutide (TRULICITY) 0.75 mg/0.5 mL pen injector INECT 0.5 ML INTO THE SKIN EVERY 7 DAYS    famotidine (PEPCID) 20 mg, Oral, 2 times daily    HYDROcodone-acetaminophen (NORCO) 5-325 mg per tablet 1 tablet, Oral, Every 6 hours PRN    metFORMIN (GLUCOPHAGE) 1,000 mg, 2 times daily with meals    multivitamin capsule 1 capsule, Daily    OZEMPIC 0.25 mg    PAXLOVID, EUA, 150-100 mg DsPk 2 tablets, 2 times daily    promethazine (PHENERGAN) 25 mg, Oral, Every 6 hours PRN    ramipriL (ALTACE) 2.5 mg    solifenacin (VESICARE) 10 mg    tadalafiL (CIALIS) 5 mg, Daily PRN    tranexamic acid (LYSTEDA) 650 mg, Oral, 2 times  daily    WESTUSSIN DM 1-5-10 mg/5 mL Syrp 10 mLs, 3 times daily    zolpidem (AMBIEN) 10 mg, Nightly PRN       Pre-op Assessment    I have reviewed the Patient Summary Reports.     I have reviewed the Nursing Notes. I have reviewed the NPO Status.   I have reviewed the Medications.     Review of Systems  Anesthesia Hx:  No problems with previous Anesthesia   History of prior surgery of interest to airway management or planning:            Denies Personal Hx of Anesthesia complications.                    Social:  Non-Smoker       Hematology/Oncology:       -- Denies Anemia:                  Denies Current/Recent Cancer                Cardiovascular:  Exercise tolerance: good   Hypertension   Denies MI.  Denies CAD.     Denies Dysrhythmias.    Denies CHF.    hyperlipidemia   ECG has been reviewed.    Functional Capacity good / => 4 METS                         Pulmonary:    Denies COPD.  Denies Asthma.   Denies Shortness of breath.   Denies Sleep Apnea.                Renal/:   Denies Chronic Renal Disease.                Hepatic/GI:     GERD, well controlled    Taking GLP-1 Agonists (ozempic on Sundays - 10/27 last dose) Instructed to Hold for 7 Days           Musculoskeletal:  Arthritis               Neurological:  Denies TIA.  Denies CVA.    Denies Seizures.                                Endocrine:  Diabetes (a1c 6.0 on 10/8/24), well controlled, type 2 Denies Hypothyroidism.  Denies Hyperthyroidism.       Obesity / BMI > 30      Physical Exam  General: Cooperative, Oriented, Well nourished and Alert    Airway:  Mallampati: II   Mouth Opening: Normal  TM Distance: Normal  Tongue: Normal  Neck ROM: Normal ROM    Dental:  Intact      Lab Results   Component Value Date    WBC 5.40 10/08/2024    HGB 13.0 (L) 10/08/2024    HCT 41.2 10/08/2024     10/08/2024    CHOL 170 02/12/2024    TRIG 152 (H) 02/12/2024    HDL 53 02/12/2024    ALT 34 10/08/2024    AST 25 10/08/2024     10/08/2024    K 5.0 10/08/2024      10/08/2024    CREATININE 1.3 10/08/2024    BUN 22 (H) 10/08/2024    CO2 22 (L) 10/08/2024    TSH 4.620 (H) 06/26/2024    INR 1.0 10/08/2024    HGBA1C 6.0 (H) 10/08/2024     Results for orders placed or performed in visit on 09/10/24   EKG 12-lead    Collection Time: 09/10/24  9:19 AM   Result Value Ref Range    QRS Duration 80 ms    OHS QTC Calculation 403 ms    Narrative    Test Reason : T84.84XD,Z96.652,    Vent. Rate : 063 BPM     Atrial Rate : 063 BPM     P-R Int : 168 ms          QRS Dur : 080 ms      QT Int : 394 ms       P-R-T Axes : 053 002 027 degrees     QTc Int : 403 ms    Normal sinus rhythm  Normal ECG  When compared with ECG of 08-FEB-2023 11:23,  No significant change was found  Confirmed by Guero Troncoso ([id number]) on 9/10/2024 3:47:32 PM    Referred By: ANA LILIA SERRANO           Confirmed By:Guero Troncoso     EXAMINATION:  XR CHEST 1 VIEW PRE-OP     CLINICAL HISTORY:  Pain due to internal orthopedic prosthetic devices, implants and grafts, subsequent encounter     TECHNIQUE:  Frontal view of the chest was performed.     COMPARISON:  No priors     FINDINGS:  The cardiomediastinal silhouette is upper normal in size.  Pulmonary vascularity appears within normal limits.     The lungs appear symmetrically expanded without confluent pulmonary parenchymal opacity. No pleural fluid or pneumothorax.     Impression:     Borderline cardiomegaly.        Electronically signed by:Rebel Yadav MD  Date:                                            09/10/2024  Time:                                           11:30    Anesthesia Plan  Type of Anesthesia, risks & benefits discussed:    Anesthesia Type: Regional, CSE  Intra-op Monitoring Plan: Standard ASA Monitors  Post Op Pain Control Plan: multimodal analgesia, IV/PO Opioids PRN, peripheral nerve block and epidural analgesia  Induction:  IV  Informed Consent: Informed consent signed with the Patient and all parties understand the risks and agree with anesthesia  plan.  All questions answered. Patient consented to blood products? Yes  ASA Score: 3  Day of Surgery Review of History & Physical: H&P Update referred to the surgeon/provider.  Anesthesia Plan Notes: Anesthesia consent to be signed prior to surgery 11/11/24      Ready For Surgery From Anesthesia Perspective.     .

## 2024-11-07 ENCOUNTER — PROCEDURE VISIT (OUTPATIENT)
Dept: ORTHOPEDICS | Facility: CLINIC | Age: 59
End: 2024-11-07
Payer: MEDICAID

## 2024-11-07 VITALS — WEIGHT: 179 LBS | BODY MASS INDEX: 29.82 KG/M2 | HEIGHT: 65 IN

## 2024-11-07 DIAGNOSIS — M62.81 QUADRICEPS WEAKNESS: ICD-10-CM

## 2024-11-07 DIAGNOSIS — M25.562 CHRONIC PAIN OF LEFT KNEE: ICD-10-CM

## 2024-11-07 DIAGNOSIS — G89.29 CHRONIC PAIN OF LEFT KNEE: ICD-10-CM

## 2024-11-07 DIAGNOSIS — E78.2 DM TYPE 2 WITH DIABETIC MIXED HYPERLIPIDEMIA: ICD-10-CM

## 2024-11-07 DIAGNOSIS — E11.69 DM TYPE 2 WITH DIABETIC MIXED HYPERLIPIDEMIA: ICD-10-CM

## 2024-11-07 DIAGNOSIS — T84.84XD PAIN DUE TO TOTAL LEFT KNEE REPLACEMENT, SUBSEQUENT ENCOUNTER: ICD-10-CM

## 2024-11-07 DIAGNOSIS — T84.033A LOOSENING OF PROSTHESIS OF LEFT TOTAL KNEE REPLACEMENT, INITIAL ENCOUNTER: ICD-10-CM

## 2024-11-07 DIAGNOSIS — Z96.652 PAIN DUE TO TOTAL LEFT KNEE REPLACEMENT, SUBSEQUENT ENCOUNTER: ICD-10-CM

## 2024-11-07 PROCEDURE — 99499 UNLISTED E&M SERVICE: CPT | Mod: S$PBB,,, | Performed by: PHYSICIAN ASSISTANT

## 2024-11-07 PROCEDURE — 64640 INJECTION TREATMENT OF NERVE: CPT | Mod: PBBFAC,PN | Performed by: PHYSICIAN ASSISTANT

## 2024-11-07 PROCEDURE — 64640 INJECTION TREATMENT OF NERVE: CPT | Mod: S$PBB,LT,, | Performed by: PHYSICIAN ASSISTANT

## 2024-11-07 NOTE — PROCEDURES
Procedures    Procedure Note Iovera:    DATE OF PROCEDURE:  11/07/2024     PREOPERATIVE DIAGNOSIS: left knee pain.     POSTOPERATIVE DIAGNOSIS: left knee pain.     PROCEDURE: Iovera treatment of anterior femoral cutaneous nerve, Lateral femoral cut nerve, and both branches of infrapatellar saphenous nerve using at least 4 different punctures to treat all 4 nerves. (cpt 64640 x3)    COMPLICATIONS: None.     IMPLANTS:  None    ESTIMATED BLOOD LOSS:  < 5cc    SPECIMENS REMOVED:  None    ANESTHESIA: Local lidocaine    INDICATIONS FOR PROCEDURE: This is a 59 y.o. male with longstanding knee pain. They have failed non operative management including injections.I discussed a new treatment therapy called Iovera, which is cryotherapy, to provide symptomatic relief along the sensory distribution of the infrapatellar tendon branch of the saphenous nerve  and AFCN. The patient elected to move forward with this  We did discuss the fact that this is a fairly novel procedure and there is very limited scientific data around this.  However, it FDA approved.  The patient was given patient information and literature to review prior to the procedure as well.  Based on this, the patient agreed to move forward with doing the procedure.      PROCEDURE:    The patient was placed supine on the exam table and the proximal medial aspect of the left tibia and anterior aspect of distal femur was prepped with sterile Betadine and alcohol.  A line was drawn extending approximately 5 cm medial to inferior pole of the patella distally to a point approximately 5 cm medial to the tibial tubercle.  A second line was drawn in a medial to lateral direction the width of the patella approximately 7 cm proximal to the patella. We then infiltrated the skin with lidocaine along both lines using a 25g needle. We then introduced the Iovera device along these lines and this device penetrated the skin, creating cryotherapy to both branches of the infrapatellar  saphenous nerve, a third treatment to the anterior femoral cutaneous nerve, and fourth LFCN. 7 punctures of the skin were made to treat the 2 branches of the ISN and another 7 punctures were made to treat the AFCN and LFCN. There were a total of 4 nerves treated with iovera of each knee. The patient tolerated the procedure well with no problems.

## 2024-11-10 RX ORDER — CEPHALEXIN 500 MG/1
500 CAPSULE ORAL EVERY 6 HOURS
Status: CANCELLED | OUTPATIENT
Start: 2024-11-12 | End: 2024-11-19

## 2024-11-11 ENCOUNTER — HOSPITAL ENCOUNTER (INPATIENT)
Facility: HOSPITAL | Age: 59
LOS: 5 days | Discharge: HOME | DRG: 467 | End: 2024-11-16
Attending: ORTHOPAEDIC SURGERY | Admitting: ORTHOPAEDIC SURGERY
Payer: MEDICAID

## 2024-11-11 ENCOUNTER — ANESTHESIA (OUTPATIENT)
Dept: SURGERY | Facility: HOSPITAL | Age: 59
End: 2024-11-11
Payer: MEDICAID

## 2024-11-11 DIAGNOSIS — T84.84XD PAIN DUE TO TOTAL LEFT KNEE REPLACEMENT, SUBSEQUENT ENCOUNTER: ICD-10-CM

## 2024-11-11 DIAGNOSIS — M17.10 ARTHRITIS OF KNEE: ICD-10-CM

## 2024-11-11 DIAGNOSIS — I10 BENIGN ESSENTIAL HTN: ICD-10-CM

## 2024-11-11 DIAGNOSIS — Z96.652 S/P REVISION OF TOTAL KNEE, LEFT: ICD-10-CM

## 2024-11-11 DIAGNOSIS — E11.9 TYPE 2 DIABETES MELLITUS WITHOUT COMPLICATION, WITHOUT LONG-TERM CURRENT USE OF INSULIN: ICD-10-CM

## 2024-11-11 DIAGNOSIS — Z96.652 PAIN DUE TO TOTAL LEFT KNEE REPLACEMENT, SUBSEQUENT ENCOUNTER: ICD-10-CM

## 2024-11-11 DIAGNOSIS — T84.033D LOOSENING OF PROSTHESIS OF LEFT TOTAL KNEE REPLACEMENT, SUBSEQUENT ENCOUNTER: Primary | ICD-10-CM

## 2024-11-11 LAB
APPEARANCE FLD: NORMAL
BODY FLD TYPE: NORMAL
COLOR FLD: YELLOW
LYMPHOCYTES NFR FLD MANUAL: 78 %
MONOS+MACROS NFR FLD MANUAL: 10 %
NEUTROPHILS NFR FLD MANUAL: 12 %
POCT GLUCOSE: 130 MG/DL (ref 70–110)
POCT GLUCOSE: 229 MG/DL (ref 70–110)
POCT GLUCOSE: 333 MG/DL (ref 70–110)
WBC # FLD: 549 /CU MM

## 2024-11-11 PROCEDURE — D9220A PRA ANESTHESIA: Mod: ANES,,, | Performed by: STUDENT IN AN ORGANIZED HEALTH CARE EDUCATION/TRAINING PROGRAM

## 2024-11-11 PROCEDURE — 36000711: Performed by: ORTHOPAEDIC SURGERY

## 2024-11-11 PROCEDURE — 27800903 OPTIME MED/SURG SUP & DEVICES OTHER IMPLANTS: Performed by: ORTHOPAEDIC SURGERY

## 2024-11-11 PROCEDURE — 25000003 PHARM REV CODE 250: Performed by: NURSE ANESTHETIST, CERTIFIED REGISTERED

## 2024-11-11 PROCEDURE — D9220A PRA ANESTHESIA: Mod: CRNA,,, | Performed by: NURSE ANESTHETIST, CERTIFIED REGISTERED

## 2024-11-11 PROCEDURE — C1729 CATH, DRAINAGE: HCPCS | Performed by: ORTHOPAEDIC SURGERY

## 2024-11-11 PROCEDURE — 63600175 PHARM REV CODE 636 W HCPCS: Performed by: NURSE ANESTHETIST, CERTIFIED REGISTERED

## 2024-11-11 PROCEDURE — 0SRD0J9 REPLACEMENT OF LEFT KNEE JOINT WITH SYNTHETIC SUBSTITUTE, CEMENTED, OPEN APPROACH: ICD-10-PCS | Performed by: ORTHOPAEDIC SURGERY

## 2024-11-11 PROCEDURE — 87070 CULTURE OTHR SPECIMN AEROBIC: CPT | Mod: 59 | Performed by: ORTHOPAEDIC SURGERY

## 2024-11-11 PROCEDURE — 87075 CULTR BACTERIA EXCEPT BLOOD: CPT | Mod: 59 | Performed by: ORTHOPAEDIC SURGERY

## 2024-11-11 PROCEDURE — 27487 REVISE/REPLACE KNEE JOINT: CPT | Mod: 79,LT,, | Performed by: ORTHOPAEDIC SURGERY

## 2024-11-11 PROCEDURE — 87116 MYCOBACTERIA CULTURE: CPT | Performed by: ORTHOPAEDIC SURGERY

## 2024-11-11 PROCEDURE — 36415 COLL VENOUS BLD VENIPUNCTURE: CPT | Performed by: ORTHOPAEDIC SURGERY

## 2024-11-11 PROCEDURE — 99900035 HC TECH TIME PER 15 MIN (STAT)

## 2024-11-11 PROCEDURE — 89051 BODY FLUID CELL COUNT: CPT | Performed by: ORTHOPAEDIC SURGERY

## 2024-11-11 PROCEDURE — 63600175 PHARM REV CODE 636 W HCPCS

## 2024-11-11 PROCEDURE — 88300 SURGICAL PATH GROSS: CPT | Mod: 26,,, | Performed by: PATHOLOGY

## 2024-11-11 PROCEDURE — 25000003 PHARM REV CODE 250

## 2024-11-11 PROCEDURE — 87102 FUNGUS ISOLATION CULTURE: CPT | Performed by: ORTHOPAEDIC SURGERY

## 2024-11-11 PROCEDURE — C1776 JOINT DEVICE (IMPLANTABLE): HCPCS | Performed by: ORTHOPAEDIC SURGERY

## 2024-11-11 PROCEDURE — 94799 UNLISTED PULMONARY SVC/PX: CPT

## 2024-11-11 PROCEDURE — 37000008 HC ANESTHESIA 1ST 15 MINUTES: Performed by: ORTHOPAEDIC SURGERY

## 2024-11-11 PROCEDURE — 87205 SMEAR GRAM STAIN: CPT | Mod: 59 | Performed by: ORTHOPAEDIC SURGERY

## 2024-11-11 PROCEDURE — C9290 INJ, BUPIVACAINE LIPOSOME: HCPCS | Performed by: STUDENT IN AN ORGANIZED HEALTH CARE EDUCATION/TRAINING PROGRAM

## 2024-11-11 PROCEDURE — 94761 N-INVAS EAR/PLS OXIMETRY MLT: CPT

## 2024-11-11 PROCEDURE — A6010 COLLAGEN BASED WOUND FILLER: HCPCS | Performed by: ORTHOPAEDIC SURGERY

## 2024-11-11 PROCEDURE — 87206 SMEAR FLUORESCENT/ACID STAI: CPT | Performed by: ORTHOPAEDIC SURGERY

## 2024-11-11 PROCEDURE — 63600175 PHARM REV CODE 636 W HCPCS: Mod: JZ,JG | Performed by: STUDENT IN AN ORGANIZED HEALTH CARE EDUCATION/TRAINING PROGRAM

## 2024-11-11 PROCEDURE — 87070 CULTURE OTHR SPECIMN AEROBIC: CPT | Performed by: ORTHOPAEDIC SURGERY

## 2024-11-11 PROCEDURE — 37000009 HC ANESTHESIA EA ADD 15 MINS: Performed by: ORTHOPAEDIC SURGERY

## 2024-11-11 PROCEDURE — 0SBD0ZZ EXCISION OF LEFT KNEE JOINT, OPEN APPROACH: ICD-10-PCS | Performed by: ORTHOPAEDIC SURGERY

## 2024-11-11 PROCEDURE — 36000710: Performed by: ORTHOPAEDIC SURGERY

## 2024-11-11 PROCEDURE — 64447 NJX AA&/STRD FEMORAL NRV IMG: CPT | Performed by: STUDENT IN AN ORGANIZED HEALTH CARE EDUCATION/TRAINING PROGRAM

## 2024-11-11 PROCEDURE — 0SPD0JZ REMOVAL OF SYNTHETIC SUBSTITUTE FROM LEFT KNEE JOINT, OPEN APPROACH: ICD-10-PCS | Performed by: ORTHOPAEDIC SURGERY

## 2024-11-11 PROCEDURE — 71000033 HC RECOVERY, INTIAL HOUR: Performed by: ORTHOPAEDIC SURGERY

## 2024-11-11 PROCEDURE — 27000221 HC OXYGEN, UP TO 24 HOURS

## 2024-11-11 PROCEDURE — 87176 TISSUE HOMOGENIZATION CULTR: CPT | Mod: 91 | Performed by: ORTHOPAEDIC SURGERY

## 2024-11-11 PROCEDURE — 27201423 OPTIME MED/SURG SUP & DEVICES STERILE SUPPLY: Performed by: ORTHOPAEDIC SURGERY

## 2024-11-11 PROCEDURE — C1713 ANCHOR/SCREW BN/BN,TIS/BN: HCPCS | Performed by: ORTHOPAEDIC SURGERY

## 2024-11-11 PROCEDURE — 88300 SURGICAL PATH GROSS: CPT | Performed by: PATHOLOGY

## 2024-11-11 PROCEDURE — 36680 INSERT NEEDLE BONE CAVITY: CPT | Mod: 79,,, | Performed by: ORTHOPAEDIC SURGERY

## 2024-11-11 DEVICE — COLLAGEN CELLERATE ACTIVATED 1GM: Type: IMPLANTABLE DEVICE | Site: KNEE | Status: FUNCTIONAL

## 2024-11-11 DEVICE — ATTUNE KNEE SYSTEM REVISION PRESSFIT STEM 16X110MM
Type: IMPLANTABLE DEVICE | Site: KNEE | Status: FUNCTIONAL
Brand: ATTUNE

## 2024-11-11 DEVICE — TOBRA FULL DOSE ANTIBIOTIC BONE CEMENT, 10 PACK CATALOG NUMBER IS 6197-9-010
Type: IMPLANTABLE DEVICE | Site: KNEE | Status: FUNCTIONAL
Brand: SIMPLEX

## 2024-11-11 DEVICE — ATTUNE KNEE SYSTEM REVISION PRESSFIT STEM 18X60MM
Type: IMPLANTABLE DEVICE | Site: KNEE | Status: FUNCTIONAL
Brand: ATTUNE

## 2024-11-11 DEVICE — STIMULAN® RAPID CURE PROVIDED STERILE FOR SINGLE PATIENT USE. STIMULAN® RAPID CURE CONTAINS CALCIUM SULFATE POWDER AND MIXING SOLUTION IN PRE-MEASURED QUANTITIES SO THAT WHEN MIXED TOGETHER IN A STERILE MIXING BOWL, THE RESULTANT PASTE IS TO BE DIGITALLY PACKED INTO OPEN BONE VOID/GAP TO SET INSITU OR PLACED INTO THE MOULD PROVIDED, THE MIXTURE SETS TO FORM BEADS. THE BIODEGRADABLE, RADIOPAQUE BEADS ARE RESORBED IN APPROXIMATELY 30 – 60 DAYS WHEN USED IN ACCORDANCE WITH THE DEVICE LABELLING. STIMULAN® RAPID CURE IS MANUFACTURED FROM SYNTHETIC IMPLANT GRADE CALCIUM SULFATE DIHYDRATE(CASO4.2H2O) THAT RESORBS AND IS REPLACED WITH BONE DURING THE HEALING PROCESS. ALSO, AS THE BONE VOID FILLER BEADS ARE BIODEGRADABLE AND BIOCOMPATIBLE, THEY MAY BE USED AT AN INFECTED SITE.
Type: IMPLANTABLE DEVICE | Site: KNEE | Status: FUNCTIONAL
Brand: STIMULAN® RAPID CURE

## 2024-11-11 DEVICE — ATTUNE KNEE SYSTEM REVISION TIBIAL SLEEVE POROCOAT FULLY COATED 37MM
Type: IMPLANTABLE DEVICE | Site: KNEE | Status: FUNCTIONAL
Brand: ATTUNE

## 2024-11-11 RX ORDER — ONDANSETRON HYDROCHLORIDE 2 MG/ML
INJECTION, SOLUTION INTRAVENOUS
Status: DISCONTINUED | OUTPATIENT
Start: 2024-11-11 | End: 2024-11-11

## 2024-11-11 RX ORDER — ACETAMINOPHEN 500 MG
1000 TABLET ORAL ONCE
Status: COMPLETED | OUTPATIENT
Start: 2024-11-11 | End: 2024-11-11

## 2024-11-11 RX ORDER — PHENYLEPHRINE HYDROCHLORIDE 10 MG/ML
INJECTION INTRAVENOUS
Status: DISCONTINUED | OUTPATIENT
Start: 2024-11-11 | End: 2024-11-11

## 2024-11-11 RX ORDER — INSULIN ASPART 100 [IU]/ML
0-10 INJECTION, SOLUTION INTRAVENOUS; SUBCUTANEOUS
Status: DISCONTINUED | OUTPATIENT
Start: 2024-11-11 | End: 2024-11-16 | Stop reason: HOSPADM

## 2024-11-11 RX ORDER — PROPOFOL 10 MG/ML
VIAL (ML) INTRAVENOUS
Status: DISCONTINUED | OUTPATIENT
Start: 2024-11-11 | End: 2024-11-11

## 2024-11-11 RX ORDER — BISACODYL 10 MG/1
10 SUPPOSITORY RECTAL 2 TIMES DAILY PRN
Status: DISCONTINUED | OUTPATIENT
Start: 2024-11-11 | End: 2024-11-16 | Stop reason: HOSPADM

## 2024-11-11 RX ORDER — AMOXICILLIN 250 MG
1 CAPSULE ORAL 2 TIMES DAILY
Status: DISCONTINUED | OUTPATIENT
Start: 2024-11-11 | End: 2024-11-16 | Stop reason: HOSPADM

## 2024-11-11 RX ORDER — BUPIVACAINE HYDROCHLORIDE 2.5 MG/ML
INJECTION, SOLUTION EPIDURAL; INFILTRATION; INTRACAUDAL
Status: DISCONTINUED | OUTPATIENT
Start: 2024-11-11 | End: 2024-11-11

## 2024-11-11 RX ORDER — LIDOCAINE HYDROCHLORIDE 20 MG/ML
INJECTION INTRAVENOUS
Status: DISCONTINUED | OUTPATIENT
Start: 2024-11-11 | End: 2024-11-11

## 2024-11-11 RX ORDER — TALC
6 POWDER (GRAM) TOPICAL NIGHTLY PRN
Status: DISCONTINUED | OUTPATIENT
Start: 2024-11-11 | End: 2024-11-11

## 2024-11-11 RX ORDER — ATORVASTATIN CALCIUM 40 MG/1
40 TABLET, FILM COATED ORAL DAILY
Status: DISCONTINUED | OUTPATIENT
Start: 2024-11-12 | End: 2024-11-16 | Stop reason: HOSPADM

## 2024-11-11 RX ORDER — CELECOXIB 100 MG/1
200 CAPSULE ORAL 2 TIMES DAILY
Status: DISCONTINUED | OUTPATIENT
Start: 2024-11-11 | End: 2024-11-16 | Stop reason: HOSPADM

## 2024-11-11 RX ORDER — TRANEXAMIC ACID 650 MG/1
1950 TABLET ORAL ONCE
Status: COMPLETED | OUTPATIENT
Start: 2024-11-11 | End: 2024-11-11

## 2024-11-11 RX ORDER — BUPIVACAINE 13.3 MG/ML
INJECTION, SUSPENSION, LIPOSOMAL INFILTRATION
Status: DISCONTINUED | OUTPATIENT
Start: 2024-11-11 | End: 2024-11-11

## 2024-11-11 RX ORDER — IBUPROFEN 200 MG
24 TABLET ORAL
Status: DISCONTINUED | OUTPATIENT
Start: 2024-11-11 | End: 2024-11-16 | Stop reason: HOSPADM

## 2024-11-11 RX ORDER — HYDROMORPHONE HYDROCHLORIDE 2 MG/ML
INJECTION, SOLUTION INTRAMUSCULAR; INTRAVENOUS; SUBCUTANEOUS
Status: DISCONTINUED | OUTPATIENT
Start: 2024-11-11 | End: 2024-11-11

## 2024-11-11 RX ORDER — TOBRAMYCIN 40 MG/ML
480 INJECTION INTRAMUSCULAR; INTRAVENOUS ONCE
Status: DISCONTINUED | OUTPATIENT
Start: 2024-11-11 | End: 2024-11-11 | Stop reason: SDUPTHER

## 2024-11-11 RX ORDER — DIPHENHYDRAMINE HCL 25 MG
25 CAPSULE ORAL EVERY 6 HOURS PRN
Status: DISCONTINUED | OUTPATIENT
Start: 2024-11-11 | End: 2024-11-11

## 2024-11-11 RX ORDER — CELECOXIB 100 MG/1
400 CAPSULE ORAL ONCE
Status: COMPLETED | OUTPATIENT
Start: 2024-11-11 | End: 2024-11-11

## 2024-11-11 RX ORDER — PROCHLORPERAZINE EDISYLATE 5 MG/ML
5 INJECTION INTRAMUSCULAR; INTRAVENOUS EVERY 30 MIN PRN
Status: DISCONTINUED | OUTPATIENT
Start: 2024-11-11 | End: 2024-11-11 | Stop reason: HOSPADM

## 2024-11-11 RX ORDER — NAPROXEN SODIUM 220 MG/1
81 TABLET, FILM COATED ORAL 2 TIMES DAILY
Status: DISCONTINUED | OUTPATIENT
Start: 2024-11-11 | End: 2024-11-16 | Stop reason: HOSPADM

## 2024-11-11 RX ORDER — LIDOCAINE HYDROCHLORIDE 10 MG/ML
1 INJECTION, SOLUTION EPIDURAL; INFILTRATION; INTRACAUDAL; PERINEURAL ONCE
Status: DISCONTINUED | OUTPATIENT
Start: 2024-11-11 | End: 2024-11-11 | Stop reason: HOSPADM

## 2024-11-11 RX ORDER — ACETAMINOPHEN 325 MG/1
650 TABLET ORAL EVERY 6 HOURS
Status: DISCONTINUED | OUTPATIENT
Start: 2024-11-11 | End: 2024-11-16 | Stop reason: HOSPADM

## 2024-11-11 RX ORDER — DEXAMETHASONE SODIUM PHOSPHATE 4 MG/ML
10 INJECTION, SOLUTION INTRA-ARTICULAR; INTRALESIONAL; INTRAMUSCULAR; INTRAVENOUS; SOFT TISSUE ONCE
Status: COMPLETED | OUTPATIENT
Start: 2024-11-11 | End: 2024-11-11

## 2024-11-11 RX ORDER — ACETAMINOPHEN 325 MG/1
325 TABLET ORAL EVERY 4 HOURS
Qty: 84 TABLET | Refills: 0 | Status: SHIPPED | OUTPATIENT
Start: 2024-11-11 | End: 2024-11-25

## 2024-11-11 RX ORDER — EPHEDRINE SULFATE 50 MG/ML
INJECTION, SOLUTION INTRAVENOUS
Status: DISCONTINUED | OUTPATIENT
Start: 2024-11-11 | End: 2024-11-11

## 2024-11-11 RX ORDER — TOBRAMYCIN 40 MG/ML
480 INJECTION INTRAMUSCULAR; INTRAVENOUS ONCE
Status: DISCONTINUED | OUTPATIENT
Start: 2024-11-11 | End: 2024-11-13

## 2024-11-11 RX ORDER — TRANEXAMIC ACID 10 MG/ML
1000 INJECTION, SOLUTION INTRAVENOUS ONCE
Status: DISCONTINUED | OUTPATIENT
Start: 2024-11-11 | End: 2024-11-11 | Stop reason: HOSPADM

## 2024-11-11 RX ORDER — IBUPROFEN 200 MG
16 TABLET ORAL
Status: DISCONTINUED | OUTPATIENT
Start: 2024-11-11 | End: 2024-11-16 | Stop reason: HOSPADM

## 2024-11-11 RX ORDER — ONDANSETRON HYDROCHLORIDE 2 MG/ML
4 INJECTION, SOLUTION INTRAVENOUS EVERY 12 HOURS PRN
Status: DISCONTINUED | OUTPATIENT
Start: 2024-11-11 | End: 2024-11-16 | Stop reason: HOSPADM

## 2024-11-11 RX ORDER — CEFAZOLIN 2 G/1
2 INJECTION, POWDER, FOR SOLUTION INTRAMUSCULAR; INTRAVENOUS EVERY 8 HOURS
Status: DISCONTINUED | OUTPATIENT
Start: 2024-11-11 | End: 2024-11-16 | Stop reason: HOSPADM

## 2024-11-11 RX ORDER — IPRATROPIUM BROMIDE AND ALBUTEROL SULFATE 2.5; .5 MG/3ML; MG/3ML
3 SOLUTION RESPIRATORY (INHALATION) EVERY 4 HOURS PRN
Status: DISCONTINUED | OUTPATIENT
Start: 2024-11-11 | End: 2024-11-11 | Stop reason: HOSPADM

## 2024-11-11 RX ORDER — GLUCAGON 1 MG
1 KIT INJECTION
Status: DISCONTINUED | OUTPATIENT
Start: 2024-11-11 | End: 2024-11-16 | Stop reason: HOSPADM

## 2024-11-11 RX ORDER — PREGABALIN 75 MG/1
75 CAPSULE ORAL 2 TIMES DAILY
Status: DISCONTINUED | OUTPATIENT
Start: 2024-11-11 | End: 2024-11-16 | Stop reason: HOSPADM

## 2024-11-11 RX ORDER — FENTANYL CITRATE 50 UG/ML
INJECTION, SOLUTION INTRAMUSCULAR; INTRAVENOUS
Status: DISCONTINUED | OUTPATIENT
Start: 2024-11-11 | End: 2024-11-11

## 2024-11-11 RX ORDER — PROMETHAZINE HYDROCHLORIDE 25 MG/ML
6.25 INJECTION, SOLUTION INTRAMUSCULAR; INTRAVENOUS EVERY 6 HOURS PRN
Status: DISCONTINUED | OUTPATIENT
Start: 2024-11-11 | End: 2024-11-16 | Stop reason: HOSPADM

## 2024-11-11 RX ORDER — FENTANYL CITRATE 50 UG/ML
25 INJECTION, SOLUTION INTRAMUSCULAR; INTRAVENOUS EVERY 5 MIN PRN
Status: DISCONTINUED | OUTPATIENT
Start: 2024-11-11 | End: 2024-11-11 | Stop reason: HOSPADM

## 2024-11-11 RX ORDER — ACETAMINOPHEN 500 MG
1000 TABLET ORAL ONCE
Status: DISCONTINUED | OUTPATIENT
Start: 2024-11-11 | End: 2024-11-11

## 2024-11-11 RX ORDER — MIDAZOLAM HYDROCHLORIDE 1 MG/ML
INJECTION INTRAMUSCULAR; INTRAVENOUS
Status: DISCONTINUED | OUTPATIENT
Start: 2024-11-11 | End: 2024-11-11

## 2024-11-11 RX ORDER — FAMOTIDINE 20 MG/1
20 TABLET, FILM COATED ORAL 2 TIMES DAILY
Qty: 84 TABLET | Refills: 0 | Status: SHIPPED | OUTPATIENT
Start: 2024-11-11 | End: 2024-12-23

## 2024-11-11 RX ORDER — ZOLPIDEM TARTRATE 5 MG/1
10 TABLET ORAL NIGHTLY PRN
Status: DISCONTINUED | OUTPATIENT
Start: 2024-11-11 | End: 2024-11-16 | Stop reason: HOSPADM

## 2024-11-11 RX ORDER — CEFAZOLIN 2 G/1
2 INJECTION, POWDER, FOR SOLUTION INTRAMUSCULAR; INTRAVENOUS ONCE
Status: DISCONTINUED | OUTPATIENT
Start: 2024-11-11 | End: 2024-11-11 | Stop reason: HOSPADM

## 2024-11-11 RX ORDER — FAMOTIDINE 20 MG/1
20 TABLET, FILM COATED ORAL 2 TIMES DAILY
Status: DISCONTINUED | OUTPATIENT
Start: 2024-11-11 | End: 2024-11-16 | Stop reason: HOSPADM

## 2024-11-11 RX ORDER — OXYCODONE HYDROCHLORIDE 5 MG/1
5 TABLET ORAL
Status: DISCONTINUED | OUTPATIENT
Start: 2024-11-11 | End: 2024-11-11 | Stop reason: HOSPADM

## 2024-11-11 RX ORDER — TRANEXAMIC ACID 650 MG/1
650 TABLET ORAL 2 TIMES DAILY
Qty: 28 TABLET | Refills: 0 | Status: SHIPPED | OUTPATIENT
Start: 2024-11-11 | End: 2024-11-25

## 2024-11-11 RX ORDER — CEFAZOLIN SODIUM 1 G/3ML
INJECTION, POWDER, FOR SOLUTION INTRAMUSCULAR; INTRAVENOUS
Status: DISCONTINUED | OUTPATIENT
Start: 2024-11-11 | End: 2024-11-11

## 2024-11-11 RX ORDER — DICLOFENAC SODIUM 75 MG/1
75 TABLET, DELAYED RELEASE ORAL 2 TIMES DAILY
Qty: 84 TABLET | Refills: 0 | Status: SHIPPED | OUTPATIENT
Start: 2024-11-11 | End: 2024-12-23

## 2024-11-11 RX ORDER — TRANEXAMIC ACID 10 MG/ML
1000 INJECTION, SOLUTION INTRAVENOUS EVERY 6 HOURS
Status: COMPLETED | OUTPATIENT
Start: 2024-11-11 | End: 2024-11-12

## 2024-11-11 RX ORDER — CEPHALEXIN 500 MG/1
500 CAPSULE ORAL EVERY 6 HOURS
Qty: 4 CAPSULE | Refills: 0 | Status: SHIPPED | OUTPATIENT
Start: 2024-11-11 | End: 2024-11-16

## 2024-11-11 RX ORDER — PREGABALIN 75 MG/1
150 CAPSULE ORAL ONCE
Status: COMPLETED | OUTPATIENT
Start: 2024-11-11 | End: 2024-11-11

## 2024-11-11 RX ORDER — HYDROMORPHONE HYDROCHLORIDE 2 MG/ML
0.5 INJECTION, SOLUTION INTRAMUSCULAR; INTRAVENOUS; SUBCUTANEOUS EVERY 10 MIN PRN
Status: DISCONTINUED | OUTPATIENT
Start: 2024-11-11 | End: 2024-11-11 | Stop reason: HOSPADM

## 2024-11-11 RX ORDER — ASPIRIN 81 MG/1
81 TABLET ORAL 2 TIMES DAILY
Qty: 84 TABLET | Refills: 0 | Status: SHIPPED | OUTPATIENT
Start: 2024-11-11 | End: 2024-12-23

## 2024-11-11 RX ORDER — ROCURONIUM BROMIDE 10 MG/ML
INJECTION, SOLUTION INTRAVENOUS
Status: DISCONTINUED | OUTPATIENT
Start: 2024-11-11 | End: 2024-11-11

## 2024-11-11 RX ORDER — TRANEXAMIC ACID 100 MG/ML
INJECTION, SOLUTION INTRAVENOUS
Status: DISCONTINUED | OUTPATIENT
Start: 2024-11-11 | End: 2024-11-11

## 2024-11-11 RX ADMIN — EPHEDRINE SULFATE 25 MG: 50 INJECTION, SOLUTION INTRAMUSCULAR; INTRAVENOUS; SUBCUTANEOUS at 01:11

## 2024-11-11 RX ADMIN — PREGABALIN 150 MG: 75 CAPSULE ORAL at 11:11

## 2024-11-11 RX ADMIN — ACETAMINOPHEN 1000 MG: 500 TABLET ORAL at 11:11

## 2024-11-11 RX ADMIN — PHENYLEPHRINE HYDROCHLORIDE 100 MCG: 10 INJECTION INTRAVENOUS at 03:11

## 2024-11-11 RX ADMIN — TRANEXAMIC ACID 1000 MG: 10 INJECTION, SOLUTION INTRAVENOUS at 05:11

## 2024-11-11 RX ADMIN — PROPOFOL 180 MG: 10 INJECTION, EMULSION INTRAVENOUS at 12:11

## 2024-11-11 RX ADMIN — ONDANSETRON 4 MG: 2 INJECTION, SOLUTION INTRAMUSCULAR; INTRAVENOUS at 04:11

## 2024-11-11 RX ADMIN — HYDROMORPHONE HYDROCHLORIDE 0.5 MG: 2 INJECTION INTRAMUSCULAR; INTRAVENOUS; SUBCUTANEOUS at 01:11

## 2024-11-11 RX ADMIN — ZOLPIDEM TARTRATE 10 MG: 5 TABLET, COATED ORAL at 09:11

## 2024-11-11 RX ADMIN — CEFAZOLIN 2 G: 330 INJECTION, POWDER, FOR SOLUTION INTRAMUSCULAR; INTRAVENOUS at 12:11

## 2024-11-11 RX ADMIN — EPHEDRINE SULFATE 5 MG: 50 INJECTION, SOLUTION INTRAVENOUS at 01:11

## 2024-11-11 RX ADMIN — GLYCOPYRROLATE 0.1 MG: 0.2 INJECTION, SOLUTION INTRAMUSCULAR; INTRAVITREAL at 01:11

## 2024-11-11 RX ADMIN — FAMOTIDINE 20 MG: 20 TABLET ORAL at 08:11

## 2024-11-11 RX ADMIN — MIDAZOLAM HYDROCHLORIDE 2 MG: 1 INJECTION, SOLUTION INTRAMUSCULAR; INTRAVENOUS at 12:11

## 2024-11-11 RX ADMIN — SENNOSIDES AND DOCUSATE SODIUM 1 TABLET: 8.6; 5 TABLET ORAL at 08:11

## 2024-11-11 RX ADMIN — ROCURONIUM BROMIDE 50 MG: 10 INJECTION, SOLUTION INTRAVENOUS at 12:11

## 2024-11-11 RX ADMIN — CELECOXIB 200 MG: 100 CAPSULE ORAL at 08:11

## 2024-11-11 RX ADMIN — CEFAZOLIN 2 G: 330 INJECTION, POWDER, FOR SOLUTION INTRAMUSCULAR; INTRAVENOUS at 04:11

## 2024-11-11 RX ADMIN — PHENYLEPHRINE HYDROCHLORIDE 100 MCG: 10 INJECTION INTRAVENOUS at 04:11

## 2024-11-11 RX ADMIN — CELECOXIB 400 MG: 100 CAPSULE ORAL at 11:11

## 2024-11-11 RX ADMIN — ONDANSETRON 4 MG: 2 INJECTION, SOLUTION INTRAMUSCULAR; INTRAVENOUS at 12:11

## 2024-11-11 RX ADMIN — TRANEXAMIC ACID 1000 MG: 100 INJECTION, SOLUTION INTRAVENOUS at 04:11

## 2024-11-11 RX ADMIN — INSULIN ASPART 4 UNITS: 100 INJECTION, SOLUTION INTRAVENOUS; SUBCUTANEOUS at 09:11

## 2024-11-11 RX ADMIN — ACETAMINOPHEN 650 MG: 325 TABLET ORAL at 06:11

## 2024-11-11 RX ADMIN — SUGAMMADEX 200 MG: 100 INJECTION, SOLUTION INTRAVENOUS at 04:11

## 2024-11-11 RX ADMIN — PHENYLEPHRINE HYDROCHLORIDE 200 MCG: 10 INJECTION INTRAVENOUS at 04:11

## 2024-11-11 RX ADMIN — PREGABALIN 75 MG: 75 CAPSULE ORAL at 08:11

## 2024-11-11 RX ADMIN — BUPIVACAINE HYDROCHLORIDE 10 ML: 2.5 INJECTION, SOLUTION EPIDURAL; INFILTRATION; INTRACAUDAL; PERINEURAL at 05:11

## 2024-11-11 RX ADMIN — GLYCOPYRROLATE 0.1 MG: 0.2 INJECTION, SOLUTION INTRAMUSCULAR; INTRAVITREAL at 12:11

## 2024-11-11 RX ADMIN — PHENYLEPHRINE HYDROCHLORIDE 100 MCG: 10 INJECTION INTRAVENOUS at 02:11

## 2024-11-11 RX ADMIN — BUPIVACAINE 10 ML: 13.3 INJECTION, SUSPENSION, LIPOSOMAL INFILTRATION at 05:11

## 2024-11-11 RX ADMIN — PHENYLEPHRINE HYDROCHLORIDE 0.2 MCG/KG/MIN: 10 INJECTION INTRAVENOUS at 12:11

## 2024-11-11 RX ADMIN — FENTANYL CITRATE 100 MCG: 50 INJECTION INTRAMUSCULAR; INTRAVENOUS at 12:11

## 2024-11-11 RX ADMIN — SODIUM CHLORIDE, SODIUM LACTATE, POTASSIUM CHLORIDE, AND CALCIUM CHLORIDE: .6; .31; .03; .02 INJECTION, SOLUTION INTRAVENOUS at 02:11

## 2024-11-11 RX ADMIN — DEXAMETHASONE SODIUM PHOSPHATE 10 MG: 4 INJECTION, SOLUTION INTRAMUSCULAR; INTRAVENOUS at 12:11

## 2024-11-11 RX ADMIN — ASPIRIN 81 MG CHEWABLE TABLET 81 MG: 81 TABLET CHEWABLE at 08:11

## 2024-11-11 RX ADMIN — EPHEDRINE SULFATE 10 MG: 50 INJECTION, SOLUTION INTRAVENOUS at 12:11

## 2024-11-11 RX ADMIN — TRANEXAMIC ACID 1950 MG: 650 TABLET ORAL at 11:11

## 2024-11-11 RX ADMIN — SODIUM CHLORIDE, SODIUM LACTATE, POTASSIUM CHLORIDE, AND CALCIUM CHLORIDE: .6; .31; .03; .02 INJECTION, SOLUTION INTRAVENOUS at 11:11

## 2024-11-11 RX ADMIN — PHENYLEPHRINE HYDROCHLORIDE 100 MCG: 10 INJECTION INTRAVENOUS at 12:11

## 2024-11-11 RX ADMIN — LIDOCAINE HYDROCHLORIDE 100 MG: 20 INJECTION, SOLUTION INTRAVENOUS at 12:11

## 2024-11-11 RX ADMIN — HYDROMORPHONE HYDROCHLORIDE 0.1 MG: 2 INJECTION INTRAMUSCULAR; INTRAVENOUS; SUBCUTANEOUS at 01:11

## 2024-11-11 RX ADMIN — CEFAZOLIN 2 G: 2 INJECTION, POWDER, FOR SOLUTION INTRAMUSCULAR; INTRAVENOUS at 09:11

## 2024-11-11 RX ADMIN — HYDROMORPHONE HYDROCHLORIDE 0.4 MG: 2 INJECTION INTRAMUSCULAR; INTRAVENOUS; SUBCUTANEOUS at 01:11

## 2024-11-11 RX ADMIN — EPHEDRINE SULFATE 10 MG: 50 INJECTION, SOLUTION INTRAVENOUS at 01:11

## 2024-11-11 NOTE — ANESTHESIA PROCEDURE NOTES
Intubation    Date/Time: 11/11/2024 12:37 PM    Performed by: Marta Lyman CRNA  Authorized by: Adelso Antunez MD    Intubation:     Induction:  Intravenous    Intubated:  Postinduction    Mask Ventilation:  Easy with oral airway    Attempts:  1    Attempted By:  CRNA    Method of Intubation:  Video laryngoscopy    Blade:  Bowling 3    Laryngeal View Grade: Grade I - full view of cords      Difficult Airway Encountered?: No      Complications:  None    Airway Device:  Oral endotracheal tube    Airway Device Size:  7.5    Style/Cuff Inflation:  Cuffed (inflated to minimal occlusive pressure)    Tube secured:  22    Secured at:  The lips    Placement Verified By:  Capnometry    Complicating Factors:  None    Findings Post-Intubation:  BS equal bilateral and atraumatic/condition of teeth unchanged

## 2024-11-11 NOTE — DISCHARGE INSTRUCTIONS
Post Op Total Knee Arthroplasty Instructions     1. Enteric coated aspirin 81 mg by mouth twice a day for 6 weeks to prevent blood clots,  unless otherwise indicated.  2. Please take a stomach reflux medication such as pepcid, prevacid, nexium (H-2 blocker or PPI) while on aspirin to prevent stomach ulcers. You will be given a prescription for pepcid.  3. Casimiro stockings should be worn as much as possible for 6 weeks to prevent blood clots.  4. Do not start antibiotics for any suspected infections related to the surgery until evaluated by dr powell staff  5. No driving for approximately 2-4 weeks  6. You can shower once the incision is completely dry, otherwise place a new dry dressing twice a day if there is drainage. Please call the office if the drainage increases after discharge.  7. You may resume all pre-surgery medications unless otherwise indicated  8. All patients should be seen in Dr Powell office approx 2 weeks after surgery  9. Dr Menezes prefers outpatient physical therapy upon discharge home. If home PT is needed, please contact Dr Menezes for approval  10. Patients should see their primary care doctor after discharge home

## 2024-11-11 NOTE — TRANSFER OF CARE
Anesthesia Transfer of Care Note    Patient: Venancio Barragan    Procedure(s) Performed: Procedure(s) (LRB):  REVISION, ARTHROPLASTY, KNEE (Left)    Patient location: PACU    Anesthesia Type: general    Transport from OR: Transported from OR on 6-10 L/min O2 by face mask with adequate spontaneous ventilation    Post pain: adequate analgesia    Post assessment: no apparent anesthetic complications and tolerated procedure well    Post vital signs: stable    Level of consciousness: awake and alert    Nausea/Vomiting: no nausea/vomiting    Complications: none    Transfer of care protocol was followed    Last vitals: Visit Vitals  BP (!) 91/47   Pulse 104   Temp 36.3 °C (97.3 °F) (Skin)   Resp 15   SpO2 97%

## 2024-11-11 NOTE — H&P
Subjective:      Patient ID: Venancio Barragan is a 59 y.o. male.    Chief Complaint: No chief complaint on file.    Save for follow-up after three-phase bone scan and CT scan of the left knee.  CT scan shows internal rotation of both the femoral and tibial components.  Bone scan also shows significant uptake at well over a year from index surgery.  Patient complains of continued pain and stiffness and would like to have revision knee replacement.      Social History     Occupational History    Not on file   Tobacco Use    Smoking status: Never    Smokeless tobacco: Never   Substance and Sexual Activity    Alcohol use: No    Drug use: No    Sexual activity: Yes     Partners: Female      Social Drivers of Health     Tobacco Use: Low Risk  (11/11/2024)    Patient History     Smoking Tobacco Use: Never     Smokeless Tobacco Use: Never     Passive Exposure: Not on file   Alcohol Use: Not At Risk (10/27/2024)    Received from [employer] [facility] and Its Subsidiaries and Affiliates    AUDIT-C     Frequency of Alcohol Consumption: Never     Average Number of Drinks: Patient does not drink     Frequency of Binge Drinking: Never   Financial Resource Strain: Medium Risk (10/27/2024)    Received from [employer] [facility] and Its Subsidiaries and Affiliates    Overall Financial Resource Strain (CARDIA)     Difficulty of Paying Living Expenses: Somewhat hard   Food Insecurity: No Food Insecurity (10/27/2024)    Received from [employer] [facility] and Its Subsidiaries and Affiliates    Hunger Vital Sign     Worried About Running Out of Food in the Last Year: Never true     Ran Out of Food in the Last Year: Never true   Transportation Needs: No Transportation Needs (10/27/2024)    Received from [employer] [facility] and Its Subsidiaries and Affiliates    PRAPARE - Transportation     Lack of Transportation (Medical): No      Lack of Transportation (Non-Medical): No   Physical Activity: Inactive (10/27/2024)    [facility]    Exercise Vital Sign     Days of Exercise per Week: 0 days     Minutes of Exercise per Session: 0 min   Stress: No Stress Concern Present (10/27/2024)    [facility]    [employer] [place] [employer] - Occupational Stress Questionnaire     Feeling of Stress : Only a little   Recent Concern: Stress - Stress Concern Present (10/14/2024)    [facility]    [employer] [place] [employer] - Occupational Stress Questionnaire     Feeling of Stress : Very much   Housing Stability: Low Risk  (10/27/2024)    [facility]    Housing Stability Vital Sign     Unable to Pay for Housing in the Last Year: No     Number of Times Moved in the Last Year: 0     Homeless in the Last Year: No   Depression: Not at risk (10/14/2024)    [facility]    PHQ-2     PHQ-2 Score: 0   Utilities: Not At Risk (10/27/2024)    [facility]    [facility] Utilities     Threatened with loss of utilities: No   Health Literacy: Adequate Health Literacy (10/27/2024)    [facility]     Health Literacy     Frequency of need for help with medical instructions: Never   Recent Concern: Health Literacy - Inadequate Health Literacy (10/1/2024)     Health Literacy     Frequency of need for help with medical instructions: Often   Social  Isolation: Not on file      Review of Systems   Constitutional: Negative for diaphoresis.   HENT:  Negative for ear discharge, nosebleeds and stridor.    Eyes:  Negative for photophobia.   Cardiovascular:  Negative for syncope.   Respiratory:  Negative for hemoptysis, shortness of breath and wheezing.    Neurological:  Negative for tremors.   Psychiatric/Behavioral: Negative.           Objective:    Ortho/SPM Exam       Assessment:       1. Chronic pain of left knee    2. Pain due to total left knee replacement, subsequent encounter    3. Loosening of prosthesis of left total knee replacement, initial encounter    4. Quadriceps weakness    5. DM type 2 with diabetic mixed hyperlipidemia          Plan:   The patient has failed non operative management and continued pain and swelling. The natural history of a failed replacement was discussed at length and nonoperative and operative treatment was reviewed. Due to the pain and associated disability, I recommend left revision total knee replacement.  The risks, benefits, convalescence, and alternatives were reviewed.  Numerous questions were asked and answered.  Models were used as an education tool.  Surgery will be scheduled at a convenient time.  The discussion with the patient included a description of a revision total knee replacement.  A revision total knee replacement (TKR) removing the old implants and resurfacing of all three surfaces-the patella, femur, and tibia, with metal and plastic parts as appropriate. The prosthetic parts are usually cemented into position wuith metal rods in the middle of the bone and will allow a patient a full range of motion from. The postoperative motion, however, is determined by multiple factors, the most important of which is preoperative motion. In general, the better the motion preoperatively, the better the motion postoperatively.  In younger patients  I will generally leave the patella unresurfaced if possible, in an effort  to preserve bone stock for any future revision surgeries. In those patients we discuss the risk of anterior knee pain in a small subset of patients (5-10%) with an unresurfaced patella. The operation, pending medical clearance, generally requires a hospitalization of 0-3 days for one knee (three-four days for a bilateral replacement). In general, we prefer to perform the procedure under epidural/spinal anesthesia.   Postoperatively, the patient is  walking the day after surgery and may be later in the week  if stable with a walker or cane.  The first couple of days are very painful and the pain medication will alleviate but not eliminate the pain.  Thus, the patient must really push hard to get the range of motion.   The cane is to be dispensed with once the patient is secure enough.  In general, there is no cast or brace required with a routine revision knee replacement. In the long term, we do not encourage our patients to run for the sake of running; although, pending their preoperative status, we often allow patients to play doubles tennis or comparable activities.  We also allow them to do gentle intermediate downhill skiing if they are truly an expert skier.  Biking is encouraged as well as swimming.  The follow-up periods are usually at three weeks, 3 months, six months, and yearly intervals.  Potential complications with total knee replacements include fracture, damage to ligaments and tendons. infection (less than 2%), which is much higher in patients with obesity, diabetes, or other conditions which adversely affect the immune system is also a mjor complication.  (Patients with a previous history of osteomyelitis can have infection rates as high as 15%).  By nerve damage we mean a peroneal nerve palsy with a foot drop (a flapping foot with ambulation).  This is particularly more apt to occur with a bad valgus (knock knee) deformity.  The incidence can be quite high in this particular patient population.   There are always areas of skin numbness, but this is not an untoward effect, nor do we consider it a complication.  Other potential complications include dislocation of the patellar component (less than 2%).  The loosening of either the tibial or femoral component is much more infrequent.  It usually occurs with infection or long-term use in a patient population that is at extreme risk (e.g., markedly overweight and not conscientious about their exercises).  Major blood vessel damage is also extremely rare.  Theoretically, because of the anatomic proximity of the popliteal artery, it could be lacerated with subsequent repairs required.  Albeit unlikely, a disruption of the popliteal artery could theoretically result in an amputation.  Similarly, infection could theoretically result in an amputation if one were to grow out an organism that cannot be controlled with antibiotics.  General medical complications include phlebitis for which we prophylactically anticoagulate, but it could still occur and fatal pulmonary embolus has been reported.  Cardiovascular problems, such as a heart attack or ischemia, are always a concern with such hemodynamic changes in the blood vascular system.  Other general complications are very rare but in medicine anything could theoretically happen. We also discussed possibly performing a pre-operative peripheral sensory block of the bracnhes of the AFCN and ISN of the same knee using iovera to help with pain control post surgery. This is a relatively new technology with early data demonstrating significant pain improvement and functional recovery. However the science defining all the associated risks is still developing. I think the patient understands the risk benefit ratio of a revision total knee replacement and the iovera treatment and would like to pursue the total knee replacement and iovera treatment. we will begin the pre-operative process. Additionally, this case will undergo peer  review for appropriateness of care during our departmental weekly indications conference prior to surgery. We will also use the [employer] NMES device with garment prescribed for disuse atrophy as part of rehabilitation program to restore strength. Due to the large surface area and frequency of treatments, it is not feasible to use conventional electrodes, requiring the use of a conductive garment. This device is being used in conjunction with physical therapy. we will begin the pre-operative process.

## 2024-11-11 NOTE — PLAN OF CARE
1720- Time out performed for bedside nerve block procedure with RN,MD. Left femoral nerve block left knee. See flowsheets for VS    1721- Start    1729- End. See flowsheets for VS

## 2024-11-11 NOTE — OP NOTE
11/11/2024    Pre op dx:  Painful and stiff left total knee replacement    Post op dx:  Same    Procedure:  Left knee synovectomy anterior and posterior compartments, excisional debridement muscle bone and soft tissues, removal prior total knee replacement, revision total knee replacement, placement drug delivery device (calcium phosphate beads)    Attending Surgeon: Bony Menezes MD    Assistants: dr almanza and kuldeep    Complications: none    Estimated bood loss: < 20cc    Implants:  DePuy revision posterior femoral augment 4 mm x2, DePuy attune size 8 left femoral revision component, DePuy attune tibial base plate size 8, DePuy attune 7 mm rotating platform polyethylene (PS), antibiotics bone cement x3, deployed revision distal femoral augment 4 mm x 2, deployed press fit stem 16 mm x 110 mm (femur), DePuy revision Press-Fit stem 18 mm by 60 mm (tibia) DePuy attune revision tibial sleeve, calcium phosphate beads impregnated with antibiotics, cellerate,     Indication:  This is a 59-year-old gentleman with painful stiff left total knee replacement who underwent total knee replacement after tibial plateau fracture in mid 2023.  He had seen multiple surgeons in consultation for treatment options.  We had a very long discussion that revising a painful stiff total knee replacement may not improve his range of motion but may potentially improve his pain.  Three-phase bone scan showed significant uptake around the left total knee replacement at greater than 1 year.  CT scan of the implants showed some slight internal rotation of the femoral component.  The index surgeon was hesitant to offer revision knee replacement given the fact that improving range of motion would be limited.  Again we had a very long conversation about his expectations after this revision and he felt that the potential benefit of improved range of motion pain relief outweighed the risks.    Findings:  There was no gross loosening of either the femoral  or tibial components.  No evidence of infection grossly.  Specimens were sent for culture.    Procedure:  Patient was brought to operating room and placed supine operative table.  General anesthesia was started without any difficulties.  Spinal anesthesia was attempted however failed.  Giordano catheter was then placed.  Interosseous needle was drilled in the proximal tibia and vancomycin attempted to be injected into the proximal tibia however this was not flowing freely.  Likely scar tissue and cement from the previous surgery was blocking the administration of the vancomycin.  The interosseous cannula was then manually removed. the left knee was then prepped draped in sterile fashion.  Prior to incision proper sent procedures well as antibiotic administration was verified.  Previous anterior incision was utilized which curved laterally at the level of the patella.  Skin subcutaneous scar tissue and deep fascia was sharply incised until we came to extensor mechanism retinaculum.  Flap was then elevated medially to the medial border patella.  Synovial fluid was then aspirated and sent for cell count and cultures.  Medial parapatellar arthrotomy was then created exposed using anterior compartment of the femur.  We exposed the undersurface extensor mechanism and performed synovectomy of the anterior compartment both medially and laterally.  Synovium was sent for cultures.  Then created recreated our medial and lateral gutters and excised the scar tissue behind the patellar tendon fat pad.  We then elevated a sleeve of soft tissue of the proximal medial tibia.  Hohmann retractors were then placed medial and laterally.  This allowed us to expose the total knee replacement as well as the tibial insert.  Using a skinny osteotome we dislodged the locking mechanism between the tibial base plate and the tibial polyethylene and the polyethylene was removed.  Next we turned our attention to distal femur.  We removed the scar  tissue along the implant cement bone interface.  Using Bovie cautery we then disrupted the cement interface.  Then using a hydraulic osteotome system we then disrupted the prosthesis cement interface from a medial to lateral direction.  Next we performed the same maneuvers laterally.  Again we debrided the lateral border of the implant and then disrupted the cement prosthesis interface using Bovie cautery and the hydraulic osteotome.  We then attached the marcin femoral extractor to the femoral component and were easily able to extract the femoral component off the distal femur.  Next we placed the knee in flexion with lamina spreaders and exposed the posterior capsule.  Using Bovie cautery we then performed a synovectomy of the posterior capsule and tissue sent for culture.  We then exposed the proximal tibia by externally rotating the foot and skeletonize the proximal medial tibia and this of the lateral gutter and lateral tibia as possible.  Again using Bovie cautery and hydraulic osteotome we then this with the tibial prosthesis cement interface.  We then subluxed tibia forward and we are able to extract the tibial base plate using a bone tamp on the undersurface of the base plate.  Once the implant was removed there was very minimal bone loss on the implant visualized.  We then debrided the surface of the tibia and all excess cement was removed as well as from the tibial metaphysis.  We then sequentially reamed up to a size 18 mm Reamer.  We then placed tibial broach with stem and broached up to a size 37 sleeve.  With this sleeve we had a nice rotational stability and it was fairly flush with the proximal tibia.  We then sized the proximal tibia for a size 8 base plate.  We then performed a clean-up cut off the top of the sleeve.  Size 8 tibial base plate trial was then placed onto the knee sleeve stem construct.  Next we turned our attention to the femur and identified the medullary canal of the femur.  We  then sequentially reamed up to a size 24 Reamer for short stem.  We then debrided the surface of the femur to remove all loose debris using rongeurs and curettes.  We then placed in distal femoral cutting guide over the Reamer and pinned this in place.  A 1 or 2 mm clean-up cut was then performed both medially and laterally.  Reamer was then removed and the AP cutting block with stem construct was then pinned onto the distal femur such that it was parallel to the tibial base plate.  Posterior condyle showed 4 mm of augment would be needed.  No defect along the anterior femur.  Posterior and anterior chamfer cuts were then created as well.  Stem was then removed and box cut then created for the femur.  Once all cuts were done we then placed the femoral trial.  While placing the trial we did see a nondisplaced fracture along the anterior cortex of the femur.  We then removed the trial and reamed up to 110 mm stem by 18 mm.  We verified radiographically under fluoro that this was more than 2 cortices past the anterior cortical fracture that we had seen.  We then placed the femoral trial with a longer stem and trial polyethylene.  Knee came out to full extension we had nice stability with varus and valgus stress both in flexion and extension.  Patella tracked centrally within trochlear groove.  We then removed all trial components and assembled the final implant construct.  We placed laminar spreaders with the knee in extension to achieve hemostasis.  There was no significant active bleeding.  We then inflated the tourniquet debrided the bony surfaces with pulse lavage surgical irrigant.  We then cemented the tibial component on 1st using a surface cementation technique and tibial component was then directly impacted into place and all excess bony cement removed from the periphery.  Next again using surface cementation we then impacted the femoral component into place.  All excess bony cement was removed from the  periphery as well.  We then placed the trial polyethylene the paused for this to allow for the cement to harden.  Once the cement was hardened we then let the tourniquet down and placed the final 7 mm polyethylene.  We then reexamined our gaps stability and tracking all of which remained unchanged.  Given his age I elected to use a standard PS knee because we had nice collateral integrity and symmetry both in flexion and extension.  We copiously irrigated the knee with surgical irrigant as well as Betadine saline.  Hemovac drain was placed exiting anterolaterally on the along the proximal aspect of the incision.  Arthrotomy was closed with running barbed suture.  Arthrotomy was treated with the hydrolyzed collagen powder.  Deep fascial layer was closed with simple interrupted 1. Vicryl sutures.  Subcutaneous skin was closed with 2-0 Vicryl and hydrolyzed collagen placed again.  Skin was closed with staples and Dermabond and dressed with sterile waterproof dressing.  Knee was then placed in a hinged range-of-motion knee brace locked in extension.  Patient was then extubated by anesthesia and transferred to recovery room bed stable condition.

## 2024-11-11 NOTE — BRIEF OP NOTE
[facility] - Periop Services  Brief Operative Note    Surgery Date:  11/11/2024    Surgeon(s) and Role:     Bony Menezes MD - Primary    Assisting Surgeon: MD Lei Rogel MD    Pre-op Diagnosis:   * Postoperative stiffness of total knee replacement, subsequent encounter [T84.89XD, M25.669, Z96.659]     * Chronic pain of left knee [M25.562, G89.29]    Post-op Diagnosis:    Post-Op Diagnosis Codes:     * Postoperative stiffness of total knee replacement, subsequent encounter [T84.89XD, M25.669, Z96.659]     * Chronic pain of left knee [M25.562, G89.29]    Procedure(s) (LRB):  REVISION, ARTHROPLASTY, KNEE (Left)    Anesthesia: General    Estimated Blood Loss: 200cc         Specimens:   Specimen (24h ago, onward)       Start     Ordered    11/11/24 1452  Specimen to Pathology, Surgery Orthopedics  Once        Comments: Pre-op Diagnosis: Postoperative stiffness of total knee replacement, subsequent encounter [T84.89XD, M25.669, Z96.659]Chronic pain of left knee [M25.562, G89.29]Procedure(s):REVISION, ARTHROPLASTY, KNEE Number of specimens: 1Name of specimens: Old Implant - Gross ID Only     References:    Click here for ordering Quick Tip   Question Answer Comment   Procedure Type: Orthopedics    Release to patient Immediate        11/11/24 1453                  Postop Plan  WBAT LLE in HKB locked in extension  Plan to remove HKB and allow motion once drain is removed  Monitor drain output  Follow up intra-op cultures  IV ancef q8 until cultures result/ finalize  PT/OT  Postop films  Family medicine consulted for medial comanagement, appreciate help with medical care  CM for discharge planning    Raman Jane MD  [employer] [facility] PGY-3

## 2024-11-11 NOTE — ANESTHESIA PROCEDURE NOTES
CSE    Patient location during procedure: OR  Start time: 11/11/2024 12:10 PM  Timeout: 11/11/2024 12:05 PM  End time: 11/11/2024 12:34 PM      Staffing  Authorizing Provider: Adelso Antunez MD  Performing Provider: Adelso Antunez MD    Staffing  Performed by: Adelso Antunez MD  Authorized by: Adelso Antunez MD    Preanesthetic Checklist  Completed: patient identified, IV checked, site marked, risks and benefits discussed, surgical consent, monitors and equipment checked, pre-op evaluation and timeout performed  CSE  Patient position: sitting  Prep: ChloraPrep  Patient monitoring: heart rate, cardiac monitor and frequent blood pressure checks  Approach: midline  Spinal Needle  Needle type: Dennis   Needle gauge: 25 G  Needle length: 4 in  Epidural Needle  Injection technique: TONY saline  Needle type: Tuohy   Needle gauge: 17 G  Needle length: 3.5 in  Location: L4-5  Needle localization: anatomical landmarks   Catheter  Catheter type: none  Assessment  Intrathecal Medications:   administered: primary anesthetic mcg of    Additional Notes      Attempted CSE for analgesia during case.  Had palpable landmarks, but false loss at several levels and unable to achieve CSF.    Decision made to convert to general anesthesia.

## 2024-11-12 LAB
ANION GAP SERPL CALC-SCNC: 10 MMOL/L (ref 8–16)
BUN SERPL-MCNC: 24 MG/DL (ref 6–20)
CALCIUM SERPL-MCNC: 8.7 MG/DL (ref 8.7–10.5)
CHLORIDE SERPL-SCNC: 106 MMOL/L (ref 95–110)
CO2 SERPL-SCNC: 22 MMOL/L (ref 23–29)
CREAT SERPL-MCNC: 1.6 MG/DL (ref 0.5–1.4)
EST. GFR  (NO RACE VARIABLE): 49 ML/MIN/1.73 M^2
GLUCOSE SERPL-MCNC: 153 MG/DL (ref 70–110)
GRAM STN SPEC: NORMAL
POCT GLUCOSE: 119 MG/DL (ref 70–110)
POCT GLUCOSE: 144 MG/DL (ref 70–110)
POCT GLUCOSE: 246 MG/DL (ref 70–110)
POCT GLUCOSE: 248 MG/DL (ref 70–110)
POCT GLUCOSE: 271 MG/DL (ref 70–110)
POTASSIUM SERPL-SCNC: 4 MMOL/L (ref 3.5–5.1)
SODIUM SERPL-SCNC: 138 MMOL/L (ref 136–145)

## 2024-11-12 PROCEDURE — 25000003 PHARM REV CODE 250

## 2024-11-12 PROCEDURE — 63600175 PHARM REV CODE 636 W HCPCS

## 2024-11-12 PROCEDURE — 97530 THERAPEUTIC ACTIVITIES: CPT

## 2024-11-12 PROCEDURE — 36415 COLL VENOUS BLD VENIPUNCTURE: CPT | Performed by: ORTHOPAEDIC SURGERY

## 2024-11-12 PROCEDURE — 97535 SELF CARE MNGMENT TRAINING: CPT

## 2024-11-12 PROCEDURE — 80048 BASIC METABOLIC PNL TOTAL CA: CPT | Performed by: ORTHOPAEDIC SURGERY

## 2024-11-12 PROCEDURE — 99900035 HC TECH TIME PER 15 MIN (STAT)

## 2024-11-12 PROCEDURE — 94799 UNLISTED PULMONARY SVC/PX: CPT

## 2024-11-12 PROCEDURE — 11000001 HC ACUTE MED/SURG PRIVATE ROOM

## 2024-11-12 PROCEDURE — A4247 BETADINE/IODINE SWABS/WIPES: HCPCS

## 2024-11-12 PROCEDURE — 97165 OT EVAL LOW COMPLEX 30 MIN: CPT

## 2024-11-12 PROCEDURE — 94761 N-INVAS EAR/PLS OXIMETRY MLT: CPT

## 2024-11-12 PROCEDURE — 97116 GAIT TRAINING THERAPY: CPT

## 2024-11-12 PROCEDURE — 25000003 PHARM REV CODE 250: Performed by: ORTHOPAEDIC SURGERY

## 2024-11-12 PROCEDURE — 63600175 PHARM REV CODE 636 W HCPCS: Mod: JG | Performed by: ORTHOPAEDIC SURGERY

## 2024-11-12 PROCEDURE — 97161 PT EVAL LOW COMPLEX 20 MIN: CPT

## 2024-11-12 RX ORDER — BUPIVACAINE HYDROCHLORIDE 2.5 MG/ML
INJECTION, SOLUTION INFILTRATION; PERINEURAL
Status: DISCONTINUED | OUTPATIENT
Start: 2024-11-12 | End: 2024-11-12 | Stop reason: HOSPADM

## 2024-11-12 RX ORDER — POVIDONE-IODINE 7.5 MG/ML
SOLUTION TOPICAL
Status: DISCONTINUED | OUTPATIENT
Start: 2024-11-12 | End: 2024-11-12 | Stop reason: HOSPADM

## 2024-11-12 RX ORDER — METHYLENE BLUE 5 MG/ML
INJECTION INTRAVENOUS
Status: DISCONTINUED | OUTPATIENT
Start: 2024-11-12 | End: 2024-11-12 | Stop reason: HOSPADM

## 2024-11-12 RX ORDER — INSULIN GLARGINE 100 [IU]/ML
5 INJECTION, SOLUTION SUBCUTANEOUS DAILY
Status: DISCONTINUED | OUTPATIENT
Start: 2024-11-12 | End: 2024-11-13

## 2024-11-12 RX ADMIN — PREGABALIN 75 MG: 75 CAPSULE ORAL at 09:11

## 2024-11-12 RX ADMIN — SENNOSIDES AND DOCUSATE SODIUM 1 TABLET: 8.6; 5 TABLET ORAL at 08:11

## 2024-11-12 RX ADMIN — PREGABALIN 75 MG: 75 CAPSULE ORAL at 08:11

## 2024-11-12 RX ADMIN — CELECOXIB 200 MG: 100 CAPSULE ORAL at 08:11

## 2024-11-12 RX ADMIN — ATORVASTATIN CALCIUM 40 MG: 40 TABLET, FILM COATED ORAL at 08:11

## 2024-11-12 RX ADMIN — ACETAMINOPHEN 650 MG: 325 TABLET ORAL at 05:11

## 2024-11-12 RX ADMIN — ASPIRIN 81 MG CHEWABLE TABLET 81 MG: 81 TABLET CHEWABLE at 09:11

## 2024-11-12 RX ADMIN — CEFAZOLIN 2 G: 2 INJECTION, POWDER, FOR SOLUTION INTRAMUSCULAR; INTRAVENOUS at 09:11

## 2024-11-12 RX ADMIN — FAMOTIDINE 20 MG: 20 TABLET ORAL at 08:11

## 2024-11-12 RX ADMIN — INSULIN GLARGINE 5 UNITS: 100 INJECTION, SOLUTION SUBCUTANEOUS at 11:11

## 2024-11-12 RX ADMIN — ACETAMINOPHEN 650 MG: 325 TABLET ORAL at 12:11

## 2024-11-12 RX ADMIN — INSULIN ASPART 6 UNITS: 100 INJECTION, SOLUTION INTRAVENOUS; SUBCUTANEOUS at 05:11

## 2024-11-12 RX ADMIN — TRANEXAMIC ACID 1000 MG: 10 INJECTION, SOLUTION INTRAVENOUS at 12:11

## 2024-11-12 RX ADMIN — ZOLPIDEM TARTRATE 10 MG: 5 TABLET, COATED ORAL at 09:11

## 2024-11-12 RX ADMIN — CEFAZOLIN 2 G: 2 INJECTION, POWDER, FOR SOLUTION INTRAMUSCULAR; INTRAVENOUS at 02:11

## 2024-11-12 RX ADMIN — INSULIN ASPART 4 UNITS: 100 INJECTION, SOLUTION INTRAVENOUS; SUBCUTANEOUS at 04:11

## 2024-11-12 RX ADMIN — FAMOTIDINE 20 MG: 20 TABLET ORAL at 09:11

## 2024-11-12 RX ADMIN — CELECOXIB 200 MG: 100 CAPSULE ORAL at 09:11

## 2024-11-12 RX ADMIN — SENNOSIDES AND DOCUSATE SODIUM 1 TABLET: 8.6; 5 TABLET ORAL at 09:11

## 2024-11-12 RX ADMIN — ASPIRIN 81 MG CHEWABLE TABLET 81 MG: 81 TABLET CHEWABLE at 08:11

## 2024-11-12 RX ADMIN — CEFAZOLIN 2 G: 2 INJECTION, POWDER, FOR SOLUTION INTRAMUSCULAR; INTRAVENOUS at 05:11

## 2024-11-12 RX ADMIN — TRANEXAMIC ACID 1000 MG: 10 INJECTION, SOLUTION INTRAVENOUS at 05:11

## 2024-11-12 NOTE — NURSING
Giordano removed from urinary bladder without difficulty.  Due to void 1545.  O2 Sat 95% on room air, Oxygen disconnected.

## 2024-11-12 NOTE — SUBJECTIVE & OBJECTIVE
Interval History: NAEON,VSS.     Review of Systems   Constitutional:  Negative for activity change.   HENT: Negative.     Eyes: Negative.    Respiratory:  Negative for shortness of breath and wheezing.    Cardiovascular:  Negative for chest pain and palpitations.   Gastrointestinal:  Negative for abdominal pain.   Genitourinary:  Negative for dysuria.   Musculoskeletal:  Negative for arthralgias.   Neurological: Negative.        Objective:     Vital Signs (Most Recent):  Temp: 98.3 °F (36.8 °C) (11/12/24 1200)  Pulse: 75 (11/12/24 1248)  Resp: 18 (11/12/24 1248)  BP: 122/60 (11/12/24 1200)  SpO2: 99 % (11/12/24 1248) Vital Signs (24h Range):  Temp:  [97.3 °F (36.3 °C)-98.6 °F (37 °C)] 98.3 °F (36.8 °C)  Pulse:  [] 75  Resp:  [15-20] 18  SpO2:  [91 %-100 %] 99 %  BP: ()/(47-76) 122/60     Weight: 86 kg (189 lb 9.5 oz)  Body mass index is 31.55 kg/m².    Intake/Output Summary (Last 24 hours) at 11/12/2024 1537  Last data filed at 11/12/2024 1200  Gross per 24 hour   Intake 100 ml   Output 2055 ml   Net -1955 ml         Physical Exam  Constitutional:       Appearance: Normal appearance.   HENT:      Head: Normocephalic and atraumatic.      Nose: Nose normal.      Mouth/Throat:      Mouth: Mucous membranes are moist.   Cardiovascular:      Rate and Rhythm: Normal rate and regular rhythm.      Pulses: Normal pulses.      Heart sounds: Normal heart sounds. No murmur heard.     No friction rub. No gallop.   Pulmonary:      Effort: Pulmonary effort is normal. No respiratory distress.      Breath sounds: Normal breath sounds. No wheezing or rhonchi.   Abdominal:      General: Abdomen is flat.      Palpations: Abdomen is soft.   Musculoskeletal:      Left lower leg: Edema (Left LE with pulsesPT/DP  by doppler and dresing to knee W/D/I with decreassed sensation to the left lateral aspect of the knee) present.   Skin:     General: Skin is warm and dry.      Capillary Refill: Capillary refill takes less than 2  seconds.   Neurological:      General: No focal deficit present.      Mental Status: He is alert.             Significant Labs: All pertinent labs within the past 24 hours have been reviewed.    Significant Imaging: I have reviewed all pertinent imaging results/findings within the past 24 hours.

## 2024-11-12 NOTE — PT/OT/SLP EVAL
Occupational Therapy   Evaluation and treatment    Name: Venancio Barragan  MRN: [id number]  Admitting Diagnosis: S/P revision of total knee, left  Recent Surgery: Procedure(s) (LRB):  REVISION, ARTHROPLASTY, KNEE (Left) 1 Day Post-Op    Recommendations:     Discharge Recommendations: Low Intensity Therapy (OP PT)  Discharge Equipment Recommendations:  none  Barriers to discharge:  Other (Comment) (No acute OT barriers)    Assessment:     Venancio Barragan is a 59 y.o. male with a medical diagnosis of S/P revision of total knee, left.  He presents with The primary encounter diagnosis was Loosening of prosthesis of left total knee replacement, subsequent encounter. Diagnoses of Pain due to total left knee replacement, subsequent encounter and Arthritis of knee were also pertinent to this visit.  Performance deficits affecting function: weakness, impaired balance, gait instability, decreased ROM, orthopedic precautions, impaired functional mobility.      OT evaluation completed.On evaluation this date patient able to perform LB dressing with SBA and toilet transfer with rolling walker with SBA. Skilled acute OT to follow. Recommend low level intensity.     Rehab Prognosis: Good; patient would benefit from acute skilled OT services to address these deficits and reach maximum level of function.       Plan:     Patient to be seen 5 x/week to address the above listed problems via self-care/home management, therapeutic activities, therapeutic exercises  Plan of Care Expires: 12/10/24  Plan of Care Reviewed with: patient, spouse    Subjective     Chief Complaint: None stated  Patient/Family Comments/goals: To return home    Occupational Profile:  Living Environment: Pt lives with wife in trailer with 3STE with L handrail; Tub/shower ( garden shower) with SC.  Previous level of function: Patient with PLOF of independence with ADLs/IADLs/mobility  and with hx of 0 falls in the past 3 months.  Roles and Routines: Works as a    Equipment Used at Home: shower chair, walker, rolling  Assistance upon Discharge: Spouse    Pain/Comfort:  Pain Rating 1: 0/10    Objective:     Communicated with: Nurse prior to session.  Patient found up in chair with hemovac, SCD upon OT entry to room.    General Precautions: Standard, fall  Orthopedic Precautions: LLE weight bearing as tolerated (With hinged knee brace locked in extension)  Braces: Hinged knee brace (L knee)  Respiratory Status: Room air    Occupational Performance:      Functional Mobility/Transfers:  Patient completed Sit <> Stand Transfer with stand by assistance  with  rolling walker   Patient completed Toilet Transfer Step Transfer technique with stand by assistance with  rolling walker  Functional Mobility: In room mobility to/from toilet with SBA with rolling walker. Verbal cues for slowing down.     Activities of Daily Living:  Lower Body Dressing: stand by assistance Instructed on putting Left leg in first.   Toileting: Simulation only; no voiding     Cognitive/Visual Perceptual:  Cognitive/Psychosocial Skills:     -       Oriented to: Person, Place, Time, and Situation   -       Follows Commands/attention:Follows multistep  commands  -       Communication: clear/fluent  -       Safety awareness/insight to disability: intact     Physical Exam:  Skin integrity: LLE wrapped in ace bandage with hemovac intact ( hinge knee brace on)   Upper Extremity Range of Motion:     -       Right Upper Extremity: WFL  -       Left Upper Extremity: WFL  Upper Extremity Strength:    -       Right Upper Extremity: WFL  -       Left Upper Extremity: WFL   Strength:    -       Right Upper Extremity: WFL  -       Left Upper Extremity: WFL    AMPAC 6 Click ADL:  AMPAC Total Score: 21    Treatment & Education:  Patient educated on role of OT/ POC development.   Occupational profile developed.   Patient educated on positioning / elevating surgical LLE and use of ice pack with barrier.   Patient  guided to transition eob for assessment.  Initiated ADL / functional mobility training as above with instruction on slowing down when walking with walker.   Lower body dressing performed by donning surgical extremity first  following therapist demonstration.   Demo'd use of reacher with verbal teachback with increased time able to put on without reacher; Min cues for drain management.   Educated patient on importance of out of bed mobility and movement/repositioning throughout the day .   Visual demo with spouse to ensure understanding of hinge brace locked in extension.  Answered questions within scope.       Patient left up in chair with all lines intact, call button in reach, nurse notified, and wife present    GOALS:   Multidisciplinary Problems       Occupational Therapy Goals          Problem: Occupational Therapy    Goal Priority Disciplines Outcome Interventions   Occupational Therapy Goal     OT, PT/OT Progressing    Description: Goals to be met by: 12/10/2024     Patient will increase functional independence with ADLs by performing:    LE Dressing with [place] including retrieval .  Toileting from toilet with [place] for hygiene and clothing management.   Functional mobility to/from toilet with modified [place] with walker.  100% reciprocation of positioning/icing/elevation regimen and task / activity modifications s/p L knee revision to maximize ease of reintegration to home environment                                  History:     Past Medical History:   Diagnosis Date    Diabetes mellitus     GERD (gastroesophageal reflux disease)     High cholesterol     Hypertension          Past Surgical History:   Procedure Laterality Date    ANKLE SURGERY      right    ELBOW SURGERY Left     INCISION AND DRAINAGE OF HAND Left 2/8/2023    Procedure: INCISION AND DRAINAGE, HAND;  Surgeon: Adele Colon MD;  [facility]: [facility];  Service: Orthopedics;  Laterality: Left;    INSERTION, ELECTRODE LEAD,  NEUROSTIMULATOR, PERIPHERAL Left 9/23/2024    Procedure: INSERTION,ELECTRODE LEAD,NEUROSTIMULATOR,PERIPHERAL;  Surgeon: Bony Menezes MD;  Location: [facility];  Service: Pain Management;  Laterality: Left;  curonix, trial    KNEE SURGERY      TOTAL HIP ARTHROPLASTY      ines       Time Tracking:     OT Date of Treatment: 11/12/24  OT Start Time: 1041  OT Stop Time: 1104  OT Total Time (min): 23 min    Billable Minutes:Evaluation 12  Self Care/Home Management 11      11/12/2024

## 2024-11-12 NOTE — PLAN OF CARE
Problem: Adult Inpatient Plan of Care  Goal: Plan of Care Review  Chart reviewed and care plan initiated      Universal Safety Interventions

## 2024-11-12 NOTE — PLAN OF CARE
CM met with pt - POD #1  - L TK Revision   Pt lives with wife Ana Lilia Garcia    Pt has a RW, BSC and SC in tub   Therapy recc OP tx - no DME   No hh prior to admit - expects to f/u with OP Therapy post d/c   Cx pending - Adri with [facility] sent referral in [facility].      Future Appointments   Date Time Provider Department Center   11/18/2024  9:00 AM [facility]   11/26/2024  9:15 AM Bony Menezes MD [id number]        11/12/24 1731   Discharge Assessment   Assessment Type Discharge Planning Assessment   Confirmed/corrected address, phone number and insurance Yes   Confirmed Demographics Correct on Facesheet   Source of Information patient;health record   Communicated GABRIELLE with patient/caregiver Date not available/Unable to determine   People in Home spouse  (wife Ana Lilia Garcia )   Do you expect to return to your current living situation? Yes   Do you have help at home or someone to help you manage your care at home? Yes   Who are your caregiver(s) and their phone number(s)? cece Sung   Prior to hospitilization cognitive status: Alert/Oriented   Current cognitive status: Alert/Oriented   Walking or Climbing Stairs Difficulty yes   Walking or Climbing Stairs ambulation difficulty, requires equipment;stair climbing difficulty, requires equipment   Equipment Currently Used at Home walker, rolling;bedside commode;shower chair   Readmission within 30 days? No   Patient currently being followed by outpatient case management? No   Do you currently have service(s) that help you manage your care at home? No   Do you take prescription medications? Yes  ([facility] [place])   How do you get to doctors appointments? family or friend will provide;car, drives self   Are you on dialysis? No   Do you take coumadin? No   Discharge Plan A Home;Home with family   Discharge Plan B Home;Home with family;Home Health   DME Needed Upon Discharge  none   Discharge Plan discussed with:  Patient

## 2024-11-12 NOTE — PLAN OF CARE
OT evaluation completed. Patient with PLOF of independence with ADLs/IADLs/mobility  and with hx of 0 falls in the past 3 months. On evaluation this date patient able to perform LB dressing with SBA and toilet transfer with rolling walker with SBA. Skilled acute OT to follow. Recommend low level intensity.     Problem: Occupational Therapy  Goal: Occupational Therapy Goal  Description: Goals to be met by: 12/10/2024     Patient will increase functional independence with ADLs by performing:    LE Dressing with [place] including retrieval .  Toileting from toilet with [place] for hygiene and clothing management.   Functional mobility to/from toilet with modified [place] with walker.  100% reciprocation of positioning/icing/elevation regimen and task / activity modifications s/p L knee revision to maximize ease of reintegration to home environment         Outcome: Progressing

## 2024-11-12 NOTE — PROGRESS NOTES
11/11/24 1943   Admission   Initial VN Admission Questions Complete   Shift   Virtual Nurse - Patient Verbalized Approval Of Camera Use;VN Rounding   Safety/Activity   Patient Rounds bed in low position;call light in patient/parent reach;clutter free environment maintained;visualized patient;placement of personal items at bedside   Safety Promotion/Fall Prevention assistive device/personal item within reach;Fall Risk reviewed with patient/family;side rails raised x 2   Positioning   Body Position supine   Head of Bed (HOB) Positioning HOB at 30-45 degrees     VN cued in to pt's room with permission. Admission questions completed. Plan of care review deferred to bedside nurse- Md  at bedside to see/speak with pt. . Call bell w/in reach. Instructed to call for needs/assist.

## 2024-11-12 NOTE — ANESTHESIA PROCEDURE NOTES
Left Femoral Nerve Peripheral Block    Patient location during procedure: post-op   Block not for primary anesthetic.  Reason for block: at surgeon's request and post-op pain management   Post-op Pain Location: Left knee   Start time: 11/11/2024 5:25 PM  Timeout: 11/11/2024 5:20 PM   End time: 11/11/2024 5:29 PM    Staffing  Authorizing Provider: Adelso Antunez MD  Performing Provider: Adelso Antunez MD    Staffing  Performed by: Adelso Antunez MD  Authorized by: Adelso Antunez MD    Preanesthetic Checklist  Completed: patient identified, IV checked, site marked, risks and benefits discussed, surgical consent, monitors and equipment checked, pre-op evaluation and timeout performed  Peripheral Block  Patient position: supine  Prep: ChloraPrep  Patient monitoring: heart rate, cardiac monitor, continuous pulse ox, continuous capnometry and frequent blood pressure checks  Block type: femoral  Laterality: left  Injection technique: single shot  Needle  Needle type: Stimuplex   Needle gauge: 20 G  Needle length: 4 in  Needle localization: anatomical landmarks and ultrasound guidance   -ultrasound image captured on disc.  Assessment  Injection assessment: negative aspiration, negative parasthesia and local visualized surrounding nerve  Paresthesia pain: none  Heart rate change: no  Slow fractionated injection: yes  Pain Tolerance: comfortable throughout block  Medications:    Medications: bupivacaine (pf) (MARCAINE) injection 0.25% - Perineural   10 mL - 11/11/2024 5:25:00 PM    Additional Notes  VSS.  PACU RN monitoring vitals throughout procedure.  Patient tolerated procedure well.     With liposomal bupivicaine 1.33% 10 mL    Lidocaine 1% 3 mL used for skin infiltration.

## 2024-11-12 NOTE — PLAN OF CARE
Problem: Physical Therapy  Goal: Physical Therapy Goal  Description: Goals to be met by: 2024     Patient will increase functional independence with mobility by performin. Sit to stand transfer with Modified [place] using Rolling Walker  2. Bed to chair transfer with Modified [place] using Rolling Walker  3. Gait  x 150 feet with Modified [place] using Rolling Walker.   4. Ascend/descend 3 stair with left Handrails Supervision  5. After drain removed, range of L  knee as tolerated    Outcome: Progressing   Patient evaluated and treatment initiated; at baseline, pt is independent with ADLs and amb without AD; during today's session, pt was supervision with bed mobility, CGA with sit to stand and amb using RW with VCs for effective technique; L knee locked in extension in hinged knee brace throughout session; recommend OP PT when ok'd by ortho.

## 2024-11-12 NOTE — PROGRESS NOTES
[facility] Progress Note     59 y.o. male s/p left knee revision TKA  for loosening 11/11/24    S: POD1. AFVSS.  Patient resting comfortably in bed this morning.  Pain well controlled.  No issues overnight.  Denies any numbness, tingling, chest pain, shortness of breath.   Drain with 30 cc output overnight.  Cultures pending     O:    Temp:  [97.3 °F (36.3 °C)-99.3 °F (37.4 °C)] 97.3 °F (36.3 °C)  Pulse:  [] 70  Resp:  [15-20] 20  SpO2:  [91 %-100 %] 100 %  BP: ()/(47-77) 133/76      MSK:   left knee   HKB in place   Dressing C/D/I; no strikethrough, overwrapped with the Ace  Drain in place   TTP at surgical site. No TTP calves  Compartments: soft and compressible   Motor Intact:EHL/FHL/GS/TA  SILT: SP/DP/Sa/Kemp  Pulses:2+ DP      Labs:  Recent Results (from the past 24 hours)   POCT glucose    Collection Time: 11/11/24 11:29 AM   Result Value Ref Range    POCT Glucose 130 (H) 70 - 110 mg/dL   Culture, Body Fluid - Bactec    Collection Time: 11/11/24  1:14 PM    Specimen: Body Fluid   Result Value Ref Range    Body Fluid Culture, Sterile No Growth to date    WBC & Diff,Body Fluid Synovial Fluid    Collection Time: 11/11/24  1:15 PM   Result Value Ref Range    Body Fluid Type Synovial Fluid     Fluid Appearance Hazy     Fluid Color Yellow     WBC, Body Fluid 549 /cu mm    Segs, Fluid 12 %    Lymphs, Fluid 78 %    Monocytes/Macrophages, Fluid 10 %   Gram stain    Collection Time: 11/11/24  1:20 PM    Specimen: Knee, Left; Body Fluid   Result Value Ref Range    Gram Stain Result Rare WBC's     Gram Stain Result No organisms seen    Gram stain    Collection Time: 11/11/24  1:23 PM    Specimen: Knee, Left; Body Fluid   Result Value Ref Range    Gram Stain Result Rare WBC's     Gram Stain Result No organisms seen    Gram stain    Collection Time: 11/11/24  2:53 PM    Specimen: Knee, Left; Tissue   Result Value Ref Range    Gram Stain Result Rare WBC's     Gram Stain Result No organisms seen    Gram  "stain    Collection Time: 11/11/24  3:17 PM    Specimen: Knee, Left; Tissue   Result Value Ref Range    Gram Stain Result Rare WBC's     Gram Stain Result No organisms seen    Gram stain    Collection Time: 11/11/24  3:20 PM    Specimen: Knee, Left; Tissue   Result Value Ref Range    Gram Stain Result Few WBC's     Gram Stain Result No organisms seen    POCT glucose    Collection Time: 11/11/24  5:17 PM   Result Value Ref Range    POCT Glucose 229 (H) 70 - 110 mg/dL   POCT glucose    Collection Time: 11/11/24  9:07 PM   Result Value Ref Range    POCT Glucose 333 (H) 70 - 110 mg/dL   POCT glucose    Collection Time: 11/12/24  5:40 AM   Result Value Ref Range    POCT Glucose 271 (H) 70 - 110 mg/dL       No results for input(s): "WBC", "HGB", "HCT", "PLT" in the last 72 hours.  No results for input(s): "NA", "K", "CL", "CO2", "HCO3C", "BUN", "LABCREA", "GLU" in the last 72 hours.  No results for input(s): "ESR", "CRP" in the last 72 hours.    Intraop cultures no growth to date     Imaging:  Postop x-ray left knee reveals status post revision TKA with the hardware in place without complication     Assessment/Plan:   59 y.o. male s/p left knee revision TKA for loosening 11/11/24    WBAT LLE in HKB locked in extension  Monitor drain output, if less than 30cc this afternoon we will plan to remove  Plan to come out of HKB on Thursday  Follow up intra-op cultures, no growth to date  IV ancef q8 until cultures result/ finalize  PT/OT  Family medicine consulted for medial comanagement, appreciate help with medical care  CM for discharge planning      Raman Jane MD  [facility]  11/12/2024 9:25 AM   "

## 2024-11-12 NOTE — ANESTHESIA POSTPROCEDURE EVALUATION
Anesthesia Post Evaluation    Patient: Venancio Barragan    Procedure(s) Performed: Procedure(s) (LRB):  REVISION, ARTHROPLASTY, KNEE (Left)    Final Anesthesia Type: general  The surgery is a total joint replacement and the reasoning for not using regional anesthesia is   Attempted and Failed  Patient location during evaluation: PACU  Patient participation: Yes- Able to Participate  Level of consciousness: awake and lethargic  Post-procedure vital signs: reviewed and stable  Pain management: adequate  Airway patency: patent    PONV status at discharge: No PONV  Anesthetic complications: no      Cardiovascular status: blood pressure returned to baseline  Respiratory status: unassisted  Hydration status: euvolemic  Follow-up not needed.              Vitals Value Taken Time   /71 11/11/24 1809   Temp 36.4 °C (97.5 °F) 11/11/24 1805   Pulse 103 11/11/24 1809   Resp 15 11/11/24 1715   SpO2 94 % 11/11/24 1809   Vitals shown include unfiled device data.      Event Time   Out of Recovery 18:08:51         Pain/Holli Score: Pain Rating Prior to Med Admin: 0 (11/11/2024 11:16 AM)  Pain Rating Post Med Admin: 0 (11/11/2024  6:05 PM)  Holli Score: 8 (11/11/2024  6:05 PM)

## 2024-11-12 NOTE — CONSULTS
Consult note  [facility]    Consulted by:Orthopedics  Reason for Consult: medical management    History of Present Illness:  Patient is a 59 y.o. male PMHx of diabetes, HTN, HLD who presents after a left total knee revision. Patient currently denies any fevers, chills, shortness of breath, nausea, vomiting. He states that his left knee is sore about 7/10.     PTA Medications   Medication Sig    atorvastatin (LIPITOR) 40 MG tablet Take 40 mg by mouth once daily.    dulaglutide (TRULICITY) 0.75 mg/0.5 mL pen injector INECT 0.5 ML INTO THE SKIN EVERY 7 DAYS    HYDROcodone-acetaminophen (NORCO) 5-325 mg per tablet Take 1 tablet by mouth every 6 (six) hours as needed for Pain.    metformin (GLUCOPHAGE) 500 MG tablet Take 1,000 mg by mouth 2 (two) times daily with meals.     multivitamin capsule Take 1 capsule by mouth once daily.    PAXLOVID, EUA, 150-100 mg DsPk Take 2 tablets by mouth 2 (two) times daily.    promethazine (PHENERGAN) 25 MG tablet Take 1 tablet (25 mg total) by mouth every 6 (six) hours as needed for Nausea.    ramipriL (ALTACE) 2.5 MG capsule Take 2.5 mg by mouth.    semaglutide (OZEMPIC) 0.25 mg or 0.5 mg(2 mg/1.5 mL) PnIj Inject 0.25 mg into the skin.    solifenacin (VESICARE) 10 MG tablet Take 10 mg by mouth.    tadalafiL (CIALIS) 5 MG tablet Take 5 mg by mouth daily as needed.    WESTUSSIN DM 1-5-10 mg/5 mL Syrp Take 10 mLs by mouth 3 (three) times daily.    zolpidem (AMBIEN) 10 mg Tab Take 10 mg by mouth nightly as needed.       Review of patient's allergies indicates:  No Known Allergies    Past Medical History:   Diagnosis Date    Diabetes mellitus     GERD (gastroesophageal reflux disease)     High cholesterol     Hypertension      Past Surgical History:   Procedure Laterality Date    ANKLE SURGERY      right    ELBOW SURGERY Left     INCISION AND DRAINAGE OF HAND Left 2/8/2023    Procedure: INCISION AND DRAINAGE, HAND;  Surgeon: Adele Colon MD;  Location: [facility];  Service:  Orthopedics;  Laterality: Left;    INSERTION, ELECTRODE LEAD, NEUROSTIMULATOR, PERIPHERAL Left 9/23/2024    Procedure: INSERTION,ELECTRODE LEAD,NEUROSTIMULATOR,PERIPHERAL;  Surgeon: Bony Menezes MD;  Location: [facility];  Service: Pain Management;  Laterality: Left;  curonix, trial    KNEE SURGERY      TOTAL HIP ARTHROPLASTY      ines     Family History   Problem Relation Name Age of Onset    Diabetes Mother      No Known Problems Father       Social History     Tobacco Use    Smoking status: Never    Smokeless tobacco: Never   Substance Use Topics    Alcohol use: No    Drug use: No        Review of Systems:   Review of Systems   Constitutional:  Negative for chills and fever.   HENT:  Negative for ear discharge and ear pain.    Eyes:  Negative for photophobia.   Respiratory:  Negative for cough and shortness of breath.    Cardiovascular:  Negative for chest pain and leg swelling.   Gastrointestinal:  Negative for abdominal pain, nausea and vomiting.   Genitourinary:  Negative for dysuria.   Musculoskeletal:         Left knee pain   Skin:  Negative for rash.   Neurological:  Negative for dizziness and headaches.      OBJECTIVE:     Vital Signs (Most Recent)  Temp: 98 °F (36.7 °C) (11/11/24 1830)  Pulse: 93 (11/11/24 1830)  Resp: 16 (11/11/24 1830)  BP: 104/69 (11/11/24 1830)  SpO2: 98 % (11/11/24 1932)    Physical Exam:  Gen- WNWD, NAD  CV- RRR, no LE edema  Resp- breathing comfortably on RA, CTAB  Abd- soft, NTND  Skin- left knee Surgical incision dressing C/D/I  Neuro - no lower extremity neurological deficits  Psych- logical thought process, answers questions appropriately     Laboratory  Lab Results   Component Value Date    WBC 5.40 10/08/2024    HGB 13.0 (L) 10/08/2024    HCT 41.2 10/08/2024    MCV 87 10/08/2024     10/08/2024      CMP  Sodium   Date Value Ref Range Status   10/08/2024 143 136 - 145 mmol/L Final     Potassium   Date Value Ref Range Status   10/08/2024 5.0 3.5 - 5.1 mmol/L Final      Chloride   Date Value Ref Range Status   10/08/2024 108 95 - 110 mmol/L Final     CO2   Date Value Ref Range Status   10/08/2024 22 (L) 23 - 29 mmol/L Final     Glucose   Date Value Ref Range Status   10/08/2024 86 70 - 110 mg/dL Final     BUN   Date Value Ref Range Status   10/08/2024 22 (H) 6 - 20 mg/dL Final     Creatinine   Date Value Ref Range Status   10/08/2024 1.3 0.5 - 1.4 mg/dL Final     Calcium   Date Value Ref Range Status   10/08/2024 9.6 8.7 - 10.5 mg/dL Final     Total Protein   Date Value Ref Range Status   10/08/2024 7.7 6.0 - 8.4 g/dL Final     Albumin   Date Value Ref Range Status   10/08/2024 4.2 3.5 - 5.2 g/dL Final   10/08/2024 4.2 3.5 - 5.2 g/dL Final     Total Bilirubin   Date Value Ref Range Status   10/08/2024 0.5 0.1 - 1.0 mg/dL Final     Comment:     For infants and newborns, interpretation of results should be based  on gestational age, weight and in agreement with clinical  observations.    Premature Infant recommended reference ranges:  Up to 24 hours.............<8.0 mg/dL  Up to 48 hours............<12.0 mg/dL  3-5 days..................<15.0 mg/dL  6-29 days.................<15.0 mg/dL       Alkaline Phosphatase   Date Value Ref Range Status   10/08/2024 70 55 - 135 U/L Final     AST   Date Value Ref Range Status   10/08/2024 25 10 - 40 U/L Final     ALT   Date Value Ref Range Status   10/08/2024 34 10 - 44 U/L Final     Anion Gap   Date Value Ref Range Status   10/08/2024 13 8 - 16 mmol/L Final     eGFR if    Date Value Ref Range Status   05/17/2021 >60.0 >60 mL/min/1.73 m^2 Final     eGFR    Date Value Ref Range Status   07/08/2024 67 mL/min/1.73mSq Final     Comment:     In accordance with NKF-ASN Task Force recommendation, calculation based on the Chronic Kidney Disease Epidemiology Collaboration (CKD-EPI) equation without adjustment for race. eGFR adjusted for gender and age and calculated in ml/min/1.73mSquared. eGFR cannot be calculated if  "patient is under 18 years of age.     Reference Range:   >= 60 ml/min/1.73mSquared.     eGFR if non    Date Value Ref Range Status   05/17/2021 >60.0 >60 mL/min/1.73 m^2 Final     Comment:     Calculation used to obtain the estimated glomerular filtration  rate (eGFR) is the CKD-EPI equation.           Lab Results   Component Value Date    INR 1.0 10/08/2024    INR 0.9 09/10/2024    INR 1.0 06/26/2024      No results for input(s): "CPK", "CPKMB", "TROPONINI", "MB" in the last 168 hours.   No results for input(s): "TROPONINI", "CKTOTAL", "CKMB" in the last 168 hours.    BNP  No results for input(s): "BNP" in the last 168 hours.    Urinalysis  No results for input(s): "COLORU", "CLARITYU", "SPECGRAV", "PHUR", "PROTEINUA", "GLUCOSEU", "BILIRUBINCON", "BLOODU", "WBCU", "RBCU", "BACTERIA", "MUCUS", "NITRITE", "LEUKOCYTESUR", "UROBILINOGEN", "HYALINECASTS" in the last 24 hours.   LAST HbA1c  Lab Results   Component Value Date    HGBA1C 6.0 (H) 10/08/2024         ASSESSMENT/PLAN:   59 y.o.male has a past medical history of Diabetes mellitus, GERD (gastroesophageal reflux disease), High cholesterol, and Hypertension. here for medical management    Plan:   #Left total knee revision  Patient with history of left knee TKA about 1-2 years ago, revision on 11/11/24.     -ASA 81 BID as DVT PPX per ortho  -PT/OT  -Multimodal pain control per ortho    #HTN  History of HTN on ramipril 2.5 at home  Blood pressure soft at 104/69 after procedure    -Hold antihypertensives until appropriate to restart  -Monitor vitals    #DM  -SSI  -POCT glucose ACHS    #HLD  -home atorvastatin    Michael Lora MD  [facility], PGY-2  11/11/2024      "

## 2024-11-12 NOTE — PLAN OF CARE
Problem: Adult Inpatient Plan of Care  Goal: Patient-Specific Goal (Individualized)  Outcome: Progressing     Problem: Diabetes Comorbidity  Goal: Blood Glucose Level Within Targeted Range  Outcome: Progressing     Problem: Fall Injury Risk  Goal: Absence of Fall and Fall-Related Injury  Outcome: Progressing     Problem: Knee Arthroplasty  Goal: Optimal Coping  Outcome: Progressing     Medications administered per MAR. Glucose monitored. Giordano in place. Safety precautions maintained. Call bell within reach.

## 2024-11-12 NOTE — PT/OT/SLP EVAL
Physical Therapy Evaluation    Patient Name:  Venancio Barragan   MRN:  [id number]    Recommendations:     Discharge Recommendations: Low Intensity Therapy (OP PT when ok'd by ortho)   Discharge Equipment Recommendations: none   Barriers to discharge:  current medical condition    Assessment:     Venancio Barragan is a 59 y.o. male admitted with a medical diagnosis of S/P revision of total knee, left.  He presents with the following impairments/functional limitations: gait instability, impaired balance, decreased lower extremity function, decreased ROM, orthopedic precautions, pain, impaired functional mobility Patient evaluated and treatment initiated; at baseline, pt is independent with ADLs and amb without AD; during today's session, pt was supervision with bed mobility, CGA with sit to stand and amb using RW with VCs for effective technique; L knee locked in extension in hinged knee brace throughout session; recommend OP PT when ok'd by ortho. .    Rehab Prognosis: Good; patient would benefit from acute skilled PT services to address these deficits and reach maximum level of function.    Recent Surgery: Procedure(s) (LRB):  REVISION, ARTHROPLASTY, KNEE (Left) 1 Day Post-Op    Plan:     During this hospitalization, patient to be seen daily to address the identified rehab impairments via gait training, therapeutic activities, therapeutic exercises, neuromuscular re-education (no knee fl until drain removed) and progress toward the following goals:    Plan of Care Expires:  12/12/24    Subjective     Chief Complaint: Pain  Patient/Family Comments/goals: knee brace tight around ankle mostly, thigh and nurse had to loosen straps  Pain/Comfort:  Pain Rating 1: 5/10  Location - Side 1: Left  Pain Addressed 1: Reposition, Distraction, Cessation of Activity (nurse aware); ice pack provided with instructions for use  Pain Rating Post-Intervention 1: 5/10    Patients cultural, spiritual, Protestant conflicts given the current  situation: no    Living Environment:  Patient lives with wife in trailer with 3 NATE and L handrail; tub/shower combo (garden tub)  Prior to admission, patients level of function was independent with ADLs and amb without AD; drives; light  work on cars.  Equipment used at home:  (has access to shower chair and RW). Upon discharge, patient will have assistance from wife.    Objective:     Communicated with nurse prior to session.  Patient found HOB elevated with hemovac, peripheral IV, SCD  upon PT entry to room.    General Precautions: Standard, fall  Orthopedic Precautions:LLE weight bearing as tolerated (in hinged knee brace locked in extension)   Braces: Hinged knee brace (L knee)  Respiratory Status: Room air    Exams:  Cognitive Exam:  Patient is oriented to Person, Place, Time, Situation, and follow multistep commands  Gross Motor Coordination:  WFL  Postural Exam:  Patient presented with the following abnormalities:    -       No postural abnormalities identified  Sensation:    -       Intact  Skin Integrity/Edema:      -       Skin integrity: L knee with ace bandage and hinged knee brace  RLE ROM: WFL  RLE Strength: ~4+ to 5/5 grossly  LLE ROM: hip/ankle WFL; L knee NT  LLE Strength: hip/ankle WFL; L knee NT; able to SLR actively    Functional Mobility:  Bed Mobility:     Scooting: supervision  Supine to Sit: supervision  Transfers:     Sit to Stand:  contact guard assistance with rolling walker  Bed to Chair: contact guard assistance with  rolling walker  using  Step Transfer  Gait: Patient amb ~130ft in [place] using RW and CGA with VCs for keeping RW within close proximity but not allowing body to touch the front bar of the RW, even step length, allowing shlds to relax, good foot clearance of floor   Balance: sit static/dynamic good+, stand static~fair+ to good-; stand dynamic/amb~fair      AM-PAC 6 CLICK MOBILITY  Total Score:19       Treatment & Education:  -pt educated on role of PT/POC  -checked  hinged knee brace and tightened where necessary- instructed pt on the workings of the knee brace and keeping in locked in ext; initially felt L knee fl with amb- checked knee brace again- stops at 0* and buttons in position for locking out at 0*, straps as tightened to comfort and effectiveness; explained to pt that there may be some minor give into fl  -pt performed transition to EOB; no c/o dizziness/lightheadedness  -/85 HR 81 O2 sats 98%  -pt amb using RW as described above  -educated on APs 20 reps every hr, QS and GS to be done 3-5x per day 10 reps; pt demonstrated ability    Patient left up in chair with all lines intact, call button in reach, nurse notified, and wife present. BLEs elevated in recliner chair.    GOALS:   Multidisciplinary Problems       Physical Therapy Goals          Problem: Physical Therapy    Goal Priority Disciplines Outcome Interventions   Physical Therapy Goal     PT, PT/OT Progressing    Description: Goals to be met by: 2024     Patient will increase functional independence with mobility by performin. Sit to stand transfer with Modified [place] using Rolling Walker  2. Bed to chair transfer with Modified [place] using Rolling Walker  3. Gait  x 150 feet with Modified [place] using Rolling Walker.   4. Ascend/descend 3 stair with left Handrails Supervision  5. After drain removed, range of L  knee as tolerated                         History:     Past Medical History:   Diagnosis Date    Diabetes mellitus     GERD (gastroesophageal reflux disease)     High cholesterol     Hypertension        Past Surgical History:   Procedure Laterality Date    ANKLE SURGERY      right    ELBOW SURGERY Left     INCISION AND DRAINAGE OF HAND Left 2023    Procedure: INCISION AND DRAINAGE, HAND;  Surgeon: Adele Colon MD;  Location: [facility];  Service: Orthopedics;  Laterality: Left;    INSERTION, ELECTRODE LEAD, NEUROSTIMULATOR, PERIPHERAL Left 2024     Procedure: INSERTION,ELECTRODE LEAD,NEUROSTIMULATOR,PERIPHERAL;  Surgeon: Bony Menezes MD;  Location: [facility];  Service: Pain Management;  Laterality: Left;  curonix, trial    KNEE SURGERY      TOTAL HIP ARTHROPLASTY      ines       Time Tracking:     PT Received On: 11/12/24  PT Start Time: 0945     PT Stop Time: 1034  PT Total Time (min): 49 min     Billable Minutes: Evaluation 10, Gait Training 15, and Therapeutic Activity 15      11/12/2024

## 2024-11-13 PROBLEM — N17.9 AKI (ACUTE KIDNEY INJURY): Status: ACTIVE | Noted: 2024-11-13

## 2024-11-13 PROBLEM — E11.9 TYPE 2 DIABETES MELLITUS WITHOUT COMPLICATION, WITHOUT LONG-TERM CURRENT USE OF INSULIN: Status: ACTIVE | Noted: 2024-11-13

## 2024-11-13 LAB
ACID FAST MOD KINY STN SPEC: NORMAL
FINAL PATHOLOGIC DIAGNOSIS: NORMAL
FUNGUS SPEC CULT: NORMAL
GROSS: NORMAL
Lab: NORMAL
MYCOBACTERIUM SPEC QL CULT: NORMAL
POCT GLUCOSE: 108 MG/DL (ref 70–110)
POCT GLUCOSE: 140 MG/DL (ref 70–110)
POCT GLUCOSE: 284 MG/DL (ref 70–110)
POCT GLUCOSE: 296 MG/DL (ref 70–110)

## 2024-11-13 PROCEDURE — 25000003 PHARM REV CODE 250

## 2024-11-13 PROCEDURE — 94761 N-INVAS EAR/PLS OXIMETRY MLT: CPT

## 2024-11-13 PROCEDURE — 99900035 HC TECH TIME PER 15 MIN (STAT)

## 2024-11-13 PROCEDURE — 11000001 HC ACUTE MED/SURG PRIVATE ROOM

## 2024-11-13 PROCEDURE — 97535 SELF CARE MNGMENT TRAINING: CPT

## 2024-11-13 PROCEDURE — 63600175 PHARM REV CODE 636 W HCPCS

## 2024-11-13 PROCEDURE — 97530 THERAPEUTIC ACTIVITIES: CPT

## 2024-11-13 RX ORDER — INSULIN GLARGINE 100 [IU]/ML
3 INJECTION, SOLUTION SUBCUTANEOUS DAILY
Status: DISCONTINUED | OUTPATIENT
Start: 2024-11-14 | End: 2024-11-14

## 2024-11-13 RX ORDER — DOCUSATE SODIUM 100 MG
200 CAPSULE ORAL
Status: DISCONTINUED | OUTPATIENT
Start: 2024-11-13 | End: 2024-11-16 | Stop reason: HOSPADM

## 2024-11-13 RX ADMIN — INSULIN GLARGINE 5 UNITS: 100 INJECTION, SOLUTION SUBCUTANEOUS at 08:11

## 2024-11-13 RX ADMIN — SENNOSIDES AND DOCUSATE SODIUM 1 TABLET: 8.6; 5 TABLET ORAL at 08:11

## 2024-11-13 RX ADMIN — ATORVASTATIN CALCIUM 40 MG: 40 TABLET, FILM COATED ORAL at 08:11

## 2024-11-13 RX ADMIN — Medication 200 ML: at 03:11

## 2024-11-13 RX ADMIN — ACETAMINOPHEN 650 MG: 325 TABLET ORAL at 11:11

## 2024-11-13 RX ADMIN — FAMOTIDINE 20 MG: 20 TABLET ORAL at 08:11

## 2024-11-13 RX ADMIN — Medication 200 ML: at 05:11

## 2024-11-13 RX ADMIN — PREGABALIN 75 MG: 75 CAPSULE ORAL at 08:11

## 2024-11-13 RX ADMIN — CELECOXIB 200 MG: 100 CAPSULE ORAL at 08:11

## 2024-11-13 RX ADMIN — ZOLPIDEM TARTRATE 10 MG: 5 TABLET, COATED ORAL at 10:11

## 2024-11-13 RX ADMIN — Medication 200 ML: at 10:11

## 2024-11-13 RX ADMIN — ASPIRIN 81 MG CHEWABLE TABLET 81 MG: 81 TABLET CHEWABLE at 08:11

## 2024-11-13 RX ADMIN — INSULIN ASPART 5 UNITS: 100 INJECTION, SOLUTION INTRAVENOUS; SUBCUTANEOUS at 08:11

## 2024-11-13 RX ADMIN — CEFAZOLIN 2 G: 2 INJECTION, POWDER, FOR SOLUTION INTRAMUSCULAR; INTRAVENOUS at 05:11

## 2024-11-13 RX ADMIN — ACETAMINOPHEN 650 MG: 325 TABLET ORAL at 05:11

## 2024-11-13 RX ADMIN — CEFAZOLIN 2 G: 2 INJECTION, POWDER, FOR SOLUTION INTRAMUSCULAR; INTRAVENOUS at 03:11

## 2024-11-13 RX ADMIN — CEFAZOLIN 2 G: 2 INJECTION, POWDER, FOR SOLUTION INTRAMUSCULAR; INTRAVENOUS at 08:11

## 2024-11-13 NOTE — PLAN OF CARE
Problem: Adult Inpatient Plan of Care  Goal: Plan of Care Review  Outcome: Progressing     Problem: Adult Inpatient Plan of Care  Goal: Patient-Specific Goal (Individualized)  Outcome: Progressing     Problem: Adult Inpatient Plan of Care  Goal: Optimal Comfort and Wellbeing  Outcome: Progressing     Problem: Diabetes Comorbidity  Goal: Blood Glucose Level Within Targeted Range  Outcome: Progressing     Problem: Wound  Goal: Absence of Infection Signs and Symptoms  Outcome: Progressing     Problem: Knee Arthroplasty  Goal: Effective Urinary Elimination  Outcome: Progressing     Problem: Knee Arthroplasty  Goal: Effective Oxygenation and Ventilation  Outcome: Progressing     Diabetic diet in place.  Knee immobilizer to left knee.  SCD on right leg.  Blood glucose monitoring.  Safety maintained.  Accordian drain to left knee with serosanguinous output.  Safety maintained.  Bed alarm on.  Call light within reach.  Giordano care provided this morning prior to removal.

## 2024-11-13 NOTE — PLAN OF CARE
Problem: Physical Therapy  Goal: Physical Therapy Goal  Description: Goals to be met by: 2024     Patient will increase functional independence with mobility by performin. Sit to stand transfer with Modified [place] using Rolling Walker  2. Bed to chair transfer with Modified [place] using Rolling Walker  3. Gait  x 150 feet with Modified [place] using Rolling Walker.   4. Ascend/descend 3 stair with left Handrails Supervision  5. After drain removed, range of L  knee as tolerated    Outcome: Progressing   Patient resting in recliner with BLEs elevated; pain level 5/10 L knee; pt reports MD readjusted distal end of brace higher on leg to reduce pressure right above the ankle and how to open the straps for relief; therapist instructed on how to loosen straps when not reclined and OOB; pt amb in contreras using RW with slow pace with 2 point gait pattern; pt transferring and amb with SBA; will cont with POC.

## 2024-11-13 NOTE — PROGRESS NOTES
"[facility] Progress Note     59 y.o. male s/p left knee revision TKA  for loosening 11/11/24    S: POD1. AFVSS.  Patient resting comfortably in bed this morning.  Pain well controlled.  No issues overnight. States his pain after this surgery is improved compared to his previous surgery. Denies any numbness, tingling, chest pain, shortness of breath. The knee brace was tight this morning so I loosened it.     O:    Temp:  [97.8 °F (36.6 °C)-98.2 °F (36.8 °C)] 98.1 °F (36.7 °C)  Pulse:  [68-82] 78  Resp:  [16-20] 16  SpO2:  [96 %-100 %] 98 %  BP: (111-143)/(62-77) 125/62      MSK:   left knee   HKB in place   Dressing C/D/I; no strikethrough, overwrapped with the Ace  Drain in place   TTP at surgical site. No TTP calves  Compartments: soft and compressible   Motor Intact:EHL/FHL/GS/TA  SILT: SP/DP/Sa/Kemp  Pulses:2+ DP  Drain output 145cc over last 24 hours.      Labs:  Recent Results (from the past 24 hours)   POCT glucose    Collection Time: 11/12/24  3:50 PM   Result Value Ref Range    POCT Glucose 246 (H) 70 - 110 mg/dL   POCT glucose    Collection Time: 11/12/24  4:28 PM   Result Value Ref Range    POCT Glucose 248 (H) 70 - 110 mg/dL   POCT glucose    Collection Time: 11/12/24  8:56 PM   Result Value Ref Range    POCT Glucose 119 (H) 70 - 110 mg/dL   POCT glucose    Collection Time: 11/13/24  5:35 AM   Result Value Ref Range    POCT Glucose 140 (H) 70 - 110 mg/dL   POCT glucose    Collection Time: 11/13/24 11:50 AM   Result Value Ref Range    POCT Glucose 296 (H) 70 - 110 mg/dL       No results for input(s): "WBC", "HGB", "HCT", "PLT" in the last 72 hours.  Recent Labs     11/12/24  1228      K 4.0      CO2 22*   BUN 24*   *     No results for input(s): "ESR", "CRP" in the last 72 hours.    Intraop cultures no growth to date     Imaging:  Postop x-ray left knee reveals status post revision TKA with the hardware in place without complication     Assessment/Plan:   59 y.o. male s/p " left knee revision TKA for loosening 11/11/24    WBAT LLE in HKB locked in extension  Monitor drain output, if less than 30cc this afternoon we will plan to remove  Plan to come out of HKB on Thursday  Follow up intra-op cultures, no growth to date  IV ancef q8 until cultures result/ finalize  PT/OT  Family medicine consulted for medial comanagement, appreciate help with medical care  CM for discharge planning      Lei Boles MD  [facility]  11/13/2024 9:25 AM

## 2024-11-13 NOTE — PLAN OF CARE
Plan of care reviewed with patient. Patient verbalized understanding. Medicated per MAR. Worked with OT/PT, walked in hallway and now sitting in chair. Instructed to use call light for assistance. Call light in reach.

## 2024-11-13 NOTE — PT/OT/SLP PROGRESS
"Physical Therapy Treatment    Patient Name:  Venancio Barragan   MRN:  [id number]    Recommendations:     Discharge Recommendations: Low Intensity Therapy (OP PT when cleared by ortho)  Discharge Equipment Recommendations: none  Barriers to discharge:  current medical condition    Assessment:     Venancio Barragan is a 59 y.o. male admitted with a medical diagnosis of S/P revision of total knee, left.  He presents with the following impairments/functional limitations: weakness, impaired functional mobility, gait instability, impaired balance, orthopedic precautions Patient resting in recliner with BLEs elevated; pain level 5/10 L knee; pt reports MD readjusted distal end of brace higher on leg to reduce pressure right above the ankle and how to open the straps for relief; therapist instructed on how to loosen straps when not reclined and OOB; pt amb in contreras using RW with slow pace with 2 point gait pattern; pt transferring and amb with SBA; will cont with POC. .    Rehab Prognosis: Good; patient would benefit from acute skilled PT services to address these deficits and reach maximum level of function.    Recent Surgery: Procedure(s) (LRB):  REVISION, ARTHROPLASTY, KNEE (Left) 2 Days Post-Op    Plan:     During this hospitalization, patient to be seen daily to address the identified rehab impairments via gait training, therapeutic activities, therapeutic exercises, neuromuscular re-education (no knee flexion until drain removed) and progress toward the following goals:    Plan of Care Expires:  12/12/24    Subjective     Chief Complaint: "I'm bored."  Patient/Family Comments/goals: to go home  Pain/Comfort:  Pain Rating 1: 5/10  Location - Side 1: Left  Location 1: knee  Pain Addressed 1: Reposition, Distraction, Cessation of Activity (nurse present)      Objective:     Communicated with nurse prior to session.  Patient found up in chair with SCD, hemovac upon PT entry to room.     General Precautions: Standard, fall  Orthopedic " Precautions: LLE weight bearing as tolerated (with knee locked in extension)  Braces: Hinged knee brace (L knee)  Respiratory Status: Room air     Functional Mobility:  Bed mobility:  Sit to Supine: stand by assistance   Transfers:  Sit to Stand: stand by assistance using RW  Gait: pt amb in contreras ~140ft using RW with slow pace with 2 point gait pattern    AM-PAC 6 CLICK MOBILITY  Total Score: 19    Treatment & Education:  -pt found sitting in bedside chair with BLEs elevated  -pt transitioned to stand and amb as described above  -pt returned to supine in bed and repositioned with pillow beneath LLE and floating heel  -pt reports MD readjusted distal end of brace higher on leg to reduce pressure right above the ankle and how to open the straps for relief; therapist instructed on how to loosen straps when not reclined and OOB  -reviewed APs, QS, GS with pt demonstrating ability    Patient left HOB elevated with all lines intact, call button in reach, bed alarm on, and nurse notified..    GOALS:   Multidisciplinary Problems       Physical Therapy Goals          Problem: Physical Therapy    Goal Priority Disciplines Outcome Interventions   Physical Therapy Goal     PT, PT/OT Progressing    Description: Goals to be met by: 2024     Patient will increase functional independence with mobility by performin. Sit to stand transfer with Modified [place] using Rolling Walker  2. Bed to chair transfer with Modified [place] using Rolling Walker  3. Gait  x 150 feet with Modified [place] using [place].   4. Ascend/descend 3 stair with left Handrails Supervision  5. After drain removed, range of L  knee as tolerated                         Time Tracking:     PT Received On: 24  PT Start Time: 1500     PT Stop Time: 1525  PT Total Time (min): 25 min     Billable Minutes: Gait Training 15  Self Care 10    Treatment Type: Treatment  PT/PTA: PT     Number of PTA visits since last PT visit: 0      11/13/2024

## 2024-11-13 NOTE — ASSESSMENT & PLAN NOTE
Patient's FSGs are uncontrolled due to hyperglycemia on current medication regimen.  Last A1c reviewed-   Lab Results   Component Value Date    HGBA1C 6.0 (H) 10/08/2024     Most recent fingerstick glucose reviewed-   Recent Labs   Lab 11/12/24  1628 11/12/24  2056 11/13/24  0535 11/13/24  1150   POCTGLUCOSE 248* 119* 140* 296*     Current correctional scale  Low  Hold  anti-hyperglycemic dose as follows-   Antihyperglycemics (From admission, onward)      Start     Stop Route Frequency Ordered    11/12/24 1040  insulin glargine U-100 (Lantus) pen 5 Units         -- SubQ Daily 11/12/24 1040    11/11/24 2103  insulin aspart U-100 pen 0-10 Units         -- SubQ Before meals & nightly PRN 11/11/24 2006          Hold Oral hypoglycemics while patient is in the hospital.      #DM  Glucose reading at goal      Plan:  -SSI  -POCT glucose ACHS  -Continue Lantus 5 units due to elevated am glucose and currently at goal  -Glucose goal 140-180

## 2024-11-13 NOTE — NURSING
Notified doctors to inform them of patients' statements about his knee brace and foot sensations.

## 2024-11-13 NOTE — ASSESSMENT & PLAN NOTE
LISSY is likely due to pre-renal azotemia due to intravascular volume depletion. Baseline creatinine is  1.3 1 month ago . Most recent creatinine and eGFR are listed below.  Recent Labs     11/14/24  0445 11/15/24  0543   CREATININE 1.2 1.5*   EGFRNORACEVR >60 53*      Plan  - Avoid nephrotoxins and renally dose meds for GFR listed above  - Monitor urine output, and adjust therapy as needed  - Creatine is down trending.  Encourage oral fluids   -Check BMP in am  -Will CTM

## 2024-11-13 NOTE — ASSESSMENT & PLAN NOTE
#Left total knee revision  Patient with history of left knee TKA about 1-2 years ago, revision on 11/11/24.      -Continue ASA 81 BID as DVT PPX per ortho  -Continue PT/OT  -Multimodal pain control per ortho  -Ancef 2 gm every 8 hrs per surgery  -11/ 11 Left knee Cultures NGTD, and  Fungus Cx pending

## 2024-11-13 NOTE — PROGRESS NOTES
CM notified by Adri with [facility] - pt's insurance is out of network   Referral sent per [facility] to [facility] - VM left with Ava - infusion nurse

## 2024-11-13 NOTE — PLAN OF CARE
Problem: Adult Inpatient Plan of Care  Goal: Plan of Care Review  Outcome: Progressing  Flowsheets (Taken 11/13/2024 0048)  Plan of Care Reviewed With: patient

## 2024-11-13 NOTE — CONSULTS
Consult note  [facility]    Consulted by:Orthopedics  Reason for Consult: medical management    History of Present Illness:  Patient is a 59 y.o. male PMHx of diabetes, HTN, HLD who presents after a left total knee revision. Patient currently denies any fevers, chills, shortness of breath, nausea, vomiting. He states that his left knee is sore about 7/10.       Subjective:   Overnight Interval: VSS, NAOE.    PTA Medications   Medication Sig    atorvastatin (LIPITOR) 40 MG tablet Take 40 mg by mouth once daily.    dulaglutide (TRULICITY) 0.75 mg/0.5 mL pen injector INECT 0.5 ML INTO THE SKIN EVERY 7 DAYS    HYDROcodone-acetaminophen (NORCO) 5-325 mg per tablet Take 1 tablet by mouth every 6 (six) hours as needed for Pain.    metformin (GLUCOPHAGE) 500 MG tablet Take 1,000 mg by mouth 2 (two) times daily with meals.     multivitamin capsule Take 1 capsule by mouth once daily.    PAXLOVID, EUA, 150-100 mg DsPk Take 2 tablets by mouth 2 (two) times daily.    promethazine (PHENERGAN) 25 MG tablet Take 1 tablet (25 mg total) by mouth every 6 (six) hours as needed for Nausea.    ramipriL (ALTACE) 2.5 MG capsule Take 2.5 mg by mouth.    semaglutide (OZEMPIC) 0.25 mg or 0.5 mg(2 mg/1.5 mL) PnIj Inject 0.25 mg into the skin.    solifenacin (VESICARE) 10 MG tablet Take 10 mg by mouth.    tadalafiL (CIALIS) 5 MG tablet Take 5 mg by mouth daily as needed.    WESTUSSIN DM 1-5-10 mg/5 mL Syrp Take 10 mLs by mouth 3 (three) times daily.    zolpidem (AMBIEN) 10 mg Tab Take 10 mg by mouth nightly as needed.       Review of patient's allergies indicates:  No Known Allergies    Past Medical History:   Diagnosis Date    Diabetes mellitus     GERD (gastroesophageal reflux disease)     High cholesterol     Hypertension      Past Surgical History:   Procedure Laterality Date    ANKLE SURGERY      right    ELBOW SURGERY Left     INCISION AND DRAINAGE OF HAND Left 2/8/2023    Procedure: INCISION AND DRAINAGE, HAND;  Surgeon: Adele  JAQUI Colon MD;  Location: [facility] OR;  Service: Orthopedics;  Laterality: Left;    INSERTION, ELECTRODE LEAD, NEUROSTIMULATOR, PERIPHERAL Left 9/23/2024    Procedure: INSERTION,ELECTRODE LEAD,NEUROSTIMULATOR,PERIPHERAL;  Surgeon: Bony Menezes MD;  Location: [facility] OR;  Service: Pain Management;  Laterality: Left;  curonix, trial    KNEE SURGERY      REVISION OF KNEE ARTHROPLASTY Left 11/11/2024    Procedure: REVISION, ARTHROPLASTY, KNEE;  Surgeon: Bony Menezes MD;  Location: [facility] OR;  Service: Orthopedics;  Laterality: Left;  bmi - 26.78 marcin cement removal, depuy revision tka   Richard will be available by phone if needed for misonix -[phone]    TOTAL HIP ARTHROPLASTY      ines     Family History   Problem Relation Name Age of Onset    Diabetes Mother      No Known Problems Father       Social History     Tobacco Use    Smoking status: Never    Smokeless tobacco: Never   Substance Use Topics    Alcohol use: No    Drug use: No        Review of Systems:   Review of Systems   Constitutional:  Negative for chills and fever.   HENT: Negative.     Respiratory:  Negative for cough and shortness of breath.    Cardiovascular:  Negative for chest pain and leg swelling.   Gastrointestinal:  Negative for abdominal pain, nausea and vomiting.   Genitourinary:  Negative for dysuria.   Musculoskeletal:         Left knee pain   Skin:  Negative for rash.   Neurological:  Negative for headaches.      OBJECTIVE:     Vital Signs (Most Recent)  Temp: 98.1 °F (36.7 °C) (11/13/24 1147)  Pulse: 78 (11/13/24 1147)  Resp: 16 (11/13/24 1147)  BP: 125/62 (11/13/24 1147)  SpO2: 98 % (11/13/24 1147)    Physical Exam:  Gen- WNWD, NAD  CV- RRR, no LE edema  Resp- breathing comfortably on RA, CTAB  Abd- soft, NTND  Skin- left knee Surgical incision dressing C/D/I. Left knee immobilizer in place in extension with drainage to gravity noted to suction  Neuro - no lower extremity neurological deficits  Psych- logical thought process, answers questions  appropriately     Laboratory  Lab Results   Component Value Date    WBC 5.40 10/08/2024    HGB 13.0 (L) 10/08/2024    HCT 41.2 10/08/2024    MCV 87 10/08/2024     10/08/2024      CMP  Sodium   Date Value Ref Range Status   11/12/2024 138 136 - 145 mmol/L Final     Potassium   Date Value Ref Range Status   11/12/2024 4.0 3.5 - 5.1 mmol/L Final     Chloride   Date Value Ref Range Status   11/12/2024 106 95 - 110 mmol/L Final     CO2   Date Value Ref Range Status   11/12/2024 22 (L) 23 - 29 mmol/L Final     Glucose   Date Value Ref Range Status   11/12/2024 153 (H) 70 - 110 mg/dL Final     BUN   Date Value Ref Range Status   11/12/2024 24 (H) 6 - 20 mg/dL Final     Creatinine   Date Value Ref Range Status   11/12/2024 1.6 (H) 0.5 - 1.4 mg/dL Final     Calcium   Date Value Ref Range Status   11/12/2024 8.7 8.7 - 10.5 mg/dL Final     Total Protein   Date Value Ref Range Status   10/08/2024 7.7 6.0 - 8.4 g/dL Final     Albumin   Date Value Ref Range Status   10/08/2024 4.2 3.5 - 5.2 g/dL Final   10/08/2024 4.2 3.5 - 5.2 g/dL Final     Total Bilirubin   Date Value Ref Range Status   10/08/2024 0.5 0.1 - 1.0 mg/dL Final     Comment:     For infants and newborns, interpretation of results should be based  on gestational age, weight and in agreement with clinical  observations.    Premature Infant recommended reference ranges:  Up to 24 hours.............<8.0 mg/dL  Up to 48 hours............<12.0 mg/dL  3-5 days..................<15.0 mg/dL  6-29 days.................<15.0 mg/dL       Alkaline Phosphatase   Date Value Ref Range Status   10/08/2024 70 55 - 135 U/L Final     AST   Date Value Ref Range Status   10/08/2024 25 10 - 40 U/L Final     ALT   Date Value Ref Range Status   10/08/2024 34 10 - 44 U/L Final     Anion Gap   Date Value Ref Range Status   11/12/2024 10 8 - 16 mmol/L Final     eGFR if    Date Value Ref Range Status   05/17/2021 >60.0 >60 mL/min/1.73 m^2 Final     eGFR African American  "  Date Value Ref Range Status   07/08/2024 67 mL/min/1.73mSq Final     Comment:     In accordance with NKF-ASN Task Force recommendation, calculation based on the Chronic Kidney Disease Epidemiology Collaboration (CKD-EPI) equation without adjustment for race. eGFR adjusted for gender and age and calculated in ml/min/1.73mSquared. eGFR cannot be calculated if patient is under 18 years of age.     Reference Range:   >= 60 ml/min/1.73mSquared.     eGFR if non    Date Value Ref Range Status   05/17/2021 >60.0 >60 mL/min/1.73 m^2 Final     Comment:     Calculation used to obtain the estimated glomerular filtration  rate (eGFR) is the CKD-EPI equation.           Lab Results   Component Value Date    INR 1.0 10/08/2024    INR 0.9 09/10/2024    INR 1.0 06/26/2024      No results for input(s): "CPK", "CPKMB", "TROPONINI", "MB" in the last 168 hours.   No results for input(s): "TROPONINI", "CKTOTAL", "CKMB" in the last 168 hours.    BNP  No results for input(s): "BNP" in the last 168 hours.    Urinalysis  No results for input(s): "COLORU", "CLARITYU", "SPECGRAV", "PHUR", "PROTEINUA", "GLUCOSEU", "BILIRUBINCON", "BLOODU", "WBCU", "RBCU", "BACTERIA", "MUCUS", "NITRITE", "LEUKOCYTESUR", "UROBILINOGEN", "HYALINECASTS" in the last 24 hours.   LAST HbA1c  Lab Results   Component Value Date    HGBA1C 6.0 (H) 10/08/2024         ASSESSMENT/PLAN:   59 y.o.male has a past medical history of Diabetes mellitus, GERD (gastroesophageal reflux disease), High cholesterol, and Hypertension. here for medical management    Plan:   #Left total knee revision  Patient with history of left knee TKA about 1-2 years ago, revision on 11/11/24.     -Continue ASA 81 BID as DVT PPX per ortho  -Continue PT/OT  -Multimodal pain control per ortho  -Ancef 2 gm every 8 hrs per surgery  -11/ 11 Left knee Cultures pending, Gram stain NGTD        #LISSY  LISSY is likely due to pre-renal azotemia due to intravascular volume depletion. Baseline " creatinine is  1.3 1 month ago . Most recent creatinine and eGFR are listed below.      Recent Labs     11/12/24  1228   CREATININE 1.6*   EGFRNORACEVR 49*       Baseline Creatine 1.3 from 1 month ago  suspect volume down from the sx    Plan:  Plan  - Avoid nephrotoxins and renally dose meds for GFR listed above  - Monitor urine output, and adjust therapy as needed  - Encourage oral fluids  -Check BMP in am  -Will CTM      #HTN  History of HTN on ramipril 2.5 at home  Blood pressure soft at 104/69 after procedure    -Hold antihypertensives until appropriate to restart  -Monitor vitals    #DM  -SSI  -POCT glucose ACHS  -Started Lantus 5 units due to elevated am glucose and currently at goal  -Glucose goal 140-180    #HLD  -home atorvastatin    Nicci Campoverde MD- 1  [facility]  11/13/2024

## 2024-11-13 NOTE — ASSESSMENT & PLAN NOTE
#HTN  History of HTN on ramipril 2.5 at home  Blood pressure soft at 104/69 after procedure     -Hold antihypertensives until appropriate to restart  -Monitor vitals

## 2024-11-13 NOTE — PT/OT/SLP PROGRESS
Occupational Therapy   Treatment    Name: Venancio Barragan  MRN: [id number]  Admitting Diagnosis:  S/P revision of total knee, left  2 Days Post-Op    Recommendations:     Discharge Recommendations: Low Intensity Therapy (OP PT)  Discharge Equipment Recommendations:  none  Barriers to discharge:  Other (Comment) (No acute OT barriers)    Assessment:     Venancio Barragan is a 59 y.o. male with a medical diagnosis of S/P revision of total knee, left.  He presents with The primary encounter diagnosis was Loosening of prosthesis of left total knee replacement, subsequent encounter. Diagnoses of Pain due to total left knee replacement, subsequent encounter, Arthritis of knee, S/P revision of total knee, left [Z96.652], Benign essential HTN [I10], and Type 2 diabetes mellitus without complication, without long-term current use of insulin [E11.9] were also pertinent to this visit.  Performance deficits affecting function are weakness, impaired balance, decreased safety awareness, pain, orthopedic precautions, gait instability, impaired functional mobility.     Rehab Prognosis:  Good; patient would benefit from acute skilled OT services to address these deficits and reach maximum level of function.       Plan:     Patient to be seen 5 x/week to address the above listed problems via self-care/home management, therapeutic activities, therapeutic exercises  Plan of Care Expires: 12/10/24  Plan of Care Reviewed with: patient    Subjective     Chief Complaint: L knee pain  Patient/Family Comments/goals: To go home  Pain/Comfort:  Pain Rating 1: 6/10  Location - Side 1: Left  Location - Orientation 1: posterior  Location 1: knee  Pain Addressed 1: Pre-medicate for activity, Reposition, Distraction, Nurse notified  Pain Rating Post-Intervention 1: 6/10    Objective:     Communicated with: Nurse prior to session.  Patient found HOB elevated with hemovac, SCD upon OT entry to room.    General Precautions: Standard, fall    Orthopedic  Precautions:LLE weight bearing as tolerated (with knee locked in extension)  Braces: Hinged knee brace (L knee)  Respiratory Status: Room air     Occupational Performance:     Bed Mobility:    Patient completed Supine to Sit with stand by assistance     Functional Mobility/Transfers:  Patient completed Sit <> Stand Transfer with stand by assistance  with  rolling walker and CGA for turning walker  Patient completed Bed <> Chair Transfer using Step Transfer technique with stand by assistance with rolling walker  Patient completed Toilet Transfer Step Transfer technique with stand by assistance with  rolling walker  Functional Mobility: In room mobility to/from bathroom with SBA with rolling walker with verbal cues for slowing down and taking small turns. CGA for safety in tight turn navigation with item retrieval from closet    Activities of Daily Living:  Lower Body Dressing: Supervision;simulated in true bathroom       [facility] 6 Click ADL: 21    Treatment & Education:  Patient re-educated on role of OT.  Patient oriented x 4  ADL / functional mobility  training as above.   Verbal cues used throughout session for slowing down and walker management.   Educated on ice protocol with 100% teachback.   Educated patient on importance of out of bed mobility and movement/repositioning throughout the day .   100% reciprocation for call light.  Answered inquires in scope.        Patient left up in chair with all lines intact, call button in reach, and nurse notified    GOALS:   Multidisciplinary Problems       Occupational Therapy Goals          Problem: Occupational Therapy    Goal Priority Disciplines Outcome Interventions   Occupational Therapy Goal     OT, PT/OT Progressing    Description: Goals to be met by: 12/10/2024     Patient will increase functional independence with ADLs by performing:    LE Dressing with [place] including retrieval .  Toileting from toilet with [place] for hygiene and clothing management.    Functional mobility to/from toilet with modified [place] with walker.  100% reciprocation of positioning/icing/elevation regimen and task / activity modifications s/p L knee revision to maximize ease of reintegration to home environment                                  Time Tracking:     OT Date of Treatment: 11/13/24  OT Start Time: 1025  OT Stop Time: 1048  OT Total Time (min): 23 min    Billable Minutes:Self Care/Home Management 10  Therapeutic Activity 13    OT/MADAI: OT          11/13/2024   normal...

## 2024-11-13 NOTE — CONSULTS
Consult note  [facility]    Consulted by:Orthopedics  Reason for Consult: medical management    History of Present Illness:  Patient is a 59 y.o. male PMHx of diabetes, HTN, HLD who presents after a left total knee revision. Patient currently denies any fevers, chills, shortness of breath, nausea, vomiting. He states that his left knee is sore about 7/10.     PTA Medications   Medication Sig    atorvastatin (LIPITOR) 40 MG tablet Take 40 mg by mouth once daily.    dulaglutide (TRULICITY) 0.75 mg/0.5 mL pen injector INECT 0.5 ML INTO THE SKIN EVERY 7 DAYS    HYDROcodone-acetaminophen (NORCO) 5-325 mg per tablet Take 1 tablet by mouth every 6 (six) hours as needed for Pain.    metformin (GLUCOPHAGE) 500 MG tablet Take 1,000 mg by mouth 2 (two) times daily with meals.     multivitamin capsule Take 1 capsule by mouth once daily.    PAXLOVID, EUA, 150-100 mg DsPk Take 2 tablets by mouth 2 (two) times daily.    promethazine (PHENERGAN) 25 MG tablet Take 1 tablet (25 mg total) by mouth every 6 (six) hours as needed for Nausea.    ramipriL (ALTACE) 2.5 MG capsule Take 2.5 mg by mouth.    semaglutide (OZEMPIC) 0.25 mg or 0.5 mg(2 mg/1.5 mL) PnIj Inject 0.25 mg into the skin.    solifenacin (VESICARE) 10 MG tablet Take 10 mg by mouth.    tadalafiL (CIALIS) 5 MG tablet Take 5 mg by mouth daily as needed.    WESTUSSIN DM 1-5-10 mg/5 mL Syrp Take 10 mLs by mouth 3 (three) times daily.    zolpidem (AMBIEN) 10 mg Tab Take 10 mg by mouth nightly as needed.       Review of patient's allergies indicates:  No Known Allergies    Past Medical History:   Diagnosis Date    Diabetes mellitus     GERD (gastroesophageal reflux disease)     High cholesterol     Hypertension      Past Surgical History:   Procedure Laterality Date    ANKLE SURGERY      right    ELBOW SURGERY Left     INCISION AND DRAINAGE OF HAND Left 2/8/2023    Procedure: INCISION AND DRAINAGE, HAND;  Surgeon: Adele Colon MD;  Location: [facility];  Service:  Orthopedics;  Laterality: Left;    INSERTION, ELECTRODE LEAD, NEUROSTIMULATOR, PERIPHERAL Left 9/23/2024    Procedure: INSERTION,ELECTRODE LEAD,NEUROSTIMULATOR,PERIPHERAL;  Surgeon: Bony Menezes MD;  Location: [facility];  Service: Pain Management;  Laterality: Left;  curonix, trial    KNEE SURGERY      TOTAL HIP ARTHROPLASTY      ines     Family History   Problem Relation Name Age of Onset    Diabetes Mother      No Known Problems Father       Social History     Tobacco Use    Smoking status: Never    Smokeless tobacco: Never   Substance Use Topics    Alcohol use: No    Drug use: No        Review of Systems:   Review of Systems   Constitutional:  Negative for chills and fever.   HENT: Negative.     Respiratory:  Negative for cough and shortness of breath.    Cardiovascular:  Negative for chest pain and leg swelling.   Gastrointestinal:  Negative for abdominal pain, nausea and vomiting.   Genitourinary:  Negative for dysuria.   Musculoskeletal:         Left knee pain   Skin:  Negative for rash.   Neurological:  Negative for headaches.      OBJECTIVE:     Vital Signs (Most Recent)  Temp: 98.1 °F (36.7 °C) (11/12/24 1548)  Pulse: 82 (11/12/24 1548)  Resp: 20 (11/12/24 1548)  BP: 137/63 (11/12/24 1548)  SpO2: 96 % (11/12/24 1548)    Physical Exam:  Gen- WNWD, NAD  CV- RRR, no LE edema  Resp- breathing comfortably on RA, CTAB  Abd- soft, NTND  Skin- left knee Surgical incision dressing C/D/I. Left knee immobilizer in place in extension with drainage to gravity noted to suction  Neuro - no lower extremity neurological deficits  Psych- logical thought process, answers questions appropriately     Laboratory  Lab Results   Component Value Date    WBC 5.40 10/08/2024    HGB 13.0 (L) 10/08/2024    HCT 41.2 10/08/2024    MCV 87 10/08/2024     10/08/2024      CMP  Sodium   Date Value Ref Range Status   11/12/2024 138 136 - 145 mmol/L Final     Potassium   Date Value Ref Range Status   11/12/2024 4.0 3.5 - 5.1 mmol/L Final      Chloride   Date Value Ref Range Status   11/12/2024 106 95 - 110 mmol/L Final     CO2   Date Value Ref Range Status   11/12/2024 22 (L) 23 - 29 mmol/L Final     Glucose   Date Value Ref Range Status   11/12/2024 153 (H) 70 - 110 mg/dL Final     BUN   Date Value Ref Range Status   11/12/2024 24 (H) 6 - 20 mg/dL Final     Creatinine   Date Value Ref Range Status   11/12/2024 1.6 (H) 0.5 - 1.4 mg/dL Final     Calcium   Date Value Ref Range Status   11/12/2024 8.7 8.7 - 10.5 mg/dL Final     Total Protein   Date Value Ref Range Status   10/08/2024 7.7 6.0 - 8.4 g/dL Final     Albumin   Date Value Ref Range Status   10/08/2024 4.2 3.5 - 5.2 g/dL Final   10/08/2024 4.2 3.5 - 5.2 g/dL Final     Total Bilirubin   Date Value Ref Range Status   10/08/2024 0.5 0.1 - 1.0 mg/dL Final     Comment:     For infants and newborns, interpretation of results should be based  on gestational age, weight and in agreement with clinical  observations.    Premature Infant recommended reference ranges:  Up to 24 hours.............<8.0 mg/dL  Up to 48 hours............<12.0 mg/dL  3-5 days..................<15.0 mg/dL  6-29 days.................<15.0 mg/dL       Alkaline Phosphatase   Date Value Ref Range Status   10/08/2024 70 55 - 135 U/L Final     AST   Date Value Ref Range Status   10/08/2024 25 10 - 40 U/L Final     ALT   Date Value Ref Range Status   10/08/2024 34 10 - 44 U/L Final     Anion Gap   Date Value Ref Range Status   11/12/2024 10 8 - 16 mmol/L Final     eGFR if    Date Value Ref Range Status   05/17/2021 >60.0 >60 mL/min/1.73 m^2 Final     eGFR    Date Value Ref Range Status   07/08/2024 67 mL/min/1.73mSq Final     Comment:     In accordance with NKF-ASN Task Force recommendation, calculation based on the Chronic Kidney Disease Epidemiology Collaboration (CKD-EPI) equation without adjustment for race. eGFR adjusted for gender and age and calculated in ml/min/1.73mSquared. eGFR cannot be  "calculated if patient is under 18 years of age.     Reference Range:   >= 60 ml/min/1.73mSquared.     eGFR if non    Date Value Ref Range Status   05/17/2021 >60.0 >60 mL/min/1.73 m^2 Final     Comment:     Calculation used to obtain the estimated glomerular filtration  rate (eGFR) is the CKD-EPI equation.           Lab Results   Component Value Date    INR 1.0 10/08/2024    INR 0.9 09/10/2024    INR 1.0 06/26/2024      No results for input(s): "CPK", "CPKMB", "TROPONINI", "MB" in the last 168 hours.   No results for input(s): "TROPONINI", "CKTOTAL", "CKMB" in the last 168 hours.    BNP  No results for input(s): "BNP" in the last 168 hours.    Urinalysis  No results for input(s): "COLORU", "CLARITYU", "SPECGRAV", "PHUR", "PROTEINUA", "GLUCOSEU", "BILIRUBINCON", "BLOODU", "WBCU", "RBCU", "BACTERIA", "MUCUS", "NITRITE", "LEUKOCYTESUR", "UROBILINOGEN", "HYALINECASTS" in the last 24 hours.   LAST HbA1c  Lab Results   Component Value Date    HGBA1C 6.0 (H) 10/08/2024         ASSESSMENT/PLAN:   59 y.o.male has a past medical history of Diabetes mellitus, GERD (gastroesophageal reflux disease), High cholesterol, and Hypertension. here for medical management    Plan:   #Left total knee revision  Patient with history of left knee TKA about 1-2 years ago, revision on 11/11/24.     -Continue ASA 81 BID as DVT PPX per ortho  -Continue PT/OT  -Multimodal pain control per ortho  -Ancef 2 gm every 8 hrs per surgery  -11/ 11 Left knee Cultures pending, Gram stain NGTD    #HTN  History of HTN on ramipril 2.5 at home  Blood pressure soft at 104/69 after procedure    -Hold antihypertensives until appropriate to restart  -Monitor vitals    #DM  -SSI  -POCT glucose ACHS  -Started Lantus 5 units due to elevated am glucose  -Glucose goal 140-180    #HLD  -home atorvastatin    Nicci Campoverde MD- 1  [facility]  11/12/2024      "

## 2024-11-14 LAB
ANION GAP SERPL CALC-SCNC: 8 MMOL/L (ref 8–16)
BUN SERPL-MCNC: 21 MG/DL (ref 6–20)
CALCIUM SERPL-MCNC: 8.8 MG/DL (ref 8.7–10.5)
CHLORIDE SERPL-SCNC: 107 MMOL/L (ref 95–110)
CO2 SERPL-SCNC: 26 MMOL/L (ref 23–29)
CREAT SERPL-MCNC: 1.2 MG/DL (ref 0.5–1.4)
EST. GFR  (NO RACE VARIABLE): >60 ML/MIN/1.73 M^2
GLUCOSE SERPL-MCNC: 152 MG/DL (ref 70–110)
POCT GLUCOSE: 108 MG/DL (ref 70–110)
POCT GLUCOSE: 180 MG/DL (ref 70–110)
POCT GLUCOSE: 205 MG/DL (ref 70–110)
POCT GLUCOSE: 213 MG/DL (ref 70–110)
POCT GLUCOSE: 317 MG/DL (ref 70–110)
POTASSIUM SERPL-SCNC: 4.4 MMOL/L (ref 3.5–5.1)
SODIUM SERPL-SCNC: 141 MMOL/L (ref 136–145)

## 2024-11-14 PROCEDURE — 25000003 PHARM REV CODE 250

## 2024-11-14 PROCEDURE — 94761 N-INVAS EAR/PLS OXIMETRY MLT: CPT

## 2024-11-14 PROCEDURE — 94799 UNLISTED PULMONARY SVC/PX: CPT

## 2024-11-14 PROCEDURE — 80048 BASIC METABOLIC PNL TOTAL CA: CPT | Performed by: NURSE PRACTITIONER

## 2024-11-14 PROCEDURE — 63600175 PHARM REV CODE 636 W HCPCS

## 2024-11-14 PROCEDURE — 99900035 HC TECH TIME PER 15 MIN (STAT)

## 2024-11-14 PROCEDURE — 11000001 HC ACUTE MED/SURG PRIVATE ROOM

## 2024-11-14 PROCEDURE — 36415 COLL VENOUS BLD VENIPUNCTURE: CPT | Performed by: NURSE PRACTITIONER

## 2024-11-14 RX ORDER — INSULIN GLARGINE 100 [IU]/ML
5 INJECTION, SOLUTION SUBCUTANEOUS DAILY
Status: DISCONTINUED | OUTPATIENT
Start: 2024-11-14 | End: 2024-11-16 | Stop reason: HOSPADM

## 2024-11-14 RX ORDER — LISINOPRIL 10 MG/1
10 TABLET ORAL DAILY
Status: DISCONTINUED | OUTPATIENT
Start: 2024-11-14 | End: 2024-11-15

## 2024-11-14 RX ADMIN — ATORVASTATIN CALCIUM 40 MG: 40 TABLET, FILM COATED ORAL at 09:11

## 2024-11-14 RX ADMIN — INSULIN GLARGINE 5 UNITS: 100 INJECTION, SOLUTION SUBCUTANEOUS at 09:11

## 2024-11-14 RX ADMIN — INSULIN ASPART 2 UNITS: 100 INJECTION, SOLUTION INTRAVENOUS; SUBCUTANEOUS at 06:11

## 2024-11-14 RX ADMIN — INSULIN ASPART 4 UNITS: 100 INJECTION, SOLUTION INTRAVENOUS; SUBCUTANEOUS at 09:11

## 2024-11-14 RX ADMIN — ACETAMINOPHEN 650 MG: 325 TABLET ORAL at 06:11

## 2024-11-14 RX ADMIN — SENNOSIDES AND DOCUSATE SODIUM 1 TABLET: 8.6; 5 TABLET ORAL at 09:11

## 2024-11-14 RX ADMIN — LISINOPRIL 10 MG: 10 TABLET ORAL at 12:11

## 2024-11-14 RX ADMIN — ASPIRIN 81 MG CHEWABLE TABLET 81 MG: 81 TABLET CHEWABLE at 09:11

## 2024-11-14 RX ADMIN — INSULIN ASPART 4 UNITS: 100 INJECTION, SOLUTION INTRAVENOUS; SUBCUTANEOUS at 12:11

## 2024-11-14 RX ADMIN — ACETAMINOPHEN 650 MG: 325 TABLET ORAL at 11:11

## 2024-11-14 RX ADMIN — ACETAMINOPHEN 650 MG: 325 TABLET ORAL at 05:11

## 2024-11-14 RX ADMIN — FAMOTIDINE 20 MG: 20 TABLET ORAL at 09:11

## 2024-11-14 RX ADMIN — CEFAZOLIN 2 G: 2 INJECTION, POWDER, FOR SOLUTION INTRAMUSCULAR; INTRAVENOUS at 09:11

## 2024-11-14 RX ADMIN — ACETAMINOPHEN 650 MG: 325 TABLET ORAL at 12:11

## 2024-11-14 RX ADMIN — ZOLPIDEM TARTRATE 10 MG: 5 TABLET, COATED ORAL at 09:11

## 2024-11-14 RX ADMIN — CELECOXIB 200 MG: 100 CAPSULE ORAL at 09:11

## 2024-11-14 RX ADMIN — PREGABALIN 75 MG: 75 CAPSULE ORAL at 09:11

## 2024-11-14 RX ADMIN — CEFAZOLIN 2 G: 2 INJECTION, POWDER, FOR SOLUTION INTRAMUSCULAR; INTRAVENOUS at 02:11

## 2024-11-14 RX ADMIN — Medication 200 ML: at 05:11

## 2024-11-14 RX ADMIN — Medication 200 ML: at 06:11

## 2024-11-14 RX ADMIN — Medication 200 ML: at 12:11

## 2024-11-14 RX ADMIN — Medication 200 ML: at 02:11

## 2024-11-14 RX ADMIN — CEFAZOLIN 2 G: 2 INJECTION, POWDER, FOR SOLUTION INTRAMUSCULAR; INTRAVENOUS at 05:11

## 2024-11-14 NOTE — PLAN OF CARE
Chart reviewed / case discussed in am MD rounds   Cx pending - Aav with [facility] is monitoring this case in the event IV abx are required.     POD #3  - L TK Revision   Pt lives with wife Ana Lilia    Pt has a RW, BSC and SC in tub   Therapy recc OP tx - no DME   No hh prior to admit - expects to f/u with OP Therapy post d/c   Cx pending -  Updated notes sent per [facility]   Per therapy notes -  Low intensity (OP Tx); no dme needs   Future Appointments   Date Time Provider Department Center   11/18/2024  9:00 AM Al Quiros, PT [facility]   11/26/2024  9:15 AM Bony Menezes MD [id number]            11/14/24 1437   Post-Acute Status   Post-Acute Authorization Other  (To be determined - Cx are pending)   Discharge Delays   (cx pending)   Discharge Plan   Discharge Plan A Home;Home with family  (home with OP therapy)   Discharge Plan B Home;Home with family;Home Health

## 2024-11-14 NOTE — PLAN OF CARE
[facility] Plan of Care    Patient evaluated at bedside after notified by medicine team and nursing. Palpable PT pulse and DP pulse. PT and DP pulses both dopplerable though weaker compared to contralateral. Both feet equally cool to touch. Decreased sensation over medial knee and lower leg limited to operative area doesn't extend into ankle or foot. SILT SP/DP/T/Kemp/Sa. Motor intact 5/5 EHL/FHL/TA/GCS. No calf tenderness. Not concerned for acute process.    Raman Jane MD  [facility] PGY-3    Received signed and completed form from Physician's Office.      Form is sent to patient's [employer].

## 2024-11-14 NOTE — PLAN OF CARE
Problem: Adult Inpatient Plan of Care  Goal: Patient-Specific Goal (Individualized)  Outcome: Progressing  Goal: Absence of Hospital-Acquired Illness or Injury  Outcome: Progressing     Problem: Diabetes Comorbidity  Goal: Blood Glucose Level Within Targeted Range  Outcome: Progressing     Problem: Wound  Goal: Optimal Functional Ability  Outcome: Progressing     Problem: Fall Injury Risk  Goal: Absence of Fall and Fall-Related Injury  Outcome: Progressing     Problem: Knee Arthroplasty  Goal: Absence of Bleeding  Outcome: Progressing  Goal: Optimal Functional Ability  Outcome: Progressing

## 2024-11-14 NOTE — PROGRESS NOTES
"[facility] Progress Note     59 y.o. male s/p left knee revision TKA  for loosening 11/11/24    S: POD3. AFVSS.  Patient resting comfortably in bed this morning.  Pain well controlled.  No issues overnight.  Denies any numbness, tingling, chest pain, shortness of breath. No issues with brace since adjustment yesterday morning. Minimal to no output from drain over past 24hrs.     O:    Temp:  [97.8 °F (36.6 °C)-98.3 °F (36.8 °C)] 98 °F (36.7 °C)  Pulse:  [68-78] 70  Resp:  [16-20] 18  SpO2:  [96 %-99 %] 96 %  BP: (108-135)/(62-86) 118/80      MSK:   left knee   HKB in place   Dressing C/D/I; no strikethrough, overwrapped with the Ace  Drain in place   TTP at surgical site. No TTP calves  Compartments: soft and compressible   Motor Intact:EHL/FHL/GS/TA  SILT: SP/DP/Sa/Kemp  Pulses:2+ DP  No drain output over last 24 hours.      Labs:  Recent Results (from the past 24 hours)   POCT glucose    Collection Time: 11/13/24 11:50 AM   Result Value Ref Range    POCT Glucose 296 (H) 70 - 110 mg/dL   POCT glucose    Collection Time: 11/13/24  4:25 PM   Result Value Ref Range    POCT Glucose 108 70 - 110 mg/dL   POCT glucose    Collection Time: 11/13/24  8:45 PM   Result Value Ref Range    POCT Glucose 284 (H) 70 - 110 mg/dL   Basic metabolic panel    Collection Time: 11/14/24  4:45 AM   Result Value Ref Range    Sodium 141 136 - 145 mmol/L    Potassium 4.4 3.5 - 5.1 mmol/L    Chloride 107 95 - 110 mmol/L    CO2 26 23 - 29 mmol/L    Glucose 152 (H) 70 - 110 mg/dL    BUN 21 (H) 6 - 20 mg/dL    Creatinine 1.2 0.5 - 1.4 mg/dL    Calcium 8.8 8.7 - 10.5 mg/dL    Anion Gap 8 8 - 16 mmol/L    eGFR >60 >60 mL/min/1.73 m^2   POCT glucose    Collection Time: 11/14/24  5:28 AM   Result Value Ref Range    POCT Glucose 180 (H) 70 - 110 mg/dL       No results for input(s): "WBC", "HGB", "HCT", "PLT" in the last 72 hours.  Recent Labs     11/14/24  0445      K 4.4      CO2 26   BUN 21*   *     No results for " "input(s): "ESR", "CRP" in the last 72 hours.    Intraop cultures no growth to date     Imaging:  Postop x-ray left knee reveals status post revision TKA with the hardware in place without complication     Assessment/Plan:   59 y.o. male s/p left knee revision TKA for loosening 11/11/24    WBAT LLE   Ok to discontinue HKB use  Drain with no output over past 24hrs, will pull today  Follow up intra-op cultures, no growth to date  IV ancef q8 until cultures result/ finalize  PT/OT  Family medicine consulted for medial comanagement, appreciate help with medical care  CM for discharge planning      Raman Jane MD  [facility]  11/14/2024 9:25 AM   "

## 2024-11-14 NOTE — PT/OT/SLP PROGRESS
"Physical Therapy      Patient Name:  Venancio Barragan   MRN:  [id number]  [id number]  Patient not seen today secondary to Other (Comment). At 1240, arrived at pt's room; pt reported that the MD had removed the HKB; pt showed how he could actively bend his knee; pt also reported that when the MD pulled out the drain, the end of the tube was clogged and the site bled but the "doctor put some medication on it and covered it with gauze." Patient reported that he could not feel the inner part of his left knee down to his foot; the knee/leg did not appear to have any overt swelling/redness; pt able to move his toes/ankle without difficulty; layed hands on both of the patient's legs and ran them down to his feet/toes; noted L foot cooler than R with the coolness starting around the lower midcalf; checked manually for pulses and the DP pulse on R foot was strong but unable to palpate L DP; immediately notified nurse Jarrod who then came to check the pulse with a doppler; Jarrod unable to get a pulse with the doppler so called the charge nurse to verify; doppler carried out-able to find a good PT pulse but not the DP pulse L; DP pulse R strong; nurse informed ortho MD and family medicine.    "

## 2024-11-14 NOTE — PT/OT/SLP PROGRESS
Occupational Therapy      Patient Name:  Venancio Barragan   MRN:  [id number]    Patient not seen today secondary to Nurse/ GRIS hold (BLE ultrasound ordered; RN reports hold therapy til imaging complete.). Will follow-up.    11/14/2024

## 2024-11-14 NOTE — PROGRESS NOTES
Consult note  [facility]    Consulted by:Orthopedics  Reason for Consult: medical management    History of Present Illness:  Patient is a 59 y.o. male PMHx of diabetes, HTN, HLD who presents after a left total knee revision.     Interval history: patient endorsing improvement of pain, working well with PT. Denies fevers, chills, nausea, vomiting. Pain currently tolerable.     PTA Medications   Medication Sig    atorvastatin (LIPITOR) 40 MG tablet Take 40 mg by mouth once daily.    dulaglutide (TRULICITY) 0.75 mg/0.5 mL pen injector INECT 0.5 ML INTO THE SKIN EVERY 7 DAYS    HYDROcodone-acetaminophen (NORCO) 5-325 mg per tablet Take 1 tablet by mouth every 6 (six) hours as needed for Pain.    metformin (GLUCOPHAGE) 500 MG tablet Take 1,000 mg by mouth 2 (two) times daily with meals.     multivitamin capsule Take 1 capsule by mouth once daily.    PAXLOVID, EUA, 150-100 mg DsPk Take 2 tablets by mouth 2 (two) times daily.    promethazine (PHENERGAN) 25 MG tablet Take 1 tablet (25 mg total) by mouth every 6 (six) hours as needed for Nausea.    ramipriL (ALTACE) 2.5 MG capsule Take 2.5 mg by mouth.    semaglutide (OZEMPIC) 0.25 mg or 0.5 mg(2 mg/1.5 mL) PnIj Inject 0.25 mg into the skin.    solifenacin (VESICARE) 10 MG tablet Take 10 mg by mouth.    tadalafiL (CIALIS) 5 MG tablet Take 5 mg by mouth daily as needed.    WESTUSSIN DM 1-5-10 mg/5 mL Syrp Take 10 mLs by mouth 3 (three) times daily.    zolpidem (AMBIEN) 10 mg Tab Take 10 mg by mouth nightly as needed.       Review of patient's allergies indicates:  No Known Allergies    Past Medical History:   Diagnosis Date    Diabetes mellitus     GERD (gastroesophageal reflux disease)     High cholesterol     Hypertension      Past Surgical History:   Procedure Laterality Date    ANKLE SURGERY      right    ELBOW SURGERY Left     INCISION AND DRAINAGE OF HAND Left 2/8/2023    Procedure: INCISION AND DRAINAGE, HAND;  Surgeon: Adele Colon MD;  Location: [facility] OR;   Service: Orthopedics;  Laterality: Left;    INSERTION, ELECTRODE LEAD, NEUROSTIMULATOR, PERIPHERAL Left 9/23/2024    Procedure: INSERTION,ELECTRODE LEAD,NEUROSTIMULATOR,PERIPHERAL;  Surgeon: Bony Menezes MD;  Location: [facility] OR;  Service: Pain Management;  Laterality: Left;  curonix, trial    KNEE SURGERY      REVISION OF KNEE ARTHROPLASTY Left 11/11/2024    Procedure: REVISION, ARTHROPLASTY, KNEE;  Surgeon: Bony Menezes MD;  Location: [facility] OR;  Service: Orthopedics;  Laterality: Left;  bmi - 26.78 marcin cement removal, depuy revision tka   Richard will be available by phone if needed for misonix -[phone]    TOTAL HIP ARTHROPLASTY      ines     Family History   Problem Relation Name Age of Onset    Diabetes Mother      No Known Problems Father       Social History     Tobacco Use    Smoking status: Never    Smokeless tobacco: Never   Substance Use Topics    Alcohol use: No    Drug use: No        Review of Systems:   Review of Systems   Constitutional:  Negative for chills and fever.   HENT:  Negative for ear discharge and ear pain.    Eyes:  Negative for photophobia.   Respiratory:  Negative for cough and shortness of breath.    Cardiovascular:  Negative for chest pain and leg swelling.   Gastrointestinal:  Negative for abdominal pain, nausea and vomiting.   Genitourinary:  Negative for dysuria.   Musculoskeletal:         Left knee pain   Skin:  Negative for rash.   Neurological:  Negative for dizziness and headaches.      OBJECTIVE:     Vital Signs (Most Recent)  Temp: 97.8 °F (36.6 °C) (11/14/24 1122)  Pulse: 66 (11/14/24 1122)  Resp: 18 (11/14/24 1122)  BP: (!) 146/87 (11/14/24 1122)  SpO2: 99 % (11/14/24 1122)    Physical Exam:  Gen- WNWD, NAD  CV- RRR, no LE edema  Resp- breathing comfortably on RA, CTAB  Abd- soft, NTND  Skin- left knee Surgical incision dressing C/D/I  Neuro - no lower extremity neurological deficits  Psych- logical thought process, answers questions appropriately     Laboratory  Lab  Results   Component Value Date    WBC 5.40 10/08/2024    HGB 13.0 (L) 10/08/2024    HCT 41.2 10/08/2024    MCV 87 10/08/2024     10/08/2024      CMP  Sodium   Date Value Ref Range Status   11/14/2024 141 136 - 145 mmol/L Final     Potassium   Date Value Ref Range Status   11/14/2024 4.4 3.5 - 5.1 mmol/L Final     Chloride   Date Value Ref Range Status   11/14/2024 107 95 - 110 mmol/L Final     CO2   Date Value Ref Range Status   11/14/2024 26 23 - 29 mmol/L Final     Glucose   Date Value Ref Range Status   11/14/2024 152 (H) 70 - 110 mg/dL Final     BUN   Date Value Ref Range Status   11/14/2024 21 (H) 6 - 20 mg/dL Final     Creatinine   Date Value Ref Range Status   11/14/2024 1.2 0.5 - 1.4 mg/dL Final     Calcium   Date Value Ref Range Status   11/14/2024 8.8 8.7 - 10.5 mg/dL Final     Total Protein   Date Value Ref Range Status   10/08/2024 7.7 6.0 - 8.4 g/dL Final     Albumin   Date Value Ref Range Status   10/08/2024 4.2 3.5 - 5.2 g/dL Final   10/08/2024 4.2 3.5 - 5.2 g/dL Final     Total Bilirubin   Date Value Ref Range Status   10/08/2024 0.5 0.1 - 1.0 mg/dL Final     Comment:     For infants and newborns, interpretation of results should be based  on gestational age, weight and in agreement with clinical  observations.    Premature Infant recommended reference ranges:  Up to 24 hours.............<8.0 mg/dL  Up to 48 hours............<12.0 mg/dL  3-5 days..................<15.0 mg/dL  6-29 days.................<15.0 mg/dL       Alkaline Phosphatase   Date Value Ref Range Status   10/08/2024 70 55 - 135 U/L Final     AST   Date Value Ref Range Status   10/08/2024 25 10 - 40 U/L Final     ALT   Date Value Ref Range Status   10/08/2024 34 10 - 44 U/L Final     Anion Gap   Date Value Ref Range Status   11/14/2024 8 8 - 16 mmol/L Final     eGFR if    Date Value Ref Range Status   05/17/2021 >60.0 >60 mL/min/1.73 m^2 Final     eGFR    Date Value Ref Range Status   07/08/2024  "67 mL/min/1.73mSq Final     Comment:     In accordance with NKF-ASN Task Force recommendation, calculation based on the Chronic Kidney Disease Epidemiology Collaboration (CKD-EPI) equation without adjustment for race. eGFR adjusted for gender and age and calculated in ml/min/1.73mSquared. eGFR cannot be calculated if patient is under 18 years of age.     Reference Range:   >= 60 ml/min/1.73mSquared.     eGFR if non    Date Value Ref Range Status   05/17/2021 >60.0 >60 mL/min/1.73 m^2 Final     Comment:     Calculation used to obtain the estimated glomerular filtration  rate (eGFR) is the CKD-EPI equation.           Lab Results   Component Value Date    INR 1.0 10/08/2024    INR 0.9 09/10/2024    INR 1.0 06/26/2024      No results for input(s): "CPK", "CPKMB", "TROPONINI", "MB" in the last 168 hours.   No results for input(s): "TROPONINI", "CKTOTAL", "CKMB" in the last 168 hours.    BNP  No results for input(s): "BNP" in the last 168 hours.    Urinalysis  No results for input(s): "COLORU", "CLARITYU", "SPECGRAV", "PHUR", "PROTEINUA", "GLUCOSEU", "BILIRUBINCON", "BLOODU", "WBCU", "RBCU", "BACTERIA", "MUCUS", "NITRITE", "LEUKOCYTESUR", "UROBILINOGEN", "HYALINECASTS" in the last 24 hours.   LAST HbA1c  Lab Results   Component Value Date    HGBA1C 6.0 (H) 10/08/2024         ASSESSMENT/PLAN:   59 y.o.male has a past medical history of Diabetes mellitus, GERD (gastroesophageal reflux disease), High cholesterol, and Hypertension. here for medical management    Plan:   #Left total knee revision  Patient with history of left knee TKA about 1-2 years ago, revision on 11/11/24.     -ASA 81 BID as DVT PPX per ortho  -PT/OT  -Multimodal pain control per ortho    #HTN  History of HTN on ramipril 2.5 at home  Blood pressure soft at 104/69 after procedure  Blood pressure 146/87 this morning  On ramipril 2.5 at home    -restart home dose equivalent of lisinopril 10 while inpatient.  -hold lisinopril if patient gets " hypotensive    #DM  History of diabetes, not on insulin at home  At home managed with metformin and ozempic    Plan:  -hold home antihyperglycemics while inpatient  -5u lantus daily while inpatient  -SSI while inpatient  -POCT glucose ACHS  -discontinue insulin on discharge and resume home medications on discharge    #HLD  -home atorvastatin    ~~~~~~~~addendum~~~~~~~~~~   Family medicine will continue to follow with the patient due to new complaints about left leg pain and numbness    Mihcael Lora MD  [facility], PGY-2  11/14/2024

## 2024-11-14 NOTE — PLAN OF CARE
Pt is AAOx4. Pt is on RA. Diabetic diet. Urinal at bedside. Drain and brace removed today. Pt reports numbness to L leg. US performed. Glucose monitoring. Care administered as ordered. Call bell within reach.

## 2024-11-14 NOTE — CARE UPDATE
Notified by nurse that the pt's left foot has PT pulse by doppler and unable to obtain DP pulses. Pt reporting that he has numbness at the lateral aspect of the left knee but sensations are intact to both feet but denies any pain or discomfort.   Physical finding significant for no palpable pulses to thel Left foot to the PT/DP.  Foot is cool to touch without color change.  Skin without mottling or paleness noted.  Femoral pulses noted bilaterally. Right Foot with noted PT and DP pulses bilaterally without any skin changes.   US LE Bilaterally was ordered and Dr. Boles, orthopedic was notified of pt status.  No further interventions at this time. Will continue to follow this patient for medical management.    _____________________________  Nicci Church MD, PGY-1  [facility]        left  hand  /wrist/DEFORMITY/JOINT SWELLING/PAIN

## 2024-11-15 LAB
ANION GAP SERPL CALC-SCNC: 10 MMOL/L (ref 8–16)
BUN SERPL-MCNC: 23 MG/DL (ref 6–20)
CALCIUM SERPL-MCNC: 9 MG/DL (ref 8.7–10.5)
CHLORIDE SERPL-SCNC: 108 MMOL/L (ref 95–110)
CO2 SERPL-SCNC: 22 MMOL/L (ref 23–29)
CREAT SERPL-MCNC: 1.5 MG/DL (ref 0.5–1.4)
EST. GFR  (NO RACE VARIABLE): 53 ML/MIN/1.73 M^2
GLUCOSE SERPL-MCNC: 145 MG/DL (ref 70–110)
POCT GLUCOSE: 157 MG/DL (ref 70–110)
POCT GLUCOSE: 166 MG/DL (ref 70–110)
POCT GLUCOSE: 203 MG/DL (ref 70–110)
POCT GLUCOSE: 214 MG/DL (ref 70–110)
POCT GLUCOSE: 276 MG/DL (ref 70–110)
POTASSIUM SERPL-SCNC: 5 MMOL/L (ref 3.5–5.1)
SODIUM SERPL-SCNC: 140 MMOL/L (ref 136–145)

## 2024-11-15 PROCEDURE — 97110 THERAPEUTIC EXERCISES: CPT

## 2024-11-15 PROCEDURE — 25000003 PHARM REV CODE 250

## 2024-11-15 PROCEDURE — 80048 BASIC METABOLIC PNL TOTAL CA: CPT | Performed by: NURSE PRACTITIONER

## 2024-11-15 PROCEDURE — 36415 COLL VENOUS BLD VENIPUNCTURE: CPT | Performed by: NURSE PRACTITIONER

## 2024-11-15 PROCEDURE — 99900035 HC TECH TIME PER 15 MIN (STAT)

## 2024-11-15 PROCEDURE — 97116 GAIT TRAINING THERAPY: CPT

## 2024-11-15 PROCEDURE — 11000001 HC ACUTE MED/SURG PRIVATE ROOM

## 2024-11-15 PROCEDURE — 97535 SELF CARE MNGMENT TRAINING: CPT

## 2024-11-15 PROCEDURE — 94761 N-INVAS EAR/PLS OXIMETRY MLT: CPT

## 2024-11-15 PROCEDURE — 63600175 PHARM REV CODE 636 W HCPCS

## 2024-11-15 PROCEDURE — 97530 THERAPEUTIC ACTIVITIES: CPT

## 2024-11-15 RX ADMIN — SENNOSIDES AND DOCUSATE SODIUM 1 TABLET: 8.6; 5 TABLET ORAL at 08:11

## 2024-11-15 RX ADMIN — ACETAMINOPHEN 650 MG: 325 TABLET ORAL at 11:11

## 2024-11-15 RX ADMIN — PREGABALIN 75 MG: 75 CAPSULE ORAL at 09:11

## 2024-11-15 RX ADMIN — Medication 200 ML: at 05:11

## 2024-11-15 RX ADMIN — ACETAMINOPHEN 650 MG: 325 TABLET ORAL at 03:11

## 2024-11-15 RX ADMIN — Medication 200 ML: at 09:11

## 2024-11-15 RX ADMIN — INSULIN ASPART 2 UNITS: 100 INJECTION, SOLUTION INTRAVENOUS; SUBCUTANEOUS at 11:11

## 2024-11-15 RX ADMIN — INSULIN ASPART 6 UNITS: 100 INJECTION, SOLUTION INTRAVENOUS; SUBCUTANEOUS at 08:11

## 2024-11-15 RX ADMIN — PREGABALIN 75 MG: 75 CAPSULE ORAL at 08:11

## 2024-11-15 RX ADMIN — CEFAZOLIN 2 G: 2 INJECTION, POWDER, FOR SOLUTION INTRAMUSCULAR; INTRAVENOUS at 11:11

## 2024-11-15 RX ADMIN — INSULIN ASPART 4 UNITS: 100 INJECTION, SOLUTION INTRAVENOUS; SUBCUTANEOUS at 04:11

## 2024-11-15 RX ADMIN — Medication 200 ML: at 11:11

## 2024-11-15 RX ADMIN — ASPIRIN 81 MG CHEWABLE TABLET 81 MG: 81 TABLET CHEWABLE at 08:11

## 2024-11-15 RX ADMIN — ATORVASTATIN CALCIUM 40 MG: 40 TABLET, FILM COATED ORAL at 09:11

## 2024-11-15 RX ADMIN — INSULIN ASPART 2 UNITS: 100 INJECTION, SOLUTION INTRAVENOUS; SUBCUTANEOUS at 05:11

## 2024-11-15 RX ADMIN — ACETAMINOPHEN 650 MG: 325 TABLET ORAL at 05:11

## 2024-11-15 RX ADMIN — CEFAZOLIN 2 G: 2 INJECTION, POWDER, FOR SOLUTION INTRAMUSCULAR; INTRAVENOUS at 05:11

## 2024-11-15 RX ADMIN — Medication 200 ML: at 12:11

## 2024-11-15 RX ADMIN — CELECOXIB 200 MG: 100 CAPSULE ORAL at 08:11

## 2024-11-15 RX ADMIN — CEFAZOLIN 2 G: 2 INJECTION, POWDER, FOR SOLUTION INTRAMUSCULAR; INTRAVENOUS at 03:11

## 2024-11-15 RX ADMIN — CELECOXIB 200 MG: 100 CAPSULE ORAL at 09:11

## 2024-11-15 RX ADMIN — ASPIRIN 81 MG CHEWABLE TABLET 81 MG: 81 TABLET CHEWABLE at 09:11

## 2024-11-15 RX ADMIN — FAMOTIDINE 20 MG: 20 TABLET ORAL at 08:11

## 2024-11-15 RX ADMIN — INSULIN GLARGINE 5 UNITS: 100 INJECTION, SOLUTION SUBCUTANEOUS at 10:11

## 2024-11-15 RX ADMIN — Medication 200 ML: at 08:11

## 2024-11-15 RX ADMIN — Medication 200 ML: at 03:11

## 2024-11-15 NOTE — PLAN OF CARE
Problem: Adult Inpatient Plan of Care  Goal: Plan of Care Review  Outcome: Progressing     Problem: Diabetes Comorbidity  Goal: Blood Glucose Level Within Targeted Range  Outcome: Progressing     Problem: Wound  Goal: Optimal Coping  Outcome: Progressing   AAOx4,On room air. Nightly medications administered as ordered. Surgical incision to the L knee remain clean dry and intact. VSS. Safety precautions secured.

## 2024-11-15 NOTE — PT/OT/SLP PROGRESS
Physical Therapy Treatment    Patient Name:  Venancio Barragan   MRN:  [id number]    Recommendations:     Discharge Recommendations: Low Intensity Therapy (OP when cleared by ortho)  Discharge Equipment Recommendations: none  Barriers to discharge: None    Assessment:     Venancio Barragan is a 59 y.o. male admitted with a medical diagnosis of S/P revision of total knee, left.  He presents with the following impairments/functional limitations: weakness, impaired functional mobility, gait instability, orthopedic precautions, decreased ROM, decreased lower extremity function ; he still has the medial knee numbness that extends downward to just above the medial malleolus; he has poor quad control so I am using the brace while he is ambulating then removing it the rest of the time; he is still concerned about the weak blood flow in the DP; will cont with POC .    Rehab Prognosis: Good; patient would benefit from acute skilled PT services to address these deficits and reach maximum level of function.    Recent Surgery: Procedure(s) (LRB):  REVISION, ARTHROPLASTY, KNEE (Left) 4 Days Post-Op    Plan:     During this hospitalization, patient to be seen daily to address the identified rehab impairments via gait training, therapeutic activities, therapeutic exercises, neuromuscular re-education and progress toward the following goals:    Plan of Care Expires:  12/12/24    Subjective     Chief Complaint: no overt complaints, just concerned about his blood flow in LLE  Patient/Family Comments/goals: pt with increased desire to be able to go home  Pain/Comfort:  Pain Rating 1: 2/10 (at rest)  Location - Side 1: Left  Location - Orientation 1: posterior  Location 1: knee (with amb, feels burning behind L knee)  Pain Addressed 1: Reposition, Distraction, Cessation of Activity  Pain Rating Post-Intervention 1:  (not rated)      Objective:     Communicated with nurse prior to session.  Patient found HOB elevated with SCD upon PT entry to room.      General Precautions: Standard, fall  Orthopedic Precautions: LLE weight bearing as tolerated  Braces:  (using hinged knee brace for amb 2/2 poor quad control)  Respiratory Status: Room air     Functional Mobility:  Bed Mobility:     Scooting: modified independence  Supine to Sit: modified independence/Supervision  Sit to Supine: modified independence/Supervision  Transfers:     Sit to Stand:  supervision and stand by assistance with rolling walker; VCs for hand placeement; applied L HKB for support 2/2 poor quad control  Gait: Patient amb ~150ft with SBA using RW and 2pt gait with good posture, RW proximity and fair step length       AM-PAC 6 CLICK MOBILITY  Turning over in bed (including adjusting bedclothes, sheets and blankets)?: 4  Sitting down on and standing up from a chair with arms (e.g., wheelchair, bedside commode, etc.): 3  Moving from lying on back to sitting on the side of the bed?: 4  Moving to and from a bed to a chair (including a wheelchair)?: 3  Need to walk in hospital room?: 3  Climbing 3-5 steps with a railing?: 3  Basic Mobility Total Score: 20       Treatment & Education:  [id number]  -pt performed activities as described above  -performed supine QS in coordination with pursed lip breathing x 10, SLR x 5 reps with assist 2/2 poor quad control; unable to actively extend knee; able to elicit very minimal contraction~trace  -transitioned to EOB and performed eccentric quads 2 x 10, concentric x 10, 2 x 5 reps seated SLR- all with assistance; foot slides on floor with L foot on towel to reduce friction 15 reps- minimal compensation with trunk  -knee fl~35-40* while supine  -applied HKB and amb as described above  -doffed HKB and returned to bed   -pt concerned about blood flow in L foot; unable to palpate DP pulse or discern it with doppler as could TP, but tried to reassure pt that the US showed blood flow; secure chatted ortho MDs with pt's status and concerns    [id number]  -instructed pt in  use of sheet for self assisted heel slides, SLR with return demonstration; also educated in self assisted FAQ with good verbal understanding  -educated in how to elevate lower leg to keep knee extended in bed    Patient left HOB elevated with all lines intact, call button in reach, bed alarm on, and nurse notified..    GOALS:   Multidisciplinary Problems       Physical Therapy Goals          Problem: Physical Therapy    Goal Priority Disciplines Outcome Interventions   Physical Therapy Goal     PT, PT/OT Progressing    Description: Goals to be met by: 2024     Patient will increase functional independence with mobility by performin. Sit to stand transfer with Modified [place] using Rolling Walker  2. Bed to chair transfer with Modified [place] using Rolling Walker  3. Gait  x 150 feet with Modified [place] using Rolling Walker.   4. Ascend/descend 3 stair with left Handrails Supervision  5. After drain removed, range of L  knee as tolerated                         Time Tracking:     PT Received On: 11/15/24  PT Start Time: 1025   1532  PT Stop Time: 1130    1547  PT Total Time (min): 65 min , 15 min    Billable Minutes: Gait Training 15, Therapeutic Activity 10, and Therapeutic Exercise 25, Self Care 15    Treatment Type: Treatment  PT/PTA: PT     Number of PTA visits since last PT visit: 0     11/15/2024

## 2024-11-15 NOTE — PLAN OF CARE
Patient is awake and alert. Patient given medications as ordered per MAR. IV push antibiotics given as scheduled. Blood glucose monitoring in place. PRN Tylenol given for pain and inflammation. Urinal at bedside. Safety maintained. Instructed to use call light for assistance.         Problem: Adult Inpatient Plan of Care  Goal: Readiness for Transition of Care  Outcome: Progressing     Problem: Diabetes Comorbidity  Goal: Blood Glucose Level Within Targeted Range  Outcome: Progressing     Problem: Wound  Goal: Optimal Functional Ability  Outcome: Progressing

## 2024-11-15 NOTE — PROGRESS NOTES
CM communicated per secure chat with Orthopedic Surgeon - Dr. Jane -- as of today - determination of need for IV abx is pending cx - results likely available tomorrow afternoon (Sat)   CM spoke with Ava with [facility] -- [phone] - she is continuing to monitor pt - she will reach out to him to let him know the infusion co will be able to assist with IV abx should they be needed.     Pt has been accepted by [facility] - [facility] L[facility]  -- Ava updated   In the event pt needs IV abx - pt info sent to numerous [facility].     PICC will be needed for home IV abx - Surgeon advised.    Future Appointments   Date Time Provider Department Center   11/18/2024  9:00 AM Al Quiros, PT [facility]   11/26/2024  9:15 AM Bony Menezes MD [id number] [address] [place]

## 2024-11-15 NOTE — PROGRESS NOTES
"[facility] Orthopaedic Surgery Progress Note     59 y.o. male s/p left knee revision TKA  for loosening 11/11/24    S: POD4. AFVSS.  Patient resting comfortably in bed this morning.  Pain well controlled.   Patient reports noticing some numbness over medial knee and lower leg that does not extend into ankle/ foot after hinged knee brace removed yesterday, unchanged today.    Patient noted to have decreased pulses yesterday by nursing and Medicine team.  Good perfusion and palpable pulses though faint when examined by ortho team shortly after.  Reports he has been working on light knee range of motion since brace removed.  Drain also removed yesterday. Denies chest pain or shortness of breath.  Reports he is eager to go home when able.      O:    Temp:  [97.7 °F (36.5 °C)-98 °F (36.7 °C)] 97.7 °F (36.5 °C)  Pulse:  [66-82] 71  Resp:  [18] 18  SpO2:  [95 %-99 %] 96 %  BP: ()/(59-87) 99/59      MSK:   left knee   Midline and drain site Dressing C/D/I  TTP at surgical site. No TTP calves  Compartments: soft and compressible   Motor Intact 5/5 strength:EHL/FHL/GS/TA  Decreased sensation over medial knee and lower leg not extend into ankle/foot  SILT: SP/DP/Sa/Kemp  Faint palpable DP and PT, foot warm and well perfused, cap refill less than 2 seconds      Labs:  Recent Results (from the past 24 hours)   POCT glucose    Collection Time: 11/14/24 11:24 AM   Result Value Ref Range    POCT Glucose 205 (H) 70 - 110 mg/dL   POCT glucose    Collection Time: 11/14/24  4:00 PM   Result Value Ref Range    POCT Glucose 108 70 - 110 mg/dL   POCT glucose    Collection Time: 11/14/24  8:26 PM   Result Value Ref Range    POCT Glucose 317 (H) 70 - 110 mg/dL   POCT glucose    Collection Time: 11/14/24 11:16 PM   Result Value Ref Range    POCT Glucose 213 (H) 70 - 110 mg/dL   POCT glucose    Collection Time: 11/15/24  5:19 AM   Result Value Ref Range    POCT Glucose 166 (H) 70 - 110 mg/dL       No results for input(s): "WBC", "HGB", "HCT", " ""PLT" in the last 72 hours.  Recent Labs     11/14/24  0445      K 4.4      CO2 26   BUN 21*   *     No results for input(s): "ESR", "CRP" in the last 72 hours.    Intraop cultures no growth to date     Imaging:  Postop x-ray left knee reveals status post revision TKA with the hardware in place without complication     Assessment/Plan:   59 y.o. male s/p left knee revision TKA for loosening 11/11/24    WBAT LLE   Follow up intra-op cultures, no growth to date  IV ancef q8 until cultures result/ finalize  PT/OT  Family medicine consulted for medial comanagement, appreciate help with medical care  CM for discharge planning  Plan for discharge over the weekend after cultures finalized pending no growth      Raman Jane MD  [employer] [facility]opaedic [facility]  11/15/2024 9:25 AM   "

## 2024-11-16 VITALS
OXYGEN SATURATION: 98 % | HEIGHT: 65 IN | DIASTOLIC BLOOD PRESSURE: 66 MMHG | TEMPERATURE: 98 F | SYSTOLIC BLOOD PRESSURE: 102 MMHG | WEIGHT: 189.63 LBS | HEART RATE: 79 BPM | RESPIRATION RATE: 20 BRPM | BODY MASS INDEX: 31.59 KG/M2

## 2024-11-16 LAB
ANION GAP SERPL CALC-SCNC: 9 MMOL/L (ref 8–16)
BACTERIA FLD CULT: NORMAL
BACTERIA SPEC AEROBE CULT: NO GROWTH
BUN SERPL-MCNC: 20 MG/DL (ref 6–20)
CALCIUM SERPL-MCNC: 9.1 MG/DL (ref 8.7–10.5)
CHLORIDE SERPL-SCNC: 105 MMOL/L (ref 95–110)
CO2 SERPL-SCNC: 26 MMOL/L (ref 23–29)
CREAT SERPL-MCNC: 1.3 MG/DL (ref 0.5–1.4)
EST. GFR  (NO RACE VARIABLE): >60 ML/MIN/1.73 M^2
GLUCOSE SERPL-MCNC: 154 MG/DL (ref 70–110)
POCT GLUCOSE: 151 MG/DL (ref 70–110)
POCT GLUCOSE: 157 MG/DL (ref 70–110)
POCT GLUCOSE: 251 MG/DL (ref 70–110)
POTASSIUM SERPL-SCNC: 4.8 MMOL/L (ref 3.5–5.1)
SODIUM SERPL-SCNC: 140 MMOL/L (ref 136–145)

## 2024-11-16 PROCEDURE — 97116 GAIT TRAINING THERAPY: CPT | Mod: CQ

## 2024-11-16 PROCEDURE — 25000003 PHARM REV CODE 250

## 2024-11-16 PROCEDURE — 63600175 PHARM REV CODE 636 W HCPCS

## 2024-11-16 PROCEDURE — 97530 THERAPEUTIC ACTIVITIES: CPT | Mod: CQ

## 2024-11-16 PROCEDURE — 80048 BASIC METABOLIC PNL TOTAL CA: CPT | Performed by: NURSE PRACTITIONER

## 2024-11-16 PROCEDURE — 36415 COLL VENOUS BLD VENIPUNCTURE: CPT | Performed by: NURSE PRACTITIONER

## 2024-11-16 PROCEDURE — 97110 THERAPEUTIC EXERCISES: CPT | Mod: CQ

## 2024-11-16 RX ORDER — CEPHALEXIN 500 MG/1
500 CAPSULE ORAL EVERY 6 HOURS
Qty: 28 CAPSULE | Refills: 0 | Status: SHIPPED | OUTPATIENT
Start: 2024-11-16 | End: 2024-11-23

## 2024-11-16 RX ADMIN — ASPIRIN 81 MG CHEWABLE TABLET 81 MG: 81 TABLET CHEWABLE at 08:11

## 2024-11-16 RX ADMIN — ACETAMINOPHEN 650 MG: 325 TABLET ORAL at 12:11

## 2024-11-16 RX ADMIN — ZOLPIDEM TARTRATE 10 MG: 5 TABLET, COATED ORAL at 01:11

## 2024-11-16 RX ADMIN — CEFAZOLIN 2 G: 2 INJECTION, POWDER, FOR SOLUTION INTRAMUSCULAR; INTRAVENOUS at 06:11

## 2024-11-16 RX ADMIN — CELECOXIB 200 MG: 100 CAPSULE ORAL at 08:11

## 2024-11-16 RX ADMIN — INSULIN ASPART 6 UNITS: 100 INJECTION, SOLUTION INTRAVENOUS; SUBCUTANEOUS at 04:11

## 2024-11-16 RX ADMIN — SENNOSIDES AND DOCUSATE SODIUM 1 TABLET: 8.6; 5 TABLET ORAL at 08:11

## 2024-11-16 RX ADMIN — FAMOTIDINE 20 MG: 20 TABLET ORAL at 08:11

## 2024-11-16 RX ADMIN — Medication 200 ML: at 11:11

## 2024-11-16 RX ADMIN — INSULIN ASPART 2 UNITS: 100 INJECTION, SOLUTION INTRAVENOUS; SUBCUTANEOUS at 01:11

## 2024-11-16 RX ADMIN — PREGABALIN 75 MG: 75 CAPSULE ORAL at 08:11

## 2024-11-16 RX ADMIN — ATORVASTATIN CALCIUM 40 MG: 40 TABLET, FILM COATED ORAL at 08:11

## 2024-11-16 RX ADMIN — ACETAMINOPHEN 650 MG: 325 TABLET ORAL at 05:11

## 2024-11-16 RX ADMIN — INSULIN GLARGINE 5 UNITS: 100 INJECTION, SOLUTION SUBCUTANEOUS at 08:11

## 2024-11-16 RX ADMIN — Medication 200 ML: at 08:11

## 2024-11-16 NOTE — SUBJECTIVE & OBJECTIVE
"Interval History: ***    Review of Systems  Objective:     Vital Signs (Most Recent):  Temp: 98 °F (36.7 °C) (11/16/24 1554)  Pulse: 79 (11/16/24 1554)  Resp: 20 (11/16/24 1554)  BP: 102/66 (11/16/24 1554)  SpO2: 98 % (11/16/24 1554) Vital Signs (24h Range):  Temp:  [97.6 °F (36.4 °C)-98.4 °F (36.9 °C)] 98 °F (36.7 °C)  Pulse:  [67-79] 79  Resp:  [18-21] 20  SpO2:  [95 %-99 %] 98 %  BP: (102-153)/(65-82) 102/66     Weight: 86 kg (189 lb 9.5 oz)  Body mass index is 31.55 kg/m².    Intake/Output Summary (Last 24 hours) at 11/16/2024 1606  Last data filed at 11/16/2024 1230  Gross per 24 hour   Intake 1100 ml   Output 1875 ml   Net -775 ml         Physical Exam        Significant Labs: {Results:30667::"All pertinent labs within the past 24 hours have been reviewed."}    Significant Imaging: {Imaging Review:66077::"I have reviewed all pertinent imaging results/findings within the past 24 hours."}  "

## 2024-11-16 NOTE — PLAN OF CARE
Problem: Adult Inpatient Plan of Care  Goal: Plan of Care Review  Outcome: Progressing     Problem: Diabetes Comorbidity  Goal: Blood Glucose Level Within Targeted Range  Outcome: Progressing     Problem: Knee Arthroplasty  Goal: Optimal Coping  Outcome: Progressing     Problem: Knee Arthroplasty  Goal: Fluid and Electrolyte Balance  Outcome: Progressing   Nightly and prn medication given as ordered.BG was monitored and given sliding scale insulin. Patient IV is due to change hence, patient refused to get new IV site change d/t anticipated discharge and he does not want to be stuck by needle again. VSS. L knee surgical site remain clean dry and intact. All safety precautions secured On going monitoring.

## 2024-11-16 NOTE — PLAN OF CARE
Discharge orders noted. Additional clinical references attached.    Patient's discharge instructions given by bedside RN and reviewed via this VN with patient.    Education provided on new medication, diagnosis, and follow-up appointments.    All questions answered. Teach back method used. Patient verbalized understanding.    Transport to [facility] requested. Floor nurse notified.      11/16/24 3117   AVS Confirmation   Discharge instructions and AVS provided to and reviewed with patient and/or significant other. Yes

## 2024-11-16 NOTE — PROGRESS NOTES
[facility] Medicine  Progress Note    Patient Name: Venancio Barragan  MRN: [id number]  Patient Class: IP- Inpatient   Admission Date: 11/11/2024  Length of Stay: 5 days  Attending Physician: Bony Menezes MD  Primary Care Provider: Gustavo Calhoun MD        Subjective:     Principal Problem:S/P revision of total knee, left        HPI:  Patient is a 59 y.o. male PMHx of diabetes, HTN, HLD who presents after a left total knee revision. Patient currently denies any fevers, chills, shortness of breath, nausea, vomiting. He states that his left knee is sore about 7/10.     Overview/Hospital Course: none      Interval History: VSS. NAD    Review of Systems  Review of Systems   Constitutional:  Negative for chills and fever.   HENT: Negative.     Respiratory:  Negative for cough and shortness of breath.    Cardiovascular:  Negative for chest pain and leg swelling.   Gastrointestinal:  Negative for abdominal pain, nausea and vomiting.   Genitourinary:  Negative for dysuria.   Musculoskeletal: Negative for left knee pain  Skin:  Negative for rash.   Neurological:  Negative for headaches.   Objective:     Vital Signs (Most Recent):  Temp: 98 °F (36.7 °C) (11/16/24 1554)  Pulse: 79 (11/16/24 1554)  Resp: 20 (11/16/24 1554)  BP: 102/66 (11/16/24 1554)  SpO2: 98 % (11/16/24 1554) Vital Signs (24h Range):  Temp:  [97.6 °F (36.4 °C)-98.4 °F (36.9 °C)] 98 °F (36.7 °C)  Pulse:  [67-79] 79  Resp:  [18-21] 20  SpO2:  [95 %-99 %] 98 %  BP: (102-153)/(65-82) 102/66     Weight: 86 kg (189 lb 9.5 oz)  Body mass index is 31.55 kg/m².    Intake/Output Summary (Last 24 hours) at 11/16/2024 1606  Last data filed at 11/16/2024 1230  Gross per 24 hour   Intake 1100 ml   Output 1875 ml   Net -775 ml         Physical Exam   Gen- WNWD, NAD  CV- RRR, no LE edema  Resp- breathing comfortably on RA, CTAB  Abd- soft, NTND  Skin- left knee Surgical incision dressing C/D/I. Left knee immobilizer in place in extension with drainage to gravity  noted to suction  Neuro - no lower extremity neurological deficits  Psych- logical thought process, answers questions appropriately     Significant Labs: All pertinent labs within the past 24 hours have been reviewed.  BMP:   Recent Labs   Lab 11/16/24  0619   *      K 4.8      CO2 26   BUN 20   CREATININE 1.3   CALCIUM 9.1       Significant Imaging: I have reviewed all pertinent imaging results/findings within the past 24 hours.    Assessment/Plan:      * S/P revision of total knee, left    #Left total knee revision  Patient with history of left knee TKA about 1-2 years ago, revision on 11/11/24.      -Continue ASA 81 BID as DVT PPX per ortho  -Continue PT/OT  -Multimodal pain control per ortho  -Ancef 2 gm every 8 hrs per surgery  -11/ 11 Left knee Cultures NGTD, and  Fungus Cx pending          LISSY (acute kidney injury)  LISSY is likely due to pre-renal azotemia due to intravascular volume depletion. Baseline creatinine is  1.3 1 month ago . Most recent creatinine and eGFR are listed below.  Recent Labs     11/14/24  0445 11/15/24  0543   CREATININE 1.2 1.5*   EGFRNORACEVR >60 53*      Plan  - Avoid nephrotoxins and renally dose meds for GFR listed above  - Monitor urine output, and adjust therapy as needed  - Creatine is down trending.  Encourage oral fluids   -Check BMP in am  -Will CTM    Type 2 diabetes mellitus without complication, without long-term current use of insulin  Patient's FSGs are uncontrolled due to hyperglycemia on current medication regimen.  Last A1c reviewed-   Lab Results   Component Value Date    HGBA1C 6.0 (H) 10/08/2024     Most recent fingerstick glucose reviewed-   Recent Labs   Lab 11/12/24  1628 11/12/24  2056 11/13/24  0535 11/13/24  1150   POCTGLUCOSE 248* 119* 140* 296*     Current correctional scale  Low  Hold  anti-hyperglycemic dose as follows-   Antihyperglycemics (From admission, onward)      Start     Stop Route Frequency Ordered    11/12/24 1040  insulin  glargine U-100 (Lantus) pen 5 Units         -- SubQ Daily 11/12/24 1040    11/11/24 2103  insulin aspart U-100 pen 0-10 Units         -- SubQ Before meals & nightly PRN 11/11/24 2006          Hold Oral hypoglycemics while patient is in the hospital.      #DM  Glucose reading at goal      Plan:  -SSI  -POCT glucose ACHS  -Continue Lantus 5 units due to elevated am glucose and currently at goal  -Glucose goal 140-180    Elevated liver enzymes    #HTN  History of HTN on ramipril 2.5 at home  Blood pressure soft at 104/69 after procedure     -Hold antihypertensives until appropriate to restart  -Monitor vitals         VTE Risk Mitigation (From admission, onward)           Ordered     Place sequential compression device  Until discontinued         11/11/24 1833     Place ALEXA hose  Until discontinued         11/11/24 1833                    Discharge Planning   GABRIELLE: 11/16/2024     Code Status: Full Code   Is the patient medically ready for discharge?:     Reason for patient still in hospital (select all that apply): Patient trending condition  Discharge Plan A: Home, Home with family (home with OP therapy)   Discharge Delays:  (cx pending)      Nicci Campoverde MD- 1  Department of Hospital Medicine   [facility]

## 2024-11-16 NOTE — PT/OT/SLP PROGRESS
"Physical Therapy Treatment    Patient Name:  Venancio Barragan   MRN:  [id number]    Recommendations:     Discharge Recommendations: Low Intensity Therapy (OP when cleared by Ortho)  Discharge Equipment Recommendations: none  Barriers to discharge: None    Assessment:     Venancio Barragan is a 59 y.o. male admitted with a medical diagnosis of S/P revision of total knee, left.  He presents with the following impairments/functional limitations: weakness, impaired functional mobility, gait instability, impaired balance, decreased lower extremity function, pain, decreased ROM, impaired skin, orthopedic precautions. Pt able to perform 2 ambulation training trials ~70 ft and ~200 ft with RW, hinged knee brace donned, LLE WBAT, requiring SBA. Recommending low intensity therapy with OP therapy once cleared by ortho.    Rehab Prognosis: Good; patient would benefit from acute skilled PT services to address these deficits and reach maximum level of function.    Recent Surgery: Procedure(s) (LRB):  REVISION, ARTHROPLASTY, KNEE (Left) 5 Days Post-Op    Plan:     During this hospitalization, patient to be seen daily to address the identified rehab impairments via gait training, therapeutic activities, therapeutic exercises, neuromuscular re-education and progress toward the following goals:    Plan of Care Expires:  12/12/24    Subjective     Chief Complaint: None Expressed  Patient/Family Comments/goals: " Said I am done with the brace".  Pain/Comfort:  Pain Rating 1:  (Did not rate)  Location - Side 1: Left  Location - Orientation 1: posterior  Location 1: knee (Described it as a stretching feeling behind knee)  Pain Addressed 1: Reposition, Distraction, Cessation of Activity  Pain Rating Post-Intervention 1:  (Not rated)      Objective:     Communicated with nurse prior to session.  Patient found HOB elevated with bed alarm upon PT entry to room.     General Precautions: Standard, fall  Orthopedic Precautions: LLE weight bearing as " tolerated  Braces:  (Pt did use the hinged knee brace for ambulating this session. However, Nurse secured chatted MD with MD replying that pt DID NOT have to wear brace anymore when ambulating)  Respiratory Status: Room air     Functional Mobility:  Bed Mobility:     Rolling Right: independence and modified independence  Scooting: independence and modified independence  Supine to Sit: independence and modified independence  Sit to Supine: independence and modified independence  Transfers:     Sit to Stand:  supervision with rolling walker  Gait: 2 trials ~70 ft and ~200 ft with RW, hinged knee brace donned, LLE WBAT requiring SBA       AM-PAC 6 CLICK MOBILITY  Turning over in bed (including adjusting bedclothes, sheets and blankets)?: 4  Sitting down on and standing up from a chair with arms (e.g., wheelchair, bedside commode, etc.): 3  Moving from lying on back to sitting on the side of the bed?: 4  Moving to and from a bed to a chair (including a wheelchair)?: 3  Need to walk in hospital room?: 3  Climbing 3-5 steps with a railing?: 3  Basic Mobility Total Score: 20       Treatment & Education:  Pt expressed he was told by MD that he was done with the hinged knee brace. However, pt wanted to wear while ambulating this session. Nurse secure chatted MD who confirmed pt DID NOT need to wear brace with ambulation. Pt performed ambulation as described above. Prior to ambulation training pt able to perform 1 x 15 of quadriceps setting BLE's with HOB elevated. Sitting at EOB pt performed AAROM to LLE 1 x 10 reps LAQ's with a gentle manual stretch at end range. While standing on balcony pt performed 1 x 10 heel raises with hands resting on balcony ledge.    Patient left HOB elevated with all lines intact, call button in reach, bed alarm on, nurse notified, and wife present..    GOALS:   Multidisciplinary Problems       Physical Therapy Goals          Problem: Physical Therapy    Goal Priority Disciplines Outcome  Interventions   Physical Therapy Goal     PT, PT/OT Progressing    Description: Goals to be met by: 2024     Patient will increase functional independence with mobility by performin. Sit to stand transfer with Modified [place] using Rolling Walker  2. Bed to chair transfer with Modified [place] using Rolling Walker  3. Gait  x 150 feet with Modified [place] using Rolling Walker.   4. Ascend/descend 3 stair with left Handrails Supervision  5. After drain removed, range of L  knee as tolerated                         Time Tracking:     PT Received On: 24  PT Start Time: 917     PT Stop Time: 959  PT Total Time (min): 42 min     Billable Minutes: Gait Training 20, Therapeutic Activity 8, and Therapeutic Exercise 14    Treatment Type: Treatment  PT/PTA: PTA     Number of PTA visits since last PT visit: 2024

## 2024-11-16 NOTE — PROGRESS NOTES
[facility] Progress Note     59 y.o. male s/p left knee revision TKA  for loosening 11/11/24    S: POD5. AFVSS.  Patient resting comfortably in bed this morning.  Pain well controlled.   Continued numbness over medial knee and lower leg that does not extend into ankle/ foot. Has been working well with PT. Been working on ROM in bed. Denies chest pain or shortness of breath.  Eager to home today pending no growth on final cultures.  Patient notes some continued concern about pulses with other teams not being able to palpate or doppler. Dopplered DP and PT pulses again this morning to provide patient with reassurance.      O:    Temp:  [97.2 °F (36.2 °C)-98.2 °F (36.8 °C)] 97.8 °F (36.6 °C)  Pulse:  [71-81] 71  Resp:  [18-21] 18  SpO2:  [92 %-98 %] 95 %  BP: (114-153)/(65-82) 114/78      MSK:   left knee   Midline and drain site Dressing C/D/I   TTP at surgical site. No TTP calves  Compartments: soft and compressible   Motor Intact 5/5 strength:EHL/FHL/GS/TA  Decreased sensation over medial knee and lower leg not extend into ankle/foot  SILT: SP/DP/Sa/Kemp  Faint palpable DP and PT, foot warm and well perfused, cap refill less than 2 seconds  Dopplerable DP and PT      Labs:  Recent Results (from the past 24 hours)   POCT glucose    Collection Time: 11/15/24 10:04 AM   Result Value Ref Range    POCT Glucose 203 (H) 70 - 110 mg/dL   POCT glucose    Collection Time: 11/15/24 11:49 AM   Result Value Ref Range    POCT Glucose 157 (H) 70 - 110 mg/dL   POCT glucose    Collection Time: 11/15/24  3:55 PM   Result Value Ref Range    POCT Glucose 214 (H) 70 - 110 mg/dL   POCT glucose    Collection Time: 11/15/24  8:44 PM   Result Value Ref Range    POCT Glucose 276 (H) 70 - 110 mg/dL   POCT glucose    Collection Time: 11/16/24  5:34 AM   Result Value Ref Range    POCT Glucose 157 (H) 70 - 110 mg/dL   Basic metabolic panel    Collection Time: 11/16/24  6:19 AM   Result Value Ref Range    Sodium 140 136 - 145 mmol/L     "Potassium 4.8 3.5 - 5.1 mmol/L    Chloride 105 95 - 110 mmol/L    CO2 26 23 - 29 mmol/L    Glucose 154 (H) 70 - 110 mg/dL    BUN 20 6 - 20 mg/dL    Creatinine 1.3 0.5 - 1.4 mg/dL    Calcium 9.1 8.7 - 10.5 mg/dL    Anion Gap 9 8 - 16 mmol/L    eGFR >60 >60 mL/min/1.73 m^2       No results for input(s): "WBC", "HGB", "HCT", "PLT" in the last 72 hours.  Recent Labs     11/16/24  0619      K 4.8      CO2 26   BUN 20   *     No results for input(s): "ESR", "CRP" in the last 72 hours.    Intraop cultures no growth to date     Imaging:  Postop x-ray left knee reveals status post revision TKA with the hardware in place without complication     Assessment/Plan:   59 y.o. male s/p left knee revision TKA for loosening 11/11/24    WBAT LLE   Follow up intra-op cultures, no growth to date  IV ancef q8 until cultures result/ finalize  PT/OT  Family medicine consulted for medial comanagement, appreciate help with medical care  CM for discharge planning  Plan for discharge today after cultures finalized pending no growth      Raman Jane MD  [facility]  11/16/2024 9:25 AM   "

## 2024-11-16 NOTE — NURSING
Report received from ODILON Meza assumed care. Patient is  AAOx4. On room air. No apparent distress noted. All safety precautions secured.

## 2024-11-16 NOTE — PROGRESS NOTES
[facility]  Progress Note    Patient Name: Venancio Barragan  MRN: [id number]  Patient Class: IP- Inpatient   Admission Date: 11/11/2024  Length of Stay: 4 days  Attending Physician: Bony Menezes MD  Primary Care Provider: Gustavo Calhoun MD        Subjective:     Principal Problem:S/P revision of total knee, left        HPI:  No notes on file    Overview/Hospital Course:  No notes on file    Interval History: NAEON,VSS.     Review of Systems   Constitutional:  Negative for activity change.   HENT: Negative.     Eyes: Negative.    Respiratory:  Negative for shortness of breath and wheezing.    Cardiovascular:  Negative for chest pain and palpitations.   Gastrointestinal:  Negative for abdominal pain.   Genitourinary:  Negative for dysuria.   Musculoskeletal:  Negative for arthralgias.   Neurological: Negative.        Objective:     Vital Signs (Most Recent):  Temp: 98.3 °F (36.8 °C) (11/12/24 1200)  Pulse: 75 (11/12/24 1248)  Resp: 18 (11/12/24 1248)  BP: 122/60 (11/12/24 1200)  SpO2: 99 % (11/12/24 1248) Vital Signs (24h Range):  Temp:  [97.3 °F (36.3 °C)-98.6 °F (37 °C)] 98.3 °F (36.8 °C)  Pulse:  [] 75  Resp:  [15-20] 18  SpO2:  [91 %-100 %] 99 %  BP: ()/(47-76) 122/60     Weight: 86 kg (189 lb 9.5 oz)  Body mass index is 31.55 kg/m².    Intake/Output Summary (Last 24 hours) at 11/12/2024 1537  Last data filed at 11/12/2024 1200  Gross per 24 hour   Intake 100 ml   Output 2055 ml   Net -1955 ml         Physical Exam  Constitutional:       Appearance: Normal appearance.   HENT:      Head: Normocephalic and atraumatic.      Nose: Nose normal.      Mouth/Throat:      Mouth: Mucous membranes are moist.   Cardiovascular:      Rate and Rhythm: Normal rate and regular rhythm.      Pulses: Normal pulses.      Heart sounds: Normal heart sounds. No murmur heard.     No friction rub. No gallop.   Pulmonary:      Effort: Pulmonary effort is normal. No respiratory distress.      Breath sounds:  Normal breath sounds. No wheezing or rhonchi.   Abdominal:      General: Abdomen is flat.      Palpations: Abdomen is soft.   Musculoskeletal:      Left lower leg: Edema (Left LE with pulsesPT/DP  by doppler and dresing to knee W/D/I with decreassed sensation to the left lateral aspect of the knee) present.   Skin:     General: Skin is warm and dry.      Capillary Refill: Capillary refill takes less than 2 seconds.   Neurological:      General: No focal deficit present.      Mental Status: He is alert.             Significant Labs: All pertinent labs within the past 24 hours have been reviewed.    Significant Imaging: I have reviewed all pertinent imaging results/findings within the past 24 hours.    Assessment/Plan:      * S/P revision of total knee, left    #Left total knee revision  Patient with history of left knee TKA about 1-2 years ago, revision on 11/11/24.      -Continue ASA 81 BID as DVT PPX per ortho  -Continue PT/OT  -Multimodal pain control per ortho  -Ancef 2 gm every 8 hrs per surgery  -11/ 11 Left knee Cultures NGTD, and  Fungus Cx pending          LISSY (acute kidney injury)  LISSY is likely due to pre-renal azotemia due to intravascular volume depletion. Baseline creatinine is  1.3 1 month ago . Most recent creatinine and eGFR are listed below.  Recent Labs     11/14/24  0445 11/15/24  0543   CREATININE 1.2 1.5*   EGFRNORACEVR >60 53*      Plan  - Avoid nephrotoxins and renally dose meds for GFR listed above  - Monitor urine output, and adjust therapy as needed  - Creatine is down trending.  Encourage oral fluids   -Check BMP in am  -Will CTM    Type 2 diabetes mellitus without complication, without long-term current use of insulin  Patient's FSGs are uncontrolled due to hyperglycemia on current medication regimen.  Last A1c reviewed-   Lab Results   Component Value Date    HGBA1C 6.0 (H) 10/08/2024     Most recent fingerstick glucose reviewed-   Recent Labs   Lab 11/12/24  1628 11/12/24 2056  11/13/24  0535 11/13/24  1150   POCTGLUCOSE 248* 119* 140* 296*     Current correctional scale  Low  Hold  anti-hyperglycemic dose as follows-   Antihyperglycemics (From admission, onward)      Start     Stop Route Frequency Ordered    11/12/24 1040  insulin glargine U-100 (Lantus) pen 5 Units         -- SubQ Daily 11/12/24 1040    11/11/24 2103  insulin aspart U-100 pen 0-10 Units         -- SubQ Before meals & nightly PRN 11/11/24 2006          Hold Oral hypoglycemics while patient is in the hospital.      #DM  Glucose reading at goal      Plan:  -SSI  -POCT glucose ACHS  -Continue Lantus 5 units due to elevated am glucose and currently at goal  -Glucose goal 140-180    Elevated liver enzymes    #HTN  History of HTN on ramipril 2.5 at home  Blood pressure soft at 104/69 after procedure     -Hold antihypertensives until appropriate to restart  -Monitor vitals       DM type 2 with diabetic mixed hyperlipidemia          VTE Risk Mitigation (From admission, onward)           Ordered     Place sequential compression device  Until discontinued         11/11/24 1833     Place ALEXA hose  Until discontinued         11/11/24 1833                    Discharge Planning   GABRIELLE: 11/15/2024     Code Status: Full Code   Is the patient medically ready for discharge?:     Reason for patient still in hospital (select all that apply): Patient trending condition  Discharge Plan A: Home, Home with family (home with OP therapy)   Discharge Delays:  (cx pending)      Nicci Campoverde MD- 1  Department of Hospital Medicine   [facility]

## 2024-11-18 ENCOUNTER — TELEPHONE (OUTPATIENT)
Dept: ORTHOPEDICS | Facility: CLINIC | Age: 59
End: 2024-11-18
Payer: MEDICAID

## 2024-11-18 ENCOUNTER — CLINICAL SUPPORT (OUTPATIENT)
Facility: HOSPITAL | Age: 59
End: 2024-11-18
Payer: MEDICAID

## 2024-11-18 DIAGNOSIS — T84.033A LOOSENING OF PROSTHESIS OF LEFT TOTAL KNEE REPLACEMENT, INITIAL ENCOUNTER: ICD-10-CM

## 2024-11-18 DIAGNOSIS — Z96.652 PAIN DUE TO TOTAL LEFT KNEE REPLACEMENT, SUBSEQUENT ENCOUNTER: ICD-10-CM

## 2024-11-18 DIAGNOSIS — T84.84XD PAIN DUE TO TOTAL LEFT KNEE REPLACEMENT, SUBSEQUENT ENCOUNTER: ICD-10-CM

## 2024-11-18 DIAGNOSIS — Z74.09 IMPAIRED MOBILITY AND ACTIVITIES OF DAILY LIVING: Primary | ICD-10-CM

## 2024-11-18 DIAGNOSIS — M62.81 QUADRICEPS WEAKNESS: ICD-10-CM

## 2024-11-18 DIAGNOSIS — Z78.9 IMPAIRED MOBILITY AND ACTIVITIES OF DAILY LIVING: Primary | ICD-10-CM

## 2024-11-18 PROCEDURE — 97110 THERAPEUTIC EXERCISES: CPT | Mod: PN | Performed by: PHYSICAL THERAPIST

## 2024-11-18 PROCEDURE — 97162 PT EVAL MOD COMPLEX 30 MIN: CPT | Mod: PN | Performed by: PHYSICAL THERAPIST

## 2024-11-18 NOTE — DISCHARGE SUMMARY
Physician Discharge Summary     Patient Id:  Venancio Barragan  1965  [id number]    Admit date: 11/11/2024     Discharge date: 11/16/24    Admitting Physician: Bony Menezes MD    Discharge Physician: Bony Menezes MD    Admission Diagnoses: Postoperative stiffness of total knee replacement, subsequent encounter [T84.89XD, M25.669, Z96.659]  Chronic pain of left knee [M25.562, G89.29]  Arthritis of knee [M17.10]     Discharge Diagnoses: left revision TKA    Admission Condition: good    Discharged Condition: good    Indication for Admission: Left revision TKA    Hospital Course: [facility] on day of scheduled surgery and underwent left revision total knee arthroplasty, please see operative report for further detail. Patient did well postoperatively. Patient was kept inpatient until intra-op cultures finalized and no growth was seen. Patient was found to be fit for discharge on POD# 5. The patient mobilized with physical therapy.  They were taught how to use DME appropriately.  Their pain was controlled.  The patient met all discharge criteria.  The patient was discharged home in stable condition. Patient was given paper prescriptions.  They were given a follow-up appointment in 2 weeks in clinic for a wound check and range of motion check.  All questions and concerns of the patient were answered to their satisfaction.       Significant Diagnostic Studies: None applicable      Treatments: surgery: left revision TKA     Disposition: home     Patient Active Problem List    Diagnosis Date Noted    Type 2 diabetes mellitus without complication, without long-term current use of insulin 11/13/2024    LISSY (acute kidney injury) 11/13/2024    S/P revision of total knee, left 11/11/2024    Loose left total knee arthroplasty 10/08/2024    Quadriceps weakness 10/08/2024    Postoperative stiffness of total knee replacement 11/28/2023    Chronic pain of left knee 11/21/2023    Pain due to total left knee replacement  11/21/2023    DM type 2 with diabetic mixed hyperlipidemia 01/26/2023    Primary hypertension 01/26/2023    Insomnia, unspecified 01/26/2023    Cellulitis and abscess of hand, left with Foreign body  01/26/2023    Osteoarthritis of knee 12/18/2022    Elevated liver enzymes 09/11/2022    Status post open reduction and internal fixation (ORIF) of fracture 04/25/2022    Increased prostate specific antigen (PSA) velocity 06/29/2021    Low testosterone in male 08/02/2018    Obesity, diabetes, and hypertension syndrome 08/02/2018        Patient Instructions:      Medication List        START taking these medications      acetaminophen 325 MG tablet  Commonly known as: TYLENOL  Take 1 tablet (325 mg total) by mouth every 4 (four) hours. for 14 days     aspirin 81 MG EC tablet  Commonly known as: ECOTRIN  Take 1 tablet (81 mg total) by mouth 2 (two) times a day.     cephALEXin 500 MG capsule  Commonly known as: KEFLEX  Take 1 capsule (500 mg total) by mouth every 6 (six) hours. for 7 days     diclofenac 75 MG EC tablet  Commonly known as: VOLTAREN  Take 1 tablet (75 mg total) by mouth 2 (two) times daily.     famotidine 20 MG tablet  Commonly known as: PEPCID  Take 1 tablet (20 mg total) by mouth 2 (two) times daily.     tranexamic acid 650 mg tablet  Commonly known as: LYSTEDA  Take 1 tablet (650 mg total) by mouth 2 (two) times daily. for 14 days            CONTINUE taking these medications      atorvastatin 40 MG tablet  Commonly known as: LIPITOR     HYDROcodone-acetaminophen 5-325 mg per tablet  Commonly known as: NORCO  Take 1 tablet by mouth every 6 (six) hours as needed for Pain.     metFORMIN 500 MG tablet  Commonly known as: GLUCOPHAGE     multivitamin capsule     OZEMPIC 0.25 mg or 0.5 mg(2 mg/1.5 mL) pen injector  Generic drug: semaglutide     PAXLOVID 150-100 mg Dspk  Generic drug: nirmatrelvir-ritonavir     promethazine 25 MG tablet  Commonly known as: PHENERGAN  Take 1 tablet (25 mg total) by mouth every 6  (six) hours as needed for Nausea.     ramipriL 2.5 MG capsule  Commonly known as: ALTACE     solifenacin 10 MG tablet  Commonly known as: VESICARE     tadalafiL 5 MG tablet  Commonly known as: CIALIS     TRULICITY 0.75 mg/0.5 mL pen injector  Generic drug: dulaglutide     WESTUSSIN DM (DEXCHLORPHENIR) 1-5-10 mg/5 mL Syrp  Generic drug: dexchlorphen-phenylephrine-DM     zolpidem 10 mg Tab  Commonly known as: AMBIEN               Where to Get Your Medications        These medications were sent to [facility]  [address], [place] [address]      Hours: Mon-Fri, 8a-5:30p Phone: [phone]   acetaminophen 325 MG tablet  aspirin 81 MG EC tablet  diclofenac 75 MG EC tablet  famotidine 20 MG tablet  tranexamic acid 650 mg tablet       These medications were sent to [id number] - [place] [address] AT [facility] [address] [address], [facility] [address]      Phone: [phone]   cephALEXin 500 MG capsule          Post Op Total Knee Arthroplasty Instructions     1. Enteric coated aspirin 81 mg by mouth twice a day for 6 weeks to prevent blood clots,  unless otherwise indicated.  2. Please take a stomach reflux medication such as pepcid, prevacid, nexium (H-2 blocker or PPI) while on aspirin to prevent stomach ulcers. You will be given a prescription for pepcid.  3. Casimrio stockings should be worn as much as possible for 6 weeks to prevent blood clots.  4. Do not start antibiotics for any suspected infections related to the surgery until evaluated by dr powell staff  5. No driving for approximately 2-4 weeks  6. You can shower once the incision is completely dry, otherwise place a new dry dressing twice a day if there is drainage. Please call the office if the drainage increases after discharge.  7. You may resume all pre-surgery medications unless otherwise indicated  8. All patients should be seen in Dr Powell office approx 2 weeks after surgery  9.  Dr Menezes prefers outpatient physical therapy upon discharge home. If home PT is needed, please contact Dr Menezes for approval  10. Patients should see their primary care doctor after discharge home      Discussed plan with patient and answered questions: Yes     Raman Jane MD  [facility] PGY-3

## 2024-11-18 NOTE — TELEPHONE ENCOUNTER
----- Message from Asmita sent at 11/18/2024  1:24 PM CST -----  Type:  RX Refill Request    Who Called: Pt spouse Ana Lilia  Refill or New Rx:refill  RX Name and Strength:cephALEXin (KEFLEX) 500 MG capsule  How is the patient currently taking it? (ex. 1XDay): Route: Take 1 capsule (500 mg total) by mouth every 6 (six) hours. for 7 days - Oral  Is this a 30 day or 90 day RX:28  Preferred Pharmacy with phone number:[facility] - [id number], [place] - [address]   Phone: [phone]  Fax: [phone]  Local or Mail Order:local  Ordering Provider: Route: Take 1 capsule (500 mg total) by mouth every 6 (six) hours. for 7 days - Oral  Would the patient rather a call back or a response via [facility]?call  Best Call Back Number:[phone]  Additional Information:

## 2024-11-18 NOTE — PROGRESS NOTES
[facility]   Physical Therapy Initial Evaluation      Name: Venancio Barragan  [facility] Number: [id number]    Therapy Diagnosis: No diagnosis found.     Physician: Bony Menezes MD    Physician Orders: PT Eval and Treat   Medical Diagnosis from Referral: Pain due to total left knee replacement, subsequent encounter [T84.84XD, Z96.652], Loosening of prosthesis of left total knee replacement, initial encounter [T84.033A], Quadriceps weakness [M62.81]   Evaluation Date: 11/18/2024  Authorization Period Expiration: 10/8/25  Plan of Care Expiration: 3/18/25  Progress Note Due: 12/18/24  Visit # / Visits authorized: 1/ 1     FOTO:  Goal: ***%  -Intake: ***%  -Status: incomplete  -Discharge: incomplete    Precautions: {IP WOUND PRECAUTIONS OHS:25864}     Time In: ***  Time Out: ***  Total Appointment Time (timed & untimed codes): *** minutes    Subjective     Date of onset: ***    History of current condition - Venancio reports: Knee replacement in '23 due to arthritis. Pain got worse afterwards. Revision on 11/11. Feeling better this time. He hasn't been taking an pain killers since then. Getting around the house okay with rollator. He has help at home.    Previous rehab had a hard time getting flexion back.    He spent 1 week in the hospital.    History of 2 SAHARA as well due to AVN    Falls:     Imaging: [] Xray [] MRI [] CT: Performed on: ***    Pain:  Current 7/10, worst 7/10, best 3/10   Location: [] Right   [x] Left:  knee  Description: aching   Aggravating Factors: Sleeping wrong, certain movements  Easing Factors: activity avoidance, rest    Prior Therapy: [facility] for 8 months  Social History:    Occupation: Unemployed,  by trade  Prior Level of Function: Active before the first surgery. On feet a lot with work. Big yard.  Current Level of Function: Limited due to post op state.    Patients goals: Get back to work, yard work, get back in race car, fishing.     Medical  History:   Past Medical History:   Diagnosis Date    Diabetes mellitus     GERD (gastroesophageal reflux disease)     High cholesterol     Hypertension        Surgical History:   Venancio Barragan  has a past surgical history that includes Elbow surgery (Left); Total hip arthroplasty; Knee surgery; Ankle surgery; Incision and drainage of hand (Left, 2/8/2023); insertion, electrode lead, neurostimulator, peripheral (Left, 9/23/2024); and Revision of knee arthroplasty (Left, 11/11/2024).    Medications:   Venancio has a current medication list which includes the following prescription(s): acetaminophen, aspirin, atorvastatin, cephalexin, diclofenac, trulicity, famotidine, hydrocodone-acetaminophen, metformin, multivitamin, paxlovid, promethazine, ramipril, ozempic, solifenacin, tadalafil, tranexamic acid, westussin dm (dexchlorphenir), and zolpidem.    Allergies:   Review of patient's allergies indicates:  No Known Allergies     Objective      Patient Specific Functional Scale  Identify up to 5 important activities that are difficult for you  Rate them 0-10, 0 is impossible, 10 is no problem    Activity Initial     Yard work  0/10     2.  work  0/10       Observation:   POSTURE:    GAIT:   Slight bent knee gait pattern with rollator. Good gait speed.  Circumferential measurement at mid patella  42 cm      RANGE OF MOTION:     Knee AROM/PROM Right Left Pain/Dysfunction with Movement Goal   Knee Flexion (135º)  67      Knee Extension (0º)  2 hyper     Tibial External Rotation (40º}       Tibial Internal Rotation (30º)                 STRENGTH:   L/E MMT Right  (spine) Left Pain/Dysfunction with Movement Goal   Hip Flexion   3/5  4+/5 B   Hip Extension     4+/5 B   Hip Abduction     4+/5 B   Knee Extension  1/5  5/5 B   Knee Flexion  1/5  5/5 B          Joint mobility:  ***      {SPECIAL TESTS (Optional):24418}      SENSATION  [x] Intact to Light Touch   [] Impaired:      PALPATION: Structures: Increased tenderness to  "palpation of: {RIGHT/LEFT:48811} ***        Function:     Intake Outcome Measure for FOTO *** Survey    Therapist reviewed FOTO scores for Venancio Barragan on 11/18/2024.   FOTO documents entered into EPIC - see Media section.    Intake Score: ***%         Treatment     Total Treatment time (time-based codes) separate from Evaluation: (***) minutes     Venancio received the treatments listed below:          Patient Education and Home Exercises     Education provided: (10) minutes  {PATIENT EDUCATION (Optional):01788}  Physical therapy diagnosis, prognosis, and plan of care.   Activity Modifications: ***    Written Home Exercises Provided: yes.  Exercises were reviewed and Venancio was able to demonstrate them prior to the end of the session.  Venancio demonstrated {Desc; good/fair/poor:46017} understanding of the education provided. See EMR under Patient Instructions for exercises provided during therapy sessions.    Assessment     Venancio is a 59 y.o. male referred to outpatient Physical Therapy with a medical diagnosis of ***. Clinical exam is consistent with ***.     Problem List:  ***    Pt prognosis is {REHAB PROGNOSIS OHS:67581::"Excellent"}  Pt will benefit from skilled outpatient Physical Therapy to address the deficits stated above and in the chart below, provide pt/family education, and to maximize pt's level of independence.     Plan of care discussed with patient: Yes  Pt's spiritual, cultural and educational needs considered and patient is agreeable to the plan of care and goals as stated below:     Anticipated Barriers for therapy: {barriers for care:40012}    Medical Necessity is demonstrated by the following ***  History  Co-morbidities and personal factors that may impact the plan of care [] LOW: no personal factors / co-morbidities  [] MODERATE: 1-2 personal factors / co-morbidities  [] HIGH: 3+ personal factors / co-morbidities    Moderate / High Support Documentation: See patient medical history and objective " "assessment     Examination  Body Structures and Functions, activity limitations and participation restrictions that may impact the plan of care [] LOW: addressing 1-2 elements  [] MODERATE: 3+ elements  [] HIGH: 4+ elements (please support below)    Moderate / High Support Documentation: See patient medical history and objective assessment     Clinical Presentation [] LOW: stable  [] MODERATE: Evolving  [] HIGH: Unstable     Decision Making/ Complexity Score: {Desc; low/moderate/high:583466}         Short Term Goals:  {numbers 1-12:04158::"6"} weeks Status  Date Met   PAIN: Pt will report worst pain of ***/10 in order to progress toward max functional ability and improve quality of life. [x] Progressing  [] Met  [] Not Met    FUNCTION: Patient will demonstrate improved function as indicated by a functional score improvement of at least 5 points on FOTO. [x] Progressing  [] Met  [] Not Met    MOBILITY: Patient will improve AROM to 50% of stated goals, listed in objective measures above, in order to progress towards independence with functional activities.  [x] Progressing  [] Met  [] Not Met    STRENGTH: Patient will improve strength to 50% of stated goals, listed in objective measures above, in order to progress towards independence with functional activities. [x] Progressing  [] Met  [] Not Met    POSTURE: Patient will correct postural deviations in sitting and standing, to decrease pain and promote long term stability.  [x] Progressing  [] Met  [] Not Met    GAIT: Patient will demonstrate improved gait mechanics including *** in order to improve functional mobility, improve quality of life, and decrease risk of further injury or fall.  [x] Progressing  [] Met  [] Not Met    HEP: Patient will demonstrate independence with HEP in order to progress toward functional independence. [x] Progressing  [] Met  [] Not Met    *** [x] Progressing  [] Met  [] Not Met      Long Term Goals:  {numbers 1-12:76689::"12"} weeks " "Status Date Met   PAIN: Pt will report worst pain of ***/10 in order to progress toward max functional ability and improve quality of life [x] Progressing  [] Met  [] Not Met    FUNCTION: Patient will demonstrate improved function as indicated by a FOTO functional score improvement as listed in header. [x] Progressing  [] Met  [] Not Met    MOBILITY: Patient will improve AROM to stated goals, listed in objective measures above, in order to return to maximal functional potential and improve quality of life. {Set ROM goals} [x] Progressing  [] Met  [] Not Met    STRENGTH: Patient will improve strength to stated goals, listed in objective measures above, in order to improve functional independence and quality of life. {Set strength goals} [x] Progressing  [] Met  [] Not Met    GAIT: Patient will demonstrate normalized gait mechanics with minimal compensation in order to return to PLOF. [x] Progressing  [] Met  [] Not Met    Patient will return to normal ADL's, IADL's, community involvement, recreational activities, and work-related activities with less than or equal to ***/10 pain and maximal function.  [x] Progressing  [] Met  [] Not Met    *** [x] Progressing  [] Met  [] Not Met      Plan   Plan of care Certification: 11/18/2024 to ***.    Outpatient Physical Therapy {Numbers; 1-5:73825::"2"} times weekly for {numbers 1-12:61798::"12"} weeks to include any combination of the following interventions: virtual visits, dry needling, modalities, electrical stimulation (IFC, Pre-Mod, Attended with Functional Dry Needling), {TX PLAN:31894::"Cervical/Lumbar Traction","Gait Training","Manual Therapy","Neuromuscular Re-ed","Patient Education","Self Care","Therapeutic Exercise","Therapeutic Activites"}     Al Quiros, PT, DPT      I CERTIFY THE NEED FOR THESE SERVICES FURNISHED UNDER THIS PLAN OF TREATMENT AND WHILE UNDER MY CARE   Physician's comments:     Physician's Signature: " ___________________________________________________

## 2024-11-19 ENCOUNTER — PATIENT MESSAGE (OUTPATIENT)
Dept: ORTHOPEDICS | Facility: CLINIC | Age: 59
End: 2024-11-19
Payer: MEDICAID

## 2024-11-20 ENCOUNTER — PATIENT MESSAGE (OUTPATIENT)
Dept: ORTHOPEDICS | Facility: CLINIC | Age: 59
End: 2024-11-20
Payer: MEDICAID

## 2024-11-20 ENCOUNTER — TELEPHONE (OUTPATIENT)
Dept: ORTHOPEDICS | Facility: CLINIC | Age: 59
End: 2024-11-20
Payer: MEDICAID

## 2024-11-20 LAB
BACTERIA SPEC ANAEROBE CULT: NORMAL

## 2024-11-20 NOTE — TELEPHONE ENCOUNTER
----- Message from Elle sent at 11/20/2024  9:08 AM CST -----  Type:  Patient Returning Call    Who Called:Wife/ Ana Lilia  Who Left Message for Patient:Nicci   Does the patient know what this is regarding?:yes   Would the patient rather a call back or a response via [facility]? Call back   Best Call Back Number:[phone]  Additional Information:

## 2024-11-20 NOTE — TELEPHONE ENCOUNTER
Spoke with wife regarding patient foot swollen and painful. Ask wife can she bring patient to the clinic today so we can check him out,  Wife states that they live over an hour away. Wife states that she will being him to ER to get his foot check and follow up with Dr. Menezes on Tuesday, 11/26/24. Patient wife verbally understand.

## 2024-11-21 ENCOUNTER — CLINICAL SUPPORT (OUTPATIENT)
Facility: HOSPITAL | Age: 59
End: 2024-11-21
Payer: MEDICAID

## 2024-11-21 DIAGNOSIS — Z74.09 IMPAIRED MOBILITY AND ACTIVITIES OF DAILY LIVING: ICD-10-CM

## 2024-11-21 DIAGNOSIS — Z78.9 IMPAIRED MOBILITY AND ACTIVITIES OF DAILY LIVING: ICD-10-CM

## 2024-11-21 DIAGNOSIS — Z96.652 S/P REVISION OF TOTAL KNEE, LEFT: Primary | ICD-10-CM

## 2024-11-21 PROCEDURE — 97110 THERAPEUTIC EXERCISES: CPT | Mod: PN | Performed by: PHYSICAL THERAPIST

## 2024-11-21 NOTE — PROGRESS NOTES
[facility]   Physical Therapy Treatment Note      Name: Venancio Barragan  [facility] Number: [id number]    Therapy Diagnosis:   Encounter Diagnoses   Name Primary?    S/P revision of total knee, left Yes    Impaired mobility and activities of daily living      Physician: Bony Menezes MD     Physician Orders: PT Eval and Treat   Medical Diagnosis from Referral: Pain due to total left knee replacement, subsequent encounter [T84.84XD, Z96.652], Loosening of prosthesis of left total knee replacement, initial encounter [T84.033A], Quadriceps weakness [M62.81]   Evaluation Date: 11/18/2024  Authorization Period Expiration: 12/31/24  Plan of Care Expiration: 3/18/25  Progress Note Due: 12/18/24  Visit # / Visits authorized: 1/ 20     Time In: 9:30  Time Out: 10:40  Total Billable Time: 70 minutes Billing reflects 1:1 direct supervision    MD follow-up:    Precautions: Standard    FOTO:  Goal: 67%  -Intake: 36%  -Status: incomplete  -Discharge: incomplete    Subjective     Pt reports: S 11/21- Felt pretty good after last visit. He feels that his bandage is restricting flexion ROM.  He was compliant with home exercise program.  Response to previous treatment: Improving symptoms  Functional change: Gradually improving function    Pain: Minimal/10  Location: left knee     Objective      Objective Measures updated at progress report unless specified otherwise.    Problem List:  Impaired knee range of motion  Impaired strength  Impaired functional mobility    Treatment     Venancio received the treatments listed below:      CPT Code Intervention Date/Notes  11/21     Manual Therapy NT   TE ROM 0-0-70   TE Heel prop 5 min   TE Quad set with NMES 5 min   TE SAQ with NMES 5 min   TE LAQ with NMES 5 min   TE Seated knee flexion stretching 5 min   TE SL hip abduction 3x10   TE Standing heel raise 3x10   TE Mini Squats 3x10   TE TKE OTB, 3x10    Game ready 10 minutes     70 minutes of Therapeutic Exercise (TE) to  develop strength, endurance, ROM, and flexibility. (84142)        Patient Education and Home Exercises         Education provided:   Physical therapy diagnosis, prognosis, and plan of care.     Written Home Exercises Provided: Patient instructed to cont prior HEP.  Exercises were reviewed and Venancio was able to demonstrate them prior to the end of the session.  Venancio demonstrated good  understanding of the education provided.     See EMR under Patient Instructions for exercises provided prior visit.    Assessment     A 11/21- The patient's symptoms level is improved today compared to last visit. He continues to have limitaiton in ROM. Improved quad function with exercises today.    Venancio Is progressing well towards his goals.   Pt prognosis is Good.     Pt will continue to benefit from skilled outpatient physical therapy to address the deficits listed in the problem list box on initial evaluation, provide pt/family education and to maximize pt's level of independence in the home and community environment.     Pt's spiritual, cultural and educational needs considered and pt agreeable to plan of care and goals.    Anticipated barriers to physical therapy: chronicity of condition     Goals:     Short Term Goals:  6 weeks Status  Date Met   PAIN: Pt will report worst pain of 4/10 in order to progress toward max functional ability and improve quality of life. [x] Progressing  [] Met  [] Not Met     FUNCTION: Patient will demonstrate improved function as indicated by a functional score improvement of at least 5 points on FOTO. [x] Progressing  [] Met  [] Not Met     MOBILITY: Patient will improve AROM to 50% of stated goals, listed in objective measures above, in order to progress towards independence with functional activities.  [x] Progressing  [] Met  [] Not Met     STRENGTH: Patient will improve strength to 50% of stated goals, listed in objective measures above, in order to progress towards independence with  functional activities. [x] Progressing  [] Met  [] Not Met     POSTURE: Patient will correct postural deviations in sitting and standing, to decrease pain and promote long term stability.  [x] Progressing  [] Met  [] Not Met     GAIT: Patient will demonstrate improved gait mechanics including ambulatory with SPC in order to improve functional mobility, improve quality of life, and decrease risk of further injury or fall.  [x] Progressing  [] Met  [] Not Met     HEP: Patient will demonstrate independence with HEP in order to progress toward functional independence. [x] Progressing  [] Met  [] Not Met        Long Term Goals:  12 weeks Status Date Met   PAIN: Pt will report worst pain of 2/10 in order to progress toward max functional ability and improve quality of life [x] Progressing  [] Met  [] Not Met     FUNCTION: Patient will demonstrate improved function as indicated by a FOTO functional score improvement as listed in header. [x] Progressing  [] Met  [] Not Met     MOBILITY: Patient will improve AROM to stated goals, listed in objective measures above, in order to return to maximal functional potential and improve quality of life.  [x] Progressing  [] Met  [] Not Met     STRENGTH: Patient will improve strength to stated goals, listed in objective measures above, in order to improve functional independence and quality of life.  [x] Progressing  [] Met  [] Not Met     GAIT: Patient will demonstrate normalized gait mechanics with minimal compensation in order to return to PLOF. [x] Progressing  [] Met  [] Not Met     Patient will return to normal ADL's, IADL's, community involvement, recreational activities, and work-related activities with less than or equal to 2/10 pain and maximal function.  [x] Progressing  [] Met  [] Not Met          Plan       Continue to progress per protocol and per patient tolerance      Al Quiros, PT

## 2024-11-21 NOTE — PLAN OF CARE
[facility]   Physical Therapy Initial Evaluation      Name: Venancio KINGSLEY [facility] Number: [id number]    Therapy Diagnosis:   Encounter Diagnoses   Name Primary?    Pain due to total left knee replacement, subsequent encounter     Loosening of prosthesis of left total knee replacement, initial encounter     Quadriceps weakness     Impaired mobility and activities of daily living Yes        Physician: Bony Menezes MD    Physician Orders: PT Eval and Treat   Medical Diagnosis from Referral: Pain due to total left knee replacement, subsequent encounter [T84.84XD, Z96.652], Loosening of prosthesis of left total knee replacement, initial encounter [T84.033A], Quadriceps weakness [M62.81]   Evaluation Date: 11/18/2024  Authorization Period Expiration: 10/8/25  Plan of Care Expiration: 3/18/25  Progress Note Due: 12/18/24  Visit # / Visits authorized: 1/ 1     FOTO:  Goal: 67%  -Intake: 36%  -Status: incomplete  -Discharge: incomplete    Precautions: Standard     Time In: 9:00  Time Out: 10:05  Total Appointment Time (timed & untimed codes): 65 minutes    Subjective     Date of onset: Surgery on 11/11    History of current condition - Venancio reports: Knee replacement in '23 due to arthritis. Pain got worse afterwards. Revision on 11/11. Feeling better this time. He hasn't been taking an pain killers since then. Getting around the house okay with rollator. He has help at home.    Previous rehab had a hard time getting flexion back.    He spent 1 week in the hospital.    History of 2 SAHARA as well due to AVN    Falls:     Imaging: [x] Xray [] MRI [] CT: Performed on:   FINDINGS:  Interval left revision knee arthroplasty with hardware projecting in appropriate alignment.  Calcium phosphate antibiotic beads noted.  Overlying skin staples with surgical drain noted.  Degree of soft tissue postprocedural gas.    Pain:  Current 7/10, worst 7/10, best 3/10   Location: [] Right   [x] Left:  knee  Description:  aching   Aggravating Factors: Sleeping wrong, certain movements  Easing Factors: activity avoidance, rest    Prior Therapy: [facility] for 8 months  Social History:    Occupation: Unemployed,  by trade  Prior Level of Function: Active before the first surgery. On feet a lot with work. Big yard.  Current Level of Function: Limited due to post op state.    Patients goals: Get back to work, yard work, get back in race car, fishing.     Medical History:   Past Medical History:   Diagnosis Date    Diabetes mellitus     GERD (gastroesophageal reflux disease)     High cholesterol     Hypertension        Surgical History:   Venancio Barragan  has a past surgical history that includes Elbow surgery (Left); Total hip arthroplasty; Knee surgery; Ankle surgery; Incision and drainage of hand (Left, 2/8/2023); insertion, electrode lead, neurostimulator, peripheral (Left, 9/23/2024); and Revision of knee arthroplasty (Left, 11/11/2024).    Medications:   Venancio has a current medication list which includes the following prescription(s): acetaminophen, aspirin, atorvastatin, cephalexin, diclofenac, trulicity, famotidine, hydrocodone-acetaminophen, metformin, multivitamin, paxlovid, promethazine, ramipril, ozempic, solifenacin, tadalafil, tranexamic acid, westussin dm (dexchlorphenir), and zolpidem.    Allergies:   Review of patient's allergies indicates:  No Known Allergies     Objective      Patient Specific Functional Scale  Identify up to 5 important activities that are difficult for you  Rate them 0-10, 0 is impossible, 10 is no problem    Activity Initial     Yard work  0/10     2.  work  0/10       Observation:   POSTURE:    GAIT:   Slight bent knee gait pattern with rollator. Good gait speed.  Circumferential measurement at mid patella  42 cm      RANGE OF MOTION:     Knee AROM/PROM Right Left Pain/Dysfunction with Movement Goal   Knee Flexion (135º)  67      Knee Extension (0º)  2 hyper     Tibial  External Rotation (40º}       Tibial Internal Rotation (30º)                 STRENGTH:   L/E MMT Right  (spine) Left Pain/Dysfunction with Movement Goal   Hip Flexion   3/5  4+/5 B   Hip Extension     4+/5 B   Hip Abduction     4+/5 B   Knee Extension  1/5  5/5 B   Knee Flexion  1/5  5/5 B          Joint mobility:  Not able to assess today      SENSATION  [x] Intact to Light Touch   [] Impaired:      PALPATION: Structures: Increased tenderness to palpation of:  L around surgical site        Function:     Intake Outcome Measure for FOTO knee Survey    Therapist reviewed FOTO scores for Venancio Barragan on 11/18/2024.   FOTO documents entered into [employer] - see Media section.    Intake Score: 36%         Treatment     Total Treatment time (time-based codes) separate from Evaluation: (15) minutes     Venancio received the treatments listed below:      CPT Code Intervention Date/Notes  11/21     Manual Therapy NT   TE Heel prop 5 min   TE Quad set with NMES 10 min   TE Seated knee flexion stretching 5 min     00 minutes of Manual therapy (MT) to improve pain and ROM. (07557)  15 minutes of Therapeutic Exercise (TE) to develop strength, endurance, ROM, and flexibility. (15600)  00 minutes of Neuromuscular Re-Education (NMR)  to improve: Balance, Coordination, Kinesthetic, Sense, Proprioception, and Posture. (76768)  00 minutes of Therapeutic Activities (TA) to improve functional performance. (53652)  Unattended Electrical Stimulation (ES) for muscle performance and/or pain modulation. (33367)  00 Minutes of Vasopneumatic Device Therapy () for management of swelling/edema. (87945)  BFR: Blood flow restriction applied during exercise  NP: Not Performed    Patient Education and Home Exercises     Education provided: (10) minutes  Physical therapy diagnosis, prognosis, and plan of care.   Activity Modifications: None    Written Home Exercises Provided: yes.  Exercises were reviewed and Venancio was able to demonstrate them prior to the  end of the session.  Venancio demonstrated good  understanding of the education provided. See EMR under Patient Instructions for exercises provided during therapy sessions.    Assessment     Venancio is a 59 y.o. male referred to outpatient Physical Therapy with a medical diagnosis of Pain due to total left knee replacement, subsequent encounter [T84.84XD, Z96.652], Loosening of prosthesis of left total knee replacement, initial encounter [T84.033A], Quadriceps weakness [M62.81] . Clinical exam is consistent with referring diagnosis.     Problem List:  Impaired knee range of motion  Impaired strength  Impaired functional mobility    Pt prognosis is Good  Pt will benefit from skilled outpatient Physical Therapy to address the deficits stated above and in the chart below, provide pt/family education, and to maximize pt's level of independence.     Plan of care discussed with patient: Yes  Pt's spiritual, cultural and educational needs considered and patient is agreeable to the plan of care and goals as stated below:     Anticipated Barriers for therapy: chronicity of condition    Medical Necessity is demonstrated by the following   History  Co-morbidities and personal factors that may impact the plan of care [] LOW: no personal factors / co-morbidities  [x] MODERATE: 1-2 personal factors / co-morbidities  [] HIGH: 3+ personal factors / co-morbidities    Moderate / High Support Documentation: See patient medical history and objective assessment     Examination  Body Structures and Functions, activity limitations and participation restrictions that may impact the plan of care [] LOW: addressing 1-2 elements  [x] MODERATE: 3+ elements  [] HIGH: 4+ elements (please support below)    Moderate / High Support Documentation: See patient medical history and objective assessment     Clinical Presentation [] LOW: stable  [x] MODERATE: Evolving  [] HIGH: Unstable     Decision Making/ Complexity Score: moderate         Short Term Goals:   6 weeks Status  Date Met   PAIN: Pt will report worst pain of 4/10 in order to progress toward max functional ability and improve quality of life. [x] Progressing  [] Met  [] Not Met    FUNCTION: Patient will demonstrate improved function as indicated by a functional score improvement of at least 5 points on FOTO. [x] Progressing  [] Met  [] Not Met    MOBILITY: Patient will improve AROM to 50% of stated goals, listed in objective measures above, in order to progress towards independence with functional activities.  [x] Progressing  [] Met  [] Not Met    STRENGTH: Patient will improve strength to 50% of stated goals, listed in objective measures above, in order to progress towards independence with functional activities. [x] Progressing  [] Met  [] Not Met    POSTURE: Patient will correct postural deviations in sitting and standing, to decrease pain and promote long term stability.  [x] Progressing  [] Met  [] Not Met    GAIT: Patient will demonstrate improved gait mechanics including ambulatory with SPC in order to improve functional mobility, improve quality of life, and decrease risk of further injury or fall.  [x] Progressing  [] Met  [] Not Met    HEP: Patient will demonstrate independence with HEP in order to progress toward functional independence. [x] Progressing  [] Met  [] Not Met      Long Term Goals:  12 weeks Status Date Met   PAIN: Pt will report worst pain of 2/10 in order to progress toward max functional ability and improve quality of life [x] Progressing  [] Met  [] Not Met    FUNCTION: Patient will demonstrate improved function as indicated by a FOTO functional score improvement as listed in header. [x] Progressing  [] Met  [] Not Met    MOBILITY: Patient will improve AROM to stated goals, listed in objective measures above, in order to return to maximal functional potential and improve quality of life.  [x] Progressing  [] Met  [] Not Met    STRENGTH: Patient will improve strength to stated  goals, listed in objective measures above, in order to improve functional independence and quality of life.  [x] Progressing  [] Met  [] Not Met    GAIT: Patient will demonstrate normalized gait mechanics with minimal compensation in order to return to PLOF. [x] Progressing  [] Met  [] Not Met    Patient will return to normal ADL's, IADL's, community involvement, recreational activities, and work-related activities with less than or equal to 2/10 pain and maximal function.  [x] Progressing  [] Met  [] Not Met      Plan   Plan of care Certification: 11/18/2024 to 2/18/24.    Outpatient Physical Therapy 2 times weekly for 12 weeks to include any combination of the following interventions: virtual visits, dry needling, modalities, electrical stimulation (IFC, Pre-Mod, Attended with Functional Dry Needling), Cervical/Lumbar Traction, Gait Training, Manual Therapy, Neuromuscular Re-ed, Patient Education, Self Care, Therapeutic Exercise, and Therapeutic Activites     Al Quiros, PT, DPT      I CERTIFY THE NEED FOR THESE SERVICES FURNISHED UNDER THIS PLAN OF TREATMENT AND WHILE UNDER MY CARE   Physician's comments:     Physician's Signature: ___________________________________________________

## 2024-11-25 ENCOUNTER — CLINICAL SUPPORT (OUTPATIENT)
Facility: HOSPITAL | Age: 59
End: 2024-11-25
Payer: MEDICAID

## 2024-11-25 DIAGNOSIS — Z74.09 IMPAIRED MOBILITY AND ACTIVITIES OF DAILY LIVING: ICD-10-CM

## 2024-11-25 DIAGNOSIS — Z78.9 IMPAIRED MOBILITY AND ACTIVITIES OF DAILY LIVING: ICD-10-CM

## 2024-11-25 DIAGNOSIS — M25.662 DECREASED RANGE OF MOTION (ROM) OF LEFT KNEE: ICD-10-CM

## 2024-11-25 DIAGNOSIS — Z96.652 S/P REVISION OF TOTAL KNEE, LEFT: Primary | ICD-10-CM

## 2024-11-26 ENCOUNTER — OFFICE VISIT (OUTPATIENT)
Dept: ORTHOPEDICS | Facility: CLINIC | Age: 59
End: 2024-11-26
Payer: MEDICAID

## 2024-11-26 VITALS — HEIGHT: 65 IN | WEIGHT: 189.63 LBS | BODY MASS INDEX: 31.59 KG/M2

## 2024-11-26 DIAGNOSIS — Z96.652 S/P REVISION OF TOTAL KNEE, LEFT: Primary | ICD-10-CM

## 2024-11-26 PROCEDURE — 3044F HG A1C LEVEL LT 7.0%: CPT | Mod: CPTII,,, | Performed by: ORTHOPAEDIC SURGERY

## 2024-11-26 PROCEDURE — 99999 PR PBB SHADOW E&M-EST. PATIENT-LVL III: CPT | Mod: PBBFAC,,, | Performed by: ORTHOPAEDIC SURGERY

## 2024-11-26 PROCEDURE — 4010F ACE/ARB THERAPY RXD/TAKEN: CPT | Mod: CPTII,,, | Performed by: ORTHOPAEDIC SURGERY

## 2024-11-26 PROCEDURE — 99024 POSTOP FOLLOW-UP VISIT: CPT | Mod: ,,, | Performed by: ORTHOPAEDIC SURGERY

## 2024-11-26 PROCEDURE — 3061F NEG MICROALBUMINURIA REV: CPT | Mod: CPTII,,, | Performed by: ORTHOPAEDIC SURGERY

## 2024-11-26 PROCEDURE — 1159F MED LIST DOCD IN RCRD: CPT | Mod: CPTII,,, | Performed by: ORTHOPAEDIC SURGERY

## 2024-11-26 PROCEDURE — 99213 OFFICE O/P EST LOW 20 MIN: CPT | Mod: PBBFAC,PN | Performed by: ORTHOPAEDIC SURGERY

## 2024-11-26 PROCEDURE — 3066F NEPHROPATHY DOC TX: CPT | Mod: CPTII,,, | Performed by: ORTHOPAEDIC SURGERY

## 2024-11-26 RX ORDER — GABAPENTIN 300 MG/1
600 CAPSULE ORAL 2 TIMES DAILY
Qty: 56 CAPSULE | Refills: 0 | Status: SHIPPED | OUTPATIENT
Start: 2024-11-26 | End: 2024-12-10

## 2024-11-26 NOTE — PROGRESS NOTES
[facility] [address]          Subjective:     Patient ID: Venancio Barragan is a 59 y.o. male.    Chief Complaint: Pain of the Left Knee    Patient is 2 weeks s/p  left revision total knee replacement  Anterior knee pain: No  Has improved pain  Is in physical therapy  Yes  Problems w incision  No  Is  happy with result  Yes  Opiod free: Yes     Patient reports being very happy with the results only taking Tylenol for pain.  Patient does reports significant burning pain in foot when he lies down for longer than 20 minutes.  Otherwise doing well with no complaints      Past Medical History:   Diagnosis Date    Diabetes mellitus     GERD (gastroesophageal reflux disease)     High cholesterol     Hypertension         Past Surgical History:   Procedure Laterality Date    ANKLE SURGERY      right    ELBOW SURGERY Left     INCISION AND DRAINAGE OF HAND Left 2/8/2023    Procedure: INCISION AND DRAINAGE, HAND;  Surgeon: Adele Colon MD;  Location: [facility] OR;  Service: Orthopedics;  Laterality: Left;    INSERTION, ELECTRODE LEAD, NEUROSTIMULATOR, PERIPHERAL Left 9/23/2024    Procedure: INSERTION,ELECTRODE LEAD,NEUROSTIMULATOR,PERIPHERAL;  Surgeon: Bony Menezes MD;  Location: [facility] OR;  Service: Pain Management;  Laterality: Left;  curonix, trial    KNEE SURGERY      REVISION OF KNEE ARTHROPLASTY Left 11/11/2024    Procedure: REVISION, ARTHROPLASTY, KNEE;  Surgeon: Bony Menezes MD;  Location: [facility] OR;  Service: Orthopedics;  Laterality: Left;  bmi - 26.78 marcin cement removal, depuy revision tka   Richard will be available by phone if needed for misonix -[phone]    TOTAL HIP ARTHROPLASTY      ines        Current Outpatient Medications   Medication Instructions    aspirin (ECOTRIN) 81 mg, Oral, 2 times daily    atorvastatin (LIPITOR) 40 mg, Daily    diclofenac (VOLTAREN) 75 mg, Oral, 2 times daily    dulaglutide (TRULICITY) 0.75 mg/0.5 mL pen injector INECT 0.5 ML INTO THE SKIN EVERY 7 DAYS    famotidine (PEPCID) 20 mg,  Oral, 2 times daily    gabapentin (NEURONTIN) 600 mg, Oral, 2 times daily    HYDROcodone-acetaminophen (NORCO) 5-325 mg per tablet 1 tablet, Oral, Every 6 hours PRN    metFORMIN (GLUCOPHAGE) 1,000 mg, 2 times daily with meals    multivitamin capsule 1 capsule, Daily    OZEMPIC 0.25 mg    PAXLOVID, EUA, 150-100 mg DsPk 2 tablets, 2 times daily    promethazine (PHENERGAN) 25 mg, Oral, Every 6 hours PRN    ramipriL (ALTACE) 2.5 mg    solifenacin (VESICARE) 10 mg    tadalafiL (CIALIS) 5 mg, Daily PRN    WESTUSSIN DM 1-5-10 mg/5 mL Syrp 10 mLs, 3 times daily    zolpidem (AMBIEN) 10 mg, Nightly PRN        Review of patient's allergies indicates:  No Known Allergies    Social History     Socioeconomic History    Marital status:    Tobacco Use    Smoking status: Never    Smokeless tobacco: Never   Substance and Sexual Activity    Alcohol use: No    Drug use: No    Sexual activity: Yes     Partners: Female     Social Drivers of Health     Financial Resource Strain: Low Risk  (11/11/2024)    Overall Financial Resource Strain (CARDIA)     Difficulty of Paying Living Expenses: Not hard at all   Recent Concern: Financial Resource Strain - Medium Risk (10/27/2024)    Received from [facility] and [facility] Subsidiaries and Affiliates    Overall Financial Resource Strain (CARDIA)     Difficulty of Paying Living Expenses: Somewhat hard   Food Insecurity: No Food Insecurity (11/11/2024)    Hunger Vital Sign     Worried About Running Out of Food in the Last Year: Never true     Ran Out of Food in the Last Year: Never true   Transportation Needs: No Transportation Needs (11/11/2024)    TRANSPORTATION NEEDS     Transportation : No   Physical Activity: Inactive (10/27/2024)    Received from [facility] and [facility] Subsidiaries and Affiliates    Exercise Vital Sign     Days of Exercise per Week: 0 days     Minutes of Exercise per Session: 0 min   Stress: No Stress  Concern Present (11/11/2024)    [employer] [place] [employer] - Occupational Stress Questionnaire     Feeling of Stress : Only a little   Recent Concern: Stress - Stress Concern Present (10/14/2024)    Received from [facility] Subsidiaries and Affiliates    [employer] [place] [employer] - Occupational Stress Questionnaire     Feeling of Stress : Very much   Housing Stability: Low Risk  (11/11/2024)    Housing Stability Vital Sign     Unable to Pay for Housing in the Last Year: No     Homeless in the Last Year: No       Family History   Problem Relation Name Age of Onset    Diabetes Mother      No Known Problems Father           Review of systems negative except for noted in HPI    Objective:   Physical Exam:     left knee  Surgical incision healing well with staples in place no signs of infection or dehiscence  Minimal effusion  No TTP about knee  ROM 0-70  Paresthesias over medial dorsal foot in no specific nerve distribution  Sensation intact to light touch in SP/DP/T/TAVERAS/SA  5/5 strength EHL/FHL/GCS/TA  WWP, cap refill less than 2 seconds      Assessment:      Venancio Barragan is a 59 y.o. male    Encounter Diagnosis   Name Primary?    S/P revision of total knee, left Yes       Plan :  Overall patient appears to be doing well and is happy with the result of the revision knee arthroplasty.   Staples removed in clinic today and Steri-Strips placed  Emphasized importance of continuing to work on knee range of motion and flexion with therapy  Gabapentin prescription given for paresthesias in foot  They can continue activities as tolerated avoiding high impact activities.  I would like to see the patient back In 3 months with xrays.           Raman Jane MD  [facility] PGY-3

## 2024-11-27 ENCOUNTER — CLINICAL SUPPORT (OUTPATIENT)
Facility: HOSPITAL | Age: 59
End: 2024-11-27
Payer: MEDICAID

## 2024-11-27 DIAGNOSIS — Z96.652 S/P REVISION OF TOTAL KNEE, LEFT: Primary | ICD-10-CM

## 2024-11-27 DIAGNOSIS — Z78.9 IMPAIRED MOBILITY AND ACTIVITIES OF DAILY LIVING: ICD-10-CM

## 2024-11-27 DIAGNOSIS — M25.662 DECREASED RANGE OF MOTION (ROM) OF LEFT KNEE: ICD-10-CM

## 2024-11-27 DIAGNOSIS — Z74.09 IMPAIRED MOBILITY AND ACTIVITIES OF DAILY LIVING: ICD-10-CM

## 2024-11-27 PROCEDURE — 97110 THERAPEUTIC EXERCISES: CPT | Mod: PN | Performed by: PHYSICAL THERAPIST

## 2024-11-27 NOTE — PROGRESS NOTES
[facility]   Physical Therapy Treatment Note      Name: Venancoi Barragan  [facility] Number: [id number]    Therapy Diagnosis:   Encounter Diagnoses   Name Primary?    S/P revision of total knee, left Yes    Impaired mobility and activities of daily living     Decreased range of motion (ROM) of left knee      Physician: Bony Menezes MD     Physician Orders: PT Eval and Treat   Medical Diagnosis from Referral: Pain due to total left knee replacement, subsequent encounter [T84.84XD, Z96.652], Loosening of prosthesis of left total knee replacement, initial encounter [T84.033A], Quadriceps weakness [M62.81]   Evaluation Date: 11/18/2024  Authorization Period Expiration: 12/31/24  Plan of Care Expiration: 3/18/25  Progress Note Due: 12/18/24  Visit # / Visits authorized: 2/ 20     Time In: 2:30  Time Out: 3:45  Total Billable Time: 70 minutes Billing reflects 1:1 direct supervision    MD follow-up:    Precautions: Standard    FOTO:  Goal: 67%  -Intake: 36%  -Status: incomplete  -Discharge: incomplete    Subjective     Pt reports: S 11/25- Knee has been feeling good. He noticed some squeaking in the joint.  He was compliant with home exercise program.  Response to previous treatment: Improving symptoms  Functional change: Gradually improving function    Pain: Minimal/10  Location: left knee     Objective      Objective Measures updated at progress report unless specified otherwise.    Problem List:  Impaired knee range of motion  Impaired strength  Impaired functional mobility    Treatment     Venancio received the treatments listed below:      CPT Code Intervention Date/Notes  11/25     Manual Therapy NT   TE ROM 0-0-77   TE Heel prop 5 min   TE Quad set with NMES 5 min   TE SAQ with NMES 5 min   TE LAQ with NMES 5 min   TE Seated knee flexion stretching 5 min   TE Heel slides 3 min   TE SL hip abduction 3x10   TE Shuttle SL leg press 1.0 band 3 x 10   TE Standing heel raise 3x10   TE Mini Squats 3x10   TE  TKE OTB, 3x10    Game ready 10 minutes       70 minutes of Therapeutic Exercise (TE) to develop strength, endurance, ROM, and flexibility. (60254)        Patient Education and Home Exercises         Education provided:   Physical therapy diagnosis, prognosis, and plan of care.     Written Home Exercises Provided: Patient instructed to cont prior HEP.  Exercises were reviewed and Venancio was able to demonstrate them prior to the end of the session.  Venancio demonstrated good  understanding of the education provided.     See EMR under Patient Instructions for exercises provided prior visit.    Assessment     11/25- Pt is making good functional progress, but is demonstrating stiffness with knee flexion that is concerning for long-term ROM deficit. Given the issues that he had with his previous surgery, I think he would benefit from low load long duration stretching with Dynasplint. We will work on ordering the splint to maximize ROM.    Venancio Is progressing well towards his goals.   Pt prognosis is Good.     Pt will continue to benefit from skilled outpatient physical therapy to address the deficits listed in the problem list box on initial evaluation, provide pt/family education and to maximize pt's level of independence in the home and community environment.     Pt's spiritual, cultural and educational needs considered and pt agreeable to plan of care and goals.    Anticipated barriers to physical therapy: chronicity of condition     Goals:     Short Term Goals:  6 weeks Status  Date Met   PAIN: Pt will report worst pain of 4/10 in order to progress toward max functional ability and improve quality of life. [x] Progressing  [] Met  [] Not Met     FUNCTION: Patient will demonstrate improved function as indicated by a functional score improvement of at least 5 points on FOTO. [x] Progressing  [] Met  [] Not Met     MOBILITY: Patient will improve AROM to 50% of stated goals, listed in objective measures above, in order to  progress towards independence with functional activities.  [x] Progressing  [] Met  [] Not Met     STRENGTH: Patient will improve strength to 50% of stated goals, listed in objective measures above, in order to progress towards independence with functional activities. [x] Progressing  [] Met  [] Not Met     POSTURE: Patient will correct postural deviations in sitting and standing, to decrease pain and promote long term stability.  [x] Progressing  [] Met  [] Not Met     GAIT: Patient will demonstrate improved gait mechanics including ambulatory with SPC in order to improve functional mobility, improve quality of life, and decrease risk of further injury or fall.  [x] Progressing  [] Met  [] Not Met     HEP: Patient will demonstrate independence with HEP in order to progress toward functional independence. [x] Progressing  [] Met  [] Not Met        Long Term Goals:  12 weeks Status Date Met   PAIN: Pt will report worst pain of 2/10 in order to progress toward max functional ability and improve quality of life [x] Progressing  [] Met  [] Not Met     FUNCTION: Patient will demonstrate improved function as indicated by a FOTO functional score improvement as listed in header. [x] Progressing  [] Met  [] Not Met     MOBILITY: Patient will improve AROM to stated goals, listed in objective measures above, in order to return to maximal functional potential and improve quality of life.  [x] Progressing  [] Met  [] Not Met     STRENGTH: Patient will improve strength to stated goals, listed in objective measures above, in order to improve functional independence and quality of life.  [x] Progressing  [] Met  [] Not Met     GAIT: Patient will demonstrate normalized gait mechanics with minimal compensation in order to return to PLOF. [x] Progressing  [] Met  [] Not Met     Patient will return to normal ADL's, IADL's, community involvement, recreational activities, and work-related activities with less than or equal to 2/10 pain  and maximal function.  [x] Progressing  [] Met  [] Not Met          Plan       Continue to progress per protocol and per patient tolerance      Al Quiros, PT

## 2024-11-30 ENCOUNTER — HOSPITAL ENCOUNTER (EMERGENCY)
Facility: HOSPITAL | Age: 59
Discharge: HOME | End: 2024-11-30
Attending: EMERGENCY MEDICINE
Payer: MEDICAID

## 2024-11-30 VITALS
OXYGEN SATURATION: 100 % | DIASTOLIC BLOOD PRESSURE: 90 MMHG | BODY MASS INDEX: 30.16 KG/M2 | HEART RATE: 73 BPM | RESPIRATION RATE: 18 BRPM | WEIGHT: 181 LBS | HEIGHT: 65 IN | TEMPERATURE: 98 F | SYSTOLIC BLOOD PRESSURE: 138 MMHG

## 2024-11-30 DIAGNOSIS — M79.672 LEFT FOOT PAIN: Primary | ICD-10-CM

## 2024-11-30 PROCEDURE — 25000003 PHARM REV CODE 250: Mod: ER | Performed by: EMERGENCY MEDICINE

## 2024-11-30 PROCEDURE — 99283 EMERGENCY DEPT VISIT LOW MDM: CPT | Mod: 25,ER

## 2024-11-30 RX ORDER — HYDROCODONE BITARTRATE AND ACETAMINOPHEN 5; 325 MG/1; MG/1
1 TABLET ORAL EVERY 6 HOURS PRN
Qty: 12 TABLET | Refills: 0 | Status: SHIPPED | OUTPATIENT
Start: 2024-11-30

## 2024-11-30 RX ORDER — OXYCODONE AND ACETAMINOPHEN 10; 325 MG/1; MG/1
1 TABLET ORAL
Status: COMPLETED | OUTPATIENT
Start: 2024-11-30 | End: 2024-11-30

## 2024-11-30 RX ADMIN — OXYCODONE AND ACETAMINOPHEN 1 TABLET: 10; 325 TABLET ORAL at 06:11

## 2024-11-30 NOTE — PROGRESS NOTES
[facility]   Physical Therapy Treatment Note      Name: Venancio KINGSLEY [facility] Number: [id number]    Therapy Diagnosis:   Encounter Diagnoses   Name Primary?    S/P revision of total knee, left Yes    Impaired mobility and activities of daily living     Decreased range of motion (ROM) of left knee      Physician: Bony Menezes MD     Physician Orders: PT Eval and Treat   Medical Diagnosis from Referral: Pain due to total left knee replacement, subsequent encounter [T84.84XD, Z96.652], Loosening of prosthesis of left total knee replacement, initial encounter [T84.033A], Quadriceps weakness [M62.81]   Evaluation Date: 11/18/2024  Authorization Period Expiration: 12/31/24  Plan of Care Expiration: 3/18/25  Progress Note Due: 12/18/24  Visit # / Visits authorized: 3/ 20     Time In: 2:00  Time Out: 3:20  Total Billable Time: 70 minutes Billing reflects 1:1 direct supervision    MD follow-up:    Precautions: Standard    FOTO:  Goal: 67%  -Intake: 36%  -Status: incomplete  -Discharge: incomplete    Subjective     Pt reports: S 11/27- Followed up with Dr. Menezes yesterday. He said progress looked good and recommended a dynasplint  He was compliant with home exercise program.  Response to previous treatment: Improving symptoms  Functional change: Gradually improving function    Pain: Minimal/10  Location: left knee     Objective      Objective Measures updated at progress report unless specified otherwise.    Problem List:  Impaired knee range of motion  Impaired strength  Impaired functional mobility    Treatment     Venancio received the treatments listed below:      CPT Code Intervention Date/Notes  11/27     Manual Therapy NT   TE ROM 0-0-85   TE Heel prop 5 min   TE Quad set with NMES 5 min   TE SAQ with NMES 5 min   TE LAQ with NMES 5 min   TE Seated knee flexion stretching 5 min   TE Knee flexion chair stretch 3 min   TE Standing heel raise 3x15   TE STanding hip abduction YTB 3x10   TE Mini Squats  3x10   TE Shuttle SL leg press 1.5 band 3 x 10   TE Hamstring curls DL 3 x 10, 45#    TE TKE -    Game ready 10 minutes       70 minutes of Therapeutic Exercise (TE) to develop strength, endurance, ROM, and flexibility. (30762)        Patient Education and Home Exercises         Education provided:   Physical therapy diagnosis, prognosis, and plan of care.     Written Home Exercises Provided: Patient instructed to cont prior HEP.  Exercises were reviewed and Venancio was able to demonstrate them prior to the end of the session.  Venancio demonstrated good  understanding of the education provided.     See EMR under Patient Instructions for exercises provided prior visit.    Assessment     A 11/27- ROM is improving in ROM and strength. His range of motion deficits would benefit from more aggressive low load long duration stretching.    Venancio Is progressing well towards his goals.   Pt prognosis is Good.     Pt will continue to benefit from skilled outpatient physical therapy to address the deficits listed in the problem list box on initial evaluation, provide pt/family education and to maximize pt's level of independence in the home and community environment.     Pt's spiritual, cultural and educational needs considered and pt agreeable to plan of care and goals.    Anticipated barriers to physical therapy: chronicity of condition     Goals:     Short Term Goals:  6 weeks Status  Date Met   PAIN: Pt will report worst pain of 4/10 in order to progress toward max functional ability and improve quality of life. [x] Progressing  [] Met  [] Not Met     FUNCTION: Patient will demonstrate improved function as indicated by a functional score improvement of at least 5 points on FOTO. [x] Progressing  [] Met  [] Not Met     MOBILITY: Patient will improve AROM to 50% of stated goals, listed in objective measures above, in order to progress towards independence with functional activities.  [x] Progressing  [] Met  [] Not Met      STRENGTH: Patient will improve strength to 50% of stated goals, listed in objective measures above, in order to progress towards independence with functional activities. [x] Progressing  [] Met  [] Not Met     POSTURE: Patient will correct postural deviations in sitting and standing, to decrease pain and promote long term stability.  [x] Progressing  [] Met  [] Not Met     GAIT: Patient will demonstrate improved gait mechanics including ambulatory with SPC in order to improve functional mobility, improve quality of life, and decrease risk of further injury or fall.  [x] Progressing  [] Met  [] Not Met     HEP: Patient will demonstrate independence with HEP in order to progress toward functional independence. [x] Progressing  [] Met  [] Not Met        Long Term Goals:  12 weeks Status Date Met   PAIN: Pt will report worst pain of 2/10 in order to progress toward max functional ability and improve quality of life [x] Progressing  [] Met  [] Not Met     FUNCTION: Patient will demonstrate improved function as indicated by a FOTO functional score improvement as listed in header. [x] Progressing  [] Met  [] Not Met     MOBILITY: Patient will improve AROM to stated goals, listed in objective measures above, in order to return to maximal functional potential and improve quality of life.  [x] Progressing  [] Met  [] Not Met     STRENGTH: Patient will improve strength to stated goals, listed in objective measures above, in order to improve functional independence and quality of life.  [x] Progressing  [] Met  [] Not Met     GAIT: Patient will demonstrate normalized gait mechanics with minimal compensation in order to return to PLOF. [x] Progressing  [] Met  [] Not Met     Patient will return to normal ADL's, IADL's, community involvement, recreational activities, and work-related activities with less than or equal to 2/10 pain and maximal function.  [x] Progressing  [] Met  [] Not Met          Plan       Continue to  progress per protocol and per patient tolerance      Al Quiros, PT, DPT

## 2024-12-01 NOTE — ED PROVIDER NOTES
Encounter Date: 11/30/2024       History     Chief Complaint   Patient presents with    Foot Pain     Top of left foot hurting x 3 weeks. Pain worse at rest. Pain began while still in hospital for knee replacement. Pt says it took them a while to find pulse but they did. Has been in constant pain, prescribed gabapentin but not giving any relief.      The history is provided by the patient.   Foot Pain  This is a chronic problem. The current episode started more than 1 week ago (3 weeks ago since surgery for left knee). The problem occurs constantly. The problem has not changed since onset.Pertinent negatives include no chest pain, no abdominal pain, no headaches and no shortness of breath. The symptoms are aggravated by bending and standing. Nothing relieves the symptoms. He has tried nothing for the symptoms.     Review of patient's allergies indicates:  No Known Allergies  Past Medical History:   Diagnosis Date    Diabetes mellitus     GERD (gastroesophageal reflux disease)     High cholesterol     Hypertension      Past Surgical History:   Procedure Laterality Date    ANKLE SURGERY      right    ELBOW SURGERY Left     INCISION AND DRAINAGE OF HAND Left 2/8/2023    Procedure: INCISION AND DRAINAGE, HAND;  Surgeon: Adele Colon MD;  Location: [facility] OR;  Service: Orthopedics;  Laterality: Left;    INSERTION, ELECTRODE LEAD, NEUROSTIMULATOR, PERIPHERAL Left 9/23/2024    Procedure: INSERTION,ELECTRODE LEAD,NEUROSTIMULATOR,PERIPHERAL;  Surgeon: Bony Menezes MD;  Location: [facility] OR;  Service: Pain Management;  Laterality: Left;  felicianoonix, trial    KNEE SURGERY      REVISION OF KNEE ARTHROPLASTY Left 11/11/2024    Procedure: REVISION, ARTHROPLASTY, KNEE;  Surgeon: Bony Menezes MD;  Location: [facility] OR;  Service: Orthopedics;  Laterality: Left;  bmi - 26.78 marcin cement removal, depuy revision tka   Richard will be available by phone if needed for misonix -[phone]    TOTAL HIP ARTHROPLASTY      ines     Family History    Problem Relation Name Age of Onset    Diabetes Mother      No Known Problems Father       Social History     Tobacco Use    Smoking status: Never    Smokeless tobacco: Never   Substance Use Topics    Alcohol use: No    Drug use: No     Review of Systems   Constitutional:  Negative for fever.   HENT:  Negative for sore throat.    Respiratory:  Negative for shortness of breath.    Cardiovascular:  Negative for chest pain.   Gastrointestinal:  Negative for abdominal pain and nausea.   Genitourinary:  Negative for dysuria.   Musculoskeletal:  Negative for back pain.   Skin:  Positive for wound. Negative for rash.   Neurological:  Negative for weakness and headaches.   Hematological:  Does not bruise/bleed easily.       Physical Exam     Initial Vitals [11/30/24 1810]   BP Pulse Resp Temp SpO2   135/83 82 18 98 °F (36.7 °C) 98 %      MAP       --         Physical Exam    Nursing note and vitals reviewed.  Constitutional: He appears well-developed and well-nourished.   HENT:   Head: Normocephalic and atraumatic. Mouth/Throat: Oropharynx is clear and moist.   Eyes: Conjunctivae and EOM are normal. Pupils are equal, round, and reactive to light.   Neck: Neck supple.   Normal range of motion.  Cardiovascular:  Normal rate, regular rhythm and normal heart sounds.           Pulmonary/Chest: Breath sounds normal.   Abdominal: Abdomen is soft. Bowel sounds are normal.   Musculoskeletal:         General: Normal range of motion.      Cervical back: Normal range of motion and neck supple.      Right foot: Normal.      Left foot: Normal range of motion and normal capillary refill. No swelling, deformity, foot drop, prominent metatarsal heads, laceration, tenderness, bony tenderness or crepitus. Normal pulse.      Comments: Left knee surgical wound clean dry no erythema, No drainage, Neg LE edema, neg palp cord, palp dp pulse cr<2s      Neurological: He is alert and oriented to person, place, and time. He has normal strength.    Skin: Skin is warm and dry.   Psychiatric: He has a normal mood and affect. Thought content normal.         ED Course   Procedures  Labs Reviewed - No data to display       Imaging Results              X-Ray Foot Complete Left (Preliminary result)  Result time 11/30/24 19:32:13      Wet Read by Raman Betancur MD (11/30/24 19:31:06, [facility], Emergency Medicine)    naf                                     Medications   oxyCODONE-acetaminophen  mg per tablet 1 tablet (1 tablet Oral Given 11/30/24 1839)     Medical Decision Making  DDx Peripheral neuropathy, fracture, contusion, sciatica    Problems Addressed:  Left foot pain: acute illness or injury    Amount and/or Complexity of Data Reviewed  Radiology: ordered and independent interpretation performed.    Risk  Prescription drug management.  Risk Details: I do not suspect DVT, pt is s/p knee sx, presents c pain to dorsum of left foot, no calf tenderness, LE swelling, palp cord, CP/SOB. Sx are localized to dorsum of left foot, no signs of infection/ rash/ deformity/ Xray wet read unremarkable.    7:35 PM - Counseling: Spoke with the patient and discussed todays findings, in addition to providing specific details for the plan of care and counseling regarding the diagnosis and prognosis. Questions are answered at this time.                                     Clinical Impression:  Final diagnoses:  [M79.672] Left foot pain (Primary)          ED Disposition Condition    Discharge Stable          ED Prescriptions       Medication Sig Dispense Start Date End Date Auth. Provider    HYDROcodone-acetaminophen (NORCO) 5-325 mg per tablet Take 1 tablet by mouth every 6 (six) hours as needed. 12 tablet 11/30/2024 -- Raman Betancur MD          Follow-up Information       Follow up With Specialties Details Why Contact Info    Gustavo Calhoun MD Family Medicine Schedule an appointment as soon as possible for a visit   [address] [place] [address]  V[address]TTE  [facility]  [place] [address]  [phone]      [facility]THOP[facility] Orthopedics Schedule an appointment as soon as possible for a visit   [address] [facility] [address] [phone]             Raman Betancur MD  11/30/24 1935

## 2024-12-02 ENCOUNTER — CLINICAL SUPPORT (OUTPATIENT)
Facility: HOSPITAL | Age: 59
End: 2024-12-02
Payer: MEDICAID

## 2024-12-02 DIAGNOSIS — M25.662 DECREASED RANGE OF MOTION (ROM) OF LEFT KNEE: ICD-10-CM

## 2024-12-02 DIAGNOSIS — Z96.652 S/P REVISION OF TOTAL KNEE, LEFT: Primary | ICD-10-CM

## 2024-12-02 DIAGNOSIS — Z78.9 IMPAIRED MOBILITY AND ACTIVITIES OF DAILY LIVING: ICD-10-CM

## 2024-12-02 DIAGNOSIS — Z74.09 IMPAIRED MOBILITY AND ACTIVITIES OF DAILY LIVING: ICD-10-CM

## 2024-12-02 PROCEDURE — 97110 THERAPEUTIC EXERCISES: CPT | Mod: PN | Performed by: PHYSICAL THERAPIST

## 2024-12-02 NOTE — PROGRESS NOTES
"[facility] OUTPATIENT THERAPY AND WELLNESS   Physical Therapy Treatment Note      Name: Venancio Barragan  [facility] Number: [id number]    Therapy Diagnosis:   Encounter Diagnoses   Name Primary?    S/P revision of total knee, left Yes    Impaired mobility and activities of daily living     Decreased range of motion (ROM) of left knee      Physician: Bony Menezes MD     Physician Orders: PT Eval and Treat   Medical Diagnosis from Referral: Pain due to total left knee replacement, subsequent encounter [T84.84XD, Z96.652], Loosening of prosthesis of left total knee replacement, initial encounter [T84.033A], Quadriceps weakness [M62.81]   Evaluation Date: 11/18/2024  Authorization Period Expiration: 12/31/24  Plan of Care Expiration: 3/18/25  Progress Note Due: 12/18/24  Visit # / Visits authorized: 4/ 20     Time In: 1004  Time Out: 1115  Total Billable Time: 70 minutes Billing reflects 1:1 direct supervision    MD follow-up:    Precautions: Standard    FOTO:  Goal: 67%  -Intake: 36%  -Status: incomplete  -Discharge: incomplete    Subjective     Pt reports: S 12/2- Knee is feeling good. He has been doing a lot of stretching on his own.  He was compliant with home exercise program.  Response to previous treatment: Improving symptoms  Functional change: Gradually improving function    Pain: Minimal/10  Location: left knee     Objective      Objective Measures updated at progress report unless specified otherwise.    Problem List:  Impaired knee range of motion  Impaired strength  Impaired functional mobility    Treatment     Venancio received the treatments listed below:      CPT Code Intervention Date/Notes  11/27     Manual Therapy NT   TE ROM 0-2-88   TE Heel prop 5 min   TE Quad set with NMES -   TE SAQ with NMES -   TE LAQ with NMES -   TE Seated knee flexion stretching 5 min   TE Knee flexion chair stretch -   TE Squats to box 18" 3x10   TE Step ups with UE support 6" 3 x 10   TE Single leg balance 3 x 20s   TE Standing heel " raise 3x15   TE STanding hip abduction YTB 3x10   TE SL leg press 45# 3 X 10   TE Knee extension SL 15# 3 x 10   TE Hamstring curls SL 3 x 10, 45#    TE TKE -    Game ready 10 minutes       70 minutes of Therapeutic Exercise (TE) to develop strength, endurance, ROM, and flexibility. (13967)        Patient Education and Home Exercises         Education provided:   Physical therapy diagnosis, prognosis, and plan of care.     Written Home Exercises Provided: Patient instructed to cont prior HEP.  Exercises were reviewed and Venancio was able to demonstrate them prior to the end of the session.  Venancio demonstrated good  understanding of the education provided.     See EMR under Patient Instructions for exercises provided prior visit.    Assessment     A 12/2- Knee range of motion improved after manual therapy and stretching. Patient tolerating progression to heavier loads on functional movements well. Some verbal cueing for balance exercises and squats required.    Venancio Is progressing well towards his goals.   Pt prognosis is Good.     Pt will continue to benefit from skilled outpatient physical therapy to address the deficits listed in the problem list box on initial evaluation, provide pt/family education and to maximize pt's level of independence in the home and community environment.     Pt's spiritual, cultural and educational needs considered and pt agreeable to plan of care and goals.    Anticipated barriers to physical therapy: chronicity of condition     Goals:     Short Term Goals:  6 weeks Status  Date Met   PAIN: Pt will report worst pain of 4/10 in order to progress toward max functional ability and improve quality of life. [x] Progressing  [] Met  [] Not Met     FUNCTION: Patient will demonstrate improved function as indicated by a functional score improvement of at least 5 points on FOTO. [x] Progressing  [] Met  [] Not Met     MOBILITY: Patient will improve AROM to 50% of stated goals, listed in  objective measures above, in order to progress towards independence with functional activities.  [x] Progressing  [] Met  [] Not Met     STRENGTH: Patient will improve strength to 50% of stated goals, listed in objective measures above, in order to progress towards independence with functional activities. [x] Progressing  [] Met  [] Not Met     POSTURE: Patient will correct postural deviations in sitting and standing, to decrease pain and promote long term stability.  [x] Progressing  [] Met  [] Not Met     GAIT: Patient will demonstrate improved gait mechanics including ambulatory with SPC in order to improve functional mobility, improve quality of life, and decrease risk of further injury or fall.  [x] Progressing  [] Met  [] Not Met     HEP: Patient will demonstrate independence with HEP in order to progress toward functional independence. [x] Progressing  [] Met  [] Not Met        Long Term Goals:  12 weeks Status Date Met   PAIN: Pt will report worst pain of 2/10 in order to progress toward max functional ability and improve quality of life [x] Progressing  [] Met  [] Not Met     FUNCTION: Patient will demonstrate improved function as indicated by a FOTO functional score improvement as listed in header. [x] Progressing  [] Met  [] Not Met     MOBILITY: Patient will improve AROM to stated goals, listed in objective measures above, in order to return to maximal functional potential and improve quality of life.  [x] Progressing  [] Met  [] Not Met     STRENGTH: Patient will improve strength to stated goals, listed in objective measures above, in order to improve functional independence and quality of life.  [x] Progressing  [] Met  [] Not Met     GAIT: Patient will demonstrate normalized gait mechanics with minimal compensation in order to return to PLOF. [x] Progressing  [] Met  [] Not Met     Patient will return to normal ADL's, IADL's, community involvement, recreational activities, and work-related activities  with less than or equal to 2/10 pain and maximal function.  [x] Progressing  [] Met  [] Not Met          Plan       Continue to progress per protocol and per patient tolerance      Al Quiros, PT, DPT

## 2024-12-04 ENCOUNTER — CLINICAL SUPPORT (OUTPATIENT)
Facility: HOSPITAL | Age: 59
End: 2024-12-04
Payer: MEDICAID

## 2024-12-04 DIAGNOSIS — Z74.09 IMPAIRED MOBILITY AND ACTIVITIES OF DAILY LIVING: ICD-10-CM

## 2024-12-04 DIAGNOSIS — Z96.652 S/P REVISION OF TOTAL KNEE, LEFT: Primary | ICD-10-CM

## 2024-12-04 DIAGNOSIS — Z78.9 IMPAIRED MOBILITY AND ACTIVITIES OF DAILY LIVING: ICD-10-CM

## 2024-12-04 DIAGNOSIS — M25.662 DECREASED RANGE OF MOTION (ROM) OF LEFT KNEE: ICD-10-CM

## 2024-12-04 PROCEDURE — 97110 THERAPEUTIC EXERCISES: CPT | Mod: PN | Performed by: PHYSICAL THERAPIST

## 2024-12-04 NOTE — PATIENT INSTRUCTIONS
"[facility]   Physical Therapy Treatment Note      Name: Venancio Barragan  [facility] Number: [id number]    Therapy Diagnosis:   Encounter Diagnoses   Name Primary?    S/P revision of total knee, left Yes    Impaired mobility and activities of daily living     Decreased range of motion (ROM) of left knee      Physician: Bony Menezes MD     Physician Orders: PT Eval and Treat   Medical Diagnosis from Referral: Pain due to total left knee replacement, subsequent encounter [T84.84XD, Z96.652], Loosening of prosthesis of left total knee replacement, initial encounter [T84.033A], Quadriceps weakness [M62.81]   Evaluation Date: 11/18/2024  Authorization Period Expiration: 12/31/24  Plan of Care Expiration: 3/18/25  Progress Note Due: 12/18/24  Visit # / Visits authorized: 4/ 20     Time In: 1004  Time Out: 1115  Total Billable Time: 70 minutes Billing reflects 1:1 direct supervision    MD follow-up:    Precautions: Standard    FOTO:  Goal: 67%  -Intake: 36%  -Status: incomplete  -Discharge: incomplete    Subjective     Pt reports: S 12/4-feeling good. Noticing some calf tightness while walking on the treadmill.  Response to previous treatment: Improving symptoms  Functional change: Gradually improving function    Pain: Minimal/10  Location: left knee     Objective      Objective Measures updated at progress report unless specified otherwise.    Problem List:  Impaired knee range of motion  Impaired strength  Impaired functional mobility    Treatment     Venancio received the treatments listed below:      CPT Code Intervention Date/Notes  12/4     Manual Therapy NT   TE ROM 0-92   TE Heel prop 5 min     Knee extension stretch with strap 3 min x 10s   TE Seated knee flexion stretching 5 min   TE Knee flexion chair stretch -   TE Squats to box 20" 3x10   TE Step ups   6" 3 x 10   TE TKE GPB 3 x 10   TE Single leg balance 3 x 20s   TE Standing heel raise 3x15   TE Lateral band walk YTB 3x10 yds   TE SL leg " press 45# 3 X 10   TE Knee extension SL 15# 3 x 10   TE Hamstring curls SL 3 x 10, 45#     Game ready 10 minutes       70 minutes of Therapeutic Exercise (TE) to develop strength, endurance, ROM, and flexibility. (60324)        Patient Education and Home Exercises         Education provided:   Physical therapy diagnosis, prognosis, and plan of care.     Written Home Exercises Provided: Patient instructed to cont prior HEP.  Exercises were reviewed and Venancio was able to demonstrate them prior to the end of the session.  Venancio demonstrated good  understanding of the education provided.     See EMR under Patient Instructions for exercises provided prior visit.    Assessment   A 12/4- calf symptoms seem to be a result of impaired mechanics on the treadmill. He doesnt have any swelling, redness, warmth, or increase in pain that would raise suspicion for the vein thrombus.      Venancio Is progressing well towards his goals.   Pt prognosis is Good.     Pt will continue to benefit from skilled outpatient physical therapy to address the deficits listed in the problem list box on initial evaluation, provide pt/family education and to maximize pt's level of independence in the home and community environment.     Pt's spiritual, cultural and educational needs considered and pt agreeable to plan of care and goals.    Anticipated barriers to physical therapy: chronicity of condition     Goals:     Short Term Goals:  6 weeks Status  Date Met   PAIN: Pt will report worst pain of 4/10 in order to progress toward max functional ability and improve quality of life. [x] Progressing  [] Met  [] Not Met     FUNCTION: Patient will demonstrate improved function as indicated by a functional score improvement of at least 5 points on FOTO. [x] Progressing  [] Met  [] Not Met     MOBILITY: Patient will improve AROM to 50% of stated goals, listed in objective measures above, in order to progress towards independence with functional activities.   [x] Progressing  [] Met  [] Not Met     STRENGTH: Patient will improve strength to 50% of stated goals, listed in objective measures above, in order to progress towards independence with functional activities. [x] Progressing  [] Met  [] Not Met     POSTURE: Patient will correct postural deviations in sitting and standing, to decrease pain and promote long term stability.  [x] Progressing  [] Met  [] Not Met     GAIT: Patient will demonstrate improved gait mechanics including ambulatory with SPC in order to improve functional mobility, improve quality of life, and decrease risk of further injury or fall.  [x] Progressing  [] Met  [] Not Met     HEP: Patient will demonstrate independence with HEP in order to progress toward functional independence. [x] Progressing  [] Met  [] Not Met        Long Term Goals:  12 weeks Status Date Met   PAIN: Pt will report worst pain of 2/10 in order to progress toward max functional ability and improve quality of life [x] Progressing  [] Met  [] Not Met     FUNCTION: Patient will demonstrate improved function as indicated by a FOTO functional score improvement as listed in header. [x] Progressing  [] Met  [] Not Met     MOBILITY: Patient will improve AROM to stated goals, listed in objective measures above, in order to return to maximal functional potential and improve quality of life.  [x] Progressing  [] Met  [] Not Met     STRENGTH: Patient will improve strength to stated goals, listed in objective measures above, in order to improve functional independence and quality of life.  [x] Progressing  [] Met  [] Not Met     GAIT: Patient will demonstrate normalized gait mechanics with minimal compensation in order to return to PLOF. [x] Progressing  [] Met  [] Not Met     Patient will return to normal ADL's, IADL's, community involvement, recreational activities, and work-related activities with less than or equal to 2/10 pain and maximal function.  [x] Progressing  [] Met  [] Not Met           Plan       Continue to progress per protocol and per patient tolerance      Al Quiros, PT, DPT

## 2024-12-04 NOTE — PROGRESS NOTES
"[facility]   Physical Therapy Treatment Note      Name: Venancio Barragan  [facility] Number: [id number]    Therapy Diagnosis:   Encounter Diagnoses   Name Primary?    S/P revision of total knee, left Yes    Impaired mobility and activities of daily living     Decreased range of motion (ROM) of left knee      Physician: Bony Menezes MD     Physician Orders: PT Eval and Treat   Medical Diagnosis from Referral: Pain due to total left knee replacement, subsequent encounter [T84.84XD, Z96.652], Loosening of prosthesis of left total knee replacement, initial encounter [T84.033A], Quadriceps weakness [M62.81]   Evaluation Date: 11/18/2024  Authorization Period Expiration: 12/31/24  Plan of Care Expiration: 3/18/25  Progress Note Due: 12/18/24  Visit # / Visits authorized: 4/ 20     Time In: 9:25  Time Out: 1045  Total Billable Time: 70 minutes Billing reflects 1:1 direct supervision    MD follow-up:    Precautions: Standard    FOTO:  Goal: 67%  -Intake: 36%  -Status: incomplete  -Discharge: incomplete    Subjective     Pt reports: S 12/4-feeling good. Noticing some calf tightness while walking on the treadmill.  He was compliant with home exercise program.  Response to previous treatment: Improving symptoms  Functional change: Gradually improving function    Pain: Minimal/10  Location: left knee     Objective      Objective Measures updated at progress report unless specified otherwise.    Problem List:  Impaired knee range of motion  Impaired strength  Impaired functional mobility    Treatment     Venancio received the treatments listed below:      CPT Code Intervention Date/Notes  12/4     Manual Therapy NT   TE ROM 0-92   TE Heel prop 5 min     Knee extension stretch with strap 3 min x 10s   TE Seated knee flexion stretching 5 min   TE Knee flexion chair stretch -   TE Squats to box 20" 3x10   TE Step ups   6" 3 x 10   TE TKE GPB 3 x 10   TE Single leg balance 3 x 20s   TE Standing heel raise 3x15   TE " Lateral band walk YTB 3x10 yds   TE SL leg press 45# 3 X 10   TE Knee extension SL 15# 3 x 10   TE Hamstring curls SL 3 x 10, 45#     Game ready 10 minutes       70 minutes of Therapeutic Exercise (TE) to develop strength, endurance, ROM, and flexibility. (40492)        Patient Education and Home Exercises         Education provided:   Physical therapy diagnosis, prognosis, and plan of care.     Written Home Exercises Provided: Patient instructed to cont prior HEP.  Exercises were reviewed and Venancio was able to demonstrate them prior to the end of the session.  Venancio demonstrated good  understanding of the education provided.     See EMR under Patient Instructions for exercises provided prior visit.    Assessment     A 12/4- calf symptoms seem to be a result of impaired mechanics on the treadmill. He doesnt have any swelling, redness, warmth, or increase in pain that would raise suspicion for the vein thrombus.      Venancio Is progressing well towards his goals.   Pt prognosis is Good.     Pt will continue to benefit from skilled outpatient physical therapy to address the deficits listed in the problem list box on initial evaluation, provide pt/family education and to maximize pt's level of independence in the home and community environment.     Pt's spiritual, cultural and educational needs considered and pt agreeable to plan of care and goals.    Anticipated barriers to physical therapy: chronicity of condition     Goals:     Short Term Goals:  6 weeks Status  Date Met   PAIN: Pt will report worst pain of 4/10 in order to progress toward max functional ability and improve quality of life. [x] Progressing  [] Met  [] Not Met     FUNCTION: Patient will demonstrate improved function as indicated by a functional score improvement of at least 5 points on FOTO. [x] Progressing  [] Met  [] Not Met     MOBILITY: Patient will improve AROM to 50% of stated goals, listed in objective measures above, in order to progress  towards independence with functional activities.  [x] Progressing  [] Met  [] Not Met     STRENGTH: Patient will improve strength to 50% of stated goals, listed in objective measures above, in order to progress towards independence with functional activities. [x] Progressing  [] Met  [] Not Met     POSTURE: Patient will correct postural deviations in sitting and standing, to decrease pain and promote long term stability.  [x] Progressing  [] Met  [] Not Met     GAIT: Patient will demonstrate improved gait mechanics including ambulatory with SPC in order to improve functional mobility, improve quality of life, and decrease risk of further injury or fall.  [x] Progressing  [] Met  [] Not Met     HEP: Patient will demonstrate independence with HEP in order to progress toward functional independence. [x] Progressing  [] Met  [] Not Met        Long Term Goals:  12 weeks Status Date Met   PAIN: Pt will report worst pain of 2/10 in order to progress toward max functional ability and improve quality of life [x] Progressing  [] Met  [] Not Met     FUNCTION: Patient will demonstrate improved function as indicated by a FOTO functional score improvement as listed in header. [x] Progressing  [] Met  [] Not Met     MOBILITY: Patient will improve AROM to stated goals, listed in objective measures above, in order to return to maximal functional potential and improve quality of life.  [x] Progressing  [] Met  [] Not Met     STRENGTH: Patient will improve strength to stated goals, listed in objective measures above, in order to improve functional independence and quality of life.  [x] Progressing  [] Met  [] Not Met     GAIT: Patient will demonstrate normalized gait mechanics with minimal compensation in order to return to PLOF. [x] Progressing  [] Met  [] Not Met     Patient will return to normal ADL's, IADL's, community involvement, recreational activities, and work-related activities with less than or equal to 2/10 pain and  maximal function.  [x] Progressing  [] Met  [] Not Met          Plan       Continue to progress per protocol and per patient tolerance      Al Quiros, PT, DPT

## 2024-12-09 ENCOUNTER — CLINICAL SUPPORT (OUTPATIENT)
Facility: HOSPITAL | Age: 59
End: 2024-12-09
Payer: MEDICAID

## 2024-12-09 ENCOUNTER — TELEPHONE (OUTPATIENT)
Dept: ORTHOPEDICS | Facility: CLINIC | Age: 59
End: 2024-12-09
Payer: MEDICAID

## 2024-12-09 DIAGNOSIS — M79.89 CALF SWELLING: Primary | ICD-10-CM

## 2024-12-09 DIAGNOSIS — Z96.652 S/P REVISION OF TOTAL KNEE, LEFT: Primary | ICD-10-CM

## 2024-12-09 DIAGNOSIS — Z74.09 IMPAIRED MOBILITY AND ACTIVITIES OF DAILY LIVING: ICD-10-CM

## 2024-12-09 DIAGNOSIS — M25.662 DECREASED RANGE OF MOTION (ROM) OF LEFT KNEE: ICD-10-CM

## 2024-12-09 DIAGNOSIS — M25.562 CHRONIC PAIN OF LEFT KNEE: ICD-10-CM

## 2024-12-09 DIAGNOSIS — G89.29 CHRONIC PAIN OF LEFT KNEE: ICD-10-CM

## 2024-12-09 DIAGNOSIS — Z78.9 IMPAIRED MOBILITY AND ACTIVITIES OF DAILY LIVING: ICD-10-CM

## 2024-12-09 PROCEDURE — 97110 THERAPEUTIC EXERCISES: CPT | Mod: PN | Performed by: PHYSICAL THERAPIST

## 2024-12-09 NOTE — PROGRESS NOTES
[facility]   Physical Therapy Treatment Note      Name: Venancio Barragan  [facility] Number: [id number]    Therapy Diagnosis:   No diagnosis found.    Physician: Bony Menezes MD     Physician Orders: PT Eval and Treat   Medical Diagnosis from Referral: Pain due to total left knee replacement, subsequent encounter [T84.84XD, Z96.652], Loosening of prosthesis of left total knee replacement, initial encounter [T84.033A], Quadriceps weakness [M62.81]   Evaluation Date: 11/18/2024  Authorization Period Expiration: 12/31/24  Plan of Care Expiration: 3/18/25  Progress Note Due: 12/18/24  Visit # / Visits authorized: 6/ 20     Time In: 9:45  Time Out: 1040  Total Billable Time: 45 minutes Billing reflects 1:1 direct supervision    MD follow-up:    Precautions: Standard    FOTO:  Goal: 67%  -Intake: 36%  -Status: incomplete  -Discharge: incomplete    Subjective     Pt reports: 12/9- he thinks that he may have overdone it this weekend. He has still been having foot pain and central calf pain. He has also noted distension of superficial veins.  He was compliant with home exercise program.  Response to previous treatment: Improving symptoms  Functional change: Gradually improving function    Pain: Minimal/10  Location: left knee     Objective      Objective Measures updated at progress report unless specified otherwise.    Problem List:  Impaired knee range of motion  Impaired strength  Impaired functional mobility    Treatment     Venancio received the treatments listed below:      CPT Code Intervention Date/Notes  12/9   TE Manual Therapy Patellar mobs, knee fleixon mobs   TE Treadmill 15 minutes   te Education DVT signs, risk factors, and next steps       45 minutes of Therapeutic Exercise (TE) to develop strength, endurance, ROM, and flexibility. (63516)        Patient Education and Home Exercises         Education provided:   Physical therapy diagnosis, prognosis, and plan of care.     Written Home  Exercises Provided: Patient instructed to cont prior HEP.  Exercises were reviewed and Venancio was able to demonstrate them prior to the end of the session.  Venancio demonstrated good  understanding of the education provided.     See EMR under Patient Instructions for exercises provided prior visit.    Assessment     A 12/9- Patient isn't noted to have any swelling on the involved extremity, but does have central lower leg tenderness, veinous distension, and recent surgery that raise concern for DVT. I reached out to his surgeon to see if he would like to order a Doppler.     Venancio Is progressing well towards his goals.   Pt prognosis is Good.     Pt will continue to benefit from skilled outpatient physical therapy to address the deficits listed in the problem list box on initial evaluation, provide pt/family education and to maximize pt's level of independence in the home and community environment.     Pt's spiritual, cultural and educational needs considered and pt agreeable to plan of care and goals.    Anticipated barriers to physical therapy: chronicity of condition     Goals:     Short Term Goals:  6 weeks Status  Date Met   PAIN: Pt will report worst pain of 4/10 in order to progress toward max functional ability and improve quality of life. [x] Progressing  [] Met  [] Not Met     FUNCTION: Patient will demonstrate improved function as indicated by a functional score improvement of at least 5 points on FOTO. [x] Progressing  [] Met  [] Not Met     MOBILITY: Patient will improve AROM to 50% of stated goals, listed in objective measures above, in order to progress towards independence with functional activities.  [x] Progressing  [] Met  [] Not Met     STRENGTH: Patient will improve strength to 50% of stated goals, listed in objective measures above, in order to progress towards independence with functional activities. [x] Progressing  [] Met  [] Not Met     POSTURE: Patient will correct postural deviations in  sitting and standing, to decrease pain and promote long term stability.  [x] Progressing  [] Met  [] Not Met     GAIT: Patient will demonstrate improved gait mechanics including ambulatory with SPC in order to improve functional mobility, improve quality of life, and decrease risk of further injury or fall.  [x] Progressing  [] Met  [] Not Met     HEP: Patient will demonstrate independence with HEP in order to progress toward functional independence. [x] Progressing  [] Met  [] Not Met        Long Term Goals:  12 weeks Status Date Met   PAIN: Pt will report worst pain of 2/10 in order to progress toward max functional ability and improve quality of life [x] Progressing  [] Met  [] Not Met     FUNCTION: Patient will demonstrate improved function as indicated by a FOTO functional score improvement as listed in header. [x] Progressing  [] Met  [] Not Met     MOBILITY: Patient will improve AROM to stated goals, listed in objective measures above, in order to return to maximal functional potential and improve quality of life.  [x] Progressing  [] Met  [] Not Met     STRENGTH: Patient will improve strength to stated goals, listed in objective measures above, in order to improve functional independence and quality of life.  [x] Progressing  [] Met  [] Not Met     GAIT: Patient will demonstrate normalized gait mechanics with minimal compensation in order to return to PLOF. [x] Progressing  [] Met  [] Not Met     Patient will return to normal ADL's, IADL's, community involvement, recreational activities, and work-related activities with less than or equal to 2/10 pain and maximal function.  [x] Progressing  [] Met  [] Not Met          Plan       Continue to progress per protocol and per patient tolerance      Al Quiros, PT, DPT

## 2024-12-09 NOTE — TELEPHONE ENCOUNTER
----- Message from Ioana Webb PA-C sent at 12/9/2024 11:35 AM CST -----  Regarding: FW: Calf pain, vein distension  Will forward to my nurse  ----- Message -----  From: Al Quiros, PT, DPT  Sent: 12/9/2024  11:24 AM CST  To: Ioana Webb PA-C  Subject: RE: Calf pain, vein distension                   I'm told there's a facility in [facility] that might be able to do ultrasounds as well. That's closer for Mr. Craft. Thanks  ----- Message -----  From: Ioana Webb PA-C  Sent: 12/9/2024  11:00 AM CST  To: Al Quiros PT, DPT  Subject: RE: Calf pain, vein distension                   I will order the ultrasound and have my nurse set it up  ----- Message -----  From: Al Quiros PT, DPT  Sent: 12/9/2024  10:53 AM CST  To: Ioana Webb PA-C  Subject: Calf pain, vein distension                       Good morning Ioana,    This patient of Dr. Larose has been complaining of calf pain and today we noticed some vein distension and central calf tenderness. I wanted to rule out DVT. Should we/ how could we get him in for a doppler? He lives in the [facility], there is ultrasound at [facility].    Thanks,   Al Quiros, PT, DPT

## 2024-12-11 ENCOUNTER — HOSPITAL ENCOUNTER (EMERGENCY)
Facility: HOSPITAL | Age: 59
Discharge: HOME | End: 2024-12-11
Attending: EMERGENCY MEDICINE
Payer: MEDICAID

## 2024-12-11 ENCOUNTER — HOSPITAL ENCOUNTER (OUTPATIENT)
Dept: RADIOLOGY | Facility: HOSPITAL | Age: 59
Discharge: HOME | End: 2024-12-11
Attending: PHYSICIAN ASSISTANT
Payer: MEDICAID

## 2024-12-11 VITALS
TEMPERATURE: 98 F | RESPIRATION RATE: 18 BRPM | WEIGHT: 177.25 LBS | BODY MASS INDEX: 29.5 KG/M2 | OXYGEN SATURATION: 100 % | SYSTOLIC BLOOD PRESSURE: 122 MMHG | HEART RATE: 65 BPM | DIASTOLIC BLOOD PRESSURE: 74 MMHG

## 2024-12-11 DIAGNOSIS — I82.4Z2 ACUTE DEEP VEIN THROMBOSIS (DVT) OF DISTAL VEIN OF LEFT LOWER EXTREMITY: Primary | ICD-10-CM

## 2024-12-11 DIAGNOSIS — M25.562 CHRONIC PAIN OF LEFT KNEE: ICD-10-CM

## 2024-12-11 DIAGNOSIS — G89.29 CHRONIC PAIN OF LEFT KNEE: ICD-10-CM

## 2024-12-11 DIAGNOSIS — M79.89 CALF SWELLING: ICD-10-CM

## 2024-12-11 LAB
BASOPHILS # BLD AUTO: 0.03 K/UL (ref 0–0.2)
BASOPHILS NFR BLD: 0.6 % (ref 0–1.9)
DIFFERENTIAL METHOD BLD: ABNORMAL
EOSINOPHIL # BLD AUTO: 0.1 K/UL (ref 0–0.5)
EOSINOPHIL NFR BLD: 2.1 % (ref 0–8)
ERYTHROCYTE [DISTWIDTH] IN BLOOD BY AUTOMATED COUNT: 13.8 % (ref 11.5–14.5)
HCT VFR BLD AUTO: 29.7 % (ref 40–54)
HGB BLD-MCNC: 9.3 G/DL (ref 14–18)
IMM GRANULOCYTES # BLD AUTO: 0.01 K/UL (ref 0–0.04)
IMM GRANULOCYTES NFR BLD AUTO: 0.2 % (ref 0–0.5)
LYMPHOCYTES # BLD AUTO: 1.6 K/UL (ref 1–4.8)
LYMPHOCYTES NFR BLD: 33.6 % (ref 18–48)
MCH RBC QN AUTO: 25.9 PG (ref 27–31)
MCHC RBC AUTO-ENTMCNC: 31.3 G/DL (ref 32–36)
MCV RBC AUTO: 83 FL (ref 82–98)
MONOCYTES # BLD AUTO: 0.5 K/UL (ref 0.3–1)
MONOCYTES NFR BLD: 11.3 % (ref 4–15)
NEUTROPHILS # BLD AUTO: 2.5 K/UL (ref 1.8–7.7)
NEUTROPHILS NFR BLD: 52.2 % (ref 38–73)
NRBC BLD-RTO: 0 /100 WBC
PLATELET # BLD AUTO: 334 K/UL (ref 150–450)
PMV BLD AUTO: 9.1 FL (ref 9.2–12.9)
RBC # BLD AUTO: 3.59 M/UL (ref 4.6–6.2)
WBC # BLD AUTO: 4.76 K/UL (ref 3.9–12.7)

## 2024-12-11 PROCEDURE — 85025 COMPLETE CBC W/AUTO DIFF WBC: CPT | Performed by: EMERGENCY MEDICINE

## 2024-12-11 PROCEDURE — 93971 EXTREMITY STUDY: CPT | Mod: TC,LT

## 2024-12-11 PROCEDURE — 93971 EXTREMITY STUDY: CPT | Mod: 26,LT,, | Performed by: RADIOLOGY

## 2024-12-11 PROCEDURE — 25000003 PHARM REV CODE 250: Performed by: EMERGENCY MEDICINE

## 2024-12-11 PROCEDURE — 99283 EMERGENCY DEPT VISIT LOW MDM: CPT | Mod: 25

## 2024-12-11 RX ORDER — APIXABAN 5 MG (74)
KIT ORAL
Qty: 1 EACH | Refills: 0 | Status: SHIPPED | OUTPATIENT
Start: 2024-12-11

## 2024-12-11 RX ADMIN — APIXABAN 10 MG: 2.5 TABLET, FILM COATED ORAL at 06:12

## 2024-12-11 NOTE — DISCHARGE INSTRUCTIONS
Regarding DVT, I instructed patient to: take medications exactly as directed to thin the blood and help prevent new clots; keep all appointments for lab tests and with provider; avoid sitting, standing, or lying down for long periods without moving legs and feet; ambulate and move around when possible; if unable to get up, wiggle toes and tighten the calf muscles to keep blood moving; wear compression stockings as prescribed; and  elevate legs whenever they feel swollen or heavy.  I also discussed lifestyle changes that can be beneficial such as starting an exercise program, participate in walking or gardening activities, and maintain a healthy weight.  Also advised patient call emergency medical services or 911 if any of the following symptoms develop: shortness of breath; chest pain; coughing up blood; develop a bluish color and coldness in an arm or leg; any unusual or unexpected bleeding (i.e., bleeding from ears or nose); black, tarry bowel movements or blood in bowel movement;blood in urine or red or brown colored urine; blood in vomit or dark brown or black material in vomit that looks like coffee grounds; and/or any new bruises with no known cause or severe bruising.

## 2024-12-11 NOTE — ED NOTES
Bed: 10  Expected date: 12/11/24  Expected time: 4:36 PM  Means of arrival:   Comments:   when ready

## 2024-12-11 NOTE — ED PROVIDER NOTES
SCRIBE #1 NOTE: I, rTace Hsu, am scribing for, and in the presence of, No att. providers found. I have scribed the entire note.       History     Chief Complaint   Patient presents with    Leg Swelling     Left leg swelling and pain. [facility] for a follow up on the symptoms where an US confirmed an DVT. Post op left knee revision surgery x1mth ago.     Review of patient's allergies indicates:  No Known Allergies      History of Present Illness     HPI    12/11/2024, 5:19 PM  History obtained from the wife and patient      History of Present Illness: Venancio Barragan is a 59 y.o. male patient with a PMHx of diabetes mellitus, GERD, high cholesterol, and hypertension who presents to the Emergency Department for evaluation of leg swelling that started a little over a month ago as a result of a knee revision. Patient reports he went to physical therapy where he told his PT that his calf muscles was hurting. After evaluation the physical therapist suspected the patient could possibly have a blood clot. Symptoms are constant and moderate in severity. No mitigating or exacerbating factors reported. Associated sxs include leg pain. Patient denies any fever, chills, shortness of breath, chest pain , and all other sxs at this time. Patient reports taking a baby aspirin prior to arrival. No further complaints or concerns at this time.       Arrival mode: Personal vehicle    PCP: Gustavo Calhoun MD        Past Medical History:  Past Medical History:   Diagnosis Date    Diabetes mellitus     GERD (gastroesophageal reflux disease)     High cholesterol     Hypertension        Past Surgical History:  Past Surgical History:   Procedure Laterality Date    ANKLE SURGERY      right    ELBOW SURGERY Left     INCISION AND DRAINAGE OF HAND Left 2/8/2023    Procedure: INCISION AND DRAINAGE, HAND;  Surgeon: Adele Colon MD;  Location: [facility] OR;  Service: Orthopedics;  Laterality: Left;    INSERTION, ELECTRODE LEAD,  NEUROSTIMULATOR, PERIPHERAL Left 9/23/2024    Procedure: INSERTION,ELECTRODE LEAD,NEUROSTIMULATOR,PERIPHERAL;  Surgeon: Bony Menezes MD;  Location: [facility] OR;  Service: Pain Management;  Laterality: Left;  curonix, trial    KNEE SURGERY      REVISION OF KNEE ARTHROPLASTY Left 11/11/2024    Procedure: REVISION, ARTHROPLASTY, KNEE;  Surgeon: Bony Menezes MD;  Location: [facility] OR;  Service: Orthopedics;  Laterality: Left;  bmi - 26.78 marcin cement removal, depuy revision tka   Richard will be available by phone if needed for rux -[phone]    TOTAL HIP ARTHROPLASTY      ines         Family History:  Family History   Problem Relation Name Age of Onset    Diabetes Mother      No Known Problems Father         Social History:  Social History     Tobacco Use    Smoking status: Never    Smokeless tobacco: Never   Substance and Sexual Activity    Alcohol use: No    Drug use: No    Sexual activity: Yes     Partners: Female        Review of Systems     Review of Systems   Constitutional:  Negative for chills and fever.   Respiratory:  Negative for shortness of breath.    Cardiovascular:  Positive for leg swelling (left). Negative for chest pain.   Musculoskeletal:         Left leg pain   All other systems reviewed and are negative.       Physical Exam     Initial Vitals [12/11/24 1624]   BP Pulse Resp Temp SpO2   135/68 72 18 98.3 °F (36.8 °C) 99 %      MAP       --          Physical Exam  Nursing Notes and Vital Signs Reviewed.  Constitutional: Patient is in no apparent distress. Well-developed and well-nourished.  Head: Atraumatic. Normocephalic.  Eyes: PERRL. EOM intact. Conjunctivae are not pale. No scleral icterus.  ENT: Mucous membranes are moist.    Neck: Supple. Full ROM.   Cardiovascular: Regular rate. Regular rhythm. No murmurs, rubs, or gallops. Distal pulses are 2+ and symmetric.  Pulmonary/Chest: No respiratory distress. Clear to auscultation bilaterally. No wheezing or rales.  Abdominal: Soft and non-distended.   There is no tenderness.  No rebound, guarding, or rigidity.   Genitourinary: No CVA tenderness  Musculoskeletal: Moves all extremities. No obvious deformities. No edema. Left calf tenderness. Neurovascularly intact.   Skin: Warm and dry. Incision to the left knee is healing properly with no signs of infection.   Neurological:  Alert, awake, and appropriate.  Normal speech.  No acute focal neurological deficits are appreciated.  Psychiatric: Normal affect. Good eye contact. Appropriate in content.     ED Course   Procedures  ED Vital Signs:  Vitals:    12/11/24 1624 12/11/24 1745 12/11/24 1833   BP: 135/68 113/69 122/74   Pulse: 72 69 65   Resp: 18 15 18   Temp: 98.3 °F (36.8 °C)  98.2 °F (36.8 °C)   TempSrc: Oral     SpO2: 99% 99% 100%   Weight: 80.4 kg (177 lb 4 oz)         Abnormal Lab Results:  Labs Reviewed   CBC W/ AUTO DIFFERENTIAL - Abnormal       Result Value    WBC 4.76      RBC 3.59 (*)     Hemoglobin 9.3 (*)     Hematocrit 29.7 (*)     MCV 83      MCH 25.9 (*)     MCHC 31.3 (*)     RDW 13.8      Platelets 334      MPV 9.1 (*)     Immature Granulocytes 0.2      Gran # (ANC) 2.5      Immature Grans (Abs) 0.01      Lymph # 1.6      Mono # 0.5      Eos # 0.1      Baso # 0.03      nRBC 0      Gran % 52.2      Lymph % 33.6      Mono % 11.3      Eosinophil % 2.1      Basophil % 0.6      Differential Method Automated          All Lab Results:  None    Imaging Results:  Imaging Results    None          The EKG was ordered, reviewed, and independently interpreted by the ED provider.    No EKG Ordered.     The Emergency Provider reviewed the vital signs and test results, which are outlined above.     ED Discussion       5:26 PM: Reassessed pt at this time. Discussed with pt all pertinent ED information and results. Discussed pt dx and plan of tx. Gave pt all f/u and return to the ED instructions. All questions and concerns were addressed at this time. Pt expresses understanding of information and instructions, and  is comfortable with plan to discharge. Pt is stable for discharge.    I discussed with patient and/or family/caretaker that evaluation in the ED does not suggest any emergent or life threatening medical conditions requiring immediate intervention beyond what was provided in the ED, and I believe patient is safe for discharge.  Regardless, an unremarkable evaluation in the ED does not preclude the development or presence of a serious of life threatening condition. As such, patient was instructed to return immediately for any worsening or change in current symptoms.    Regarding DVT, I instructed patient to: take medications exactly as directed to thin the blood and help prevent new clots; keep all appointments for lab tests and with provider; avoid sitting, standing, or lying down for long periods without moving legs and feet; ambulate and move around when possible; if unable to get up, wiggle toes and tighten the calf muscles to keep blood moving; wear compression stockings as prescribed; and  elevate legs whenever they feel swollen or heavy.  I also discussed lifestyle changes that can be beneficial such as starting an exercise program, participate in walking or gardening activities, and maintain a healthy weight.  Also advised patient call emergency medical services or 911 if any of the following symptoms develop: shortness of breath; chest pain; coughing up blood; develop a bluish color and coldness in an arm or leg; any unusual or unexpected bleeding (i.e., bleeding from ears or nose); black, tarry bowel movements or blood in bowel movement;blood in urine or red or brown colored urine; blood in vomit or dark brown or black material in vomit that looks like coffee grounds; and/or any new bruises with no known cause or severe bruising.         Medical Decision Making  Amount and/or Complexity of Data Reviewed  Independent Historian:      Details: Wife was present at bed side.   Labs: ordered. Decision-making  details documented in ED Course.    Risk  OTC drugs.  Prescription drug management.  Parenteral controlled substances.  Risk Details: OTC drugs, prescription drugs and controlled substances considered.  Due to patient's symptoms improving and pain controlled pain medications ordered appropriately.  Ddx: dvt, contusion, sprain, infection                ED Medication(s):  Medications   apixaban tablet 10 mg (10 mg Oral Given 12/11/24 1834)       Discharge Medication List as of 12/11/2024  5:29 PM        START taking these medications    Details   apixaban (ELIQUIS DVT-PE TREAT 30D START) 5 mg (74 tabs) DsPk For the first 7 days take two 5 mg tablets twice daily.  After 7 days take one 5 mg tablet twice daily., Print              Follow-up Information       Gustavo Calhoun MD. Schedule an appointment as soon as possible for a visit in 1 week.    Specialty: Family Medicine  Contact information:  [address] [facility]  [facility] [address]  [phone]               [facility] Hem/Onc [facility]. Schedule an appointment as soon as possible for a visit in 1 week.    Specialty: Hematology and Oncology  Contact information:  [address] [facility] [id number] [phone]  Additional information:  Please park on the [address] side and use the Clinic entrance. Check in on the 4th floor, to the right.             [facility] - Emergency Dept..    Specialty: Emergency Medicine  Why: As needed, If symptoms worsen  Contact information:  [address]  [facility] [address] [phone]                               Scribe Attestation:   Scribe #1: I performed the above scribed service and the documentation accurately describes the services I performed. I attest to the accuracy of the note.     Attending:   Physician Attestation Statement for Scribe #1: I, No att. providers found, personally performed the services described in this documentation, as scribed by Trace  Aracelis, in my presence, and it is both accurate and complete.           Clinical Impression       ICD-10-CM ICD-9-CM   1. Acute deep vein thrombosis (DVT) of distal vein of left lower extremity  I82.4Z2 453.42               Trace Hsu Jr., MD  12/13/24 0600

## 2024-12-13 ENCOUNTER — TELEPHONE (OUTPATIENT)
Dept: ORTHOPEDICS | Facility: CLINIC | Age: 59
End: 2024-12-13
Payer: MEDICAID

## 2024-12-13 LAB
OHS QRS DURATION: 74 MS
OHS QTC CALCULATION: 419 MS

## 2024-12-13 NOTE — TELEPHONE ENCOUNTER
----- Message from Ewa sent at 12/13/2024  1:49 PM CST -----  Type:  Needs Medical Advice    Who Called: Patient's wife  Best Call Back Number: [phone]   Additional Information: Patient is requesting a call back in regards to blood clot was recently found in leg. US performed and pt wife (Ana Lilia) would like to speak to Dr. Menezes about it and who he should see next.

## 2024-12-16 ENCOUNTER — CLINICAL SUPPORT (OUTPATIENT)
Facility: HOSPITAL | Age: 59
End: 2024-12-16
Payer: MEDICAID

## 2024-12-16 DIAGNOSIS — Z78.9 IMPAIRED MOBILITY AND ACTIVITIES OF DAILY LIVING: ICD-10-CM

## 2024-12-16 DIAGNOSIS — M25.662 DECREASED RANGE OF MOTION (ROM) OF LEFT KNEE: ICD-10-CM

## 2024-12-16 DIAGNOSIS — Z74.09 IMPAIRED MOBILITY AND ACTIVITIES OF DAILY LIVING: ICD-10-CM

## 2024-12-16 DIAGNOSIS — I82.4Z2 ACUTE DEEP VEIN THROMBOSIS (DVT) OF DISTAL VEIN OF LEFT LOWER EXTREMITY: Primary | ICD-10-CM

## 2024-12-16 DIAGNOSIS — Z96.652 S/P REVISION OF TOTAL KNEE, LEFT: Primary | ICD-10-CM

## 2024-12-16 PROCEDURE — 97110 THERAPEUTIC EXERCISES: CPT | Mod: PN | Performed by: PHYSICAL THERAPIST

## 2024-12-16 NOTE — PROGRESS NOTES
[facility]   Physical Therapy Treatment Note      Name: Venancio Barragan  [facility] Number: [id number]    Therapy Diagnosis:   Encounter Diagnoses   Name Primary?    S/P revision of total knee, left Yes    Impaired mobility and activities of daily living     Decreased range of motion (ROM) of left knee        Physician: Bony Menezes MD     Physician Orders: PT Eval and Treat   Medical Diagnosis from Referral: Pain due to total left knee replacement, subsequent encounter [T84.84XD, Z96.652], Loosening of prosthesis of left total knee replacement, initial encounter [T84.033A], Quadriceps weakness [M62.81]   Evaluation Date: 11/18/2024  Authorization Period Expiration: 12/31/24  Plan of Care Expiration: 3/18/25  Progress Note Due: 12/18/24  Visit # / Visits authorized: 7/ 20     Time In: 9:25  Time Out: 1030  Total Billable Time: 55 minutes Billing reflects 1:1 direct supervision    MD follow-up:    Precautions: Standard    FOTO:  Goal: 67%  -Intake: 36%  -Status: incomplete  -Discharge: incomplete    Subjective     Pt reports: 12/16- He was diagnosed with DVT in the lower leg and has started anticoagulant therapy. He spoke with Dr. Menezes who encouraged him to continue with therapy.  He was compliant with home exercise program.  Response to previous treatment: Improving symptoms  Functional change: Gradually improving function    Pain: Minimal/10  Location: left knee     Objective      Objective Measures updated at progress report unless specified otherwise.    Problem List:  Impaired knee range of motion  Impaired strength  Impaired functional mobility    Treatment     Venancio received the treatments listed below:      CPT Code Intervention Date/Notes  12/16   TE Manual Therapy NT   TE Treadmill     TE ROM 0-3-88  0-100 after manual   TE Seated knee flexion stretching 5 min   TE Knee extension stretch with strap 3 min x 10s   TE SL hip abduction isometric 15 x 10s L only   TE Hamstring bridge on ball  3 x 10   TE Knee extension SL 15# 3 x 10   TE Hamstring curls SL 3 x 10, 45#      55 minutes of Therapeutic Exercise (TE) to develop strength, endurance, ROM, and flexibility. (60495)      Patient Education and Home Exercises         Education provided:   Physical therapy diagnosis, prognosis, and plan of care.     Written Home Exercises Provided: Patient instructed to cont prior HEP.  Exercises were reviewed and Venancio was able to demonstrate them prior to the end of the session.  Venancio demonstrated good  understanding of the education provided.     See EMR under Patient Instructions for exercises provided prior visit.    Assessment     12/16- Patient noted to have slight loss of range of motion at the beginning of session, but showed improvement with manual and stretching interventions today. We focused on non-weight bearing exercises today to avoid placing excessive compression on lower extremity veins until we receive clearance from MD.    Venancio Is progressing well towards his goals.   Pt prognosis is Good.     Pt will continue to benefit from skilled outpatient physical therapy to address the deficits listed in the problem list box on initial evaluation, provide pt/family education and to maximize pt's level of independence in the home and community environment.     Pt's spiritual, cultural and educational needs considered and pt agreeable to plan of care and goals.    Anticipated barriers to physical therapy: chronicity of condition     Goals:     Short Term Goals:  6 weeks Status  Date Met   PAIN: Pt will report worst pain of 4/10 in order to progress toward max functional ability and improve quality of life. [x] Progressing  [] Met  [] Not Met     FUNCTION: Patient will demonstrate improved function as indicated by a functional score improvement of at least 5 points on FOTO. [x] Progressing  [] Met  [] Not Met     MOBILITY: Patient will improve AROM to 50% of stated goals, listed in objective measures above,  in order to progress towards independence with functional activities.  [x] Progressing  [] Met  [] Not Met     STRENGTH: Patient will improve strength to 50% of stated goals, listed in objective measures above, in order to progress towards independence with functional activities. [x] Progressing  [] Met  [] Not Met     POSTURE: Patient will correct postural deviations in sitting and standing, to decrease pain and promote long term stability.  [x] Progressing  [] Met  [] Not Met     GAIT: Patient will demonstrate improved gait mechanics including ambulatory with SPC in order to improve functional mobility, improve quality of life, and decrease risk of further injury or fall.  [x] Progressing  [] Met  [] Not Met     HEP: Patient will demonstrate independence with HEP in order to progress toward functional independence. [x] Progressing  [] Met  [] Not Met        Long Term Goals:  12 weeks Status Date Met   PAIN: Pt will report worst pain of 2/10 in order to progress toward max functional ability and improve quality of life [x] Progressing  [] Met  [] Not Met     FUNCTION: Patient will demonstrate improved function as indicated by a FOTO functional score improvement as listed in header. [x] Progressing  [] Met  [] Not Met     MOBILITY: Patient will improve AROM to stated goals, listed in objective measures above, in order to return to maximal functional potential and improve quality of life.  [x] Progressing  [] Met  [] Not Met     STRENGTH: Patient will improve strength to stated goals, listed in objective measures above, in order to improve functional independence and quality of life.  [x] Progressing  [] Met  [] Not Met     GAIT: Patient will demonstrate normalized gait mechanics with minimal compensation in order to return to PLOF. [x] Progressing  [] Met  [] Not Met     Patient will return to normal ADL's, IADL's, community involvement, recreational activities, and work-related activities with less than or equal to  2/10 pain and maximal function.  [x] Progressing  [] Met  [] Not Met          Plan       Continue to progress per protocol and per patient tolerance      Al Quiros, PT, DPT

## 2024-12-17 ENCOUNTER — PATIENT MESSAGE (OUTPATIENT)
Dept: HEMATOLOGY/ONCOLOGY | Facility: CLINIC | Age: 59
End: 2024-12-17
Payer: MEDICAID

## 2024-12-18 ENCOUNTER — CLINICAL SUPPORT (OUTPATIENT)
Facility: HOSPITAL | Age: 59
End: 2024-12-18
Payer: MEDICAID

## 2024-12-18 DIAGNOSIS — Z74.09 IMPAIRED MOBILITY AND ACTIVITIES OF DAILY LIVING: ICD-10-CM

## 2024-12-18 DIAGNOSIS — Z96.652 S/P REVISION OF TOTAL KNEE, LEFT: Primary | ICD-10-CM

## 2024-12-18 DIAGNOSIS — Z78.9 IMPAIRED MOBILITY AND ACTIVITIES OF DAILY LIVING: ICD-10-CM

## 2024-12-18 DIAGNOSIS — M25.662 DECREASED RANGE OF MOTION (ROM) OF LEFT KNEE: ICD-10-CM

## 2024-12-18 PROCEDURE — 97110 THERAPEUTIC EXERCISES: CPT | Mod: PN | Performed by: PHYSICAL THERAPIST

## 2024-12-18 NOTE — PROGRESS NOTES
[facility]   Physical Therapy Treatment Note      Name: Venancio Barragan  [facility] Number: [id number]    Therapy Diagnosis:   Encounter Diagnoses   Name Primary?    S/P revision of total knee, left Yes    Impaired mobility and activities of daily living     Decreased range of motion (ROM) of left knee        Physician: Bony Menezes MD     Physician Orders: PT Eval and Treat   Medical Diagnosis from Referral: Pain due to total left knee replacement, subsequent encounter [T84.84XD, Z96.652], Loosening of prosthesis of left total knee replacement, initial encounter [T84.033A], Quadriceps weakness [M62.81]   Evaluation Date: 11/18/2024  Authorization Period Expiration: 12/31/24  Plan of Care Expiration: 3/18/25  Progress Note Due: 12/18/24  Visit # / Visits authorized: 8/ 20     Time In: 1000  Time Out: 1100  Total Billable Time: 55 minutes Billing reflects 1:1 direct supervision    MD follow-up:    Precautions: Standard    FOTO:  Goal: 67%  -Intake: 36%  -Status: incomplete  -Discharge: incomplete    Subjective     Pt reports: 12/18- The only spot he has pain is above the kneecap where the nerve block was put in.  He was compliant with home exercise program.  Response to previous treatment: Improving symptoms  Functional change: Gradually improving function    Pain: Minimal/10  Location: left knee     Objective      Objective Measures updated at progress report unless specified otherwise.    Problem List:  Impaired knee range of motion  Impaired strength  Impaired functional mobility    Treatment     Venancio received the treatments listed below:      CPT Code Intervention Date/Notes  12/18   TE Manual Therapy Knee flexion  Patellar mobs  Scar mobs   TE Treadmill     TE ROM 0-3-88  0-100 after manual   TE Seated knee flexion stretching 5 min   TE Knee extension stretch with strap 3 min x 10s   TE Knee extension SL 15# 3 x 10   TE Dynasplit fitting 15 minutes     55 minutes of Therapeutic Exercise (TE)  to develop strength, endurance, ROM, and flexibility. (68867)      Patient Education and Home Exercises         Education provided:   Physical therapy diagnosis, prognosis, and plan of care.     Written Home Exercises Provided: Patient instructed to cont prior HEP.  Exercises were reviewed and Venancio was able to demonstrate them prior to the end of the session.  Venancio demonstrated good  understanding of the education provided.     See EMR under Patient Instructions for exercises provided prior visit.    Assessment     12/18- Patient continues to demonstrate impairment in knee flexion range of motion that was improved with manual therapy. Stiffness noted in soft tissue above suprapatellar pouch. Continues to demonstrate flexed knee gait pattern that was improved with cuing.    Venancio Is progressing well towards his goals.   Pt prognosis is Good.     Pt will continue to benefit from skilled outpatient physical therapy to address the deficits listed in the problem list box on initial evaluation, provide pt/family education and to maximize pt's level of independence in the home and community environment.     Pt's spiritual, cultural and educational needs considered and pt agreeable to plan of care and goals.    Anticipated barriers to physical therapy: chronicity of condition     Goals:     Short Term Goals:  6 weeks Status  Date Met   PAIN: Pt will report worst pain of 4/10 in order to progress toward max functional ability and improve quality of life. [x] Progressing  [] Met  [] Not Met     FUNCTION: Patient will demonstrate improved function as indicated by a functional score improvement of at least 5 points on FOTO. [x] Progressing  [] Met  [] Not Met     MOBILITY: Patient will improve AROM to 50% of stated goals, listed in objective measures above, in order to progress towards independence with functional activities.  [x] Progressing  [] Met  [] Not Met     STRENGTH: Patient will improve strength to 50% of stated  goals, listed in objective measures above, in order to progress towards independence with functional activities. [x] Progressing  [] Met  [] Not Met     POSTURE: Patient will correct postural deviations in sitting and standing, to decrease pain and promote long term stability.  [x] Progressing  [] Met  [] Not Met     GAIT: Patient will demonstrate improved gait mechanics including ambulatory with SPC in order to improve functional mobility, improve quality of life, and decrease risk of further injury or fall.  [x] Progressing  [] Met  [] Not Met     HEP: Patient will demonstrate independence with HEP in order to progress toward functional independence. [x] Progressing  [] Met  [] Not Met        Long Term Goals:  12 weeks Status Date Met   PAIN: Pt will report worst pain of 2/10 in order to progress toward max functional ability and improve quality of life [x] Progressing  [] Met  [] Not Met     FUNCTION: Patient will demonstrate improved function as indicated by a FOTO functional score improvement as listed in header. [x] Progressing  [] Met  [] Not Met     MOBILITY: Patient will improve AROM to stated goals, listed in objective measures above, in order to return to maximal functional potential and improve quality of life.  [x] Progressing  [] Met  [] Not Met     STRENGTH: Patient will improve strength to stated goals, listed in objective measures above, in order to improve functional independence and quality of life.  [x] Progressing  [] Met  [] Not Met     GAIT: Patient will demonstrate normalized gait mechanics with minimal compensation in order to return to PLOF. [x] Progressing  [] Met  [] Not Met     Patient will return to normal ADL's, IADL's, community involvement, recreational activities, and work-related activities with less than or equal to 2/10 pain and maximal function.  [x] Progressing  [] Met  [] Not Met          Plan       Continue to progress per protocol and per patient tolerance      Al Quiros  PT, DPT

## 2024-12-23 ENCOUNTER — TELEPHONE (OUTPATIENT)
Dept: HEMATOLOGY/ONCOLOGY | Facility: CLINIC | Age: 59
End: 2024-12-23
Payer: MEDICAID

## 2024-12-23 ENCOUNTER — TELEPHONE (OUTPATIENT)
Dept: ORTHOPEDICS | Facility: CLINIC | Age: 59
End: 2024-12-23
Payer: MEDICAID

## 2024-12-23 NOTE — TELEPHONE ENCOUNTER
Spoke with patient's wife.  Gave her information to contact Shiv in hematology/oncology.  She stated that he is in extreme pain in foot and above knee and calf.  Informed her to go to emergency room as we have not providers for next two weeks. Verbalized understanding.

## 2024-12-23 NOTE — TELEPHONE ENCOUNTER
----- Message from Rama sent at 12/23/2024  1:02 PM CST -----  Regarding: Call back  Contact: Ana Lilia Wife [phone]  Who Called: PT     Patient called in requesting to speak with you. Did not specify why just said regards to hematology oncology. Please advise.

## 2024-12-28 ENCOUNTER — HOSPITAL ENCOUNTER (EMERGENCY)
Facility: HOSPITAL | Age: 59
Discharge: HOME | End: 2024-12-28
Attending: EMERGENCY MEDICINE
Payer: MEDICAID

## 2024-12-28 VITALS
SYSTOLIC BLOOD PRESSURE: 112 MMHG | DIASTOLIC BLOOD PRESSURE: 76 MMHG | OXYGEN SATURATION: 100 % | HEART RATE: 72 BPM | TEMPERATURE: 98 F | WEIGHT: 172 LBS | RESPIRATION RATE: 19 BRPM | BODY MASS INDEX: 28.62 KG/M2

## 2024-12-28 DIAGNOSIS — M79.605 PAIN OF LEFT LOWER EXTREMITY: ICD-10-CM

## 2024-12-28 DIAGNOSIS — M79.89 LEG SWELLING: Primary | ICD-10-CM

## 2024-12-28 DIAGNOSIS — M25.569 KNEE PAIN: ICD-10-CM

## 2024-12-28 LAB
ALBUMIN SERPL BCP-MCNC: 4.1 G/DL (ref 3.5–5.2)
ALP SERPL-CCNC: 86 U/L (ref 40–150)
ALT SERPL W/O P-5'-P-CCNC: 24 U/L (ref 10–44)
ANION GAP SERPL CALC-SCNC: 14 MMOL/L (ref 8–16)
APTT PPP: 29 SEC (ref 21–32)
AST SERPL-CCNC: 20 U/L (ref 10–40)
BASOPHILS # BLD AUTO: 0.02 K/UL (ref 0–0.2)
BASOPHILS NFR BLD: 0.3 % (ref 0–1.9)
BILIRUB SERPL-MCNC: 0.4 MG/DL (ref 0.1–1)
BNP SERPL-MCNC: <10 PG/ML (ref 0–99)
BUN SERPL-MCNC: 20 MG/DL (ref 6–20)
CALCIUM SERPL-MCNC: 9.3 MG/DL (ref 8.7–10.5)
CHLORIDE SERPL-SCNC: 104 MMOL/L (ref 95–110)
CO2 SERPL-SCNC: 22 MMOL/L (ref 23–29)
CREAT SERPL-MCNC: 1.4 MG/DL (ref 0.5–1.4)
CRP SERPL-MCNC: 1.6 MG/L (ref 0–8.2)
DIFFERENTIAL METHOD BLD: ABNORMAL
EOSINOPHIL # BLD AUTO: 0.1 K/UL (ref 0–0.5)
EOSINOPHIL NFR BLD: 1.2 % (ref 0–8)
ERYTHROCYTE [DISTWIDTH] IN BLOOD BY AUTOMATED COUNT: 13.7 % (ref 11.5–14.5)
ERYTHROCYTE [SEDIMENTATION RATE] IN BLOOD BY WESTERGREN METHOD: 42 MM/HR (ref 0–23)
EST. GFR  (NO RACE VARIABLE): 58 ML/MIN/1.73 M^2
GLUCOSE SERPL-MCNC: 120 MG/DL (ref 70–110)
HCT VFR BLD AUTO: 34.5 % (ref 40–54)
HGB BLD-MCNC: 10.8 G/DL (ref 14–18)
IMM GRANULOCYTES # BLD AUTO: 0.02 K/UL (ref 0–0.04)
IMM GRANULOCYTES NFR BLD AUTO: 0.3 % (ref 0–0.5)
INR PPP: 1 (ref 0.8–1.2)
LYMPHOCYTES # BLD AUTO: 2 K/UL (ref 1–4.8)
LYMPHOCYTES NFR BLD: 27.6 % (ref 18–48)
MCH RBC QN AUTO: 25.5 PG (ref 27–31)
MCHC RBC AUTO-ENTMCNC: 31.3 G/DL (ref 32–36)
MCV RBC AUTO: 82 FL (ref 82–98)
MONOCYTES # BLD AUTO: 0.6 K/UL (ref 0.3–1)
MONOCYTES NFR BLD: 8.4 % (ref 4–15)
NEUTROPHILS # BLD AUTO: 4.5 K/UL (ref 1.8–7.7)
NEUTROPHILS NFR BLD: 62.2 % (ref 38–73)
NRBC BLD-RTO: 0 /100 WBC
PLATELET # BLD AUTO: 365 K/UL (ref 150–450)
PMV BLD AUTO: 9 FL (ref 9.2–12.9)
POTASSIUM SERPL-SCNC: 4.5 MMOL/L (ref 3.5–5.1)
PROT SERPL-MCNC: 7.5 G/DL (ref 6–8.4)
PROTHROMBIN TIME: 11.5 SEC (ref 9–12.5)
RBC # BLD AUTO: 4.23 M/UL (ref 4.6–6.2)
SODIUM SERPL-SCNC: 140 MMOL/L (ref 136–145)
TROPONIN I SERPL DL<=0.01 NG/ML-MCNC: <0.006 NG/ML (ref 0–0.03)
WBC # BLD AUTO: 7.25 K/UL (ref 3.9–12.7)

## 2024-12-28 PROCEDURE — 85651 RBC SED RATE NONAUTOMATED: CPT | Performed by: EMERGENCY MEDICINE

## 2024-12-28 PROCEDURE — 84484 ASSAY OF TROPONIN QUANT: CPT

## 2024-12-28 PROCEDURE — 83880 ASSAY OF NATRIURETIC PEPTIDE: CPT

## 2024-12-28 PROCEDURE — 80053 COMPREHEN METABOLIC PANEL: CPT

## 2024-12-28 PROCEDURE — 99285 EMERGENCY DEPT VISIT HI MDM: CPT | Mod: 25

## 2024-12-28 PROCEDURE — 85730 THROMBOPLASTIN TIME PARTIAL: CPT

## 2024-12-28 PROCEDURE — 63600175 PHARM REV CODE 636 W HCPCS: Performed by: EMERGENCY MEDICINE

## 2024-12-28 PROCEDURE — 93010 ELECTROCARDIOGRAM REPORT: CPT | Mod: ,,, | Performed by: INTERNAL MEDICINE

## 2024-12-28 PROCEDURE — 85025 COMPLETE CBC W/AUTO DIFF WBC: CPT

## 2024-12-28 PROCEDURE — 96374 THER/PROPH/DIAG INJ IV PUSH: CPT

## 2024-12-28 PROCEDURE — 85610 PROTHROMBIN TIME: CPT

## 2024-12-28 PROCEDURE — 93005 ELECTROCARDIOGRAM TRACING: CPT

## 2024-12-28 PROCEDURE — 86140 C-REACTIVE PROTEIN: CPT | Performed by: EMERGENCY MEDICINE

## 2024-12-28 RX ORDER — MORPHINE SULFATE 4 MG/ML
4 INJECTION, SOLUTION INTRAMUSCULAR; INTRAVENOUS
Status: COMPLETED | OUTPATIENT
Start: 2024-12-28 | End: 2024-12-28

## 2024-12-28 RX ORDER — HYDROCODONE BITARTRATE AND ACETAMINOPHEN 5; 325 MG/1; MG/1
1 TABLET ORAL EVERY 4 HOURS PRN
Qty: 11 TABLET | Refills: 0 | Status: SHIPPED | OUTPATIENT
Start: 2024-12-28 | End: 2025-01-02

## 2024-12-28 RX ADMIN — MORPHINE SULFATE 4 MG: 4 INJECTION INTRAVENOUS at 05:12

## 2024-12-28 NOTE — FIRST PROVIDER EVALUATION
Medical screening examination initiated.  I have conducted a focused provider triage encounter, findings are as follows:    Brief history of present illness:  59-year-old male with PMH ex of DM, GERD, HTN s/p left knee revision 11/11/2024 presenting to the emergency department complaining of pain to left leg over the last few weeks.  Also reports leg swelling, redness, tenderness, and engorged veins.  Patient states that he had a blood clot in his left leg on 12/11/2024.  Patient is currently taking Eliquis.  Denies fever, chills, shortness of breath, palpitations, chest pain, abdominal pain, nausea, vomiting, and all other symptoms.    Vitals:    12/28/24 1354   BP: 92/66   BP Location: Right arm   Pulse: 101   Resp: 18   Temp: 98.1 °F (36.7 °C)   TempSrc: Oral   SpO2: 97%   Weight: 78 kg (172 lb)       Pertinent physical exam:  VSS. NAD.  Breaths are even and unlabored.  No adventitious lung sounds.  Regular rate and rhythm.    LLE:  Engorged veins noted to posterior calf.  No edema, erythema, ecchymosis or open wounds.  Mild calf tenderness and tenderness behind the knee.  Neurovascularly intact.    Brief workup plan:  Preliminary workup initiated; this workup will be continued and followed by the physician or advanced practice provider that is assigned to the patient when roomed.

## 2024-12-28 NOTE — ED PROVIDER NOTES
SCRIBE #1 NOTE: I, Gabino Cervantes, am scribing for, and in the presence of, Gabino Cervantes MD. I have scribed the entire note.       History     Chief Complaint   Patient presents with    Knee Pain     Left knee pain for several weeks that progressively worsened. Recent revision on left knee 11/11 and states he had a blood clot in his left knee 12/11 and has an appointment scheduled with a hematologist in February. Currently on eliquis. -SOB, -CP     Review of patient's allergies indicates:  No Known Allergies      History of Present Illness     HPI    12/28/2024, 4:44 PM  History obtained from the patient      History of Present Illness: Venancio Barragan is a 59 y.o. male patient with a PMHx of GERD, DM, HTN, and high cholesterol who presents to the Emergency Department for evaluation of left-sided knee pain which onset gradually for the past several weeks. Pt states the knee pain has progressively worsened. Pt states he had a recent revision on his left knee on 11/11 preformed by Dr. Menezes (Orthopedics). Pt states he had a blood clot in his left knee on 12/11 and has an appointment with a hematologist in February. Symptoms are constant and moderate in severity. No mitigating or exacerbating factors reported. Associated sxs include swelling. Patient denies any SOB, CP, and all other sxs at this time. Prior Tx includes Eliquis. No further complaints or concerns at this time.       Arrival mode: Personal vehicle    PCP: Gustavo Calhoun MD        Past Medical History:  Past Medical History:   Diagnosis Date    Diabetes mellitus     GERD (gastroesophageal reflux disease)     High cholesterol     Hypertension        Past Surgical History:  Past Surgical History:   Procedure Laterality Date    ANKLE SURGERY      right    ELBOW SURGERY Left     INCISION AND DRAINAGE OF HAND Left 2/8/2023    Procedure: INCISION AND DRAINAGE, HAND;  Surgeon: Adele Colon MD;  Location: [facility] [place] OR;  Service: Orthopedics;  Laterality: Left;     INSERTION, ELECTRODE LEAD, NEUROSTIMULATOR, PERIPHERAL Left 9/23/2024    Procedure: INSERTION,ELECTRODE LEAD,NEUROSTIMULATOR,PERIPHERAL;  Surgeon: Bony Menezes MD;  Location: [facility] OR;  Service: Pain Management;  Laterality: Left;  curonix, trial    KNEE SURGERY      REVISION OF KNEE ARTHROPLASTY Left 11/11/2024    Procedure: REVISION, ARTHROPLASTY, KNEE;  Surgeon: Bony Menezes MD;  Location: [facility] OR;  Service: Orthopedics;  Laterality: Left;  bmi - 26.78 marcin cement removal, depuy revision tka   Richard will be available by phone if needed for rux -[phone]    TOTAL HIP ARTHROPLASTY      ines         Family History:  Family History   Problem Relation Name Age of Onset    Diabetes Mother      No Known Problems Father         Social History:  Social History     Tobacco Use    Smoking status: Never    Smokeless tobacco: Never   Substance and Sexual Activity    Alcohol use: No    Drug use: No    Sexual activity: Yes     Partners: Female        Review of Systems     Review of Systems   Constitutional:  Negative for fever.   HENT:  Negative for sore throat.    Respiratory:  Negative for shortness of breath.    Cardiovascular:  Negative for chest pain.   Gastrointestinal:  Negative for nausea.   Genitourinary:  Negative for dysuria.   Musculoskeletal:  Positive for joint swelling (Left Knee). Negative for back pain.   Skin:  Negative for rash.   Neurological:  Negative for weakness.   Hematological:  Does not bruise/bleed easily.   All other systems reviewed and are negative.     Physical Exam     Initial Vitals [12/28/24 1354]   BP Pulse Resp Temp SpO2   92/66 101 18 98.1 °F (36.7 °C) 97 %      MAP       --          Physical Exam  Nursing Notes and Vital Signs Reviewed.  Constitutional: Patient is in no acute distress. Well-developed and well-nourished.  Head: Atraumatic. Normocephalic.  Eyes: PERRL. EOM intact. Conjunctivae are not pale. No scleral icterus.  ENT: Mucous membranes are moist. Oropharynx is clear  and symmetric.    Neck: Supple. Full ROM. No lymphadenopathy.  Cardiovascular: Regular rate. Regular rhythm. No murmurs, rubs, or gallops. Distal pulses are 2+ and symmetric.  Pulmonary/Chest: No respiratory distress. Clear to auscultation bilaterally. No wheezing or rales.  Abdominal: Soft and non-distended.  There is no tenderness.  No rebound, guarding, or rigidity. Good bowel sounds.  Genitourinary: No CVA tenderness  Musculoskeletal: Moves all extremities. No obvious deformities. Left lower extremity swelling. No erythema or warmth  Skin: No warmth or erythema to the left knee. Mild swelling  Neurological:  Alert, awake, and appropriate.  Normal speech.  No acute focal neurological deficits are appreciated.  Psychiatric: Normal affect. Good eye contact. Appropriate in content.     ED Course   Procedures  ED Vital Signs:  Vitals:    12/28/24 1354 12/28/24 1600 12/28/24 1700 12/28/24 1707   BP: 92/66 119/74 103/74    Pulse: 101 79 74    Resp: 18   19   Temp: 98.1 °F (36.7 °C)      TempSrc: Oral      SpO2: 97% 99% 99%    Weight: 78 kg (172 lb)       12/28/24 1730 12/28/24 1800   BP: 109/76 112/76   Pulse: 74 72   Resp:     Temp:     TempSrc:     SpO2: 100% 100%   Weight:         Abnormal Lab Results:  Labs Reviewed   CBC W/ AUTO DIFFERENTIAL - Abnormal       Result Value    WBC 7.25      RBC 4.23 (*)     Hemoglobin 10.8 (*)     Hematocrit 34.5 (*)     MCV 82      MCH 25.5 (*)     MCHC 31.3 (*)     RDW 13.7      Platelets 365      MPV 9.0 (*)     Immature Granulocytes 0.3      Gran # (ANC) 4.5      Immature Grans (Abs) 0.02      Lymph # 2.0      Mono # 0.6      Eos # 0.1      Baso # 0.02      nRBC 0      Gran % 62.2      Lymph % 27.6      Mono % 8.4      Eosinophil % 1.2      Basophil % 0.3      Differential Method Automated     COMPREHENSIVE METABOLIC PANEL - Abnormal    Sodium 140      Potassium 4.5      Chloride 104      CO2 22 (*)     Glucose 120 (*)     BUN 20      Creatinine 1.4      Calcium 9.3      Total  Protein 7.5      Albumin 4.1      Total Bilirubin 0.4      Alkaline Phosphatase 86      AST 20      ALT 24      eGFR 58 (*)     Anion Gap 14     SEDIMENTATION RATE - Abnormal    Sed Rate 42 (*)    TROPONIN I    Troponin I <0.006     B-TYPE NATRIURETIC PEPTIDE    BNP <10     APTT    aPTT 29.0     PROTIME-INR    Prothrombin Time 11.5      INR 1.0     C-REACTIVE PROTEIN    CRP 1.6          All Lab Results:  Results for orders placed or performed during the hospital encounter of 12/28/24   CBC Auto Differential    Collection Time: 12/28/24  2:25 PM   Result Value Ref Range    WBC 7.25 3.90 - 12.70 K/uL    RBC 4.23 (L) 4.60 - 6.20 M/uL    Hemoglobin 10.8 (L) 14.0 - 18.0 g/dL    Hematocrit 34.5 (L) 40.0 - 54.0 %    MCV 82 82 - 98 fL    MCH 25.5 (L) 27.0 - 31.0 pg    MCHC 31.3 (L) 32.0 - 36.0 g/dL    RDW 13.7 11.5 - 14.5 %    Platelets 365 150 - 450 K/uL    MPV 9.0 (L) 9.2 - 12.9 fL    Immature Granulocytes 0.3 0.0 - 0.5 %    Gran # (ANC) 4.5 1.8 - 7.7 K/uL    Immature Grans (Abs) 0.02 0.00 - 0.04 K/uL    Lymph # 2.0 1.0 - 4.8 K/uL    Mono # 0.6 0.3 - 1.0 K/uL    Eos # 0.1 0.0 - 0.5 K/uL    Baso # 0.02 0.00 - 0.20 K/uL    nRBC 0 0 /100 WBC    Gran % 62.2 38.0 - 73.0 %    Lymph % 27.6 18.0 - 48.0 %    Mono % 8.4 4.0 - 15.0 %    Eosinophil % 1.2 0.0 - 8.0 %    Basophil % 0.3 0.0 - 1.9 %    Differential Method Automated    Comprehensive Metabolic Panel    Collection Time: 12/28/24  2:25 PM   Result Value Ref Range    Sodium 140 136 - 145 mmol/L    Potassium 4.5 3.5 - 5.1 mmol/L    Chloride 104 95 - 110 mmol/L    CO2 22 (L) 23 - 29 mmol/L    Glucose 120 (H) 70 - 110 mg/dL    BUN 20 6 - 20 mg/dL    Creatinine 1.4 0.5 - 1.4 mg/dL    Calcium 9.3 8.7 - 10.5 mg/dL    Total Protein 7.5 6.0 - 8.4 g/dL    Albumin 4.1 3.5 - 5.2 g/dL    Total Bilirubin 0.4 0.1 - 1.0 mg/dL    Alkaline Phosphatase 86 40 - 150 U/L    AST 20 10 - 40 U/L    ALT 24 10 - 44 U/L    eGFR 58 (A) >60 mL/min/1.73 m^2    Anion Gap 14 8 - 16 mmol/L   Troponin I     Collection Time: 12/28/24  2:25 PM   Result Value Ref Range    Troponin I <0.006 0.000 - 0.026 ng/mL   BNP    Collection Time: 12/28/24  2:25 PM   Result Value Ref Range    BNP <10 0 - 99 pg/mL   APTT    Collection Time: 12/28/24  2:27 PM   Result Value Ref Range    aPTT 29.0 21.0 - 32.0 sec   Protime-INR    Collection Time: 12/28/24  2:27 PM   Result Value Ref Range    Prothrombin Time 11.5 9.0 - 12.5 sec    INR 1.0 0.8 - 1.2   Sedimentation rate    Collection Time: 12/28/24  5:06 PM   Result Value Ref Range    Sed Rate 42 (H) 0 - 23 mm/Hr   C-reactive protein    Collection Time: 12/28/24  5:06 PM   Result Value Ref Range    CRP 1.6 0.0 - 8.2 mg/L        Imaging Results:  Imaging Results              X-Ray Knee Complete 4 or More Views Left (Final result)  Result time 12/28/24 16:39:58      Final result by Chris Preston MD (12/28/24 16:39:58)                   Impression:      As above      Electronically signed by: Chris Preston MD  Date:    12/28/2024  Time:    16:39               Narrative:    EXAMINATION:  XR KNEE COMP 4 OR MORE VIEWS LEFT    CLINICAL HISTORY:  Pain in unspecified knee    TECHNIQUE:  AP, lateral, and bilateral oblique views of the left knee were performed.    COMPARISON:  November 11, 2024    FINDINGS:  Since the comparison study, most of the radiopaque beads have resorbed.  The air in the knee joint has resolved.  The hardware in surrounding osseous structures are unchanged.  Negative for acute process.                                       X-Ray Chest AP Portable (Final result)  Result time 12/28/24 15:44:19      Final result by Chris Preston MD (12/28/24 15:44:19)                   Impression:      1.  Low lung volumes with left greater than right basilar vascular crowding or atelectasis.  Possible tiny left effusion.  Early infectious process difficult to exclude in the right clinical setting.    2.  Stable findings as noted above.      Electronically signed by: Chris Preston  MD  Date:    12/28/2024  Time:    15:44               Narrative:    EXAMINATION:  XR CHEST AP PORTABLE    CLINICAL HISTORY:  leg swelling;    COMPARISON:  September 10, 2024    FINDINGS:  Low lung volumes with vascular crowding or atelectasis in the left greater than right lung bases.  Possible small left effusion.  The lungs are otherwise clear.  The cardiac silhouette size is normal. The trachea is midline and the mediastinal width is normal. Negative for right effusion or pneumothorax.  Pulmonary vasculature is normal. Negative for osseous abnormalities. Convex right curvature of the thoracolumbar junction with marginal spondylosis.  Tortuous aorta.                                       US Lower Extremity Veins Left (Final result)  Result time 12/28/24 15:31:36   Procedure changed from US Lower Extremity Veins Right     Final result by Chris Preston MD (12/28/24 15:31:36)                   Impression:      1.  Persistent clot in the left peroneal vein.  Interval resolution of clot in the posterior tibial vein.  Negative for new or progressing DVT.      Electronically signed by: Chris Preston MD  Date:    12/28/2024  Time:    15:31               Narrative:    EXAMINATION:  US LOWER EXTREMITY VEINS LEFT    CLINICAL HISTORY:  DVT rule out;    TECHNIQUE:  Duplex and color flow Doppler evaluation and graded compression of the left lower extremity veins was performed.    COMPARISON:  December 11, 2024    FINDINGS:  Left thigh veins: The common femoral, femoral, popliteal, upper greater saphenous, and deep femoral veins are patent and free of thrombus. The veins are normally compressible and have normal phasic flow and augmentation response.    Left calf veins: Thrombosis of the left peroneal vein    Contralateral CFV: The contralateral (right) common femoral vein is patent and free of thrombus.    Miscellaneous: None                                       The EKG was ordered, reviewed, and independently interpreted  by the ED provider.  Interpretation time: 14:25  Rate: 106 BPM  Rhythm: sinus tachycardia  Interpretation: No STEMI.           The Emergency Provider reviewed the vital signs and test results, which are outlined above.     ED Discussion     5:53 PM: Reassessed pt at this time. Discussed with pt all pertinent ED information and results. Discussed pt dx and plan of tx. Gave pt all f/u and return to the ED instructions. All questions and concerns were addressed at this time. Pt expresses understanding of information and instructions, and is comfortable with plan to discharge. Pt is stable for discharge.    I discussed with patient and/or family/caretaker that evaluation in the ED does not suggest any emergent or life threatening medical conditions requiring immediate intervention beyond what was provided in the ED, and I believe patient is safe for discharge.  Regardless, an unremarkable evaluation in the ED does not preclude the development or presence of a serious of life threatening condition. As such, patient was instructed to return immediately for any worsening or change in current symptoms.          Medical Decision Making  DDx: knee pain, leg pain, DVT    Amount and/or Complexity of Data Reviewed  Labs: ordered. Decision-making details documented in ED Course.     Details: CRP and WBC negative  Radiology: ordered. Decision-making details documented in ED Course.     Details: Resolution of some of the DVT. No progression  ECG/medicine tests: ordered and independent interpretation performed. Decision-making details documented in ED Course.  Discussion of management or test interpretation with external provider(s): Continue meds.  Follow up with Ortho and hem/onc    Risk  Prescription drug management.                ED Medication(s):  Medications   morphine injection 4 mg (4 mg Intravenous Given 12/28/24 6369)       New Prescriptions    HYDROCODONE-ACETAMINOPHEN (NORCO) 5-325 MG PER TABLET    Take 1 tablet by  mouth every 4 (four) hours as needed.        Follow-up Information       Gustavo Calhoun MD.    Specialty: Family Medicine  Contact information:  [address]  [place] [address]  [phone]                                 Scribe Attestation:   Scribe #1: I performed the above scribed service and the documentation accurately describes the services I performed. I attest to the accuracy of the note.     Attending:   Physician Attestation Statement for Scribe #1: I, Gabino Cervantes MD, personally performed the services described in this documentation, as scribed by Gabino Cervantes, in my presence, and it is both accurate and complete.           Clinical Impression       ICD-10-CM ICD-9-CM   1. Leg swelling  M79.89 729.81   2. Knee pain  M25.569 719.46   3. Pain of left lower extremity  M79.605 729.5       Disposition:   Disposition: Discharged  Condition: Stable        Gabino Cervantes MD  12/28/24 1812       Gabino Cervantes MD  12/28/24 1813

## 2024-12-29 LAB
OHS QRS DURATION: 74 MS
OHS QTC CALCULATION: 443 MS

## 2024-12-31 ENCOUNTER — CLINICAL SUPPORT (OUTPATIENT)
Facility: HOSPITAL | Age: 59
End: 2024-12-31
Payer: MEDICAID

## 2024-12-31 DIAGNOSIS — Z96.652 S/P REVISION OF TOTAL KNEE, LEFT: Primary | ICD-10-CM

## 2024-12-31 DIAGNOSIS — M25.662 DECREASED RANGE OF MOTION (ROM) OF LEFT KNEE: ICD-10-CM

## 2024-12-31 DIAGNOSIS — Z74.09 IMPAIRED MOBILITY AND ACTIVITIES OF DAILY LIVING: ICD-10-CM

## 2024-12-31 DIAGNOSIS — Z78.9 IMPAIRED MOBILITY AND ACTIVITIES OF DAILY LIVING: ICD-10-CM

## 2024-12-31 PROCEDURE — 97110 THERAPEUTIC EXERCISES: CPT | Mod: PN | Performed by: PHYSICAL THERAPIST

## 2024-12-31 NOTE — PROGRESS NOTES
[facility]   Physical Therapy Treatment Note      Name: Venancio Barragan  [facility] Number: [id number]    Therapy Diagnosis:   Encounter Diagnoses   Name Primary?    S/P revision of total knee, left Yes    Impaired mobility and activities of daily living     Decreased range of motion (ROM) of left knee        Physician: Bony Menezes MD     Physician Orders: PT Eval and Treat   Medical Diagnosis from Referral: Pain due to total left knee replacement, subsequent encounter [T84.84XD, Z96.652], Loosening of prosthesis of left total knee replacement, initial encounter [T84.033A], Quadriceps weakness [M62.81]   Evaluation Date: 11/18/2024  Authorization Period Expiration: 12/31/24  Plan of Care Expiration: 3/18/25  Progress Note Due: 1/31/25  Visit # / Visits authorized: 9/ 20     Time In: 1050  Time Out: 1200  Total Billable Time: 60 minutes Billing reflects 1:1 direct supervision    MD follow-up:    Precautions: Standard    FOTO:  Goal: 67%  -Intake: 36%  -Status: incomplete  -Discharge: incomplete    Subjective     Pt reports: 12/31- He used the dynasplint one time for 35-40 minutes. It was a little sore after. The next morning the knee was swollen. After a couple days he felt like something wasn't right so he went to the ER. Blood work was okay, ultrasound showed continued DVT.   He was compliant with home exercise program.  Response to previous treatment: Improving symptoms  Functional change: Gradually improving function    Pain: Minimal/10  Location: left knee     Objective      Objective Measures updated at progress report unless specified otherwise.    Problem List:  Impaired knee range of motion  Impaired strength  Impaired functional mobility    Knee range of motion 0-95  Quad strength- 3+/5    Warmth, swelling, increase in pain noted around knee joitn    Impaired scaar mobility noted in suprapatellar region    Treatment     Venancio received the treatments listed below:      CPT Code  Intervention Date/Notes  12/31   TE Manual Therapy Knee flexion  Patellar mobs  Scar mobs   TE Treadmill     TE ROM 0-3-94     TE Seated knee flexion stretching 5 min   TE Knee extension stretch with strap 3 min x 10s   TE SL hip abduction  YTB 3 x 10 x 5s   TE Hamstring bridge on ball 3 x 10   TE Knee extension DL 35# 3 x 10   TE Hamstring curls SL 3 x 10, 35#    TE TKE GPB 3 x 10       60 minutes of Therapeutic Exercise (TE) to develop strength, endurance, ROM, and flexibility. ([id number])      Patient Education and Home Exercises         Education provided:   Physical therapy diagnosis, prognosis, and plan of care.     Written Home Exercises Provided: Patient instructed to cont prior HEP.  Exercises were reviewed and Venancio was able to demonstrate them prior to the end of the session.  Venancio demonstrated good  understanding of the education provided.     See EMR under Patient Instructions for exercises provided prior visit.    Assessment     12/31- Some warmth, increase in pain, and swelling noted but no redness. He does have some signs of infection but blood work came back negative. I encouraged patient to continue to monitor for worsening of symptoms.    Venancio Is progressing well towards his goals.   Pt prognosis is Good.     Pt will continue to benefit from skilled outpatient physical therapy to address the deficits listed in the problem list box on initial evaluation, provide pt/family education and to maximize pt's level of independence in the home and community environment.     Pt's spiritual, cultural and educational needs considered and pt agreeable to plan of care and goals.    Anticipated barriers to physical therapy: chronicity of condition     Goals:     Short Term Goals:  6 weeks Status  Date Met   PAIN: Pt will report worst pain of 4/10 in order to progress toward max functional ability and improve quality of life. [x] Progressing  [] Met  [] Not Met     FUNCTION: Patient will demonstrate improved  function as indicated by a functional score improvement of at least 5 points on FOTO. [x] Progressing  [] Met  [] Not Met     MOBILITY: Patient will improve AROM to 50% of stated goals, listed in objective measures above, in order to progress towards independence with functional activities.  [x] Progressing  [] Met  [] Not Met     STRENGTH: Patient will improve strength to 50% of stated goals, listed in objective measures above, in order to progress towards independence with functional activities. [x] Progressing  [] Met  [] Not Met     POSTURE: Patient will correct postural deviations in sitting and standing, to decrease pain and promote long term stability.  [x] Progressing  [x] Met  [] Not Met     GAIT: Patient will demonstrate improved gait mechanics including ambulatory with SPC in order to improve functional mobility, improve quality of life, and decrease risk of further injury or fall.  [x] Progressing  [x] Met  [] Not Met     HEP: Patient will demonstrate independence with HEP in order to progress toward functional independence. [x] Progressing  [x] Met  [] Not Met        Long Term Goals:  12 weeks Status Date Met   PAIN: Pt will report worst pain of 2/10 in order to progress toward max functional ability and improve quality of life [x] Progressing  [] Met  [] Not Met     FUNCTION: Patient will demonstrate improved function as indicated by a FOTO functional score improvement as listed in header. [x] Progressing  [] Met  [] Not Met     MOBILITY: Patient will improve AROM to stated goals, listed in objective measures above, in order to return to maximal functional potential and improve quality of life.  [x] Progressing  [] Met  [] Not Met     STRENGTH: Patient will improve strength to stated goals, listed in objective measures above, in order to improve functional independence and quality of life.  [x] Progressing  [] Met  [] Not Met     GAIT: Patient will demonstrate normalized gait mechanics with minimal  compensation in order to return to PLOF. [x] Progressing  [] Met  [] Not Met     Patient will return to normal ADL's, IADL's, community involvement, recreational activities, and work-related activities with less than or equal to 2/10 pain and maximal function.  [x] Progressing  [] Met  [] Not Met          Plan       Continue to progress per protocol and per patient tolerance      Al Quiros, PT, DPT

## 2025-01-03 ENCOUNTER — CLINICAL SUPPORT (OUTPATIENT)
Facility: HOSPITAL | Age: 60
End: 2025-01-03
Payer: MEDICAID

## 2025-01-03 DIAGNOSIS — Z74.09 IMPAIRED MOBILITY AND ACTIVITIES OF DAILY LIVING: ICD-10-CM

## 2025-01-03 DIAGNOSIS — M25.662 DECREASED RANGE OF MOTION (ROM) OF LEFT KNEE: ICD-10-CM

## 2025-01-03 DIAGNOSIS — Z96.652 S/P REVISION OF TOTAL KNEE, LEFT: Primary | ICD-10-CM

## 2025-01-03 DIAGNOSIS — Z78.9 IMPAIRED MOBILITY AND ACTIVITIES OF DAILY LIVING: ICD-10-CM

## 2025-01-03 PROCEDURE — 97110 THERAPEUTIC EXERCISES: CPT | Mod: PN | Performed by: PHYSICAL THERAPIST

## 2025-01-03 NOTE — PROGRESS NOTES
[facility]   Physical Therapy Treatment Note      Name: Venancio Barragan  [facility] Number: [id number]    Therapy Diagnosis:   Encounter Diagnoses   Name Primary?    S/P revision of total knee, left Yes    Impaired mobility and activities of daily living     Decreased range of motion (ROM) of left knee        Physician: Bony Menezes MD     Physician Orders: PT Eval and Treat   Medical Diagnosis from Referral: Pain due to total left knee replacement, subsequent encounter [T84.84XD, Z96.652], Loosening of prosthesis of left total knee replacement, initial encounter [T84.033A], Quadriceps weakness [M62.81]   Evaluation Date: 11/18/2024  Authorization Period Expiration: 12/31/24  Plan of Care Expiration: 3/18/25  Progress Note Due: 1/31/25  Visit # / Visits authorized: 1/5    Time In: 1100  Time Out: 1204  Total Billable Time: 60 minutes Billing reflects 1:1 direct supervision    MD follow-up:    Precautions: Standard    FOTO:  Goal: 67%  -Intake: 36%  -Status: incomplete  -Discharge: incomplete    Subjective     Pt reports: 1/3- Feeling somewhat better today. He still hasn't used his dynasplint to stretch  He was compliant with home exercise program.  Response to previous treatment: Improving symptoms  Functional change: Gradually improving function    Pain: Minimal/10  Location: left knee     Objective      Objective Measures updated at progress report unless specified otherwise.    Problem List:  Impaired knee range of motion  Impaired strength  Impaired functional mobility      Impaired scaar mobility noted in suprapatellar region    Treatment     Venancio received the treatments listed below:      CPT Code Intervention Date/Notes  1/3   TE Manual Therapy Knee flexion  Patellar mobs  Scar mobs   TE Treadmill     TE ROM 0-90      TE Prone femoral nerve floss 20X   TE Knee extension stretch with strap -   TE Seated knee flexion stretching 3 min   TE Step ups 3 x 10   TE SL balance 3 x 30s   TE SL hip  abduction      TE Hamstring bridge on ball     TE Knee extension DL 35# 3 x 10   TE Hamstring curls SL 3 x 10, 35#        60 minutes Therapeutic Exercise (TE) to develop strength, endurance, ROM, and flexibility. (57800)      Patient Education and Home Exercises         Education provided:   Physical therapy diagnosis, prognosis, and plan of care.     Written Home Exercises Provided: Patient instructed to cont prior HEP.  Exercises were reviewed and Venancio was able to demonstrate them prior to the end of the session.  Venancio demonstrated good  understanding of the education provided.     See EMR under Patient Instructions for exercises provided prior visit.    Assessment     1/3- Less knee irritability based on swelling and pain levels. Good tolerance to progression of exercise.    Venancio Is progressing well towards his goals.   Pt prognosis is Good.     Pt will continue to benefit from skilled outpatient physical therapy to address the deficits listed in the problem list box on initial evaluation, provide pt/family education and to maximize pt's level of independence in the home and community environment.     Pt's spiritual, cultural and educational needs considered and pt agreeable to plan of care and goals.    Anticipated barriers to physical therapy: chronicity of condition     Goals:     Short Term Goals:  6 weeks Status  Date Met   PAIN: Pt will report worst pain of 4/10 in order to progress toward max functional ability and improve quality of life. [x] Progressing  [] Met  [] Not Met     FUNCTION: Patient will demonstrate improved function as indicated by a functional score improvement of at least 5 points on FOTO. [x] Progressing  [] Met  [] Not Met     MOBILITY: Patient will improve AROM to 50% of stated goals, listed in objective measures above, in order to progress towards independence with functional activities.  [x] Progressing  [] Met  [] Not Met     STRENGTH: Patient will improve strength to 50% of  stated goals, listed in objective measures above, in order to progress towards independence with functional activities. [x] Progressing  [] Met  [] Not Met     POSTURE: Patient will correct postural deviations in sitting and standing, to decrease pain and promote long term stability.  [x] Progressing  [x] Met  [] Not Met     GAIT: Patient will demonstrate improved gait mechanics including ambulatory with SPC in order to improve functional mobility, improve quality of life, and decrease risk of further injury or fall.  [x] Progressing  [x] Met  [] Not Met     HEP: Patient will demonstrate independence with HEP in order to progress toward functional independence. [x] Progressing  [x] Met  [] Not Met        Long Term Goals:  12 weeks Status Date Met   PAIN: Pt will report worst pain of 2/10 in order to progress toward max functional ability and improve quality of life [x] Progressing  [] Met  [] Not Met     FUNCTION: Patient will demonstrate improved function as indicated by a FOTO functional score improvement as listed in header. [x] Progressing  [] Met  [] Not Met     MOBILITY: Patient will improve AROM to stated goals, listed in objective measures above, in order to return to maximal functional potential and improve quality of life.  [x] Progressing  [] Met  [] Not Met     STRENGTH: Patient will improve strength to stated goals, listed in objective measures above, in order to improve functional independence and quality of life.  [x] Progressing  [] Met  [] Not Met     GAIT: Patient will demonstrate normalized gait mechanics with minimal compensation in order to return to PLOF. [x] Progressing  [] Met  [] Not Met     Patient will return to normal ADL's, IADL's, community involvement, recreational activities, and work-related activities with less than or equal to 2/10 pain and maximal function.  [x] Progressing  [] Met  [] Not Met          Plan       Continue to progress per protocol and per patient  tolerance      Al Quiros, PT, DPT

## 2025-01-06 ENCOUNTER — CLINICAL SUPPORT (OUTPATIENT)
Facility: HOSPITAL | Age: 60
End: 2025-01-06
Payer: MEDICAID

## 2025-01-06 DIAGNOSIS — Z96.652 S/P REVISION OF TOTAL KNEE, LEFT: Primary | ICD-10-CM

## 2025-01-06 DIAGNOSIS — Z74.09 IMPAIRED MOBILITY AND ACTIVITIES OF DAILY LIVING: ICD-10-CM

## 2025-01-06 DIAGNOSIS — M25.662 DECREASED RANGE OF MOTION (ROM) OF LEFT KNEE: ICD-10-CM

## 2025-01-06 DIAGNOSIS — Z78.9 IMPAIRED MOBILITY AND ACTIVITIES OF DAILY LIVING: ICD-10-CM

## 2025-01-06 PROCEDURE — 97110 THERAPEUTIC EXERCISES: CPT | Mod: PN | Performed by: PHYSICAL THERAPIST

## 2025-01-06 NOTE — PROGRESS NOTES
"[facility] OUTPATIENT THERAPY AND WELLNESS   Physical Therapy Treatment Note      Name: Venancio Barragan  [facility] Number: [id number]    Therapy Diagnosis:   Encounter Diagnoses   Name Primary?    S/P revision of total knee, left Yes    Impaired mobility and activities of daily living     Decreased range of motion (ROM) of left knee        Physician: Bony Menezes MD     Physician Orders: PT Eval and Treat   Medical Diagnosis from Referral: Pain due to total left knee replacement, subsequent encounter [T84.84XD, Z96.652], Loosening of prosthesis of left total knee replacement, initial encounter [T84.033A], Quadriceps weakness [M62.81]   Evaluation Date: 11/18/2024  Authorization Period Expiration: 12/31/24  Plan of Care Expiration: 3/18/25  Progress Note Due: 1/31/25  Visit # / Visits authorized: 2/5    Time In: 0925  Time Out: 1035  Total Billable Time: 68 minutes Billing reflects 1:1 direct supervision    MD follow-up:    Precautions: Standard    FOTO:  Goal: 67%  -Intake: 36%  -Status: incomplete  -Discharge: incomplete    Subjective     Pt reports: 1/6- He did some work over the weekend and felt a lot of soreness afterward.  He was compliant with home exercise program.  Response to previous treatment: Improving symptoms  Functional change: Gradually improving function    Pain: Minimal/10  Location: left knee     Objective      Objective Measures updated at progress report unless specified otherwise.    Problem List:  Impaired knee range of motion  Impaired strength  Impaired functional mobility      Impaired scaar mobility noted in suprapatellar region    Treatment     Venancio received the treatments listed below:      CPT Code Intervention Date/Notes  1/6   TE Manual Therapy Knee flexion  Patellar mobs  Scar mobs   TE Treadmill 10 minutes   TE ROM 0-90 initial  0-98 post manual         TE Prone femoral nerve floss 20X   TE Heel prop 5# 5'   TE Heel slides 3'   TE SAQ 5# 3 x 15   TE Step ups 6" 3 x 10   TE SL balance 3 x 30s "   TE Lateral band walk RTB, Had pain   TE Knee extension DL 35# 3 x 10   TE Hamstring curls SL 3 x 10, 35#    TE SL leg press 65# 3x10          68 minutes Therapeutic Exercise (TE) to develop strength, endurance, ROM, and flexibility. (99607)      Patient Education and Home Exercises         Education provided:   Physical therapy diagnosis, prognosis, and plan of care.     Written Home Exercises Provided: Patient instructed to cont prior HEP.  Exercises were reviewed and Venancio was able to demonstrate them prior to the end of the session.  Venancio demonstrated good  understanding of the education provided.     See EMR under Patient Instructions for exercises provided prior visit.    Assessment     1/6- Continued stiffness overall but less irritability recently. Improved starting range of motion compared to previous visit.    Venancio Is progressing well towards his goals.   Pt prognosis is Good.     Pt will continue to benefit from skilled outpatient physical therapy to address the deficits listed in the problem list box on initial evaluation, provide pt/family education and to maximize pt's level of independence in the home and community environment.     Pt's spiritual, cultural and educational needs considered and pt agreeable to plan of care and goals.    Anticipated barriers to physical therapy: chronicity of condition     Goals:     Short Term Goals:  6 weeks Status  Date Met   PAIN: Pt will report worst pain of 4/10 in order to progress toward max functional ability and improve quality of life. [x] Progressing  [] Met  [] Not Met     FUNCTION: Patient will demonstrate improved function as indicated by a functional score improvement of at least 5 points on FOTO. [x] Progressing  [] Met  [] Not Met     MOBILITY: Patient will improve AROM to 50% of stated goals, listed in objective measures above, in order to progress towards independence with functional activities.  [x] Progressing  [] Met  [] Not Met     STRENGTH:  Patient will improve strength to 50% of stated goals, listed in objective measures above, in order to progress towards independence with functional activities. [x] Progressing  [] Met  [] Not Met     POSTURE: Patient will correct postural deviations in sitting and standing, to decrease pain and promote long term stability.  [x] Progressing  [x] Met  [] Not Met     GAIT: Patient will demonstrate improved gait mechanics including ambulatory with SPC in order to improve functional mobility, improve quality of life, and decrease risk of further injury or fall.  [x] Progressing  [x] Met  [] Not Met     HEP: Patient will demonstrate independence with HEP in order to progress toward functional independence. [x] Progressing  [x] Met  [] Not Met        Long Term Goals:  12 weeks Status Date Met   PAIN: Pt will report worst pain of 2/10 in order to progress toward max functional ability and improve quality of life [x] Progressing  [] Met  [] Not Met     FUNCTION: Patient will demonstrate improved function as indicated by a FOTO functional score improvement as listed in header. [x] Progressing  [] Met  [] Not Met     MOBILITY: Patient will improve AROM to stated goals, listed in objective measures above, in order to return to maximal functional potential and improve quality of life.  [x] Progressing  [] Met  [] Not Met     STRENGTH: Patient will improve strength to stated goals, listed in objective measures above, in order to improve functional independence and quality of life.  [x] Progressing  [] Met  [] Not Met     GAIT: Patient will demonstrate normalized gait mechanics with minimal compensation in order to return to PLOF. [x] Progressing  [] Met  [] Not Met     Patient will return to normal ADL's, IADL's, community involvement, recreational activities, and work-related activities with less than or equal to 2/10 pain and maximal function.  [x] Progressing  [] Met  [] Not Met          Plan       Continue to progress per  protocol and per patient tolerance      Al Quiros, PT, DPT

## 2025-01-07 ENCOUNTER — OFFICE VISIT (OUTPATIENT)
Dept: ORTHOPEDICS | Facility: CLINIC | Age: 60
End: 2025-01-07
Payer: MEDICAID

## 2025-01-07 VITALS — WEIGHT: 171.94 LBS | HEIGHT: 65 IN | BODY MASS INDEX: 28.65 KG/M2

## 2025-01-07 DIAGNOSIS — Z96.652 STATUS POST REVISION OF TOTAL REPLACEMENT OF LEFT KNEE: Primary | ICD-10-CM

## 2025-01-07 DIAGNOSIS — M79.672 LEFT FOOT PAIN: ICD-10-CM

## 2025-01-07 PROCEDURE — 99999 PR PBB SHADOW E&M-EST. PATIENT-LVL III: CPT | Mod: PBBFAC,,, | Performed by: ORTHOPAEDIC SURGERY

## 2025-01-07 PROCEDURE — 99024 POSTOP FOLLOW-UP VISIT: CPT | Mod: ,,, | Performed by: ORTHOPAEDIC SURGERY

## 2025-01-07 PROCEDURE — 1159F MED LIST DOCD IN RCRD: CPT | Mod: CPTII,,, | Performed by: ORTHOPAEDIC SURGERY

## 2025-01-07 PROCEDURE — 99213 OFFICE O/P EST LOW 20 MIN: CPT | Mod: PBBFAC,PN | Performed by: ORTHOPAEDIC SURGERY

## 2025-01-07 RX ORDER — GABAPENTIN 300 MG/1
600 CAPSULE ORAL 2 TIMES DAILY
Qty: 56 CAPSULE | Refills: 0 | Status: SHIPPED | OUTPATIENT
Start: 2025-01-07 | End: 2025-01-21

## 2025-01-07 RX ORDER — CYCLOBENZAPRINE HCL 5 MG
5 TABLET ORAL NIGHTLY
Qty: 14 TABLET | Refills: 0 | Status: SHIPPED | OUTPATIENT
Start: 2025-01-07 | End: 2025-01-21

## 2025-01-07 RX ORDER — ACETAMINOPHEN 500 MG
500 TABLET ORAL EVERY 6 HOURS PRN
Qty: 56 TABLET | Refills: 0 | Status: SHIPPED | OUTPATIENT
Start: 2025-01-07 | End: 2025-01-21

## 2025-01-07 NOTE — PROGRESS NOTES
Subjective:      Patient ID: Venancio Barragan is a 59 y.o. male.    Chief Complaint: Post-op Evaluation and Pain of the Left Knee    Patient is a proximally 2 months out from revision left total knee replacement.  Knee itself is doing well.  However he is complaining of some slight increase in his pain.  No effusion.  He is on Eliquis for anticoagulation.  Also complaining of dorsal grade foot pain since the surgery.      Social History     Occupational History    Not on file   Tobacco Use    Smoking status: Never    Smokeless tobacco: Never   Substance and Sexual Activity    Alcohol use: No    Drug use: No    Sexual activity: Yes     Partners: Female      Social Drivers of Health     Tobacco Use: Low Risk  (1/7/2025)    Patient History     Smoking Tobacco Use: Never     Smokeless Tobacco Use: Never     Passive Exposure: Not on file   Alcohol Use: Not At Risk (10/27/2024)    Received from [facility] and Its Subsidiaries and Affiliates    AUDIT-C     Frequency of Alcohol Consumption: Never     Average Number of Drinks: Patient does not drink     Frequency of Binge Drinking: Never   Financial Resource Strain: Low Risk  (11/11/2024)    Overall Financial Resource Strain (CARDIA)     Difficulty of Paying Living Expenses: Not hard at all   Recent Concern: Financial Resource Strain - Medium Risk (10/27/2024)    Received from [facility] and Its Subsidiaries and Affiliates    Overall Financial Resource Strain (CARDIA)     Difficulty of Paying Living Expenses: Somewhat hard   Food Insecurity: No Food Insecurity (11/11/2024)    Hunger Vital Sign     Worried About Running Out of Food in the Last Year: Never true     Ran Out of Food in the Last Year: Never true   Transportation Needs: No Transportation Needs (11/11/2024)    TRANSPORTATION NEEDS     Transportation : No   Physical Activity: Inactive (10/27/2024)    [facility]sidiaries and Affiliates    Exercise Vital Sign     Days of Exercise per Week: 0 days     Minutes of Exercise per Session: 0 min   Stress: No Stress Concern Present (11/11/2024)    [employer] [place] [employer] - Occupational Stress Questionnaire     Feeling of Stress : Only a little   Recent Concern: Stress - Stress Concern Present (10/14/2024)    [facility]    [employer] [place] [employer] - Occupational Stress Questionnaire     Feeling of Stress : Very much   Housing Stability: Low Risk  (11/11/2024)    Housing Stability Vital Sign     Unable to Pay for Housing in the Last Year: No     Homeless in the Last Year: No   Depression: Not at risk (10/14/2024)    [facility]    PHQ-2     PHQ-2 Score: 0   Utilities: Not At Risk (11/11/2024)    [facility] Utilities     Threatened with loss of utilities: No   Health Literacy: Adequate Health Literacy (11/11/2024)     Health Literacy     Frequency of need for help with medical instructions: Never   Recent Concern: Health Literacy - Inadequate Health Literacy (10/1/2024)     Health Literacy     Frequency of need for help with medical instructions: Often   Social Isolation: Not on file      Review of Systems   Constitutional: Negative for diaphoresis.   HENT:  Negative for ear discharge, nosebleeds and stridor.    Eyes:  Negative for photophobia.   Cardiovascular:  Negative for syncope.   Respiratory:  Negative for hemoptysis, shortness of breath and wheezing.    Neurological:  Negative for tremors.   Psychiatric/Behavioral: Negative.           Objective:    General    Constitutional: He is oriented to person, place, and time. He appears well-developed and well-nourished.   HENT:   Head: Normocephalic and atraumatic.   Eyes: EOM are normal.    Pulmonary/Chest: Effort normal.   Neurological: He is alert and oriented to person, place, and time.   Psychiatric: He has a normal mood and affect. His behavior is normal. Judgment and thought content normal.     General Musculoskeletal Exam   Gait: normal         Left Knee Exam     Inspection   Erythema: absent  Scars: present  Swelling: absent  Effusion: absent  Deformity: absent  Bruising: absent    Tenderness   The patient is experiencing no tenderness.     Range of Motion   Extension:  0   Flexion:  90     Tests   Stability   PCL-Posterior Drawer: normal (0 to 2mm)  MCL - Valgus: normal (0 to 2mm)  LCL - Varus: normal (0 to 2mm)  Patella   Passive Patellar Tilt: neutral  Patellar Tracking: normal    Other   Sensation: normal    Comments:  Incision well healed         Assessment:       1. Status post revision of total replacement of left knee    2. Left foot pain          Plan:   Patient should continue physical therapy for his left knee.  We will go ahead and represcribe additional pain medication as well as Flexeril.  Pain recurred around the same timing as we would expect when the cryo neurolysis began wearing off which was in the last week or 2.  Patient does have focal tenderness at the base of the 1st metatarsal.  Radiographs of his foot previously so some degenerative changes.  We will refer him to Dr. Gutierrez for treatment options.  I will see him back proximally 1 month for x-rays of his left knee.  He is scheduled to see Hematology next month for management of the DVT and anticoagulation.

## 2025-01-16 ENCOUNTER — CLINICAL SUPPORT (OUTPATIENT)
Facility: HOSPITAL | Age: 60
End: 2025-01-16
Payer: MEDICAID

## 2025-01-16 DIAGNOSIS — Z74.09 IMPAIRED MOBILITY AND ACTIVITIES OF DAILY LIVING: ICD-10-CM

## 2025-01-16 DIAGNOSIS — Z78.9 IMPAIRED MOBILITY AND ACTIVITIES OF DAILY LIVING: ICD-10-CM

## 2025-01-16 DIAGNOSIS — Z96.652 STATUS POST REVISION OF TOTAL REPLACEMENT OF LEFT KNEE: Primary | ICD-10-CM

## 2025-01-16 DIAGNOSIS — M25.662 DECREASED RANGE OF MOTION (ROM) OF LEFT KNEE: ICD-10-CM

## 2025-01-16 PROCEDURE — 97110 THERAPEUTIC EXERCISES: CPT | Mod: PN | Performed by: PHYSICAL THERAPIST

## 2025-01-17 ENCOUNTER — CLINICAL SUPPORT (OUTPATIENT)
Facility: HOSPITAL | Age: 60
End: 2025-01-17
Payer: MEDICAID

## 2025-01-17 DIAGNOSIS — Z96.652 STATUS POST REVISION OF TOTAL REPLACEMENT OF LEFT KNEE: Primary | ICD-10-CM

## 2025-01-17 DIAGNOSIS — Z78.9 IMPAIRED MOBILITY AND ACTIVITIES OF DAILY LIVING: ICD-10-CM

## 2025-01-17 DIAGNOSIS — Z74.09 IMPAIRED MOBILITY AND ACTIVITIES OF DAILY LIVING: ICD-10-CM

## 2025-01-17 DIAGNOSIS — M25.662 DECREASED RANGE OF MOTION (ROM) OF LEFT KNEE: ICD-10-CM

## 2025-01-17 PROCEDURE — 97110 THERAPEUTIC EXERCISES: CPT | Mod: PN | Performed by: PHYSICAL THERAPIST

## 2025-01-17 NOTE — PROGRESS NOTES
"[facility]ER[facility]   Physical Therapy Treatment Note      Name: Venancio Barragan  [facility] Number: [id number]    Therapy Diagnosis:   Encounter Diagnoses   Name Primary?    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    Impaired mobility and activities of daily living     Decreased range of motion (ROM) of left knee          Physician: Bony Menezes MD     Physician Orders: PT Eval and Treat   Medical Diagnosis from Referral: Pain due to total left knee replacement, subsequent encounter [T84.84XD, Z96.652], Loosening of prosthesis of left total knee replacement, initial encounter [T84.033A], Quadriceps weakness [M62.81]   Evaluation Date: 11/18/2024  Authorization Period Expiration: 12/31/24  Plan of Care Expiration: 3/18/25  Progress Note Due: 1/31/25  Visit # / Visits authorized: 4/20    Time In: 0925  Time Out: 1035  Total Billable Time: 70 minutes Billing reflects 1:1 direct supervision    MD follow-up:    Precautions: Standard    FOTO:  Goal: 67%  -Intake: 36%  -Status: incomplete  -Discharge: incomplete    Subjective     Pt reports: 1/16- Patient reports he started using his Dynasplint again.  He was compliant with home exercise program.  Response to previous treatment: Improving symptoms  Functional change: Gradually improving function    Pain: Minimal/10  Location: left knee     Objective      Objective Measures updated at progress report unless specified otherwise.    Problem List:  Impaired knee range of motion  Impaired strength  Impaired functional mobility      Impaired scaar mobility noted in suprapatellar region    Treatment     Venancio received the treatments listed below:    CPT Code  Intervention  Date/Notes   1/16    TE  Manual Therapy  Knee flexion   Patellar mobs   Scar mobs    TE  Treadmill  10 minutes    TE  ROM  0-91 initial   0-98 post manual          TE  Heel prop  5# 5'    TE  Heel slides  3'    TE  SAQ  10# 3 x 15    TE  Step ups  12" 3 x 10    TE  Lateral band " walk  RTB, Had pain     TE  Knee extension DL  35# 3 x 10    TE  Hamstring curls SL  3 x 10, 35#     TE  SL leg press  65# 3x10     TE  SL balance  3 x 30s      70 minutes of Therapeutic Exercise (TE) to develop strength, endurance, ROM, and flexibility. (06099)      Patient Education and Home Exercises       Education provided:   Physical therapy diagnosis, prognosis, and plan of care.     Written Home Exercises Provided: Patient instructed to cont prior HEP.  Exercises were reviewed and Venancio was able to demonstrate them prior to the end of the session.  Venancio demonstrated good  understanding of the education provided.     See EMR under Patient Instructions for exercises provided prior visit.    Assessment     1/16-  Patients range of motion improved after session, he is tolerating stretching with less irritability and doing well with functional strength performance.    Venancio Is progressing well towards his goals.   Pt prognosis is Good.     Pt will continue to benefit from skilled outpatient physical therapy to address the deficits listed in the problem list box on initial evaluation, provide pt/family education and to maximize pt's level of independence in the home and community environment.     Pt's spiritual, cultural and educational needs considered and pt agreeable to plan of care and goals.    Anticipated barriers to physical therapy: chronicity of condition     Goals:     Short Term Goals:  6 weeks Status  Date Met   PAIN: Pt will report worst pain of 4/10 in order to progress toward max functional ability and improve quality of life. [x] Progressing  [] Met  [] Not Met     FUNCTION: Patient will demonstrate improved function as indicated by a functional score improvement of at least 5 points on FOTO. [x] Progressing  [] Met  [] Not Met     MOBILITY: Patient will improve AROM to 50% of stated goals, listed in objective measures above, in order to progress towards independence with functional activities.   [x] Progressing  [] Met  [] Not Met     STRENGTH: Patient will improve strength to 50% of stated goals, listed in objective measures above, in order to progress towards independence with functional activities. [x] Progressing  [] Met  [] Not Met     POSTURE: Patient will correct postural deviations in sitting and standing, to decrease pain and promote long term stability.  [x] Progressing  [x] Met  [] Not Met     GAIT: Patient will demonstrate improved gait mechanics including ambulatory with SPC in order to improve functional mobility, improve quality of life, and decrease risk of further injury or fall.  [x] Progressing  [x] Met  [] Not Met     HEP: Patient will demonstrate independence with HEP in order to progress toward functional independence. [x] Progressing  [x] Met  [] Not Met        Long Term Goals:  12 weeks Status Date Met   PAIN: Pt will report worst pain of 2/10 in order to progress toward max functional ability and improve quality of life [x] Progressing  [] Met  [] Not Met     FUNCTION: Patient will demonstrate improved function as indicated by a FOTO functional score improvement as listed in header. [x] Progressing  [] Met  [] Not Met     MOBILITY: Patient will improve AROM to stated goals, listed in objective measures above, in order to return to maximal functional potential and improve quality of life.  [x] Progressing  [] Met  [] Not Met     STRENGTH: Patient will improve strength to stated goals, listed in objective measures above, in order to improve functional independence and quality of life.  [x] Progressing  [] Met  [] Not Met     GAIT: Patient will demonstrate normalized gait mechanics with minimal compensation in order to return to PLOF. [x] Progressing  [] Met  [] Not Met     Patient will return to normal ADL's, IADL's, community involvement, recreational activities, and work-related activities with less than or equal to 2/10 pain and maximal function.  [x] Progressing  [] Met  [] Not  Met          Plan       Continue to progress per protocol and per patient tolerance      Al Quiros, PT, DPT

## 2025-01-17 NOTE — PROGRESS NOTES
"[facility]   Physical Therapy Treatment Note      Name: Venancio Barragan  [facility] Number: [id number]    Therapy Diagnosis:   Encounter Diagnoses   Name Primary?    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    Impaired mobility and activities of daily living     Decreased range of motion (ROM) of left knee        Physician: Bony Menezes MD     Physician Orders: PT Eval and Treat   Medical Diagnosis from Referral: Pain due to total left knee replacement, subsequent encounter [T84.84XD, Z96.652], Loosening of prosthesis of left total knee replacement, initial encounter [T84.033A], Quadriceps weakness [M62.81]   Evaluation Date: 11/18/2024  Authorization Period Expiration: 12/31/24  Plan of Care Expiration: 3/18/25  Progress Note Due: 1/31/25  Visit # / Visits authorized: 4/5    Time In: 1000  Time Out: 1105  Total Billable Time: 65 minutes Billing reflects 1:1 direct supervision    MD follow-up:    Precautions: Standard    FOTO:  Goal: 67%  -Intake: 36%  -Status: incomplete  -Discharge: incomplete    Subjective     Pt reports: 1/17- Knee felt better after yesterday.  He was compliant with home exercise program.  Response to previous treatment: Improving symptoms  Functional change: Gradually improving function    Pain: Minimal/10  Location: left knee     Objective      Objective Measures updated at progress report unless specified otherwise.    Problem List:  Impaired knee range of motion  Impaired strength  Impaired functional mobility      Impaired scaar mobility noted in suprapatellar region    Treatment     Venancio received the treatments listed below:      CPT Code Intervention Date/Notes  1/17   TE Manual Therapy Knee flexion  Patellar mobs  Scar mobs   TE Treadmill 10 minutes   TE ROM 0-96 initial  0-99 post manual         TE Heel prop 5# 5'   TE Heel slides 3'   TE SAQ 10# 3 x 15   TE Step ups 12" 3 x 10   TE Lateral band walk RTB, Had pain    TE Knee extension DL 35# 3 x 10 "   TE Hamstring curls SL 3 x 10, 35#    TE Hamstring bridge on ball     TE SL leg press 65# 3x10    TE SL balance 3 x 30s       70 minutes Therapeutic Exercise (TE) to develop strength, endurance, ROM, and flexibility. (38901)      Patient Education and Home Exercises         Education provided:   Physical therapy diagnosis, prognosis, and plan of care.     Written Home Exercises Provided: Patient instructed to cont prior HEP.  Exercises were reviewed and Venancio was able to demonstrate them prior to the end of the session.  Venancio demonstrated good  understanding of the education provided.     See EMR under Patient Instructions for exercises provided prior visit.    Assessment     1/17- Patient currently on a positive trend with functional strength and mobility. Continued stiffness, I encouraged him to continue with dynasplint.    Venancio Is progressing well towards his goals.   Pt prognosis is Good.     Pt will continue to benefit from skilled outpatient physical therapy to address the deficits listed in the problem list box on initial evaluation, provide pt/family education and to maximize pt's level of independence in the home and community environment.     Pt's spiritual, cultural and educational needs considered and pt agreeable to plan of care and goals.    Anticipated barriers to physical therapy: chronicity of condition     Goals:     Short Term Goals:  6 weeks Status  Date Met   PAIN: Pt will report worst pain of 4/10 in order to progress toward max functional ability and improve quality of life. [x] Progressing  [] Met  [] Not Met     FUNCTION: Patient will demonstrate improved function as indicated by a functional score improvement of at least 5 points on FOTO. [x] Progressing  [] Met  [] Not Met     MOBILITY: Patient will improve AROM to 50% of stated goals, listed in objective measures above, in order to progress towards independence with functional activities.  [x] Progressing  [] Met  [] Not Met      STRENGTH: Patient will improve strength to 50% of stated goals, listed in objective measures above, in order to progress towards independence with functional activities. [x] Progressing  [] Met  [] Not Met     POSTURE: Patient will correct postural deviations in sitting and standing, to decrease pain and promote long term stability.  [x] Progressing  [x] Met  [] Not Met     GAIT: Patient will demonstrate improved gait mechanics including ambulatory with SPC in order to improve functional mobility, improve quality of life, and decrease risk of further injury or fall.  [x] Progressing  [x] Met  [] Not Met     HEP: Patient will demonstrate independence with HEP in order to progress toward functional independence. [x] Progressing  [x] Met  [] Not Met        Long Term Goals:  12 weeks Status Date Met   PAIN: Pt will report worst pain of 2/10 in order to progress toward max functional ability and improve quality of life [x] Progressing  [] Met  [] Not Met     FUNCTION: Patient will demonstrate improved function as indicated by a FOTO functional score improvement as listed in header. [x] Progressing  [] Met  [] Not Met     MOBILITY: Patient will improve AROM to stated goals, listed in objective measures above, in order to return to maximal functional potential and improve quality of life.  [x] Progressing  [] Met  [] Not Met     STRENGTH: Patient will improve strength to stated goals, listed in objective measures above, in order to improve functional independence and quality of life.  [x] Progressing  [] Met  [] Not Met     GAIT: Patient will demonstrate normalized gait mechanics with minimal compensation in order to return to PLOF. [x] Progressing  [] Met  [] Not Met     Patient will return to normal ADL's, IADL's, community involvement, recreational activities, and work-related activities with less than or equal to 2/10 pain and maximal function.  [x] Progressing  [] Met  [] Not Met          Plan       Continue to  progress per protocol and per patient tolerance      Al Quiros, PT, DPT

## 2025-01-25 ENCOUNTER — CLINICAL SUPPORT (OUTPATIENT)
Facility: HOSPITAL | Age: 60
End: 2025-01-25
Payer: MEDICAID

## 2025-01-25 DIAGNOSIS — M25.662 DECREASED RANGE OF MOTION (ROM) OF LEFT KNEE: ICD-10-CM

## 2025-01-25 DIAGNOSIS — Z74.09 IMPAIRED MOBILITY AND ACTIVITIES OF DAILY LIVING: ICD-10-CM

## 2025-01-25 DIAGNOSIS — Z96.652 STATUS POST REVISION OF TOTAL REPLACEMENT OF LEFT KNEE: Primary | ICD-10-CM

## 2025-01-25 DIAGNOSIS — Z78.9 IMPAIRED MOBILITY AND ACTIVITIES OF DAILY LIVING: ICD-10-CM

## 2025-01-25 PROCEDURE — 97110 THERAPEUTIC EXERCISES: CPT | Mod: PN | Performed by: PHYSICAL THERAPIST

## 2025-01-27 NOTE — PROGRESS NOTES
"[facility]   Physical Therapy Treatment Note      Name: Venancio Barragan  [facility] Number: [id number]    Therapy Diagnosis:   Encounter Diagnoses   Name Primary?    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    Impaired mobility and activities of daily living     Decreased range of motion (ROM) of left knee        Physician: Bony Menezes MD     Physician Orders: PT Eval and Treat   Medical Diagnosis from Referral: Pain due to total left knee replacement, subsequent encounter [T84.84XD, Z96.652], Loosening of prosthesis of left total knee replacement, initial encounter [T84.033A], Quadriceps weakness [M62.81]   Evaluation Date: 11/18/2024  Authorization Period Expiration: 12/31/24  Plan of Care Expiration: 3/18/25  Progress Note Due: 1/31/25  Visit # / Visits authorized: 5/20    Time In: 9:30  Time Out: 10:40  Total Billable Time: 70 minutes Billing reflects 1:1 direct supervision    MD follow-up:    Precautions: Standard    FOTO:  Goal: 67%  -Intake: 36%  -Status: incomplete  -Discharge: incomplete    Subjective     Pt reports: 1/25- He has continued to use his dynasplint. Things have been feeling good.  He was compliant with home exercise program.  Response to previous treatment: Improving symptoms  Functional change: Gradually improving function    Pain: Minimal/10  Location: left knee     Objective      Objective Measures updated at progress report unless specified otherwise.    Problem List:  Impaired knee range of motion  Impaired strength  Impaired functional mobility      Impaired scaar mobility noted in suprapatellar region    Treatment     Venancio received the treatments listed below:      CPT Code Intervention Date/Notes  1/25   TE Manual Therapy Knee flexion  Patellar mobs  Scar mobs   TE Elliptical 10 minutes   TE ROM 0-96 initial  0-100 post manual         TE Heel prop 5# 5'   TE Heel slides 3'   TE SAQ 10# 3 x 15   TE Step ups 12" 3 x 10   TE Lateral band walk RTB, " Had pain    TE Knee extension SL 15# 3 x 10   TE Hamstring curls SL 3 x 10, 35#    TE Hamstring bridge on ball     TE SL leg press 65# 3x10    TE SL balance 3 x 30s       70 minutes Therapeutic Exercise (TE) to develop strength, endurance, ROM, and flexibility. (61531)      Patient Education and Home Exercises         Education provided:   Physical therapy diagnosis, prognosis, and plan of care.     Written Home Exercises Provided: Patient instructed to cont prior HEP.  Exercises were reviewed and Venancio was able to demonstrate them prior to the end of the session.  Venancio demonstrated good  understanding of the education provided.     See EMR under Patient Instructions for exercises provided prior visit.    Assessment     1/24- Continued improvement in range of motion, although progress is slow. He is showing much less joint irritability and progressing well with functional strength.    Venancio Is progressing well towards his goals.   Pt prognosis is Good.     Pt will continue to benefit from skilled outpatient physical therapy to address the deficits listed in the problem list box on initial evaluation, provide pt/family education and to maximize pt's level of independence in the home and community environment.     Pt's spiritual, cultural and educational needs considered and pt agreeable to plan of care and goals.    Anticipated barriers to physical therapy: chronicity of condition     Goals:     Short Term Goals:  6 weeks Status  Date Met   PAIN: Pt will report worst pain of 4/10 in order to progress toward max functional ability and improve quality of life. [x] Progressing  [] Met  [] Not Met     FUNCTION: Patient will demonstrate improved function as indicated by a functional score improvement of at least 5 points on FOTO. [x] Progressing  [] Met  [] Not Met     MOBILITY: Patient will improve AROM to 50% of stated goals, listed in objective measures above, in order to progress towards independence with functional  activities.  [x] Progressing  [] Met  [] Not Met     STRENGTH: Patient will improve strength to 50% of stated goals, listed in objective measures above, in order to progress towards independence with functional activities. [x] Progressing  [] Met  [] Not Met     POSTURE: Patient will correct postural deviations in sitting and standing, to decrease pain and promote long term stability.  [x] Progressing  [x] Met  [] Not Met     GAIT: Patient will demonstrate improved gait mechanics including ambulatory with SPC in order to improve functional mobility, improve quality of life, and decrease risk of further injury or fall.  [x] Progressing  [x] Met  [] Not Met     HEP: Patient will demonstrate independence with HEP in order to progress toward functional independence. [x] Progressing  [x] Met  [] Not Met        Long Term Goals:  12 weeks Status Date Met   PAIN: Pt will report worst pain of 2/10 in order to progress toward max functional ability and improve quality of life [x] Progressing  [] Met  [] Not Met     FUNCTION: Patient will demonstrate improved function as indicated by a FOTO functional score improvement as listed in header. [x] Progressing  [] Met  [] Not Met     MOBILITY: Patient will improve AROM to stated goals, listed in objective measures above, in order to return to maximal functional potential and improve quality of life.  [x] Progressing  [] Met  [] Not Met     STRENGTH: Patient will improve strength to stated goals, listed in objective measures above, in order to improve functional independence and quality of life.  [x] Progressing  [] Met  [] Not Met     GAIT: Patient will demonstrate normalized gait mechanics with minimal compensation in order to return to PLOF. [x] Progressing  [] Met  [] Not Met     Patient will return to normal ADL's, IADL's, community involvement, recreational activities, and work-related activities with less than or equal to 2/10 pain and maximal function.  [x] Progressing  []  Met  [] Not Met          Plan       Continue to progress per protocol and per patient tolerance      Al Quiros, PT, DPT

## 2025-01-28 ENCOUNTER — CLINICAL SUPPORT (OUTPATIENT)
Facility: HOSPITAL | Age: 60
End: 2025-01-28
Payer: MEDICAID

## 2025-01-28 DIAGNOSIS — Z74.09 IMPAIRED MOBILITY AND ACTIVITIES OF DAILY LIVING: ICD-10-CM

## 2025-01-28 DIAGNOSIS — Z78.9 IMPAIRED MOBILITY AND ACTIVITIES OF DAILY LIVING: ICD-10-CM

## 2025-01-28 DIAGNOSIS — Z96.652 STATUS POST REVISION OF TOTAL REPLACEMENT OF LEFT KNEE: Primary | ICD-10-CM

## 2025-01-28 DIAGNOSIS — M25.662 DECREASED RANGE OF MOTION (ROM) OF LEFT KNEE: ICD-10-CM

## 2025-01-28 PROCEDURE — 97110 THERAPEUTIC EXERCISES: CPT | Mod: PN | Performed by: PHYSICAL THERAPIST

## 2025-01-28 NOTE — PROGRESS NOTES
[facility]   Physical Therapy Treatment Note      Name: Venancio Barragan  [facility] Number: [id number]    Therapy Diagnosis:   Encounter Diagnoses   Name Primary?    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    Impaired mobility and activities of daily living     Decreased range of motion (ROM) of left knee        Physician: Bony Menezes MD     Physician Orders: PT Eval and Treat   Medical Diagnosis from Referral: Pain due to total left knee replacement, subsequent encounter [T84.84XD, Z96.652], Loosening of prosthesis of left total knee replacement, initial encounter [T84.033A], Quadriceps weakness [M62.81]   Evaluation Date: 11/18/2024  Authorization Period Expiration: 12/31/24  Plan of Care Expiration: 3/18/25  Progress Note Due: 1/31/25  Visit # / Visits authorized: 6/20    Time In: 1050  Time Out: 1200  Total Billable Time: 65 minutes Billing reflects 1:1 direct supervision    MD follow-up:    Precautions: Standard    FOTO:  Goal: 67%  -Intake: 36%  -Status: incomplete  -Discharge: incomplete    Subjective     Pt reports: 1/28- He was very sore after the visit on Saturday. Likely a combination of the exercise and doing too much afterwards. Feeling fine today.  He was compliant with home exercise program.  Response to previous treatment: Improving symptoms  Functional change: Gradually improving function    Pain: Minimal/10  Location: left knee     Objective      Objective Measures updated at progress report unless specified otherwise.    Problem List:  Impaired knee range of motion  Impaired strength  Impaired functional mobility      Impaired scaar mobility noted in suprapatellar region    Treatment     Venancio received the treatments listed below:      CPT Code Intervention Date/Notes  1/28   TE Manual Therapy Knee flexion  Patellar mobs  Scar mobs   TE Elliptical 10 minutes   TE ROM 0-96 initial  0-99 post manual         TE Prone quad stretch 4 x 30s   TE Knee extension  "stretch with strap -   TE Heel prop -   TE Heel slides 3'   TE SAQ 10# 3 x 15   TE Step ups 12" 3 x 10   TE Lateral band walk RTB, Had pain    TE Knee extension SL 15# 3 x 10   TE Hamstring curls SL 3 x 10, 35#        65 minutes Therapeutic Exercise (TE) to develop strength, endurance, ROM, and flexibility. (23136)      Patient Education and Home Exercises         Education provided:   Physical therapy diagnosis, prognosis, and plan of care.     Written Home Exercises Provided: Patient instructed to cont prior HEP.  Exercises were reviewed and Venancio was able to demonstrate them prior to the end of the session.  Venancio demonstrated good  understanding of the education provided.     See EMR under Patient Instructions for exercises provided prior visit.    Assessment     1/28- Good tolerance to exercises today, we opted to keep load the same to avoid irritating the knee joint. Low irritability today, no particular reason for concern.    Venancio Is progressing well towards his goals.   Pt prognosis is Good.     Pt will continue to benefit from skilled outpatient physical therapy to address the deficits listed in the problem list box on initial evaluation, provide pt/family education and to maximize pt's level of independence in the home and community environment.     Pt's spiritual, cultural and educational needs considered and pt agreeable to plan of care and goals.    Anticipated barriers to physical therapy: chronicity of condition     Goals:     Short Term Goals:  6 weeks Status  Date Met   PAIN: Pt will report worst pain of 4/10 in order to progress toward max functional ability and improve quality of life. [x] Progressing  [] Met  [] Not Met     FUNCTION: Patient will demonstrate improved function as indicated by a functional score improvement of at least 5 points on FOTO. [x] Progressing  [] Met  [] Not Met     MOBILITY: Patient will improve AROM to 50% of stated goals, listed in objective measures above, in order " to progress towards independence with functional activities.  [x] Progressing  [] Met  [] Not Met     STRENGTH: Patient will improve strength to 50% of stated goals, listed in objective measures above, in order to progress towards independence with functional activities. [x] Progressing  [] Met  [] Not Met     POSTURE: Patient will correct postural deviations in sitting and standing, to decrease pain and promote long term stability.  [x] Progressing  [x] Met  [] Not Met     GAIT: Patient will demonstrate improved gait mechanics including ambulatory with SPC in order to improve functional mobility, improve quality of life, and decrease risk of further injury or fall.  [x] Progressing  [x] Met  [] Not Met     HEP: Patient will demonstrate independence with HEP in order to progress toward functional independence. [x] Progressing  [x] Met  [] Not Met        Long Term Goals:  12 weeks Status Date Met   PAIN: Pt will report worst pain of 2/10 in order to progress toward max functional ability and improve quality of life [x] Progressing  [] Met  [] Not Met     FUNCTION: Patient will demonstrate improved function as indicated by a FOTO functional score improvement as listed in header. [x] Progressing  [] Met  [] Not Met     MOBILITY: Patient will improve AROM to stated goals, listed in objective measures above, in order to return to maximal functional potential and improve quality of life.  [x] Progressing  [] Met  [] Not Met     STRENGTH: Patient will improve strength to stated goals, listed in objective measures above, in order to improve functional independence and quality of life.  [x] Progressing  [] Met  [] Not Met     GAIT: Patient will demonstrate normalized gait mechanics with minimal compensation in order to return to PLOF. [x] Progressing  [] Met  [] Not Met     Patient will return to normal ADL's, IADL's, community involvement, recreational activities, and work-related activities with less than or equal to 2/10  pain and maximal function.  [x] Progressing  [] Met  [] Not Met          Plan       Continue to progress per protocol and per patient tolerance      Al Quiros, PT, DPT

## 2025-01-30 ENCOUNTER — PATIENT MESSAGE (OUTPATIENT)
Dept: ORTHOPEDICS | Facility: CLINIC | Age: 60
End: 2025-01-30
Payer: MEDICAID

## 2025-01-30 ENCOUNTER — CLINICAL SUPPORT (OUTPATIENT)
Facility: HOSPITAL | Age: 60
End: 2025-01-30
Payer: MEDICAID

## 2025-01-30 DIAGNOSIS — Z78.9 IMPAIRED MOBILITY AND ACTIVITIES OF DAILY LIVING: ICD-10-CM

## 2025-01-30 DIAGNOSIS — M25.662 DECREASED RANGE OF MOTION (ROM) OF LEFT KNEE: ICD-10-CM

## 2025-01-30 DIAGNOSIS — Z74.09 IMPAIRED MOBILITY AND ACTIVITIES OF DAILY LIVING: ICD-10-CM

## 2025-01-30 DIAGNOSIS — Z96.652 STATUS POST REVISION OF TOTAL REPLACEMENT OF LEFT KNEE: Primary | ICD-10-CM

## 2025-01-30 PROCEDURE — 97110 THERAPEUTIC EXERCISES: CPT | Mod: PN | Performed by: PHYSICAL THERAPIST

## 2025-01-30 NOTE — PROGRESS NOTES
"[facility]   Physical Therapy Treatment Note      Name: Venancio Barragan  [facility] Number: [id number]    Therapy Diagnosis:   Encounter Diagnoses   Name Primary?    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    Impaired mobility and activities of daily living     Decreased range of motion (ROM) of left knee        Physician: Bony Menezes MD     Physician Orders: PT Eval and Treat   Medical Diagnosis from Referral: Pain due to total left knee replacement, subsequent encounter [T84.84XD, Z96.652], Loosening of prosthesis of left total knee replacement, initial encounter [T84.033A], Quadriceps weakness [M62.81]   Evaluation Date: 11/18/2024  Authorization Period Expiration: 12/31/24  Plan of Care Expiration: 3/18/25  Progress Note Due: 1/31/25  Visit # / Visits authorized: 7/20    Time In: 1050  Time Out: 1200  Total Billable Time: 65 minutes Billing reflects 1:1 direct supervision    MD follow-up:    Precautions: Standard    FOTO:  Goal: 67%  -Intake: 36%  -Status: incomplete  -Discharge: incomplete    Subjective     Pt reports: 1/30- He was sore again after last time. He is hoping we can modify the program today to make sure he doesn't get too sore.  He was compliant with home exercise program.  Response to previous treatment: Improving symptoms  Functional change: Gradually improving function    Pain: Minimal/10  Location: left knee     Objective      Objective Measures updated at progress report unless specified otherwise.    Problem List:  Impaired knee range of motion  Impaired strength  Impaired functional mobility      Impaired scaar mobility noted in suprapatellar region    Treatment     Venancio received the treatments listed below:      CPT Code Intervention Date/Notes  1/30   TE Manual Therapy -      TE Elliptical 10 minutes   TE ROM 0-96 initial  0-99 post manual         TE Lateral step down 4" 3 x 10   TE Step ups 12" 3 x 10   TE SL heel raise 3 x 10 ea   TE SL leg press " SL 65# 3x10    TE Knee extension SL 20# 3 x 10   TE Hamstring curls SL 3 x 10, 35#    TE SL hip abduction 3 x 10 ea   TE Bridge with band 3 x 10   TE SAQ 10# 3 x 15       65 minutes Therapeutic Exercise (TE) to develop strength, endurance, ROM, and flexibility. (55612)      Patient Education and Home Exercises         Education provided:   Physical therapy diagnosis, prognosis, and plan of care.     Written Home Exercises Provided: Patient instructed to cont prior HEP.  Exercises were reviewed and Venancio was able to demonstrate them prior to the end of the session.  Venancio demonstrated good  understanding of the education provided.     See EMR under Patient Instructions for exercises provided prior visit.    Assessment     1/30- We modified patient's program today to include less stretching to avoid joint capsular irritation. I recommended that he continue with dynasplit stretching at home.    Venancio Is progressing well towards his goals.   Pt prognosis is Good.     Pt will continue to benefit from skilled outpatient physical therapy to address the deficits listed in the problem list box on initial evaluation, provide pt/family education and to maximize pt's level of independence in the home and community environment.     Pt's spiritual, cultural and educational needs considered and pt agreeable to plan of care and goals.    Anticipated barriers to physical therapy: chronicity of condition     Goals:     Short Term Goals:  6 weeks Status  Date Met   PAIN: Pt will report worst pain of 4/10 in order to progress toward max functional ability and improve quality of life. [x] Progressing  [] Met  [] Not Met     FUNCTION: Patient will demonstrate improved function as indicated by a functional score improvement of at least 5 points on FOTO. [x] Progressing  [] Met  [] Not Met     MOBILITY: Patient will improve AROM to 50% of stated goals, listed in objective measures above, in order to progress towards independence with  functional activities.  [x] Progressing  [] Met  [] Not Met     STRENGTH: Patient will improve strength to 50% of stated goals, listed in objective measures above, in order to progress towards independence with functional activities. [x] Progressing  [] Met  [] Not Met     POSTURE: Patient will correct postural deviations in sitting and standing, to decrease pain and promote long term stability.  [x] Progressing  [x] Met  [] Not Met     GAIT: Patient will demonstrate improved gait mechanics including ambulatory with SPC in order to improve functional mobility, improve quality of life, and decrease risk of further injury or fall.  [x] Progressing  [x] Met  [] Not Met     HEP: Patient will demonstrate independence with HEP in order to progress toward functional independence. [x] Progressing  [x] Met  [] Not Met        Long Term Goals:  12 weeks Status Date Met   PAIN: Pt will report worst pain of 2/10 in order to progress toward max functional ability and improve quality of life [x] Progressing  [] Met  [] Not Met     FUNCTION: Patient will demonstrate improved function as indicated by a FOTO functional score improvement as listed in header. [x] Progressing  [] Met  [] Not Met     MOBILITY: Patient will improve AROM to stated goals, listed in objective measures above, in order to return to maximal functional potential and improve quality of life.  [x] Progressing  [] Met  [] Not Met     STRENGTH: Patient will improve strength to stated goals, listed in objective measures above, in order to improve functional independence and quality of life.  [x] Progressing  [] Met  [] Not Met     GAIT: Patient will demonstrate normalized gait mechanics with minimal compensation in order to return to PLOF. [x] Progressing  [] Met  [] Not Met     Patient will return to normal ADL's, IADL's, community involvement, recreational activities, and work-related activities with less than or equal to 2/10 pain and maximal function.  [x]  Progressing  [] Met  [] Not Met          Plan       Continue to progress per protocol and per patient tolerance      Al Quiros, PT, DPT

## 2025-01-31 DIAGNOSIS — Z96.652 STATUS POST REVISION OF TOTAL REPLACEMENT OF LEFT KNEE: Primary | ICD-10-CM

## 2025-02-03 ENCOUNTER — TELEPHONE (OUTPATIENT)
Dept: ORTHOPEDICS | Facility: CLINIC | Age: 60
End: 2025-02-03
Payer: MEDICAID

## 2025-02-03 NOTE — TELEPHONE ENCOUNTER
----- Message from Everardo sent at 2/3/2025  8:38 AM CST -----  Type:   Appointment Request    Caller is requesting a sooner appointment.  Caller declined first available appointment listed below.  Caller will not accept being placed on the waitlist and is requesting a message be sent to doctor.  Name of Caller:pt   When is the first available appointment?today (pt couldn't make it) books were closed   Symptoms:rt foot pain   Would the patient rather a call back or a response via [facility]? Call   Best Call Back Number: [phone]  Additional Information:

## 2025-02-06 RX ORDER — GABAPENTIN 300 MG/1
600 CAPSULE ORAL 2 TIMES DAILY
Qty: 56 CAPSULE | Refills: 0 | Status: SHIPPED | OUTPATIENT
Start: 2025-02-06 | End: 2025-02-18 | Stop reason: SDUPTHER

## 2025-02-12 PROBLEM — I82.452 ACUTE DEEP VEIN THROMBOSIS (DVT) OF LEFT PERONEAL VEIN: Status: ACTIVE | Noted: 2025-02-12

## 2025-02-12 NOTE — PROGRESS NOTES
Subjective:       Patient ID: Venancio Barragan is a 59 y.o. male.    Chief Complaint:   1. Acute deep vein thrombosis (DVT) of left peroneal vein  CBC Auto Differential    Comprehensive Metabolic Panel    CBC Auto Differential    Comprehensive Metabolic Panel    US Lower Extremity Veins Left      2. Acute deep vein thrombosis (DVT) of distal vein of left lower extremity  Ambulatory referral/consult to Hematology / Oncology    CBC Auto Differential    Comprehensive Metabolic Panel    CBC Auto Differential    Comprehensive Metabolic Panel    US Lower Extremity Veins Left        Current Treatment:  Eliquis 5mg po BID    Treatment History:    HPI: This is a 59 year old  male with diabetes, GERD, hyperlipidemia, and HTN who is seen in Hem/Onc for DVT. He presented to the ER for leg edema that began around 11/2024 following a knee revision. He was undergoing PT when his calf muscle began to hurt. US on 12/11/2024 noted an acute DVT in the left posterior tibial vein and peroneal vein. He had been taking baby ASA but was started on Eliquis. US doppler performed 2 weeks later noted resolution of the DVT in the tibial vein but persistent DVT in the peroneal vein with no new or progressing clots.    Interval History: Patient presents for follow up on Eliquis. He presents with family and reports adherence to Eliquis with no bleeding/bruising. He does state that he bleeds more easily when he injures himself during his work as a ; advised him that that is common with blood thinners. He denies history of thrombosis prior to this as well as symptoms of a new blood clot. He reports being very active prior to his knee surgery; he is currently in PT twice a week. He reports having started Eliquis about 6 weeks ago; advised him to continue Eliquis for at least 6 more weeks. He states he is considering stopping PT for fear that therapy will dislodge his clot. Advised him to continue PT until he has completed the recommended  therapy as Eliquis adequately treats his clot. Will repeat US doppler upon completion of 6 more weeks of Eliquis per patient request.     Reviewed labs with patient:   CBC:   Recent Labs   Lab 02/13/25  1114   WBC 4.79   RBC 4.27 L   Hemoglobin 10.2 L   Hematocrit 33.6 L   Platelets 322   MCV 79 L   MCH 23.9 L   MCHC 30.4 L     CMP:  Recent Labs   Lab 02/13/25  1114   Glucose 100   Calcium 9.6   Albumin 3.7   Total Protein 7.2   Sodium 142   Potassium 5.2 H   CO2 23   Chloride 110   BUN 18   Creatinine 1.2   Alkaline Phosphatase 73   ALT 29   AST 22   Total Bilirubin 0.2     Social History     Socioeconomic History    Marital status:    Tobacco Use    Smoking status: Never    Smokeless tobacco: Never   Substance and Sexual Activity    Alcohol use: No    Drug use: No    Sexual activity: Yes     Partners: Female     Social Drivers of Health     Financial Resource Strain: Low Risk  (2/12/2025)    Overall Financial Resource Strain (CARDIA)     Difficulty of Paying Living Expenses: Not hard at all   Food Insecurity: No Food Insecurity (2/12/2025)    Hunger Vital Sign     Worried About Running Out of Food in the Last Year: Never true     Ran Out of Food in the Last Year: Never true   Transportation Needs: No Transportation Needs (2/12/2025)    PRAPARE - Transportation     Lack of Transportation (Medical): No     Lack of Transportation (Non-Medical): No   Physical Activity: Insufficiently Active (2/12/2025)    Exercise Vital Sign     Days of Exercise per Week: 2 days     Minutes of Exercise per Session: 20 min   Stress: Stress Concern Present (2/12/2025)    [place] [place] [employer] - Occupational Stress Questionnaire     Feeling of Stress : Very much   Housing Stability: Low Risk  (2/12/2025)    Housing Stability Vital Sign     Unable to Pay for Housing in the Last Year: No     Homeless in the Last Year: No     Past Medical History:   Diagnosis Date    Diabetes mellitus     GERD (gastroesophageal  reflux disease)     High cholesterol     Hypertension      Family History   Problem Relation Name Age of Onset    Diabetes Mother      No Known Problems Father       Past Surgical History:   Procedure Laterality Date    ANKLE SURGERY      right    ELBOW SURGERY Left     INCISION AND DRAINAGE OF HAND Left 2/8/2023    Procedure: INCISION AND DRAINAGE, HAND;  Surgeon: Adele Colon MD;  Location: [facility];  Service: Orthopedics;  Laterality: Left;    INSERTION, ELECTRODE LEAD, NEUROSTIMULATOR, PERIPHERAL Left 9/23/2024    Procedure: INSERTION,ELECTRODE LEAD,NEUROSTIMULATOR,PERIPHERAL;  Surgeon: Bony Menezes MD;  Location: [facility] OR;  Service: Pain Management;  Laterality: Left;  curonix, trial    KNEE SURGERY      REVISION OF KNEE ARTHROPLASTY Left 11/11/2024    Procedure: REVISION, ARTHROPLASTY, KNEE;  Surgeon: Bony Menezes MD;  Location: [facility] OR;  Service: Orthopedics;  Laterality: Left;  bmi - 26.78 marcin cement removal, depuy revision tka   Richard will be available by phone if needed for misonix -[phone]    TOTAL HIP ARTHROPLASTY      ines     Review of Systems   Constitutional:  Negative for appetite change and fatigue.   HENT:  Negative for mouth sores, rhinorrhea and sore throat.    Eyes: Negative.    Respiratory: Negative.     Cardiovascular: Negative.    Gastrointestinal:  Negative for constipation, diarrhea, nausea and vomiting.   Endocrine: Negative.    Genitourinary: Negative.    Musculoskeletal: Negative.  Positive for leg pain (left knee pain 6/10 following surgery).   Integumentary:  Negative.   Allergic/Immunologic: Negative.    Neurological:  Negative for weakness and numbness.   Hematological: Negative.    Psychiatric/Behavioral: Negative.         Medication List with Changes/Refills   Current Medications    APIXABAN (ELIQUIS DVT-PE TREAT 30D START) 5 MG (74 TABS) DSPK    For the first 7 days take two 5 mg tablets twice daily.  After 7 days take one 5 mg tablet twice daily.    ASPIRIN (ECOTRIN) 81  MG EC TABLET    Take 1 tablet (81 mg total) by mouth 2 (two) times a day.    ATORVASTATIN (LIPITOR) 40 MG TABLET    Take 40 mg by mouth once daily.    DULAGLUTIDE (TRULICITY) 0.75 MG/0.5 ML PEN INJECTOR    INECT 0.5 ML INTO THE SKIN EVERY 7 DAYS    FAMOTIDINE (PEPCID) 20 MG TABLET    Take 1 tablet (20 mg total) by mouth 2 (two) times daily.    GABAPENTIN (NEURONTIN) 300 MG CAPSULE    Take 2 capsules (600 mg total) by mouth 2 (two) times daily. for 14 days    METFORMIN (GLUCOPHAGE) 500 MG TABLET    Take 1,000 mg by mouth 2 (two) times daily with meals.     MULTIVITAMIN CAPSULE    Take 1 capsule by mouth once daily.    PROMETHAZINE (PHENERGAN) 25 MG TABLET    Take 1 tablet (25 mg total) by mouth every 6 (six) hours as needed for Nausea.    RAMIPRIL (ALTACE) 2.5 MG CAPSULE    Take 2.5 mg by mouth.    SEMAGLUTIDE (OZEMPIC) 0.25 MG OR 0.5 MG(2 MG/1.5 ML) PNIJ    Inject 0.25 mg into the skin.    SOLIFENACIN (VESICARE) 10 MG TABLET    Take 10 mg by mouth.    TADALAFIL (CIALIS) 5 MG TABLET    Take 5 mg by mouth daily as needed.    WESTUSSIN DM 1-5-10 MG/5 ML SYRP    Take 10 mLs by mouth 3 (three) times daily.    ZOLPIDEM (AMBIEN) 10 MG TAB    Take 10 mg by mouth nightly as needed.     Objective:     Vitals:    02/13/25 1032   BP: (!) 148/85   Pulse: 72   Resp: 20   Temp: 98.1 °F (36.7 °C)     Physical Exam  Vitals reviewed.   Constitutional:       Appearance: Normal appearance.   HENT:      Head: Normocephalic.      Mouth/Throat:      Comments:     Eyes:      Extraocular Movements: Extraocular movements intact.      Pupils: Pupils are equal, round, and reactive to light.   Cardiovascular:      Rate and Rhythm: Normal rate and regular rhythm.      Heart sounds: Normal heart sounds.   Pulmonary:      Effort: Pulmonary effort is normal.      Breath sounds: Normal breath sounds.   Abdominal:      General: Bowel sounds are normal.      Palpations: Abdomen is soft.      Comments: rounded     Genitourinary:     Comments:  deferred    Musculoskeletal:         General: Normal range of motion.      Cervical back: Normal range of motion and neck supple.   Skin:     General: Skin is warm and dry.   Neurological:      Mental Status: He is alert and oriented to person, place, and time.   Psychiatric:         Behavior: Behavior normal.         Thought Content: Thought content normal.          (1) Restricted in physically strenuous activity, ambulatory and able to do work of light nature  Assessment:     Problem List Items Addressed This Visit          Hematology    Acute deep vein thrombosis (DVT) of distal vein of left lower extremity    Relevant Orders    CBC Auto Differential (Completed)    Comprehensive Metabolic Panel (Completed)    CBC Auto Differential    Comprehensive Metabolic Panel    US Lower Extremity Veins Left    Acute deep vein thrombosis (DVT) of left peroneal vein - Primary     Diagnosed on US in 12/2024 following knee revision.          Relevant Orders    CBC Auto Differential (Completed)    Comprehensive Metabolic Panel (Completed)    CBC Auto Differential    Comprehensive Metabolic Panel    US Lower Extremity Veins Left     Plan:     Acute deep vein thrombosis (DVT) of left peroneal vein  -     CBC Auto Differential; Future; Expected date: 02/13/2025  -     Comprehensive Metabolic Panel; Future; Expected date: 02/13/2025  -     CBC Auto Differential; Future; Expected date: 03/27/2025  -     Comprehensive Metabolic Panel; Future; Expected date: 03/27/2025  -     US Lower Extremity Veins Left; Future; Expected date: 03/27/2025    Acute deep vein thrombosis (DVT) of distal vein of left lower extremity  -     Ambulatory referral/consult to Hematology / Oncology  -     CBC Auto Differential; Future; Expected date: 02/13/2025  -     Comprehensive Metabolic Panel; Future; Expected date: 02/13/2025  -     CBC Auto Differential; Future; Expected date: 03/27/2025  -     Comprehensive Metabolic Panel; Future; Expected date:  03/27/2025  -     US Lower Extremity Veins Left; Future; Expected date: 03/27/2025    No recent labs to review. Will check CBC, CMP today.   Check iron studies on blood in lab to evaluate source of anemia.   Continue Eliquis for 6 weeks; no refill requested.   Follow up in 6+ weeks with CBC and Comprehensive Metabolic Panel.    Route Chart for Scheduling    Med Onc Chart Routing      Follow up with physician    Follow up with [facility] 6 weeks.   Infusion scheduling note    Injection scheduling note    Labs CBC and CMP   Scheduling:  Preferred lab:  Lab interval:  CBC, CMP today and again in 6 weeks, 1-2 days prior   Imaging Other   US doppler left lower extremity in 6 weeks (same day as labs)   Pharmacy appointment No pharmacy appointment needed      Other referrals       No additional referrals needed              I will review assessment/plan with collaborating physician.      JANAE Fortune

## 2025-02-13 ENCOUNTER — OFFICE VISIT (OUTPATIENT)
Dept: HEMATOLOGY/ONCOLOGY | Facility: CLINIC | Age: 60
End: 2025-02-13
Payer: MEDICAID

## 2025-02-13 ENCOUNTER — LAB VISIT (OUTPATIENT)
Dept: LAB | Facility: HOSPITAL | Age: 60
End: 2025-02-13
Attending: NURSE PRACTITIONER
Payer: MEDICAID

## 2025-02-13 VITALS
WEIGHT: 177.25 LBS | SYSTOLIC BLOOD PRESSURE: 148 MMHG | BODY MASS INDEX: 29.53 KG/M2 | HEART RATE: 72 BPM | RESPIRATION RATE: 20 BRPM | TEMPERATURE: 98 F | DIASTOLIC BLOOD PRESSURE: 85 MMHG | HEIGHT: 65 IN | OXYGEN SATURATION: 98 %

## 2025-02-13 DIAGNOSIS — D64.9 ANEMIA, UNSPECIFIED TYPE: ICD-10-CM

## 2025-02-13 DIAGNOSIS — I82.452 ACUTE DEEP VEIN THROMBOSIS (DVT) OF LEFT PERONEAL VEIN: ICD-10-CM

## 2025-02-13 DIAGNOSIS — I82.452 ACUTE DEEP VEIN THROMBOSIS (DVT) OF LEFT PERONEAL VEIN: Primary | ICD-10-CM

## 2025-02-13 DIAGNOSIS — I82.4Z2 ACUTE DEEP VEIN THROMBOSIS (DVT) OF DISTAL VEIN OF LEFT LOWER EXTREMITY: ICD-10-CM

## 2025-02-13 LAB
ALBUMIN SERPL BCP-MCNC: 3.7 G/DL (ref 3.5–5.2)
ALP SERPL-CCNC: 73 U/L (ref 40–150)
ALT SERPL W/O P-5'-P-CCNC: 29 U/L (ref 10–44)
ANION GAP SERPL CALC-SCNC: 9 MMOL/L (ref 8–16)
AST SERPL-CCNC: 22 U/L (ref 10–40)
BASOPHILS # BLD AUTO: 0.02 K/UL (ref 0–0.2)
BASOPHILS NFR BLD: 0.4 % (ref 0–1.9)
BILIRUB SERPL-MCNC: 0.2 MG/DL (ref 0.1–1)
BUN SERPL-MCNC: 18 MG/DL (ref 6–20)
CALCIUM SERPL-MCNC: 9.6 MG/DL (ref 8.7–10.5)
CHLORIDE SERPL-SCNC: 110 MMOL/L (ref 95–110)
CO2 SERPL-SCNC: 23 MMOL/L (ref 23–29)
CREAT SERPL-MCNC: 1.2 MG/DL (ref 0.5–1.4)
DIFFERENTIAL METHOD BLD: ABNORMAL
EOSINOPHIL # BLD AUTO: 0.1 K/UL (ref 0–0.5)
EOSINOPHIL NFR BLD: 2.1 % (ref 0–8)
ERYTHROCYTE [DISTWIDTH] IN BLOOD BY AUTOMATED COUNT: 15.3 % (ref 11.5–14.5)
EST. GFR  (NO RACE VARIABLE): >60 ML/MIN/1.73 M^2
GLUCOSE SERPL-MCNC: 100 MG/DL (ref 70–110)
HCT VFR BLD AUTO: 33.6 % (ref 40–54)
HGB BLD-MCNC: 10.2 G/DL (ref 14–18)
IMM GRANULOCYTES # BLD AUTO: 0.01 K/UL (ref 0–0.04)
IMM GRANULOCYTES NFR BLD AUTO: 0.2 % (ref 0–0.5)
LYMPHOCYTES # BLD AUTO: 1.8 K/UL (ref 1–4.8)
LYMPHOCYTES NFR BLD: 36.7 % (ref 18–48)
MCH RBC QN AUTO: 23.9 PG (ref 27–31)
MCHC RBC AUTO-ENTMCNC: 30.4 G/DL (ref 32–36)
MCV RBC AUTO: 79 FL (ref 82–98)
MONOCYTES # BLD AUTO: 0.7 K/UL (ref 0.3–1)
MONOCYTES NFR BLD: 14.4 % (ref 4–15)
NEUTROPHILS # BLD AUTO: 2.2 K/UL (ref 1.8–7.7)
NEUTROPHILS NFR BLD: 46.2 % (ref 38–73)
NRBC BLD-RTO: 0 /100 WBC
PLATELET # BLD AUTO: 322 K/UL (ref 150–450)
PMV BLD AUTO: 8.9 FL (ref 9.2–12.9)
POTASSIUM SERPL-SCNC: 5.2 MMOL/L (ref 3.5–5.1)
PROT SERPL-MCNC: 7.2 G/DL (ref 6–8.4)
RBC # BLD AUTO: 4.27 M/UL (ref 4.6–6.2)
SODIUM SERPL-SCNC: 142 MMOL/L (ref 136–145)
WBC # BLD AUTO: 4.79 K/UL (ref 3.9–12.7)

## 2025-02-13 PROCEDURE — 1160F RVW MEDS BY RX/DR IN RCRD: CPT | Mod: CPTII,,, | Performed by: NURSE PRACTITIONER

## 2025-02-13 PROCEDURE — 1159F MED LIST DOCD IN RCRD: CPT | Mod: CPTII,,, | Performed by: NURSE PRACTITIONER

## 2025-02-13 PROCEDURE — 82728 ASSAY OF FERRITIN: CPT | Performed by: NURSE PRACTITIONER

## 2025-02-13 PROCEDURE — 3077F SYST BP >= 140 MM HG: CPT | Mod: CPTII,,, | Performed by: NURSE PRACTITIONER

## 2025-02-13 PROCEDURE — 3008F BODY MASS INDEX DOCD: CPT | Mod: CPTII,,, | Performed by: NURSE PRACTITIONER

## 2025-02-13 PROCEDURE — 99215 OFFICE O/P EST HI 40 MIN: CPT | Mod: PBBFAC | Performed by: NURSE PRACTITIONER

## 2025-02-13 PROCEDURE — 36415 COLL VENOUS BLD VENIPUNCTURE: CPT | Performed by: NURSE PRACTITIONER

## 2025-02-13 PROCEDURE — 4010F ACE/ARB THERAPY RXD/TAKEN: CPT | Mod: CPTII,,, | Performed by: NURSE PRACTITIONER

## 2025-02-13 PROCEDURE — 99999 PR PBB SHADOW E&M-EST. PATIENT-LVL V: CPT | Mod: PBBFAC,,, | Performed by: NURSE PRACTITIONER

## 2025-02-13 PROCEDURE — 85025 COMPLETE CBC W/AUTO DIFF WBC: CPT | Performed by: NURSE PRACTITIONER

## 2025-02-13 PROCEDURE — 3079F DIAST BP 80-89 MM HG: CPT | Mod: CPTII,,, | Performed by: NURSE PRACTITIONER

## 2025-02-13 PROCEDURE — 84466 ASSAY OF TRANSFERRIN: CPT | Performed by: NURSE PRACTITIONER

## 2025-02-13 PROCEDURE — 99214 OFFICE O/P EST MOD 30 MIN: CPT | Mod: S$PBB,,, | Performed by: NURSE PRACTITIONER

## 2025-02-13 PROCEDURE — 80053 COMPREHEN METABOLIC PANEL: CPT | Performed by: NURSE PRACTITIONER

## 2025-02-14 ENCOUNTER — PATIENT MESSAGE (OUTPATIENT)
Dept: HEMATOLOGY/ONCOLOGY | Facility: CLINIC | Age: 60
End: 2025-02-14
Payer: MEDICAID

## 2025-02-14 LAB
FERRITIN SERPL-MCNC: 17 NG/ML (ref 20–300)
IRON SERPL-MCNC: 19 UG/DL (ref 45–160)
SATURATED IRON: 4 % (ref 20–50)
TOTAL IRON BINDING CAPACITY: 429 UG/DL (ref 250–450)
TRANSFERRIN SERPL-MCNC: 290 MG/DL (ref 200–375)

## 2025-02-18 ENCOUNTER — OFFICE VISIT (OUTPATIENT)
Dept: ORTHOPEDICS | Facility: CLINIC | Age: 60
End: 2025-02-18
Payer: MEDICAID

## 2025-02-18 ENCOUNTER — HOSPITAL ENCOUNTER (OUTPATIENT)
Dept: RADIOLOGY | Facility: HOSPITAL | Age: 60
Discharge: HOME | End: 2025-02-18
Attending: PHYSICIAN ASSISTANT
Payer: MEDICAID

## 2025-02-18 DIAGNOSIS — Z96.652 STATUS POST REVISION OF TOTAL REPLACEMENT OF LEFT KNEE: ICD-10-CM

## 2025-02-18 DIAGNOSIS — Z96.652 STATUS POST REVISION OF TOTAL REPLACEMENT OF LEFT KNEE: Primary | ICD-10-CM

## 2025-02-18 PROCEDURE — G2211 COMPLEX E/M VISIT ADD ON: HCPCS | Mod: S$PBB,,, | Performed by: ORTHOPAEDIC SURGERY

## 2025-02-18 PROCEDURE — 99212 OFFICE O/P EST SF 10 MIN: CPT | Mod: PBBFAC,25,PN | Performed by: ORTHOPAEDIC SURGERY

## 2025-02-18 PROCEDURE — 73562 X-RAY EXAM OF KNEE 3: CPT | Mod: TC,PN,LT

## 2025-02-18 PROCEDURE — 99214 OFFICE O/P EST MOD 30 MIN: CPT | Mod: S$PBB,,, | Performed by: ORTHOPAEDIC SURGERY

## 2025-02-18 RX ORDER — GABAPENTIN 300 MG/1
600 CAPSULE ORAL 2 TIMES DAILY
Qty: 56 CAPSULE | Refills: 0 | Status: SHIPPED | OUTPATIENT
Start: 2025-02-18 | End: 2025-03-04

## 2025-02-18 NOTE — PROGRESS NOTES
[facility] [address]      Subjective:     Patient ID: Venancio Barragan is a 59 y.o. male.    Chief Complaint: Pain and Post-op Evaluation of the Left Knee    Patient is 3 weeks s/p  left revision total knee replacement  Anterior knee pain: Yes  Has worse pain  Is in physical therapy  Yes  Problems w incision  No  Is  happy with result   its complicated  Opiod free: Yes    Patient saw hematology this past week where they recommended 6 more weeks of Eliquis and they will repeat ultrasound at that time to see if his deep vein thrombosis is cleared    Patient still has persistent anterior and anteromedial knee pain that has helped significantly by gabapentin for which he is taking 600 mg b.i.d..  Patient states that he is still in physical therapy however he has been having difficulty performing the exercises due to his pain and swelling.      Past Medical History:   Diagnosis Date    Diabetes mellitus     GERD (gastroesophageal reflux disease)     High cholesterol     Hypertension       Past Surgical History:   Procedure Laterality Date    ANKLE SURGERY      right    ELBOW SURGERY Left     INCISION AND DRAINAGE OF HAND Left 2/8/2023    Procedure: INCISION AND DRAINAGE, HAND;  Surgeon: Adele Colon MD;  Location: [facility] OR;  Service: Orthopedics;  Laterality: Left;    INSERTION, ELECTRODE LEAD, NEUROSTIMULATOR, PERIPHERAL Left 9/23/2024    Procedure: INSERTION,ELECTRODE LEAD,NEUROSTIMULATOR,PERIPHERAL;  Surgeon: Bony Menezes MD;  Location: [facility] OR;  Service: Pain Management;  Laterality: Left;  curonix, trial    KNEE SURGERY      REVISION OF KNEE ARTHROPLASTY Left 11/11/2024    Procedure: REVISION, ARTHROPLASTY, KNEE;  Surgeon: Bony Menezes MD;  Location: [facility] OR;  Service: Orthopedics;  Laterality: Left;  bmi - 26.78 marcin cement removal, depuy revision tka   Richard will be available by phone if needed for misonix -[phone]    TOTAL HIP ARTHROPLASTY      ines      Current Outpatient Medications   Medication  Instructions    apixaban (ELIQUIS DVT-PE TREAT 30D START) 5 mg (74 tabs) DsPk For the first 7 days take two 5 mg tablets twice daily.  After 7 days take one 5 mg tablet twice daily.    aspirin (ECOTRIN) 81 mg, Oral, 2 times daily    atorvastatin (LIPITOR) 40 mg, Daily    dulaglutide (TRULICITY) 0.75 mg/0.5 mL pen injector INECT 0.5 ML INTO THE SKIN EVERY 7 DAYS    famotidine (PEPCID) 20 mg, Oral, 2 times daily    gabapentin (NEURONTIN) 600 mg, Oral, 2 times daily    metFORMIN (GLUCOPHAGE) 1,000 mg, 2 times daily with meals    multivitamin capsule 1 capsule, Daily    OZEMPIC 0.25 mg    promethazine (PHENERGAN) 25 mg, Oral, Every 6 hours PRN    ramipriL (ALTACE) 2.5 mg    solifenacin (VESICARE) 10 mg    tadalafiL (CIALIS) 5 mg, Daily PRN    WESTUSSIN DM 1-5-10 mg/5 mL Syrp 10 mLs, 3 times daily    zolpidem (AMBIEN) 10 mg, Nightly PRN        Review of patient's allergies indicates:  No Known Allergies    Social History[1]    Family History   Problem Relation Name Age of Onset    Diabetes Mother      No Known Problems Father           Review of systems negative except for noted in HPI    Objective:   Physical Exam:     Left knee  Well healed lengthy incision  Significant effusion  Tenderness to palpation with stinging sensation at the anterior and anteromedial knee  ROM 5° short of full extension to approximately 90° flexion  Stable anterior/posterior   Stable varus/valgus  No groin pain with rotation of hip  Grossly NVI distally    Imaging:   X-Ray knee reviewed, and the prosthesis intact without any new associated fracture    Assessment:  59-year-old male with a complex surgical history to his left knee including original tibial plateau fracture with open reduction internal fixation status post hardware removal and primary total knee arthroplasty followed by 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    Plan :  - increasing dosage/frequency for his gabapentin  - consider new neuropathic pain medication at next appointment  - continue with hematology for care of deep vein thrombosis  - follow up 3 months    Kenneth Johnson MD   02/18/2025          [1]   Social History  Socioeconomic History    Marital status:    Tobacco Use    Smoking status: Never    Smokeless tobacco: Never   Substance and Sexual Activity    Alcohol use: No    Drug use: No    Sexual activity: Yes     Partners: Female     Social Drivers of Health     Financial Resource Strain: Low Risk  (2/12/2025)    Overall Financial Resource Strain (CARDIA)     Difficulty of Paying Living Expenses: Not hard at all   Food Insecurity: No Food Insecurity (2/12/2025)    Hunger Vital Sign     Worried About Running Out of Food in the Last Year: Never true     Ran Out of Food in the Last Year: Never true   Transportation Needs: No Transportation Needs (2/12/2025)    PRAPARE - Transportation     Lack of Transportation (Medical): No     Lack of Transportation (Non-Medical): No   Physical Activity: Insufficiently Active (2/12/2025)    Exercise Vital Sign     Days of Exercise per Week: 2 days     Minutes of Exercise per Session: 20 min   Stress: Stress Concern Present (2/12/2025)    [employer] [place] [employer] - Occupational Stress Questionnaire     Feeling of Stress : Very much   Housing Stability: Low Risk  (2/12/2025)    Housing Stability Vital Sign     Unable to Pay for Housing in the Last Year: No     Homeless in the Last Year: No

## 2025-02-19 ENCOUNTER — CLINICAL SUPPORT (OUTPATIENT)
Facility: HOSPITAL | Age: 60
End: 2025-02-19
Payer: MEDICAID

## 2025-02-19 DIAGNOSIS — M25.662 DECREASED RANGE OF MOTION (ROM) OF LEFT KNEE: ICD-10-CM

## 2025-02-19 DIAGNOSIS — Z78.9 IMPAIRED MOBILITY AND ACTIVITIES OF DAILY LIVING: ICD-10-CM

## 2025-02-19 DIAGNOSIS — Z74.09 IMPAIRED MOBILITY AND ACTIVITIES OF DAILY LIVING: ICD-10-CM

## 2025-02-19 DIAGNOSIS — Z96.652 STATUS POST REVISION OF TOTAL REPLACEMENT OF LEFT KNEE: Primary | ICD-10-CM

## 2025-02-19 PROCEDURE — 97110 THERAPEUTIC EXERCISES: CPT | Mod: PN | Performed by: PHYSICAL THERAPIST

## 2025-02-19 NOTE — PROGRESS NOTES
"[facility]   Physical Therapy Treatment Note      Name: Venancio Barragan  [facility] Number: [id number]    Therapy Diagnosis:   No diagnosis found.      Physician: Bony Menezes MD     Physician Orders: PT Eval and Treat   Medical Diagnosis from Referral: Pain due to total left knee replacement, subsequent encounter [T84.84XD, Z96.652], Loosening of prosthesis of left total knee replacement, initial encounter [T84.033A], Quadriceps weakness [M62.81]   Evaluation Date: 11/18/2024  Authorization Period Expiration: 12/31/24  Plan of Care Expiration: 3/18/25  Progress Note Due: 3/19/25  Visit # / Visits authorized: 9/20    Time In: 8:59 am  Time Out: 1015 am   Total Billable Time: 70 minutes Billing reflects 1:1 direct supervision    MD follow-up:    Precautions: Standard    FOTO:  Goal: 67%  -Intake: 36%  -Status: incomplete  -Discharge: incomplete    Subjective     Pt reports: 2/19- Patient reports doing his HEP as much as he could over the past weeks. Saw MD yesterday who is happy with his progress. He has had a tough couple of weeks with work and family events.  He was compliant with home exercise program.  Response to previous treatment: Improving symptoms  Functional change: Gradually improving function    Pain: Minimal/10  Location: left knee     Objective      Objective Measures updated at progress report unless specified otherwise.    Problem List:  Impaired knee range of motion  Impaired strength  Impaired functional mobility    0-95 initial  0-105 post manual    Impaired scaar mobility noted in suprapatellar region    Treatment     Venancio received the treatments listed below:      CPT Code Intervention Date/Notes  2/19   TE Manual Therapy FLX + Ext range of motion, Tibiofemoral glides      TE Elliptical 10 minutes   TE Bike 10 minutes seat 13   TE ROM 0-95 initial  0-105 post manual         TE Lateral step down 4" 3 x 10   TE Step ups 12" 3 x 10   TE SL heel raise 3 x 10 ea   TE SL leg press " SL 65# 3x10    TE Knee extension SL 20# 3 x 10   TE Hamstring curls SL 3 x 10, 35#    TE SL hip abduction 3 x 10 ea yTB   TE Bridge with band 3 x 10 yTB   TE SAQ 10# 3 x 15       70 minutes Therapeutic Exercise (TE) to develop strength, endurance, ROM, and flexibility. (85780)      Patient Education and Home Exercises         Education provided:   Physical therapy diagnosis, prognosis, and plan of care.     Written Home Exercises Provided: Patient instructed to cont prior HEP.  Exercises were reviewed and Venancio was able to demonstrate them prior to the end of the session.  Venancio demonstrated good  understanding of the education provided.     See EMR under Patient Instructions for exercises provided prior visit.    Assessment     2/19- Patient with continued improvements in knee range of motion. Patient tolerated significant loading with minor symptoms in the anterior patella and quad region. Education to continue patellar mobilizations and knee range of motion as tolerated. Verbal and tactile cues for anterior tibial translation with step downs and squatting exercises. Continue to progress as tolerated.     Venancio Is progressing well towards his goals.   Pt prognosis is Good.     Pt will continue to benefit from skilled outpatient physical therapy to address the deficits listed in the problem list box on initial evaluation, provide pt/family education and to maximize pt's level of independence in the home and community environment.     Pt's spiritual, cultural and educational needs considered and pt agreeable to plan of care and goals.    Anticipated barriers to physical therapy: chronicity of condition     Goals:     Short Term Goals:  6 weeks Status  Date Met   PAIN: Pt will report worst pain of 4/10 in order to progress toward max functional ability and improve quality of life. [x] Progressing  [] Met  [] Not Met     FUNCTION: Patient will demonstrate improved function as indicated by a functional score  improvement of at least 5 points on FOTO. [x] Progressing  [] Met  [] Not Met     MOBILITY: Patient will improve AROM to 50% of stated goals, listed in objective measures above, in order to progress towards independence with functional activities.  [x] Progressing  [] Met  [] Not Met     STRENGTH: Patient will improve strength to 50% of stated goals, listed in objective measures above, in order to progress towards independence with functional activities. [x] Progressing  [] Met  [] Not Met     POSTURE: Patient will correct postural deviations in sitting and standing, to decrease pain and promote long term stability.  [x] Progressing  [x] Met  [] Not Met     GAIT: Patient will demonstrate improved gait mechanics including ambulatory with SPC in order to improve functional mobility, improve quality of life, and decrease risk of further injury or fall.  [x] Progressing  [x] Met  [] Not Met     HEP: Patient will demonstrate independence with HEP in order to progress toward functional independence. [x] Progressing  [x] Met  [] Not Met        Long Term Goals:  12 weeks Status Date Met   PAIN: Pt will report worst pain of 2/10 in order to progress toward max functional ability and improve quality of life [x] Progressing  [] Met  [] Not Met     FUNCTION: Patient will demonstrate improved function as indicated by a FOTO functional score improvement as listed in header. [x] Progressing  [] Met  [] Not Met     MOBILITY: Patient will improve AROM to stated goals, listed in objective measures above, in order to return to maximal functional potential and improve quality of life.  [x] Progressing  [] Met  [] Not Met     STRENGTH: Patient will improve strength to stated goals, listed in objective measures above, in order to improve functional independence and quality of life.  [x] Progressing  [] Met  [] Not Met     GAIT: Patient will demonstrate normalized gait mechanics with minimal compensation in order to return to PLOF. [x]  Progressing  [] Met  [] Not Met     Patient will return to normal ADL's, IADL's, community involvement, recreational activities, and work-related activities with less than or equal to 2/10 pain and maximal function.  [x] Progressing  [] Met  [] Not Met          Plan       Continue to progress per protocol and per patient tolerance    Jeremias Jimenez, SPT    I, Al Quiros, PT, DPT, certify that I was present in the room directing the student's service delivery and guiding them using my clinical judgment. As the co-signing therapist, I have reviewed the student's documentation and take full responsibility for the treatment, assessment, and plan.      Al Quiros, PT, DPT

## 2025-02-21 ENCOUNTER — CLINICAL SUPPORT (OUTPATIENT)
Facility: HOSPITAL | Age: 60
End: 2025-02-21
Payer: MEDICAID

## 2025-02-21 DIAGNOSIS — Z78.9 IMPAIRED MOBILITY AND ACTIVITIES OF DAILY LIVING: ICD-10-CM

## 2025-02-21 DIAGNOSIS — M25.662 DECREASED RANGE OF MOTION (ROM) OF LEFT KNEE: ICD-10-CM

## 2025-02-21 DIAGNOSIS — Z96.652 STATUS POST REVISION OF TOTAL REPLACEMENT OF LEFT KNEE: Primary | ICD-10-CM

## 2025-02-21 DIAGNOSIS — Z74.09 IMPAIRED MOBILITY AND ACTIVITIES OF DAILY LIVING: ICD-10-CM

## 2025-02-21 PROCEDURE — 97110 THERAPEUTIC EXERCISES: CPT | Mod: PN | Performed by: PHYSICAL THERAPIST

## 2025-02-21 NOTE — PROGRESS NOTES
"[facility]   Physical Therapy Treatment Note      Name: Venancio Barragan  [facility] Number: [id number]    Therapy Diagnosis:   No diagnosis found.      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 Had a busy few days with extra work helping a friend repair a trailer and said his knee held up great.  He was compliant with home exercise program.  Response to previous treatment: Improving symptoms  Functional change: Gradually improving function    Pain: Minimal/10  Location: left knee     Objective      Objective Measures updated at progress report unless specified otherwise.    Problem List:  Impaired knee range of motion  Impaired strength  Impaired functional mobility    0-95 initial  0-107  post manual    Impaired scaar mobility noted in suprapatellar region    Treatment     Venancio received the treatments listed below:      CPT Code  Intervention  Date/Notes 2/21    TE  Manual Therapy  Knee flexion   Patellar mobs   Scar mobs          TE  Bike  10 minutes seat 13    TE  ROM  0-95 initial   0-107 post manual          TE  Lateral step down  4" 3 x 10    TE  Step ups  12" 3 x 10    TE  SL heel raise  3 x 10 ea    TE  SL leg press SL  65# 3x10     TE  Knee extension SL  20# 3 x 10  "   TE  Hamstring curls SL  3 x 10, 35#     TE  SL hip abduction  3 x 10 ea yTB    TE  Bridge with band  3 x 10 yTB    TE  SAQ  10# 3 x 15    TE  Hamstring Stretch 30s x 3       70 minutes Therapeutic Exercise (TE) to develop strength, endurance, ROM, and flexibility. (44419)      Patient Education and Home Exercises         Education provided:   Physical therapy diagnosis, prognosis, and plan of care.     Written Home Exercises Provided: Patient instructed to cont prior HEP.  Exercises were reviewed and Venancio was able to demonstrate them prior to the end of the session.  Venancio demonstrated good  understanding of the education provided.     See EMR under Patient Instructions for exercises provided prior visit.    Assessment     2/21 -Patient with slow but continuous progression with Knee range of motion and strength. Patient will continue to benefit from skilled physical therapy services to improve strength, range of motion, and overall function. Continue to monitor for calf tenderness until check up with MD to discontinue anticoagulants.     Venancio Is progressing well towards his goals.   Pt prognosis is Good.     Pt will continue to benefit from skilled outpatient physical therapy to address the deficits listed in the problem list box on initial evaluation, provide pt/family education and to maximize pt's level of independence in the home and community environment.     Pt's spiritual, cultural and educational needs considered and pt agreeable to plan of care and goals.    Anticipated barriers to physical therapy: chronicity of condition     Goals:     Short Term Goals:  6 weeks Status  Date Met   PAIN: Pt will report worst pain of 4/10 in order to progress toward max functional ability and improve quality of life. [x] Progressing  [] Met  [] Not Met     FUNCTION: Patient will demonstrate improved function as indicated by a functional score improvement of at least 5 points on FOTO. [x] Progressing  [] Met  [] Not Met      MOBILITY: Patient will improve AROM to 50% of stated goals, listed in objective measures above, in order to progress towards independence with functional activities.  [x] Progressing  [] Met  [] Not Met     STRENGTH: Patient will improve strength to 50% of stated goals, listed in objective measures above, in order to progress towards independence with functional activities. [x] Progressing  [] Met  [] Not Met     POSTURE: Patient will correct postural deviations in sitting and standing, to decrease pain and promote long term stability.  [x] Progressing  [x] Met  [] Not Met     GAIT: Patient will demonstrate improved gait mechanics including ambulatory with SPC in order to improve functional mobility, improve quality of life, and decrease risk of further injury or fall.  [x] Progressing  [x] Met  [] Not Met     HEP: Patient will demonstrate independence with HEP in order to progress toward functional independence. [x] Progressing  [x] Met  [] Not Met        Long Term Goals:  12 weeks Status Date Met   PAIN: Pt will report worst pain of 2/10 in order to progress toward max functional ability and improve quality of life [x] Progressing  [] Met  [] Not Met     FUNCTION: Patient will demonstrate improved function as indicated by a FOTO functional score improvement as listed in header. [x] Progressing  [] Met  [] Not Met     MOBILITY: Patient will improve AROM to stated goals, listed in objective measures above, in order to return to maximal functional potential and improve quality of life.  [x] Progressing  [] Met  [] Not Met     STRENGTH: Patient will improve strength to stated goals, listed in objective measures above, in order to improve functional independence and quality of life.  [x] Progressing  [] Met  [] Not Met     GAIT: Patient will demonstrate normalized gait mechanics with minimal compensation in order to return to PLOF. [x] Progressing  [] Met  [] Not Met     Patient will return to normal ADL's, IADL's,  community involvement, recreational activities, and work-related activities with less than or equal to 2/10 pain and maximal function.  [x] Progressing  [] Met  [] Not Met          Plan       Continue to progress per protocol and per patient tolerance    Jeremias Jimenez, SPT    I, Al Quiros, PT, DPT, certify that I was present in the room directing the student's service delivery and guiding them using my clinical judgment. As the co-signing therapist, I have reviewed the student's documentation and take full responsibility for the treatment, assessment, and plan.      Al Quiros, PT, DPT

## 2025-02-26 ENCOUNTER — CLINICAL SUPPORT (OUTPATIENT)
Facility: HOSPITAL | Age: 60
End: 2025-02-26
Payer: MEDICAID

## 2025-02-26 DIAGNOSIS — M25.662 DECREASED RANGE OF MOTION (ROM) OF LEFT KNEE: ICD-10-CM

## 2025-02-26 DIAGNOSIS — Z78.9 IMPAIRED MOBILITY AND ACTIVITIES OF DAILY LIVING: ICD-10-CM

## 2025-02-26 DIAGNOSIS — Z74.09 IMPAIRED MOBILITY AND ACTIVITIES OF DAILY LIVING: ICD-10-CM

## 2025-02-26 DIAGNOSIS — Z96.652 STATUS POST REVISION OF TOTAL REPLACEMENT OF LEFT KNEE: Primary | ICD-10-CM

## 2025-02-26 PROCEDURE — 97110 THERAPEUTIC EXERCISES: CPT | Mod: PN | Performed by: PHYSICAL THERAPIST

## 2025-02-26 NOTE — PROGRESS NOTES
"[facility]   Physical Therapy Treatment Note      Name: Venancio Barragan  [facility] Number: [id number]    Therapy Diagnosis:   No diagnosis found.      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   He was compliant with home exercise program.  Response to previous treatment: Improving symptoms  Functional change: Gradually improving function    Pain: Minimal/10  Location: left knee     Objective      Objective Measures updated at progress report unless specified otherwise.    Problem List:  Impaired knee range of motion  Impaired strength  Impaired functional mobility    0-95 initial  0-107  post manual    Impaired scaar mobility noted in suprapatellar region    Treatment     Venancio received the treatments listed below:      CPT Code Intervention Date/Notes 2/26   TE Manual Therapy Knee flexion  Patellar mobs  Scar mobs         TE Elliptical -   TE ROM 0-95 initial  0-107 post manual         TE Step ups 12" 2 x 15# DBs 3 x 10   TE Lateral step down 6" 3 x 10   TE SLS foam 4 x 30s   TE SL heel raise Incline 3 x 10 ea   TE SL leg press SL 65# 3x10    TE Knee extension SL 25# 3 x 10   TE Hamstring curls SL 3 x 10, 35#    TE Lateral band walk Green loop above knees 2 x 10 " yds       45 minutes Therapeutic Exercise (TE) to develop strength, endurance, ROM, and flexibility. (33356)      Patient Education and Home Exercises         Education provided:   Physical therapy diagnosis, prognosis, and plan of care.     Written Home Exercises Provided: Patient instructed to cont prior HEP.  Exercises were reviewed and Venancio was able to demonstrate them prior to the end of the session.  Venancio demonstrated good  understanding of the education provided.     See EMR under Patient Instructions for exercises provided prior visit.    Assessment     2/26- Patient continues to do very well with progressive strengthening exercise. I encouraged him to remain consistent with stretching at home. He has transition back to work very well.    Venancio Is progressing well towards his goals.   Pt prognosis is Good.     Pt will continue to benefit from skilled outpatient physical therapy to address the deficits listed in the problem list box on initial evaluation, provide pt/family education and to maximize pt's level of independence in the home and community environment.     Pt's spiritual, cultural and educational needs considered and pt agreeable to plan of care and goals.    Anticipated barriers to physical therapy: chronicity of condition     Goals:     Short Term Goals:  6 weeks Status  Date Met   PAIN: Pt will report worst pain of 4/10 in order to progress toward max functional ability and improve quality of life. [x] Progressing  [] Met  [] Not Met     FUNCTION: Patient will demonstrate improved function as indicated by a functional score improvement of at least 5 points on FOTO. [x] Progressing  [] Met  [] Not Met     MOBILITY: Patient will improve AROM to 50% of stated goals, listed in objective measures above, in order to progress towards independence with functional activities.  [x] Progressing  [] Met  [] Not Met     STRENGTH: Patient will improve strength to 50% of stated goals, listed in objective  measures above, in order to progress towards independence with functional activities. [x] Progressing  [] Met  [] Not Met     POSTURE: Patient will correct postural deviations in sitting and standing, to decrease pain and promote long term stability.  [x] Progressing  [x] Met  [] Not Met     GAIT: Patient will demonstrate improved gait mechanics including ambulatory with SPC in order to improve functional mobility, improve quality of life, and decrease risk of further injury or fall.  [x] Progressing  [x] Met  [] Not Met     HEP: Patient will demonstrate independence with HEP in order to progress toward functional independence. [x] Progressing  [x] Met  [] Not Met        Long Term Goals:  12 weeks Status Date Met   PAIN: Pt will report worst pain of 2/10 in order to progress toward max functional ability and improve quality of life [x] Progressing  [] Met  [] Not Met     FUNCTION: Patient will demonstrate improved function as indicated by a FOTO functional score improvement as listed in header. [x] Progressing  [] Met  [] Not Met     MOBILITY: Patient will improve AROM to stated goals, listed in objective measures above, in order to return to maximal functional potential and improve quality of life.  [x] Progressing  [] Met  [] Not Met     STRENGTH: Patient will improve strength to stated goals, listed in objective measures above, in order to improve functional independence and quality of life.  [x] Progressing  [] Met  [] Not Met     GAIT: Patient will demonstrate normalized gait mechanics with minimal compensation in order to return to PLOF. [x] Progressing  [] Met  [] Not Met     Patient will return to normal ADL's, IADL's, community involvement, recreational activities, and work-related activities with less than or equal to 2/10 pain and maximal function.  [x] Progressing  [] Met  [] Not Met          Plan       Continue to progress per protocol and per patient tolerance    Jeremias Jimenez, SPT    I, Al Quiros,  PT, DPT, certify that I was present in the room directing the student's service delivery and guiding them using my clinical judgment. As the co-signing therapist, I have reviewed the student's documentation and take full responsibility for the treatment, assessment, and plan.      Al Quiros, PT, DPT

## 2025-03-03 ENCOUNTER — CLINICAL SUPPORT (OUTPATIENT)
Facility: HOSPITAL | Age: 60
End: 2025-03-03
Payer: MEDICAID

## 2025-03-03 DIAGNOSIS — Z78.9 IMPAIRED MOBILITY AND ACTIVITIES OF DAILY LIVING: ICD-10-CM

## 2025-03-03 DIAGNOSIS — Z96.652 STATUS POST REVISION OF TOTAL REPLACEMENT OF LEFT KNEE: Primary | ICD-10-CM

## 2025-03-03 DIAGNOSIS — M25.662 DECREASED RANGE OF MOTION (ROM) OF LEFT KNEE: ICD-10-CM

## 2025-03-03 DIAGNOSIS — Z74.09 IMPAIRED MOBILITY AND ACTIVITIES OF DAILY LIVING: ICD-10-CM

## 2025-03-03 PROCEDURE — 97110 THERAPEUTIC EXERCISES: CPT | Mod: PN | Performed by: PHYSICAL THERAPIST

## 2025-03-03 NOTE — PROGRESS NOTES
[facility] OUTPATIENT THERAPY AND WELLNESS   Physical Therapy Treatment Note      Name: Venancio Barragan  [facility] Number: [id number]    Therapy Diagnosis:   Encounter Diagnoses   Name Primary?    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 He was able to race this weekend.  He was compliant with home exercise program.  Response to previous treatment: Improving symptoms  Functional change: Gradually improving function    Pain: Minimal/10  Location: left knee     Objective      Objective Measures updated at progress report unless specified otherwise.    Problem List:  Impaired knee range of motion  Impaired strength  Impaired functional mobility    0-95 initial  0-107  post manual    Impaired scaar mobility noted in suprapatellar region    Treatment     Venancio received the treatments listed below:      CPT Code Intervention Date/Notes 3/3   TE Manual Therapy Knee flexion  Patellar mobs  Scar mobs         TE Elliptical -   TE Bike 10 minutes seat 13   TE ROM 0-95 initial  0-107 post manual         TE Step ups -   TE Walking lunges 2 laps 10 yds   TE SL heel  "raise Incline 3 x 10 ea   TE Lateral step down 6" 3 x 10   TE SLS foam 4 x 30s      60 minutes of Therapeutic Exercise (TE) to develop strength, endurance, ROM, and flexibility. ([id number])      Patient Education and Home Exercises         Education provided:   Physical therapy diagnosis, prognosis, and plan of care.     Written Home Exercises Provided: Patient instructed to cont prior HEP.  Exercises were reviewed and Venancio was able to demonstrate them prior to the end of the session.  Venancio demonstrated good  understanding of the education provided.     See EMR under Patient Instructions for exercises provided prior visit.    Assessment     3/3- the patient continues to have knee flexion stiffness. He gets some improvement with manual therapy. I encouraged him to continue with stretching at home. He had some trouble with introduction to functional Lunging exercise, but was able to show improved mechanics with cuing.        Venancio Is progressing well towards his goals.   Pt prognosis is Good.     Pt will continue to benefit from skilled outpatient physical therapy to address the deficits listed in the problem list box on initial evaluation, provide pt/family education and to maximize pt's level of independence in the home and community environment.     Pt's spiritual, cultural and educational needs considered and pt agreeable to plan of care and goals.    Anticipated barriers to physical therapy: chronicity of condition     Goals:     Short Term Goals:  6 weeks Status  Date Met   PAIN: Pt will report worst pain of 4/10 in order to progress toward max functional ability and improve quality of life. [x] Progressing  [] Met  [] Not Met     FUNCTION: Patient will demonstrate improved function as indicated by a functional score improvement of at least 5 points on FOTO. [x] Progressing  [] Met  [] Not Met     MOBILITY: Patient will improve AROM to 50% of stated goals, listed in objective measures above, in order to progress " towards independence with functional activities.  [x] Progressing  [] Met  [] Not Met     STRENGTH: Patient will improve strength to 50% of stated goals, listed in objective measures above, in order to progress towards independence with functional activities. [x] Progressing  [] Met  [] Not Met     POSTURE: Patient will correct postural deviations in sitting and standing, to decrease pain and promote long term stability.  [x] Progressing  [x] Met  [] Not Met     GAIT: Patient will demonstrate improved gait mechanics including ambulatory with SPC in order to improve functional mobility, improve quality of life, and decrease risk of further injury or fall.  [x] Progressing  [x] Met  [] Not Met     HEP: Patient will demonstrate independence with HEP in order to progress toward functional independence. [x] Progressing  [x] Met  [] Not Met        Long Term Goals:  12 weeks Status Date Met   PAIN: Pt will report worst pain of 2/10 in order to progress toward max functional ability and improve quality of life [x] Progressing  [] Met  [] Not Met     FUNCTION: Patient will demonstrate improved function as indicated by a FOTO functional score improvement as listed in header. [x] Progressing  [] Met  [] Not Met     MOBILITY: Patient will improve AROM to stated goals, listed in objective measures above, in order to return to maximal functional potential and improve quality of life.  [x] Progressing  [] Met  [] Not Met     STRENGTH: Patient will improve strength to stated goals, listed in objective measures above, in order to improve functional independence and quality of life.  [x] Progressing  [] Met  [] Not Met     GAIT: Patient will demonstrate normalized gait mechanics with minimal compensation in order to return to PLOF. [x] Progressing  [] Met  [] Not Met     Patient will return to normal ADL's, IADL's, community involvement, recreational activities, and work-related activities with less than or equal to 2/10 pain and  maximal function.  [x] Progressing  [] Met  [] Not Met          Plan       Continue to progress per protocol and per patient tolerance    Jeremias Jimenez, SPT    I, Al Quiros, PT, DPT, certify that I was present in the room directing the student's service delivery and guiding them using my clinical judgment. As the co-signing therapist, I have reviewed the student's documentation and take full responsibility for the treatment, assessment, and plan.      Al Quiros, PT, DPT

## 2025-03-20 ENCOUNTER — TELEPHONE (OUTPATIENT)
Dept: HEMATOLOGY/ONCOLOGY | Facility: CLINIC | Age: 60
End: 2025-03-20
Payer: MEDICAID

## 2025-03-20 NOTE — TELEPHONE ENCOUNTER
Attempted to contact pt to let him know that the appt on 03/28 had to be Rs'd due to the provider being out. No answer VM left

## 2025-03-27 ENCOUNTER — HOSPITAL ENCOUNTER (OUTPATIENT)
Dept: RADIOLOGY | Facility: HOSPITAL | Age: 60
Discharge: HOME | End: 2025-03-27
Attending: NURSE PRACTITIONER
Payer: MEDICAID

## 2025-03-27 DIAGNOSIS — I82.452 ACUTE DEEP VEIN THROMBOSIS (DVT) OF LEFT PERONEAL VEIN: ICD-10-CM

## 2025-03-27 DIAGNOSIS — I82.4Z2 ACUTE DEEP VEIN THROMBOSIS (DVT) OF DISTAL VEIN OF LEFT LOWER EXTREMITY: ICD-10-CM

## 2025-03-27 PROCEDURE — 93971 EXTREMITY STUDY: CPT | Mod: 26,LT,, | Performed by: RADIOLOGY

## 2025-03-27 PROCEDURE — 93971 EXTREMITY STUDY: CPT | Mod: TC,PO,LT

## 2025-04-01 ENCOUNTER — PATIENT MESSAGE (OUTPATIENT)
Dept: ORTHOPEDICS | Facility: CLINIC | Age: 60
End: 2025-04-01
Payer: MEDICAID

## 2025-04-03 ENCOUNTER — TELEPHONE (OUTPATIENT)
Dept: HEMATOLOGY/ONCOLOGY | Facility: CLINIC | Age: 60
End: 2025-04-03
Payer: MEDICAID

## 2025-04-03 NOTE — TELEPHONE ENCOUNTER
----- Message from Darrell sent at 4/3/2025  8:16 AM CDT -----  Type:  Needs Medical AdviceWho Called: TOMMIE Rivero [[id number]]Symptoms (please be specific):  How long has patient had these symptoms:  Pharmacy name and phone #:  Would the patient rather a call back or a response via [facility]? Best Call Back Number: [phone] Information: Ana Lilia patient wife called and will like to rs appt

## 2025-04-08 ENCOUNTER — OFFICE VISIT (OUTPATIENT)
Dept: ORTHOPEDICS | Facility: CLINIC | Age: 60
End: 2025-04-08
Payer: MEDICAID

## 2025-04-08 VITALS — BODY MASS INDEX: 30.26 KG/M2 | WEIGHT: 177.25 LBS | HEIGHT: 64 IN

## 2025-04-08 DIAGNOSIS — Z96.659 STATUS POST REVISION OF TOTAL KNEE REPLACEMENT, UNSPECIFIED LATERALITY: ICD-10-CM

## 2025-04-08 DIAGNOSIS — M70.52 PES ANSERINUS BURSITIS OF LEFT KNEE: ICD-10-CM

## 2025-04-08 DIAGNOSIS — M25.562 CHRONIC PAIN OF LEFT KNEE: Primary | ICD-10-CM

## 2025-04-08 DIAGNOSIS — Z96.652 STATUS POST REVISION OF TOTAL REPLACEMENT OF LEFT KNEE: ICD-10-CM

## 2025-04-08 DIAGNOSIS — G89.29 CHRONIC PAIN OF LEFT KNEE: Primary | ICD-10-CM

## 2025-04-08 PROCEDURE — 3008F BODY MASS INDEX DOCD: CPT | Mod: CPTII,,, | Performed by: ORTHOPAEDIC SURGERY

## 2025-04-08 PROCEDURE — 20610 DRAIN/INJ JOINT/BURSA W/O US: CPT | Mod: PBBFAC,PN,LT | Performed by: ORTHOPAEDIC SURGERY

## 2025-04-08 PROCEDURE — 99213 OFFICE O/P EST LOW 20 MIN: CPT | Mod: PBBFAC,PN,25 | Performed by: ORTHOPAEDIC SURGERY

## 2025-04-08 PROCEDURE — 4010F ACE/ARB THERAPY RXD/TAKEN: CPT | Mod: CPTII,,, | Performed by: ORTHOPAEDIC SURGERY

## 2025-04-08 PROCEDURE — 99999 PR PBB SHADOW E&M-EST. PATIENT-LVL III: CPT | Mod: PBBFAC,,, | Performed by: ORTHOPAEDIC SURGERY

## 2025-04-08 PROCEDURE — 1159F MED LIST DOCD IN RCRD: CPT | Mod: CPTII,,, | Performed by: ORTHOPAEDIC SURGERY

## 2025-04-08 PROCEDURE — 99999PBSHW PR PBB SHADOW TECHNICAL ONLY FILED TO HB: Mod: PBBFAC,,,

## 2025-04-08 PROCEDURE — 99213 OFFICE O/P EST LOW 20 MIN: CPT | Mod: 25,S$PBB,, | Performed by: ORTHOPAEDIC SURGERY

## 2025-04-08 RX ORDER — TRIAMCINOLONE ACETONIDE 40 MG/ML
40 INJECTION, SUSPENSION INTRA-ARTICULAR; INTRAMUSCULAR
Status: DISCONTINUED | OUTPATIENT
Start: 2025-04-08 | End: 2025-04-08 | Stop reason: HOSPADM

## 2025-04-08 RX ORDER — GABAPENTIN 300 MG/1
600 CAPSULE ORAL 2 TIMES DAILY
Qty: 56 CAPSULE | Refills: 0 | Status: SHIPPED | OUTPATIENT
Start: 2025-04-08 | End: 2025-04-22

## 2025-04-08 RX ORDER — LIDOCAINE HYDROCHLORIDE 10 MG/ML
4 INJECTION, SOLUTION INFILTRATION; PERINEURAL
Status: DISCONTINUED | OUTPATIENT
Start: 2025-04-08 | End: 2025-04-08 | Stop reason: HOSPADM

## 2025-04-08 RX ORDER — BUPIVACAINE HYDROCHLORIDE 5 MG/ML
5 INJECTION, SOLUTION PERINEURAL
Status: DISCONTINUED | OUTPATIENT
Start: 2025-04-08 | End: 2025-04-08 | Stop reason: HOSPADM

## 2025-04-08 RX ADMIN — TRIAMCINOLONE ACETONIDE 40 MG: 40 INJECTION, SUSPENSION INTRA-ARTICULAR; INTRAMUSCULAR at 11:04

## 2025-04-08 RX ADMIN — BUPIVACAINE HYDROCHLORIDE 5 ML: 5 INJECTION, SOLUTION PERINEURAL at 11:04

## 2025-04-08 RX ADMIN — LIDOCAINE HYDROCHLORIDE 4 ML: 10 INJECTION, SOLUTION INFILTRATION; PERINEURAL at 11:04

## 2025-04-08 NOTE — PROCEDURES
Large Joint Aspiration/Injection: L anserine bursa    Date/Time: 4/8/2025 11:30 AM    Performed by: Bony Menezes MD  Authorized by: Bony Menezes MD    Consent Done?:  Yes (Verbal)  Indications:  Arthritis  Site marked: the procedure site was marked    Timeout: prior to procedure the correct patient, procedure, and site was verified    Prep: patient was prepped and draped in usual sterile fashion    Local anesthesia used?: No    Local anesthetic:  Topical anesthetic    Details:  Needle Size:  22 G  Ultrasonic Guidance for needle placement?: No    Approach:  Anteromedial  Location:  Knee  Site:  L anserine bursa  Medications:  40 mg triamcinolone acetonide 40 mg/mL; 5 mL BUPivacaine 0.5 % (5 mg/mL); 4 mL LIDOcaine HCL 10 mg/ml (1%) 10 mg/mL (1 %)  Patient tolerance:  Patient tolerated the procedure well with no immediate complications

## 2025-04-08 NOTE — PROGRESS NOTES
Subjective:      Patient ID: Venancio Barragan is a 60 y.o. male.    Chief Complaint: Pain of the Left Knee    Months out from revision total knee replacement.  He is doing very well after his surgery.  He has a lot more active and is happy with the result.  He is complaining of ongoing anterior knee pain.  No fevers chills or systemic complaints.  This is remained relatively unchanged since his last visit.  Gabapentin is helping      Social History     Occupational History    Not on file   Tobacco Use    Smoking status: Never    Smokeless tobacco: Never   Substance and Sexual Activity    Alcohol use: No    Drug use: No    Sexual activity: Yes     Partners: Female      Social Drivers of Health     Tobacco Use: Low Risk  (4/8/2025)    Patient History     Smoking Tobacco Use: Never     Smokeless Tobacco Use: Never     Passive Exposure: Not on file   Alcohol Use: Not At Risk (2/12/2025)    AUDIT-C     Frequency of Alcohol Consumption: Never     Average Number of Drinks: Patient does not drink     Frequency of Binge Drinking: Never   Financial Resource Strain: Low Risk  (2/12/2025)    Overall Financial Resource Strain (CARDIA)     Difficulty of Paying Living Expenses: Not hard at all   Food Insecurity: No Food Insecurity (2/12/2025)    Hunger Vital Sign     Worried About Running Out of Food in the Last Year: Never true     Ran Out of Food in the Last Year: Never true   Transportation Needs: No Transportation Needs (2/12/2025)    PRAPARE - Transportation     Lack of Transportation (Medical): No     Lack of Transportation (Non-Medical): No   Physical Activity: Insufficiently Active (2/12/2025)    Exercise Vital Sign     Days of Exercise per Week: 2 days     Minutes of Exercise per Session: 20 min   Stress: Stress Concern Present (2/12/2025)    [employer] - Occupational Stress Questionnaire     Feeling of Stress : Very much   Housing Stability: Low Risk  (2/12/2025)    Housing Stability Vital Sign      Unable to Pay for Housing in the Last Year: No     Number of Times Moved in the Last Year: Not on file     Homeless in the Last Year: No   Depression: Not at risk (10/14/2024)    [facility] Subsidiaries and Affiliates    PHQ-2     PHQ-2 Score: 0   Utilities: Not At Risk (2/12/2025)    [facility] Utilities     Threatened with loss of utilities: No   Health Literacy: Adequate Health Literacy (2/12/2025)     Health Literacy     Frequency of need for help with medical instructions: Never   Social Isolation: Not on file      Review of Systems   Constitutional: Negative for diaphoresis.   HENT:  Negative for ear discharge, nosebleeds and stridor.    Eyes:  Negative for photophobia.   Cardiovascular:  Negative for syncope.   Respiratory:  Negative for hemoptysis, shortness of breath and wheezing.    Neurological:  Negative for tremors.   Psychiatric/Behavioral: Negative.           Objective:    General    Constitutional: He is oriented to person, place, and time. He appears well-developed and well-nourished.   HENT:   Head: Normocephalic and atraumatic.   Eyes: EOM are normal.   Pulmonary/Chest: Effort normal.   Neurological: He is alert and oriented to person, place, and time.   Psychiatric: He has a normal mood and affect. His behavior is normal. Judgment and thought content normal.     General Musculoskeletal Exam   Gait: normal         Left Knee Exam     Inspection   Erythema: absent  Scars: present  Swelling: absent  Effusion: absent  Deformity: absent  Bruising: absent    Tenderness   The patient tender to palpation of the pes anserinus.    Range of Motion   Extension:  0   Flexion:  0     Tests   Stability   PCL-Posterior Drawer: normal (0 to 2mm)  MCL - Valgus: normal (0 to 2mm)  LCL - Varus: normal (0 to 2mm)  Patella   Passive Patellar Tilt: neutral  Patellar Tracking: normal    Other   Sensation: normal    Comments:  Incision well healed    Muscle  Strength   Left Lower Extremity   Quadriceps:  3/5          Assessment:       1. Chronic pain of left knee    2. Status post revision of total knee replacement, unspecified laterality    3. Status post revision of total replacement of left knee    4. Pes anserinus bursitis of left knee          Plan:   We injected the left pes today with Kenalog Marcaine lidocaine.  I would like him to begin physical therapy for his quad weakness.  Overall he is doing much better today than with his previous knee replacement in his lot more active which is driving the bursitis given his weakness.  We will see him back proximally 1 month for x-rays and re-evaluation.

## 2025-04-21 DIAGNOSIS — M70.52 PES ANSERINUS BURSITIS OF LEFT KNEE: ICD-10-CM

## 2025-04-21 DIAGNOSIS — G89.29 CHRONIC PAIN OF LEFT KNEE: ICD-10-CM

## 2025-04-21 DIAGNOSIS — M25.562 CHRONIC PAIN OF LEFT KNEE: ICD-10-CM

## 2025-04-21 DIAGNOSIS — Z96.652 STATUS POST REVISION OF TOTAL REPLACEMENT OF LEFT KNEE: ICD-10-CM

## 2025-04-22 PROBLEM — D50.9 IRON DEFICIENCY ANEMIA: Status: ACTIVE | Noted: 2025-04-22

## 2025-04-22 RX ORDER — GABAPENTIN 300 MG/1
600 CAPSULE ORAL 2 TIMES DAILY
Qty: 56 CAPSULE | Refills: 0 | Status: SHIPPED | OUTPATIENT
Start: 2025-04-22 | End: 2025-05-06

## 2025-04-22 NOTE — PROGRESS NOTES
Subjective:       Patient ID: Venancio Barragan is a 60 y.o. male.    Chief Complaint:   1. Acute deep vein thrombosis (DVT) of left peroneal vein  CBC Auto Differential      2. Acute deep vein thrombosis (DVT) of distal vein of left lower extremity  CBC Auto Differential      3. Iron deficiency anemia, unspecified iron deficiency anemia type  Ferritin    Iron and TIBC    CBC Auto Differential        Current Treatment:  Eliquis 5mg po BID    Treatment History:    HPI: This is a 60 year old  male with diabetes, GERD, hyperlipidemia, and HTN who is seen in Hem/Onc for DVT. He presented to the ER for leg edema that began around 11/2024 following a knee revision. He was undergoing PT when his calf muscle began to hurt. US on 12/11/2024 noted an acute DVT in the left posterior tibial vein and peroneal vein. He had been taking baby ASA but was started on Eliquis. US doppler performed 2 weeks later noted resolution of the DVT in the tibial vein but persistent DVT in the peroneal vein with no new or progressing clots.    He denied history of thrombosis prior to this as well as symptoms of a new blood clot. He reported being very active prior to his knee surgery.    Interval History: Patient presents for follow up on Eliquis. He presents alone and reports adherence to Eliquis with no bleeding/bruising. He does that as a , he has minor skin injuries and bleeds longer on Eliquis. Discussed that this a common side effect of blood thinners. Reviewed US indicating persistent peroneal clot; advised him to continue Eliquis for another 3 months. He is amenable to this. Labs indicate persistent anemia. Most recent iron studies indicate deficiency. Recommend treating with IV iron; he is amenable. Discussed that he was mildly anemic for years prior to surgery.     Reviewed labs with patient:   CBC:   Recent Labs   Lab 03/27/25  1036 04/14/25  0945   WBC 5.43  --    White Blood Cell Count  --  5.1   RBC 4.39 L  --     Hemoglobin  --  10.4 L   HGB 9.9 L  --    Hematocrit  --  36.2 L   HCT 33.1 L  --    Platelet Count 356  --    Platelets  --  385   MCV 75 L  --    MCH 22.6 L 22.5 L   MCHC 29.9 L  --      CMP:  Recent Labs   Lab 02/13/25  1114 03/27/25  1036   Glucose 100  --    Calcium 9.6 8.7   Albumin 3.7 3.6   Total Protein 7.2  --    Sodium 142 142   Potassium 5.2 H 4.8   CO2 23 23   Chloride 110 109   BUN 18 21 H   Creatinine 1.2 1.4   Alkaline Phosphatase 73  --    ALP  --  74   ALT 29 24   AST 22 20   Total Bilirubin 0.2  --    Bilirubin Total  --  0.5     Lab Results   Component Value Date    IRON 19 (L) 02/13/2025    TRANSFERRIN 290 02/13/2025    TIBC 429 02/13/2025    FESATURATED 4 (L) 02/13/2025      Lab Results   Component Value Date    FERRITIN 17 (L) 02/13/2025     US Lower Extremity Veins Left  Narrative: EXAMINATION:  US LOWER EXTREMITY VEINS LEFT    CLINICAL HISTORY:  Acute embolism and thrombosis of left peroneal vein    TECHNIQUE:  Duplex and color flow Doppler evaluation and graded compression of the left lower extremity veins was performed.    COMPARISON:  12/11/2024    FINDINGS:  Left thigh veins: The common femoral, femoral, popliteal, upper greater saphenous, and deep femoral veins are patent and free of thrombus. The veins are normally compressible and have normal phasic flow and augmentation response.    Left calf veins: Persistent thrombus within the peroneal vein with some minimal flow now present.    Right common femoral vein normal.    Miscellaneous: None  Impression: No new DVT with persistent peroneal vein thrombus.    Electronically signed by: Adan Sanches MD  Date:    03/27/2025  Time:    10:45    Social History     Socioeconomic History    Marital status:    Tobacco Use    Smoking status: Never    Smokeless tobacco: Never   Substance and Sexual Activity    Alcohol use: No    Drug use: No    Sexual activity: Yes     Partners: Female     Social Drivers of Health     Financial Resource  Strain: Low Risk  (2/12/2025)    Overall Financial Resource Strain (CARDIA)     Difficulty of Paying Living Expenses: Not hard at all   Food Insecurity: No Food Insecurity (2/12/2025)    Hunger Vital Sign     Worried About Running Out of Food in the Last Year: Never true     Ran Out of Food in the Last Year: Never true   Transportation Needs: No Transportation Needs (2/12/2025)    PRAPARE - Transportation     Lack of Transportation (Medical): No     Lack of Transportation (Non-Medical): No   Physical Activity: Insufficiently Active (2/12/2025)    Exercise Vital Sign     Days of Exercise per Week: 2 days     Minutes of Exercise per Session: 20 min   Stress: Stress Concern Present (2/12/2025)    [employer] - Occupational Stress Questionnaire     Feeling of Stress : Very much   Housing Stability: Low Risk  (2/12/2025)    Housing Stability Vital Sign     Unable to Pay for Housing in the Last Year: No     Homeless in the Last Year: No     Past Medical History:   Diagnosis Date    Diabetes mellitus     GERD (gastroesophageal reflux disease)     High cholesterol     Hypertension      Family History   Problem Relation Name Age of Onset    Diabetes Mother      No Known Problems Father       Past Surgical History:   Procedure Laterality Date    ANKLE SURGERY      right    ELBOW SURGERY Left     INCISION AND DRAINAGE OF HAND Left 2/8/2023    Procedure: INCISION AND DRAINAGE, HAND;  Surgeon: Adele Colon MD;  Location: [facility] OR;  Service: Orthopedics;  Laterality: Left;    INSERTION, ELECTRODE LEAD, NEUROSTIMULATOR, PERIPHERAL Left 9/23/2024    Procedure: INSERTION,ELECTRODE LEAD,NEUROSTIMULATOR,PERIPHERAL;  Surgeon: Bony Menezes MD;  Location: [facility] OR;  Service: Pain Management;  Laterality: Left;  curonix, trial    KNEE SURGERY      REVISION OF KNEE ARTHROPLASTY Left 11/11/2024    Procedure: REVISION, ARTHROPLASTY, KNEE;  Surgeon: Bony Menezes MD;  Location: [facility] OR;  Service: Orthopedics;   Laterality: Left;  bmi - 26.78 marcin cement removal, depuy revision tka   Richard will be available by phone if needed for misonix -[phone]    TOTAL HIP ARTHROPLASTY      ines     Review of Systems   Constitutional:  Negative for appetite change and fatigue.   HENT:  Negative for mouth sores, rhinorrhea and sore throat.    Eyes: Negative.    Respiratory: Negative.     Cardiovascular: Negative.    Gastrointestinal:  Negative for constipation, diarrhea, nausea and vomiting.   Endocrine: Negative.    Genitourinary: Negative.    Musculoskeletal: Negative.  Positive for leg pain (left knee pain 7/10).   Integumentary:  Negative.   Allergic/Immunologic: Negative.    Neurological:  Negative for weakness and numbness.   Hematological: Negative.  Does not bruise/bleed easily.   Psychiatric/Behavioral: Negative.         Medication List with Changes/Refills   Current Medications    APIXABAN (ELIQUIS DVT-PE TREAT 30D START) 5 MG (74 TABS) DSPK    For the first 7 days take two 5 mg tablets twice daily.  After 7 days take one 5 mg tablet twice daily.    ASPIRIN (ECOTRIN) 81 MG EC TABLET    Take 1 tablet (81 mg total) by mouth 2 (two) times a day.    ATORVASTATIN (LIPITOR) 40 MG TABLET    Take 40 mg by mouth once daily.    DULAGLUTIDE (TRULICITY) 0.75 MG/0.5 ML PEN INJECTOR    INECT 0.5 ML INTO THE SKIN EVERY 7 DAYS    FAMOTIDINE (PEPCID) 20 MG TABLET    Take 1 tablet (20 mg total) by mouth 2 (two) times daily.    GABAPENTIN (NEURONTIN) 300 MG CAPSULE    Take 2 capsules (600 mg total) by mouth 2 (two) times daily. for 14 days    METFORMIN (GLUCOPHAGE) 500 MG TABLET    Take 1,000 mg by mouth 2 (two) times daily with meals.     MULTIVITAMIN CAPSULE    Take 1 capsule by mouth once daily.    PROMETHAZINE (PHENERGAN) 25 MG TABLET    Take 1 tablet (25 mg total) by mouth every 6 (six) hours as needed for Nausea.    RAMIPRIL (ALTACE) 2.5 MG CAPSULE    Take 2.5 mg by mouth.    SEMAGLUTIDE (OZEMPIC) 0.25 MG OR 0.5 MG(2 MG/1.5 ML) PNIJ     Inject 0.25 mg into the skin.    SOLIFENACIN (VESICARE) 10 MG TABLET    Take 10 mg by mouth.    TADALAFIL (CIALIS) 5 MG TABLET    Take 5 mg by mouth daily as needed.    WESTUSSIN DM 1-5-10 MG/5 ML SYRP    Take 10 mLs by mouth 3 (three) times daily.    ZOLPIDEM (AMBIEN) 10 MG TAB    Take 10 mg by mouth nightly as needed.     Objective:     Vitals:    04/23/25 0812   BP: 123/85   Pulse: 73   Temp: 98.2 °F (36.8 °C)     Physical Exam  Vitals reviewed.   Constitutional:       Appearance: Normal appearance.   HENT:      Head: Normocephalic.      Mouth/Throat:      Comments:     Eyes:      Extraocular Movements: Extraocular movements intact.      Pupils: Pupils are equal, round, and reactive to light.   Cardiovascular:      Rate and Rhythm: Normal rate and regular rhythm.      Heart sounds: Normal heart sounds.   Pulmonary:      Effort: Pulmonary effort is normal.      Breath sounds: Normal breath sounds.   Abdominal:      General: Bowel sounds are normal.      Palpations: Abdomen is soft.      Comments: rounded     Genitourinary:     Comments: deferred    Musculoskeletal:         General: Normal range of motion.      Cervical back: Normal range of motion and neck supple.   Skin:     General: Skin is warm and dry.      Comments: Left forearm tattoo   Neurological:      Mental Status: He is alert and oriented to person, place, and time.   Psychiatric:         Behavior: Behavior normal.         Thought Content: Thought content normal.          (1) Restricted in physically strenuous activity, ambulatory and able to do work of light nature  Assessment:     Problem List Items Addressed This Visit          Hematology    Acute deep vein thrombosis (DVT) of distal vein of left lower extremity    Resolved.         Relevant Orders    CBC Auto Differential    Acute deep vein thrombosis (DVT) of left peroneal vein - Primary    Diagnosed on US in 12/2024 following knee revision. Persists in 3/2025. Will continue Eliquis for a total of  6 months.          Relevant Orders    CBC Auto Differential       Oncology    Iron deficiency anemia    Anemia dating back to 8/2023. Iron deficiency noted in 2/2024. Will treat with IV iron.         Relevant Orders    Ferritin    Iron and TIBC    CBC Auto Differential     Plan:     Acute deep vein thrombosis (DVT) of left peroneal vein  -     CBC Auto Differential; Future; Expected date: 06/24/2025    Acute deep vein thrombosis (DVT) of distal vein of left lower extremity  -     CBC Auto Differential; Future; Expected date: 06/24/2025    Iron deficiency anemia, unspecified iron deficiency anemia type  -     Ferritin; Future; Expected date: 06/24/2025  -     Iron and TIBC; Future; Expected date: 06/24/2025  -     CBC Auto Differential; Future; Expected date: 06/24/2025    Other orders  -     ferric carboxymaltose (Injectafer) injection 750 mg  -     EPINEPHrine (EPIPEN) 0.3 mg/0.3 mL pen injection 0.3 mg  -     hydrocortisone sodium succinate injection 100 mg  -     0.9% NaCl 250 mL flush bag  -     sodium chloride 0.9% flush 10 mL  -     heparin, porcine (PF) 100 unit/mL injection flush 500 Units  -     alteplase injection 2 mg    Labs reviewed; iron deficiency with stable anemia.   Injectafer IV x 1 dose per Ganzoni equation.  Left peroneal DVT persists; continue Eliquis for a total of 6 months.   Follow up in  7-8 weeks  with  iron profile, ferritin and CBC to assess anemia/iron deficiency.    Route Chart for Scheduling    Med Onc Chart Routing      Follow up with physician    Follow up with [facility] Other. 7-8 weeks   Infusion scheduling note   Injectafer x 1 dose   Injection scheduling note    Labs CBC, ferritin and iron and TIBC   Scheduling:  Preferred lab:  Lab interval:  in 7-8 weeks, 2 days prior in [place]   Imaging None      Pharmacy appointment No pharmacy appointment needed      Other referrals       No additional referrals needed             Therapy Plan Information  OP IV IRON THERAPY for Iron  deficiency anemia, noted on 4/22/2025  Medications  ferric carboxymaltose (Injectafer) injection 750 mg  750 mg, Intravenous, 1 time a week  Anaphylaxis/Hypersensitivity  EPINEPHrine (EPIPEN) 0.3 mg/0.3 mL pen injection 0.3 mg  0.3 mg, Intramuscular, PRN  hydrocortisone sodium succinate injection 100 mg  100 mg, Intravenous, PRN  Flushes  0.9% NaCl 250 mL flush bag  Intravenous, Every visit  sodium chloride 0.9% flush 10 mL  10 mL, Intravenous, Every visit  heparin, porcine (PF) 100 unit/mL injection flush 500 Units  500 Units, Intravenous, Every visit  alteplase injection 2 mg  2 mg, Intra-Catheter, Every visit      No therapy plan of the specified type found.    No therapy plan of the specified type found.  I will review assessment/plan with collaborating physician.      JANAE Fortune

## 2025-04-22 NOTE — ASSESSMENT & PLAN NOTE
Diagnosed on US in 12/2024 following knee revision. Persists in 3/2025. Will continue Eliquis for a total of 6 months.

## 2025-04-23 ENCOUNTER — OFFICE VISIT (OUTPATIENT)
Dept: HEMATOLOGY/ONCOLOGY | Facility: CLINIC | Age: 60
End: 2025-04-23
Payer: MEDICAID

## 2025-04-23 VITALS
BODY MASS INDEX: 30.22 KG/M2 | WEIGHT: 177 LBS | SYSTOLIC BLOOD PRESSURE: 123 MMHG | DIASTOLIC BLOOD PRESSURE: 85 MMHG | OXYGEN SATURATION: 99 % | HEIGHT: 64 IN | TEMPERATURE: 98 F | HEART RATE: 73 BPM

## 2025-04-23 DIAGNOSIS — D50.9 IRON DEFICIENCY ANEMIA, UNSPECIFIED IRON DEFICIENCY ANEMIA TYPE: ICD-10-CM

## 2025-04-23 DIAGNOSIS — I82.4Z2 ACUTE DEEP VEIN THROMBOSIS (DVT) OF DISTAL VEIN OF LEFT LOWER EXTREMITY: ICD-10-CM

## 2025-04-23 DIAGNOSIS — I82.452 ACUTE DEEP VEIN THROMBOSIS (DVT) OF LEFT PERONEAL VEIN: Primary | ICD-10-CM

## 2025-04-23 PROCEDURE — 3079F DIAST BP 80-89 MM HG: CPT | Mod: CPTII,,, | Performed by: NURSE PRACTITIONER

## 2025-04-23 PROCEDURE — 3008F BODY MASS INDEX DOCD: CPT | Mod: CPTII,,, | Performed by: NURSE PRACTITIONER

## 2025-04-23 PROCEDURE — 99999 PR PBB SHADOW E&M-EST. PATIENT-LVL III: CPT | Mod: PBBFAC,,, | Performed by: NURSE PRACTITIONER

## 2025-04-23 PROCEDURE — 99213 OFFICE O/P EST LOW 20 MIN: CPT | Mod: PBBFAC | Performed by: NURSE PRACTITIONER

## 2025-04-23 PROCEDURE — 3066F NEPHROPATHY DOC TX: CPT | Mod: CPTII,,, | Performed by: NURSE PRACTITIONER

## 2025-04-23 PROCEDURE — 1160F RVW MEDS BY RX/DR IN RCRD: CPT | Mod: CPTII,,, | Performed by: NURSE PRACTITIONER

## 2025-04-23 PROCEDURE — 3074F SYST BP LT 130 MM HG: CPT | Mod: CPTII,,, | Performed by: NURSE PRACTITIONER

## 2025-04-23 PROCEDURE — 4010F ACE/ARB THERAPY RXD/TAKEN: CPT | Mod: CPTII,,, | Performed by: NURSE PRACTITIONER

## 2025-04-23 PROCEDURE — 99214 OFFICE O/P EST MOD 30 MIN: CPT | Mod: S$PBB,,, | Performed by: NURSE PRACTITIONER

## 2025-04-23 PROCEDURE — 3044F HG A1C LEVEL LT 7.0%: CPT | Mod: CPTII,,, | Performed by: NURSE PRACTITIONER

## 2025-04-23 PROCEDURE — 1159F MED LIST DOCD IN RCRD: CPT | Mod: CPTII,,, | Performed by: NURSE PRACTITIONER

## 2025-04-23 PROCEDURE — 3061F NEG MICROALBUMINURIA REV: CPT | Mod: CPTII,,, | Performed by: NURSE PRACTITIONER

## 2025-04-23 RX ORDER — EPINEPHRINE 0.3 MG/.3ML
0.3 INJECTION SUBCUTANEOUS ONCE AS NEEDED
OUTPATIENT
Start: 2025-04-23

## 2025-04-23 RX ORDER — HEPARIN 100 UNIT/ML
500 SYRINGE INTRAVENOUS
OUTPATIENT
Start: 2025-04-23

## 2025-04-23 RX ORDER — SODIUM CHLORIDE 0.9 % (FLUSH) 0.9 %
10 SYRINGE (ML) INJECTION
OUTPATIENT
Start: 2025-04-23

## 2025-04-28 ENCOUNTER — TELEPHONE (OUTPATIENT)
Dept: HEMATOLOGY/ONCOLOGY | Facility: CLINIC | Age: 60
End: 2025-04-28
Payer: MEDICAID

## 2025-04-28 NOTE — TELEPHONE ENCOUNTER
----- Message from Shana sent at 4/28/2025  4:37 PM CDT -----  Regarding: Ana Lilia(wife)  Who called:Ana Lilia(wife)What is the request in detail: Patient wife wants to know if the infusion that her  is scheduled for on 5/5/2025 is for iron and if he needs someone to go with him please reach out to patient wife with informationCan the clinic reply by [facility]? NOWould the patient rather a call back or a response via [facility]? [facility]:[phone] Information:

## 2025-05-02 ENCOUNTER — TELEPHONE (OUTPATIENT)
Dept: INFUSION THERAPY | Facility: HOSPITAL | Age: 60
End: 2025-05-02
Payer: MEDICAID

## 2025-05-02 DIAGNOSIS — Z96.659 STATUS POST REVISION OF TOTAL KNEE REPLACEMENT, UNSPECIFIED LATERALITY: Primary | ICD-10-CM

## 2025-05-02 NOTE — TELEPHONE ENCOUNTER
Spoke with Mr. Barragan and informed him of the denial below from his insurance provider. At this time we are unable to guarantee authorization by Monday and will need to reschedule for late in the wee to give the provider time to review and resubmission to the insurance company. If he happens to be approved by Monday he will also be notified    Mr. Barragan states understanding and that he is available any day next week for reschedule if need be. Infusion notified.

## 2025-05-05 RX ORDER — SODIUM FERRIC GLUCONATE COMPLEX IN SUCROSE 12.5 MG/ML
125 INJECTION INTRAVENOUS
OUTPATIENT
Start: 2025-05-05

## 2025-05-07 ENCOUNTER — TELEPHONE (OUTPATIENT)
Dept: HEMATOLOGY/ONCOLOGY | Facility: CLINIC | Age: 60
End: 2025-05-07
Payer: MEDICAID

## 2025-05-07 NOTE — TELEPHONE ENCOUNTER
----- Message from Mariluz sent at 5/7/2025  2:00 PM CDT -----  Contact: cece Sung  .Type:  Patient Requesting CallWho Called:cece SungDoes the patient know what this is regarding?:Patients wife requesting a call back following up on patients cancelled infusion and wanting to know what the next step isWould the patient rather a call back or a response via [facility]? Call [facility] Call Back Number:[phone] Information:

## 2025-05-09 DIAGNOSIS — Z96.652 STATUS POST REVISION OF TOTAL REPLACEMENT OF LEFT KNEE: Primary | ICD-10-CM

## 2025-05-15 ENCOUNTER — OFFICE VISIT (OUTPATIENT)
Dept: ORTHOPEDICS | Facility: CLINIC | Age: 60
End: 2025-05-15
Payer: MEDICAID

## 2025-05-15 ENCOUNTER — TELEPHONE (OUTPATIENT)
Dept: HEMATOLOGY/ONCOLOGY | Facility: CLINIC | Age: 60
End: 2025-05-15
Payer: MEDICAID

## 2025-05-15 ENCOUNTER — LAB VISIT (OUTPATIENT)
Dept: LAB | Facility: HOSPITAL | Age: 60
End: 2025-05-15
Attending: PHYSICIAN ASSISTANT
Payer: MEDICAID

## 2025-05-15 ENCOUNTER — HOSPITAL ENCOUNTER (OUTPATIENT)
Facility: HOSPITAL | Age: 60
Discharge: HOME | End: 2025-05-15
Attending: PHYSICIAN ASSISTANT
Payer: MEDICAID

## 2025-05-15 VITALS — BODY MASS INDEX: 30.22 KG/M2 | WEIGHT: 177 LBS | HEIGHT: 64 IN

## 2025-05-15 DIAGNOSIS — M25.562 CHRONIC PAIN OF LEFT KNEE: ICD-10-CM

## 2025-05-15 DIAGNOSIS — Z96.652 STATUS POST REVISION OF TOTAL REPLACEMENT OF LEFT KNEE: ICD-10-CM

## 2025-05-15 DIAGNOSIS — G89.29 CHRONIC PAIN OF LEFT KNEE: ICD-10-CM

## 2025-05-15 DIAGNOSIS — Z96.659 STATUS POST REVISION OF TOTAL KNEE REPLACEMENT, UNSPECIFIED LATERALITY: ICD-10-CM

## 2025-05-15 DIAGNOSIS — Z96.652 STATUS POST REVISION OF TOTAL REPLACEMENT OF LEFT KNEE: Primary | ICD-10-CM

## 2025-05-15 LAB
CRP SERPL-MCNC: 1.5 MG/L
ERYTHROCYTE [SEDIMENTATION RATE] IN BLOOD BY PHOTOMETRIC METHOD: 39 MM/HR

## 2025-05-15 PROCEDURE — 73562 X-RAY EXAM OF KNEE 3: CPT | Mod: TC,PN,LT

## 2025-05-15 PROCEDURE — 86140 C-REACTIVE PROTEIN: CPT

## 2025-05-15 PROCEDURE — 99213 OFFICE O/P EST LOW 20 MIN: CPT | Mod: PBBFAC,25,PN | Performed by: PHYSICIAN ASSISTANT

## 2025-05-15 PROCEDURE — 99213 OFFICE O/P EST LOW 20 MIN: CPT | Mod: S$PBB,,, | Performed by: PHYSICIAN ASSISTANT

## 2025-05-15 PROCEDURE — 4010F ACE/ARB THERAPY RXD/TAKEN: CPT | Mod: CPTII,,, | Performed by: PHYSICIAN ASSISTANT

## 2025-05-15 PROCEDURE — 1159F MED LIST DOCD IN RCRD: CPT | Mod: CPTII,,, | Performed by: PHYSICIAN ASSISTANT

## 2025-05-15 PROCEDURE — 3044F HG A1C LEVEL LT 7.0%: CPT | Mod: CPTII,,, | Performed by: PHYSICIAN ASSISTANT

## 2025-05-15 PROCEDURE — 3008F BODY MASS INDEX DOCD: CPT | Mod: CPTII,,, | Performed by: PHYSICIAN ASSISTANT

## 2025-05-15 PROCEDURE — 85652 RBC SED RATE AUTOMATED: CPT

## 2025-05-15 PROCEDURE — 99999 PR PBB SHADOW E&M-EST. PATIENT-LVL III: CPT | Mod: PBBFAC,,, | Performed by: PHYSICIAN ASSISTANT

## 2025-05-15 PROCEDURE — 36415 COLL VENOUS BLD VENIPUNCTURE: CPT

## 2025-05-15 PROCEDURE — 3066F NEPHROPATHY DOC TX: CPT | Mod: CPTII,,, | Performed by: PHYSICIAN ASSISTANT

## 2025-05-15 PROCEDURE — 3061F NEG MICROALBUMINURIA REV: CPT | Mod: CPTII,,, | Performed by: PHYSICIAN ASSISTANT

## 2025-05-15 RX ORDER — GABAPENTIN 300 MG/1
600 CAPSULE ORAL 2 TIMES DAILY
Qty: 120 CAPSULE | Refills: 0 | Status: SHIPPED | OUTPATIENT
Start: 2025-05-15 | End: 2025-06-14

## 2025-05-15 NOTE — TELEPHONE ENCOUNTER
----- Message from Duyen sent at 5/15/2025  1:20 PM CDT -----  Contact: Wife, Ana Lilia, [phone]  .[email or web address] Patient is calling for Medical Advice regarding: Calling to speak with the nurse regarding the infusions. Please call her. Thanks.How long has patient had these symptoms: N/APharmacy name and phone#: N/APatient wants a call back or thru [facility]: N/AComments: N/APlease advise patient replies from provider may take up to 48 hours.

## 2025-05-15 NOTE — PROGRESS NOTES
"Subjective:      Chief Complaint: Pain of the Left Knee    Patient ID: Venancio Barragan is a 60 y.o. male.  Patient is 6 months s/p  left revision total knee replacement 11/11/24  Anterior knee pain: Yes  Has stable pain  Is in physical therapy  No  Problems w incision  No  Is  happy with result  somewhat  Opiod free: Yes  Since last visit, he feels like his pain is getting worse.  Still localized to the medial aspect of the knee.  Gabapentin helps some.  He was referred to physical therapy and states that he went but had to stop due to pain.  He bought a "total gym" to complete exercises at home.  He also had a has bursa injection at last visit which did not help.  He would like to know what options he has to help with pain.            Past Medical History:   Diagnosis Date    Diabetes mellitus     GERD (gastroesophageal reflux disease)     High cholesterol     Hypertension         Past Surgical History:   Procedure Laterality Date    ANKLE SURGERY      right    ELBOW SURGERY Left     INCISION AND DRAINAGE OF HAND Left 2/8/2023    Procedure: INCISION AND DRAINAGE, HAND;  Surgeon: Adele Colon MD;  Location: [facility] OR;  Service: Orthopedics;  Laterality: Left;    INSERTION, ELECTRODE LEAD, NEUROSTIMULATOR, PERIPHERAL Left 9/23/2024    Procedure: INSERTION,ELECTRODE LEAD,NEUROSTIMULATOR,PERIPHERAL;  Surgeon: Bony Menezes MD;  Location: [facility] OR;  Service: Pain Management;  Laterality: Left;  curonix, trial    KNEE SURGERY      REVISION OF KNEE ARTHROPLASTY Left 11/11/2024    Procedure: REVISION, ARTHROPLASTY, KNEE;  Surgeon: Bony Menezes MD;  Location: [facility] OR;  Service: Orthopedics;  Laterality: Left;  bmi - 26.78 marcin cement removal, depuy revision tka   Richard will be available by phone if needed for misonix -[phone]    TOTAL HIP ARTHROPLASTY      ines        Current Outpatient Medications   Medication Instructions    apixaban (ELIQUIS DVT-PE TREAT 30D START) 5 mg (74 tabs) DsPk For the first 7 days take two 5 mg " "tablets twice daily.  After 7 days take one 5 mg tablet twice daily.    apixaban (ELIQUIS) 5 mg, Oral, 2 times daily    aspirin (ECOTRIN) 81 mg, Oral, 2 times daily    atorvastatin (LIPITOR) 40 mg, Daily    dulaglutide (TRULICITY) 0.75 mg/0.5 mL pen injector INECT 0.5 ML INTO THE SKIN EVERY 7 DAYS    famotidine (PEPCID) 20 mg, Oral, 2 times daily    gabapentin (NEURONTIN) 600 mg, Oral, 2 times daily    metFORMIN (GLUCOPHAGE) 1,000 mg, 2 times daily with meals    multivitamin capsule 1 capsule, Daily    OZEMPIC 0.25 mg    promethazine (PHENERGAN) 25 mg, Oral, Every 6 hours PRN    ramipriL (ALTACE) 2.5 mg    solifenacin (VESICARE) 10 mg    tadalafiL (CIALIS) 5 mg, Daily PRN    WESTUSSIN DM 1-5-10 mg/5 mL Syrp 10 mLs, 3 times daily    zolpidem (AMBIEN) 10 mg, Nightly PRN        Review of patient's allergies indicates:  No Known Allergies    Review of Systems   Constitutional: Negative for fever and malaise/fatigue.   Eyes:  Negative for blurred vision.   Cardiovascular:  Negative for chest pain.   Respiratory:  Negative for shortness of breath.    Skin:  Negative for poor wound healing.   Musculoskeletal:  Positive for joint pain, joint swelling and myalgias.   Genitourinary:  Negative for bladder incontinence.   Neurological:  Negative for dizziness, numbness and paresthesias.   Psychiatric/Behavioral:  Negative for altered mental status.        The patient's relevant past medical, surgical, and social history was reviewed in Epic.       Objective:      VITAL SIGNS: Ht 5' 4" (1.626 m)   Wt 80.3 kg (177 lb 0.5 oz)   BMI 30.39 kg/m²     General    Nursing note and vitals reviewed.  Constitutional: He is oriented to person, place, and time. He appears well-developed and well-nourished.   Neurological: He is alert and oriented to person, place, and time.     General Musculoskeletal Exam   Gait: normal         Left Knee Exam     Inspection   Scars: present    Tenderness   The patient tender to palpation of the medial " retinaculum and medial joint line.    Range of Motion   Extension:  0   Flexion:  90     Tests   Stability   MCL - Valgus: normal (0 to 2mm)  LCL - Varus: normal (0 to 2mm)  Patella   Passive Patellar Tilt: neutral    Other   Sensation: normal    Comments:  -SLR     Muscle Strength   Left Lower Extremity   Quadriceps:  5/5   Hamstrin/5     Vascular Exam       Left Pulses  Dorsalis Pedis:      2+  Posterior Tibial:      2+           Imaging          Assessment:       Venancio Barragan is a 60 y.o. male seen in the office today for   1. Status post revision of total replacement of left knee    2. Chronic pain of left knee     Chronic pain after left knee revision 25. Still c/o medial-sided knee pain.  We will refill the gabapentin today.  I will order inflammatory labs to rule out any possible infection.  I will also discuss the case with Dr. Menezes to see what options he has.  May consider Iovera again.  I will message patient with results once completed.      Plan:       Venancio was seen today for pain.    Diagnoses and all orders for this visit:    Status post revision of total replacement of left knee  -     Sedimentation rate; Future  -     C-reactive protein; Future  -     gabapentin (NEURONTIN) 300 MG capsule; Take 2 capsules (600 mg total) by mouth 2 (two) times daily.    Chronic pain of left knee  -     Sedimentation rate; Future  -     C-reactive protein; Future  -     gabapentin (NEURONTIN) 300 MG capsule; Take 2 capsules (600 mg total) by mouth 2 (two) times daily.          Diagnoses and plan discussed with the patient, as well as the expected course and duration of his symptoms.  All questions and concerns were addressed prior to the end of the visit.   Instructed patient to call office if they have any future questions/concerns or to schedule apt. Patient will return to see me if symptoms worsen or fail to improve    Note dictated with voice recognition software, please excuse any grammatical  errors.        Ioana Webb PA-C      Department of Orthopedic Surgery  [facility]  Office: [phone]  05/15/2025

## 2025-05-15 NOTE — TELEPHONE ENCOUNTER
Spoke with patients wife. She wanted why the patient had to have 6 infusions. I explained the type of iron the patient was receiving. Understanding was verbalized

## 2025-05-16 ENCOUNTER — INFUSION (OUTPATIENT)
Dept: INFUSION THERAPY | Facility: HOSPITAL | Age: 60
End: 2025-05-16
Attending: NURSE PRACTITIONER
Payer: MEDICAID

## 2025-05-16 ENCOUNTER — PATIENT MESSAGE (OUTPATIENT)
Dept: ORTHOPEDICS | Facility: CLINIC | Age: 60
End: 2025-05-16
Payer: MEDICAID

## 2025-05-16 VITALS
RESPIRATION RATE: 18 BRPM | TEMPERATURE: 97 F | OXYGEN SATURATION: 97 % | DIASTOLIC BLOOD PRESSURE: 74 MMHG | HEART RATE: 78 BPM | SYSTOLIC BLOOD PRESSURE: 117 MMHG

## 2025-05-16 DIAGNOSIS — M25.562 CHRONIC PAIN OF LEFT KNEE: Primary | ICD-10-CM

## 2025-05-16 DIAGNOSIS — D50.9 IRON DEFICIENCY ANEMIA, UNSPECIFIED IRON DEFICIENCY ANEMIA TYPE: Primary | ICD-10-CM

## 2025-05-16 DIAGNOSIS — G89.29 CHRONIC PAIN OF LEFT KNEE: Primary | ICD-10-CM

## 2025-05-16 PROCEDURE — 96374 THER/PROPH/DIAG INJ IV PUSH: CPT

## 2025-05-16 PROCEDURE — 63600175 PHARM REV CODE 636 W HCPCS: Performed by: NURSE PRACTITIONER

## 2025-05-16 RX ORDER — EPINEPHRINE 0.3 MG/.3ML
0.3 INJECTION SUBCUTANEOUS ONCE AS NEEDED
OUTPATIENT
Start: 2025-05-27

## 2025-05-16 RX ORDER — SODIUM FERRIC GLUCONATE COMPLEX IN SUCROSE 12.5 MG/ML
125 INJECTION INTRAVENOUS
OUTPATIENT
Start: 2025-05-27

## 2025-05-16 RX ORDER — HEPARIN 100 UNIT/ML
500 SYRINGE INTRAVENOUS
OUTPATIENT
Start: 2025-05-27

## 2025-05-16 RX ORDER — SODIUM FERRIC GLUCONATE COMPLEX IN SUCROSE 12.5 MG/ML
125 INJECTION INTRAVENOUS
Status: COMPLETED | OUTPATIENT
Start: 2025-05-16 | End: 2025-05-16

## 2025-05-16 RX ORDER — SODIUM CHLORIDE 0.9 % (FLUSH) 0.9 %
10 SYRINGE (ML) INJECTION
Status: DISCONTINUED | OUTPATIENT
Start: 2025-05-16 | End: 2025-05-16 | Stop reason: HOSPADM

## 2025-05-16 RX ORDER — EPINEPHRINE 0.3 MG/.3ML
0.3 INJECTION SUBCUTANEOUS ONCE AS NEEDED
Status: DISCONTINUED | OUTPATIENT
Start: 2025-05-16 | End: 2025-05-16 | Stop reason: HOSPADM

## 2025-05-16 RX ORDER — SODIUM CHLORIDE 0.9 % (FLUSH) 0.9 %
10 SYRINGE (ML) INJECTION
OUTPATIENT
Start: 2025-05-27

## 2025-05-16 RX ADMIN — SODIUM FERRIC GLUCONATE COMPLEX IN SUCROSE 125 MG: 12.5 INJECTION INTRAVENOUS at 08:05

## 2025-05-23 ENCOUNTER — INFUSION (OUTPATIENT)
Dept: INFUSION THERAPY | Facility: HOSPITAL | Age: 60
End: 2025-05-23
Attending: NURSE PRACTITIONER
Payer: MEDICAID

## 2025-05-23 VITALS
OXYGEN SATURATION: 97 % | SYSTOLIC BLOOD PRESSURE: 137 MMHG | TEMPERATURE: 97 F | HEART RATE: 64 BPM | DIASTOLIC BLOOD PRESSURE: 82 MMHG | RESPIRATION RATE: 18 BRPM

## 2025-05-23 DIAGNOSIS — D50.9 IRON DEFICIENCY ANEMIA, UNSPECIFIED IRON DEFICIENCY ANEMIA TYPE: Primary | ICD-10-CM

## 2025-05-23 PROCEDURE — 63600175 PHARM REV CODE 636 W HCPCS: Performed by: NURSE PRACTITIONER

## 2025-05-23 PROCEDURE — 96374 THER/PROPH/DIAG INJ IV PUSH: CPT

## 2025-05-23 RX ORDER — EPINEPHRINE 0.3 MG/.3ML
0.3 INJECTION SUBCUTANEOUS ONCE AS NEEDED
OUTPATIENT
Start: 2025-05-30

## 2025-05-23 RX ORDER — SODIUM FERRIC GLUCONATE COMPLEX IN SUCROSE 12.5 MG/ML
125 INJECTION INTRAVENOUS
Status: COMPLETED | OUTPATIENT
Start: 2025-05-23 | End: 2025-05-23

## 2025-05-23 RX ORDER — SODIUM CHLORIDE 0.9 % (FLUSH) 0.9 %
10 SYRINGE (ML) INJECTION
OUTPATIENT
Start: 2025-05-30

## 2025-05-23 RX ORDER — HEPARIN 100 UNIT/ML
500 SYRINGE INTRAVENOUS
OUTPATIENT
Start: 2025-05-30

## 2025-05-23 RX ORDER — SODIUM FERRIC GLUCONATE COMPLEX IN SUCROSE 12.5 MG/ML
125 INJECTION INTRAVENOUS
OUTPATIENT
Start: 2025-05-30

## 2025-05-23 RX ADMIN — SODIUM FERRIC GLUCONATE COMPLEX IN SUCROSE 125 MG: 12.5 INJECTION INTRAVENOUS at 10:05

## 2025-05-23 NOTE — DISCHARGE INSTRUCTIONS
Thank you for allowing me to care for you today,  PAMELA TrejoN, RN    [facility]  [address] [address]  [phone] phone     [phone] fax  Hours of Operation: Monday- Friday 7:00am- 5:30pm  After hours phone  [phone]  Hematology / Oncology Physicians on call      FELIX Bergman Dr., Dr., Dr., Dr., Dr., Dr., Dr., Dr., Dr., Dr., Dr., Dr., Dr., Dr., Dr., TINO Nichols, TINO Koch, TINO Davidson, NP  Please call with any concerns regarding your appointment today.   FALL PREVENTION   Falls often occur due to slipping, tripping or losing your balance. Here are ways to reduce your risk of falling again.   Was there anything that caused your fall that can be fixed, removed or replaced?   Make your home safe by keeping walkways clear of objects you may trip over.   Use non-slip pads under rugs.   Do not walk in poorly lit areas.   Do not stand on chairs or wobbly ladders.   Use caution when reaching overhead or looking upward. This position can cause a loss of balance.   Be sure your shoes fit properly, have non-slip bottoms and are in good condition.   Be cautious when going up and down stairs, curbs, and when walking on uneven sidewalks.   If your balance is poor, consider using a cane or walker.   If your fall was related to alcohol use, stop or limit alcohol intake.   If your fall was related to use of sleeping medicines, talk to your doctor about this. You may need to reduce your dosage at bedtime if you awaken during the night to go to the bathroom.   To reduce the need for nighttime bathroom trips:   Avoid drinking fluids for several hours before going to bed   Empty your bladder before going to bed   Men can keep a urinal at the bedside   © 4124-7363 [employer], [address] [place] [address], [place] [address]. All rights reserved. This information is not intended as a  substitute for professional medical care. Always follow your healthcare professional's instructions.

## 2025-05-23 NOTE — PLAN OF CARE
"Pt is stable, pt administered Ferrlecit. Pt voiced he was dong "well." Pt will follow up in week. Plan of care reviewed with patient. Discussed if there are any new or ongoing concerns.        Problem: Adult Inpatient Plan of Care  Goal: Plan of Care Review  Outcome: Progressing  Goal: Patient-Specific Goal (Individualized)  Outcome: Progressing  Flowsheets (Taken 5/23/2025 1100)  Individualized Care Needs: Pt provided pillow, warm blanket, legs elevated in reclined position. Apple juice provided.  Anxieties, Fears or Concerns: Pt denies.  Patient/Family-Specific Goals (Include Timeframe): Pt will tolerate Ferrlcit withot adverse reaction.  Goal: Optimal Comfort and Wellbeing  Outcome: Progressing     Problem: Anemia  Goal: Anemia Symptom Improvement  Outcome: Progressing     Problem: Fall Injury Risk  Goal: Absence of Fall and Fall-Related Injury  Outcome: Progressing     "

## 2025-05-29 ENCOUNTER — PROCEDURE VISIT (OUTPATIENT)
Dept: ORTHOPEDICS | Facility: CLINIC | Age: 60
End: 2025-05-29
Payer: MEDICAID

## 2025-05-29 VITALS — WEIGHT: 174.38 LBS | HEIGHT: 64 IN | BODY MASS INDEX: 29.77 KG/M2

## 2025-05-29 DIAGNOSIS — M25.562 CHRONIC PAIN OF LEFT KNEE: ICD-10-CM

## 2025-05-29 DIAGNOSIS — G89.29 CHRONIC PAIN OF LEFT KNEE: ICD-10-CM

## 2025-05-29 PROCEDURE — 99499 UNLISTED E&M SERVICE: CPT | Mod: S$PBB,,, | Performed by: PHYSICIAN ASSISTANT

## 2025-05-29 PROCEDURE — 64640 INJECTION TREATMENT OF NERVE: CPT | Mod: PBBFAC,PN,LT | Performed by: PHYSICIAN ASSISTANT

## 2025-05-29 PROCEDURE — 64640 INJECTION TREATMENT OF NERVE: CPT | Mod: S$PBB,LT,, | Performed by: PHYSICIAN ASSISTANT

## 2025-05-29 NOTE — PROCEDURES
Procedures  Procedure Note Iovera:    DATE OF PROCEDURE:  05/29/2025     PREOPERATIVE DIAGNOSIS: left knee pain.     POSTOPERATIVE DIAGNOSIS: left knee pain.     PROCEDURE: Iovera treatment of anterior femoral cutaneous nerve, Lateral femoral cut nerve, and both branches of infrapatellar saphenous nerve using at least 4 different punctures to treat all 4 nerves. (cpt 64640 x4)    COMPLICATIONS: None.     IMPLANTS:  None    ESTIMATED BLOOD LOSS:  < 5cc    SPECIMENS REMOVED:  None    ANESTHESIA: Local lidocaine    INDICATIONS FOR PROCEDURE: This is a 60 y.o. male with longstanding knee pain. They have failed non operative management including injections.I discussed a new treatment therapy called Iovera, which is cryotherapy, to provide symptomatic relief along the sensory distribution of the infrapatellar tendon branch of the saphenous nerve  and AFCN. The patient elected to move forward with this  We did discuss the fact that this is a fairly novel procedure and there is very limited scientific data around this.  However, it FDA approved.  The patient was given patient information and literature to review prior to the procedure as well.  Based on this, the patient agreed to move forward with doing the procedure.      PROCEDURE:    The patient was placed supine on the exam table and the proximal medial aspect of the left tibia and anterior aspect of distal femur was prepped with sterile Betadine and alcohol.  A line was drawn extending approximately 5 cm medial to inferior pole of the patella distally to a point approximately 5 cm medial to the tibial tubercle.  A second line was drawn in a medial to lateral direction the width of the patella approximately 7 cm proximal to the patella. We then infiltrated the skin with lidocaine along both lines using a 25g needle. We then introduced the Iovera device along these lines and this device penetrated the skin, creating cryotherapy to both branches of the infrapatellar  saphenous nerve, a third treatment to the anterior femoral cutaneous nerve, and fourth LFCN. 7 punctures of the skin were made to treat the 2 branches of the ISN and another 7 punctures were made to treat the AFCN and LFCN. There were a total of 4 nerves treated with iovera of each knee. The patient tolerated the procedure well with no problems.

## 2025-05-30 ENCOUNTER — INFUSION (OUTPATIENT)
Dept: INFUSION THERAPY | Facility: HOSPITAL | Age: 60
End: 2025-05-30
Attending: NURSE PRACTITIONER
Payer: MEDICAID

## 2025-05-30 VITALS
TEMPERATURE: 99 F | DIASTOLIC BLOOD PRESSURE: 71 MMHG | RESPIRATION RATE: 18 BRPM | SYSTOLIC BLOOD PRESSURE: 118 MMHG | OXYGEN SATURATION: 97 % | HEART RATE: 69 BPM

## 2025-05-30 DIAGNOSIS — D50.9 IRON DEFICIENCY ANEMIA, UNSPECIFIED IRON DEFICIENCY ANEMIA TYPE: Primary | ICD-10-CM

## 2025-05-30 PROCEDURE — 63600175 PHARM REV CODE 636 W HCPCS: Performed by: NURSE PRACTITIONER

## 2025-05-30 PROCEDURE — 96374 THER/PROPH/DIAG INJ IV PUSH: CPT

## 2025-05-30 RX ORDER — SODIUM FERRIC GLUCONATE COMPLEX IN SUCROSE 12.5 MG/ML
125 INJECTION INTRAVENOUS
OUTPATIENT
Start: 2025-06-06

## 2025-05-30 RX ORDER — EPINEPHRINE 0.3 MG/.3ML
0.3 INJECTION SUBCUTANEOUS ONCE AS NEEDED
OUTPATIENT
Start: 2025-06-06

## 2025-05-30 RX ORDER — SODIUM CHLORIDE 0.9 % (FLUSH) 0.9 %
10 SYRINGE (ML) INJECTION
OUTPATIENT
Start: 2025-06-06

## 2025-05-30 RX ORDER — SODIUM FERRIC GLUCONATE COMPLEX IN SUCROSE 12.5 MG/ML
125 INJECTION INTRAVENOUS
Status: COMPLETED | OUTPATIENT
Start: 2025-05-30 | End: 2025-05-30

## 2025-05-30 RX ORDER — HEPARIN 100 UNIT/ML
500 SYRINGE INTRAVENOUS
OUTPATIENT
Start: 2025-06-06

## 2025-05-30 RX ADMIN — SODIUM FERRIC GLUCONATE COMPLEX IN SUCROSE 125 MG: 12.5 INJECTION INTRAVENOUS at 08:05

## 2025-05-30 NOTE — NURSING
Infusion # Ferrlecit - 125 mg  Last dose- 5/23/25      Recent labs? 2/13/25  Last Hem/Onc provider visit- Seen by Alesia on 4/23/25     Premeds-N/A     Ferrlecit 125 mg administered IV at a 10 minute rate per orders; see MAR and vitals for more details. Monitored for 30 minutes post infusion for any s/sx of reaction. Tolerated well without adverse events, discharged and ambulatory out of clinic.

## 2025-06-06 ENCOUNTER — INFUSION (OUTPATIENT)
Dept: INFUSION THERAPY | Facility: HOSPITAL | Age: 60
End: 2025-06-06
Attending: NURSE PRACTITIONER
Payer: MEDICAID

## 2025-06-06 VITALS
SYSTOLIC BLOOD PRESSURE: 129 MMHG | RESPIRATION RATE: 18 BRPM | HEART RATE: 61 BPM | TEMPERATURE: 98 F | OXYGEN SATURATION: 98 % | DIASTOLIC BLOOD PRESSURE: 75 MMHG

## 2025-06-06 DIAGNOSIS — D50.9 IRON DEFICIENCY ANEMIA, UNSPECIFIED IRON DEFICIENCY ANEMIA TYPE: Primary | ICD-10-CM

## 2025-06-06 PROCEDURE — 96374 THER/PROPH/DIAG INJ IV PUSH: CPT

## 2025-06-06 PROCEDURE — 63600175 PHARM REV CODE 636 W HCPCS: Performed by: NURSE PRACTITIONER

## 2025-06-06 RX ORDER — EPINEPHRINE 0.3 MG/.3ML
0.3 INJECTION SUBCUTANEOUS ONCE AS NEEDED
OUTPATIENT
Start: 2025-06-13

## 2025-06-06 RX ORDER — SODIUM CHLORIDE 0.9 % (FLUSH) 0.9 %
10 SYRINGE (ML) INJECTION
Status: DISCONTINUED | OUTPATIENT
Start: 2025-06-06 | End: 2025-06-06 | Stop reason: HOSPADM

## 2025-06-06 RX ORDER — SODIUM FERRIC GLUCONATE COMPLEX IN SUCROSE 12.5 MG/ML
125 INJECTION INTRAVENOUS
OUTPATIENT
Start: 2025-06-13

## 2025-06-06 RX ORDER — HEPARIN 100 UNIT/ML
500 SYRINGE INTRAVENOUS
OUTPATIENT
Start: 2025-06-13

## 2025-06-06 RX ORDER — SODIUM FERRIC GLUCONATE COMPLEX IN SUCROSE 12.5 MG/ML
125 INJECTION INTRAVENOUS
Status: COMPLETED | OUTPATIENT
Start: 2025-06-06 | End: 2025-06-06

## 2025-06-06 RX ORDER — SODIUM CHLORIDE 0.9 % (FLUSH) 0.9 %
10 SYRINGE (ML) INJECTION
OUTPATIENT
Start: 2025-06-13

## 2025-06-06 RX ORDER — EPINEPHRINE 0.3 MG/.3ML
0.3 INJECTION SUBCUTANEOUS ONCE AS NEEDED
Status: DISCONTINUED | OUTPATIENT
Start: 2025-06-06 | End: 2025-06-06 | Stop reason: HOSPADM

## 2025-06-06 RX ADMIN — SODIUM FERRIC GLUCONATE COMPLEX IN SUCROSE 125 MG: 12.5 INJECTION INTRAVENOUS at 08:06

## 2025-06-10 ENCOUNTER — TELEPHONE (OUTPATIENT)
Dept: ORTHOPEDICS | Facility: CLINIC | Age: 60
End: 2025-06-10
Payer: MEDICAID

## 2025-06-10 NOTE — TELEPHONE ENCOUNTER
----- Message from Latoya sent at 6/10/2025 10:57 AM CDT -----  Type:  Sooner Appointment RequestCaller is requesting a sooner appointment.  Caller declined first available appointment listed below.  Caller will not accept being placed on the waitlist and is requesting a message be sent to doctor.Name of Caller:Ana Lilia spouse When is the first available appointment?  Scheduled  for 8/26/2025 9:30 AM Symptoms:continues painWould the patient rather a call back or a response via [facility]? Call Best Call Back Number: [phone] Information: spouse states pt needs to be seen sooner than scheduled appt

## 2025-06-11 ENCOUNTER — PATIENT MESSAGE (OUTPATIENT)
Dept: ORTHOPEDICS | Facility: CLINIC | Age: 60
End: 2025-06-11
Payer: MEDICAID

## 2025-06-12 ENCOUNTER — HOSPITAL ENCOUNTER (OUTPATIENT)
Dept: RADIOLOGY | Facility: HOSPITAL | Age: 60
Discharge: HOME | End: 2025-06-12
Attending: ORTHOPAEDIC SURGERY
Payer: MEDICAID

## 2025-06-12 ENCOUNTER — OFFICE VISIT (OUTPATIENT)
Dept: ORTHOPEDICS | Facility: CLINIC | Age: 60
End: 2025-06-12
Payer: MEDICAID

## 2025-06-12 VITALS — BODY MASS INDEX: 29.77 KG/M2 | WEIGHT: 174.38 LBS | HEIGHT: 64 IN

## 2025-06-12 DIAGNOSIS — Z96.651 PRESENCE OF RIGHT ARTIFICIAL KNEE JOINT: ICD-10-CM

## 2025-06-12 DIAGNOSIS — Z96.651 PRESENCE OF RIGHT ARTIFICIAL KNEE JOINT: Primary | ICD-10-CM

## 2025-06-12 DIAGNOSIS — Z96.652 PAIN DUE TO TOTAL LEFT KNEE REPLACEMENT, SUBSEQUENT ENCOUNTER: ICD-10-CM

## 2025-06-12 DIAGNOSIS — Z96.652 PRESENCE OF LEFT ARTIFICIAL KNEE JOINT: ICD-10-CM

## 2025-06-12 DIAGNOSIS — Z96.652 STATUS POST REVISION OF TOTAL REPLACEMENT OF LEFT KNEE: ICD-10-CM

## 2025-06-12 DIAGNOSIS — T84.84XD PAIN DUE TO TOTAL LEFT KNEE REPLACEMENT, SUBSEQUENT ENCOUNTER: ICD-10-CM

## 2025-06-12 PROCEDURE — 3066F NEPHROPATHY DOC TX: CPT | Mod: CPTII,,, | Performed by: ORTHOPAEDIC SURGERY

## 2025-06-12 PROCEDURE — 73700 CT LOWER EXTREMITY W/O DYE: CPT | Mod: TC,LT

## 2025-06-12 PROCEDURE — 1159F MED LIST DOCD IN RCRD: CPT | Mod: CPTII,,, | Performed by: ORTHOPAEDIC SURGERY

## 2025-06-12 PROCEDURE — 99999 PR PBB SHADOW E&M-EST. PATIENT-LVL III: CPT | Mod: PBBFAC,,, | Performed by: ORTHOPAEDIC SURGERY

## 2025-06-12 PROCEDURE — 3044F HG A1C LEVEL LT 7.0%: CPT | Mod: CPTII,,, | Performed by: ORTHOPAEDIC SURGERY

## 2025-06-12 PROCEDURE — 4010F ACE/ARB THERAPY RXD/TAKEN: CPT | Mod: CPTII,,, | Performed by: ORTHOPAEDIC SURGERY

## 2025-06-12 PROCEDURE — 99213 OFFICE O/P EST LOW 20 MIN: CPT | Mod: PBBFAC,PN | Performed by: ORTHOPAEDIC SURGERY

## 2025-06-12 PROCEDURE — 3061F NEG MICROALBUMINURIA REV: CPT | Mod: CPTII,,, | Performed by: ORTHOPAEDIC SURGERY

## 2025-06-12 PROCEDURE — 99213 OFFICE O/P EST LOW 20 MIN: CPT | Mod: S$PBB,,, | Performed by: ORTHOPAEDIC SURGERY

## 2025-06-12 PROCEDURE — 73700 CT LOWER EXTREMITY W/O DYE: CPT | Mod: 26,LT,, | Performed by: RADIOLOGY

## 2025-06-12 PROCEDURE — 3008F BODY MASS INDEX DOCD: CPT | Mod: CPTII,,, | Performed by: ORTHOPAEDIC SURGERY

## 2025-06-12 PROCEDURE — G2211 COMPLEX E/M VISIT ADD ON: HCPCS | Mod: ,,, | Performed by: ORTHOPAEDIC SURGERY

## 2025-06-12 NOTE — PROGRESS NOTES
Subjective:      Patient ID: Venancio Barragan is a 60 y.o. male.    Chief Complaint: Pain and Post-op Evaluation of the Left Knee    Six months out from left still having significant medially based knee pain.  Pain is localized nonradiating.  No fevers, is or systemic symptoms.  Inflammatory labs are negative.      Social History     Occupational History    Not on file   Tobacco Use    Smoking status: Never    Smokeless tobacco: Never   Substance and Sexual Activity    Alcohol use: No    Drug use: No    Sexual activity: Yes     Partners: Female      Social Drivers of Health     Tobacco Use: Low Risk  (6/12/2025)    Patient History     Smoking Tobacco Use: Never     Smokeless Tobacco Use: Never     Passive Exposure: Not on file   Alcohol Use: Not At Risk (2/12/2025)    AUDIT-C     Frequency of Alcohol Consumption: Never     Average Number of Drinks: Patient does not drink     Frequency of Binge Drinking: Never   Financial Resource Strain: Low Risk  (2/12/2025)    Overall Financial Resource Strain (CARDIA)     Difficulty of Paying Living Expenses: Not hard at all   Food Insecurity: No Food Insecurity (2/12/2025)    Hunger Vital Sign     Worried About Running Out of Food in the Last Year: Never true     Ran Out of Food in the Last Year: Never true   Transportation Needs: No Transportation Needs (2/12/2025)    PRAPARE - Transportation     Lack of Transportation (Medical): No     Lack of Transportation (Non-Medical): No   Physical Activity: Insufficiently Active (2/12/2025)    Exercise Vital Sign     Days of Exercise per Week: 2 days     Minutes of Exercise per Session: 20 min   Stress: Stress Concern Present (2/12/2025)    [employer] [place] - Occupational Stress Questionnaire     Feeling of Stress : Very much   Housing Stability: Low Risk  (2/12/2025)    Housing Stability Vital Sign     Unable to Pay for Housing in the Last Year: No     Number of Times Moved in the Last Year: Not on file     Homeless  in the Last Year: No   Depression: Not at risk (4/14/2025)    Received from [facility]idi[facility] and [facility]    PHQ-2     PHQ-2 Score: 0   Utilities: Not At Risk (2/12/2025)    [facility] Utilities     Threatened with loss of utilities: No   Health Literacy: Adequate Health Literacy (2/12/2025)     Health Literacy     Frequency of need for help with medical instructions: Never   Social Isolation: Not on file      Review of Systems   Constitutional: Negative for diaphoresis.   HENT:  Negative for ear discharge, nosebleeds and stridor.    Eyes:  Negative for photophobia.   Cardiovascular:  Negative for syncope.   Respiratory:  Negative for hemoptysis, shortness of breath and wheezing.    Neurological:  Negative for tremors.   Psychiatric/Behavioral: Negative.           Objective:    General    Constitutional: He is oriented to person, place, and time. He appears well-developed and well-nourished.   HENT:   Head: Normocephalic and atraumatic.   Eyes: EOM are normal.   Pulmonary/Chest: Effort normal.   Neurological: He is alert and oriented to person, place, and time.   Psychiatric: He has a normal mood and affect. His behavior is normal. Judgment and thought content normal.     General Musculoskeletal Exam   Gait: normal         Left Knee Exam     Inspection   Erythema: absent  Scars: present  Swelling: absent  Effusion: absent  Deformity: absent  Bruising: absent    Tenderness   The patient tender to palpation of the medial joint line and medial retinaculum.    Range of Motion   Extension:  0   Flexion:  120     Tests   Stability   PCL-Posterior Drawer: normal (0 to 2mm)  MCL - Valgus: normal (0 to 2mm)  LCL - Varus: normal (0 to 2mm)  Patella   Passive Patellar Tilt: neutral  Patellar Tracking: normal    Other   Sensation: normal    Comments:  Incision well healed         Assessment:       1. Presence of right artificial knee joint    2. Pain due to total left knee  replacement, subsequent encounter    3. Status post revision of total replacement of left knee          Plan:   Clinically and radiographically the patient appears to have a normal and well functioning left revision knee replacement.  Not sure of the exact etiology of his isolated medial knee pain.  Like to get a CAT scan to better visualize the implant and the bone, however given his exam and radiographs I am not sure he will give us that much information however on his behalf.  We we also started talking about peripheral nerve stimulation to see if we can give him more long term pain relief.

## 2025-06-13 ENCOUNTER — INFUSION (OUTPATIENT)
Dept: INFUSION THERAPY | Facility: HOSPITAL | Age: 60
End: 2025-06-13
Attending: NURSE PRACTITIONER
Payer: MEDICAID

## 2025-06-13 VITALS
OXYGEN SATURATION: 97 % | SYSTOLIC BLOOD PRESSURE: 115 MMHG | RESPIRATION RATE: 18 BRPM | TEMPERATURE: 98 F | DIASTOLIC BLOOD PRESSURE: 68 MMHG | HEART RATE: 86 BPM

## 2025-06-13 DIAGNOSIS — D50.9 IRON DEFICIENCY ANEMIA, UNSPECIFIED IRON DEFICIENCY ANEMIA TYPE: Primary | ICD-10-CM

## 2025-06-13 PROCEDURE — 96374 THER/PROPH/DIAG INJ IV PUSH: CPT

## 2025-06-13 PROCEDURE — 63600175 PHARM REV CODE 636 W HCPCS: Performed by: NURSE PRACTITIONER

## 2025-06-13 RX ORDER — SODIUM FERRIC GLUCONATE COMPLEX IN SUCROSE 12.5 MG/ML
125 INJECTION INTRAVENOUS
Status: COMPLETED | OUTPATIENT
Start: 2025-06-13 | End: 2025-06-13

## 2025-06-13 RX ORDER — HEPARIN 100 UNIT/ML
500 SYRINGE INTRAVENOUS
OUTPATIENT
Start: 2025-06-20

## 2025-06-13 RX ORDER — EPINEPHRINE 0.3 MG/.3ML
0.3 INJECTION SUBCUTANEOUS ONCE AS NEEDED
OUTPATIENT
Start: 2025-06-20

## 2025-06-13 RX ORDER — SODIUM FERRIC GLUCONATE COMPLEX IN SUCROSE 12.5 MG/ML
125 INJECTION INTRAVENOUS
OUTPATIENT
Start: 2025-06-20

## 2025-06-13 RX ORDER — SODIUM CHLORIDE 0.9 % (FLUSH) 0.9 %
10 SYRINGE (ML) INJECTION
OUTPATIENT
Start: 2025-06-20

## 2025-06-13 RX ADMIN — SODIUM FERRIC GLUCONATE COMPLEX IN SUCROSE 125 MG: 12.5 INJECTION INTRAVENOUS at 08:06

## 2025-06-13 NOTE — NURSING
Pt tolerated Ferrlecit well today. Observed for 30 minutes after administration. Pt follow-up appts provided. Pt d/tee from clinic without concerns or questions.

## 2025-06-20 ENCOUNTER — INFUSION (OUTPATIENT)
Dept: INFUSION THERAPY | Facility: HOSPITAL | Age: 60
End: 2025-06-20
Attending: NURSE PRACTITIONER
Payer: MEDICAID

## 2025-06-20 VITALS
HEART RATE: 60 BPM | OXYGEN SATURATION: 98 % | SYSTOLIC BLOOD PRESSURE: 116 MMHG | RESPIRATION RATE: 18 BRPM | DIASTOLIC BLOOD PRESSURE: 69 MMHG | TEMPERATURE: 98 F

## 2025-06-20 DIAGNOSIS — D50.9 IRON DEFICIENCY ANEMIA, UNSPECIFIED IRON DEFICIENCY ANEMIA TYPE: Primary | ICD-10-CM

## 2025-06-20 PROCEDURE — 63600175 PHARM REV CODE 636 W HCPCS: Performed by: NURSE PRACTITIONER

## 2025-06-20 PROCEDURE — 96374 THER/PROPH/DIAG INJ IV PUSH: CPT

## 2025-06-20 RX ORDER — SODIUM CHLORIDE 0.9 % (FLUSH) 0.9 %
10 SYRINGE (ML) INJECTION
OUTPATIENT
Start: 2025-06-20

## 2025-06-20 RX ORDER — HEPARIN 100 UNIT/ML
500 SYRINGE INTRAVENOUS
OUTPATIENT
Start: 2025-06-20

## 2025-06-20 RX ORDER — EPINEPHRINE 0.3 MG/.3ML
0.3 INJECTION SUBCUTANEOUS ONCE AS NEEDED
OUTPATIENT
Start: 2025-06-20

## 2025-06-20 RX ORDER — SODIUM CHLORIDE 0.9 % (FLUSH) 0.9 %
10 SYRINGE (ML) INJECTION
Status: DISCONTINUED | OUTPATIENT
Start: 2025-06-20 | End: 2025-06-20 | Stop reason: HOSPADM

## 2025-06-20 RX ORDER — SODIUM FERRIC GLUCONATE COMPLEX IN SUCROSE 12.5 MG/ML
125 INJECTION INTRAVENOUS
Status: COMPLETED | OUTPATIENT
Start: 2025-06-20 | End: 2025-06-20

## 2025-06-20 RX ORDER — SODIUM FERRIC GLUCONATE COMPLEX IN SUCROSE 12.5 MG/ML
125 INJECTION INTRAVENOUS
Status: CANCELLED | OUTPATIENT
Start: 2025-06-20

## 2025-06-20 RX ADMIN — SODIUM FERRIC GLUCONATE COMPLEX IN SUCROSE 125 MG: 12.5 INJECTION INTRAVENOUS at 08:06

## 2025-07-02 ENCOUNTER — LAB VISIT (OUTPATIENT)
Dept: LAB | Facility: HOSPITAL | Age: 60
End: 2025-07-02
Attending: NURSE PRACTITIONER
Payer: MEDICAID

## 2025-07-02 DIAGNOSIS — D50.9 IRON DEFICIENCY ANEMIA, UNSPECIFIED IRON DEFICIENCY ANEMIA TYPE: ICD-10-CM

## 2025-07-02 DIAGNOSIS — T84.84XD PAIN DUE TO TOTAL LEFT KNEE REPLACEMENT, SUBSEQUENT ENCOUNTER: ICD-10-CM

## 2025-07-02 DIAGNOSIS — Z96.652 PAIN DUE TO TOTAL LEFT KNEE REPLACEMENT, SUBSEQUENT ENCOUNTER: ICD-10-CM

## 2025-07-02 DIAGNOSIS — Z96.652 STATUS POST REVISION OF TOTAL REPLACEMENT OF LEFT KNEE: ICD-10-CM

## 2025-07-02 DIAGNOSIS — I82.452 ACUTE DEEP VEIN THROMBOSIS (DVT) OF LEFT PERONEAL VEIN: ICD-10-CM

## 2025-07-02 DIAGNOSIS — I82.4Z2 ACUTE DEEP VEIN THROMBOSIS (DVT) OF DISTAL VEIN OF LEFT LOWER EXTREMITY: ICD-10-CM

## 2025-07-02 LAB
ABSOLUTE EOSINOPHIL (OHS): 0.12 K/UL
ABSOLUTE MONOCYTE (OHS): 0.6 K/UL (ref 0.3–1)
ABSOLUTE NEUTROPHIL COUNT (OHS): 2.86 K/UL (ref 1.8–7.7)
BASOPHILS # BLD AUTO: 0.03 K/UL
BASOPHILS NFR BLD AUTO: 0.5 %
ERYTHROCYTE [DISTWIDTH] IN BLOOD BY AUTOMATED COUNT: 21.7 % (ref 11.5–14.5)
FERRITIN SERPL-MCNC: 112 NG/ML (ref 20–300)
HCT VFR BLD AUTO: 38.2 % (ref 40–54)
HGB BLD-MCNC: 12 GM/DL (ref 14–18)
IMM GRANULOCYTES # BLD AUTO: 0.01 K/UL (ref 0–0.04)
IMM GRANULOCYTES NFR BLD AUTO: 0.2 % (ref 0–0.5)
IRON SATN MFR SERPL: 15 % (ref 20–50)
IRON SERPL-MCNC: 51 UG/DL (ref 45–160)
LYMPHOCYTES # BLD AUTO: 1.97 K/UL (ref 1–4.8)
MCH RBC QN AUTO: 26 PG (ref 27–31)
MCHC RBC AUTO-ENTMCNC: 31.4 G/DL (ref 32–36)
MCV RBC AUTO: 83 FL (ref 82–98)
NUCLEATED RBC (/100WBC) (OHS): 0 /100 WBC
PLATELET # BLD AUTO: 230 K/UL (ref 150–450)
PMV BLD AUTO: 9.7 FL (ref 9.2–12.9)
RBC # BLD AUTO: 4.61 M/UL (ref 4.6–6.2)
RELATIVE EOSINOPHIL (OHS): 2.1 %
RELATIVE LYMPHOCYTE (OHS): 35.2 % (ref 18–48)
RELATIVE MONOCYTE (OHS): 10.7 % (ref 4–15)
RELATIVE NEUTROPHIL (OHS): 51.3 % (ref 38–73)
TIBC SERPL-MCNC: 334 UG/DL (ref 250–450)
TRANSFERRIN SERPL-MCNC: 226 MG/DL (ref 200–375)
WBC # BLD AUTO: 5.59 K/UL (ref 3.9–12.7)

## 2025-07-02 PROCEDURE — 36415 COLL VENOUS BLD VENIPUNCTURE: CPT

## 2025-07-02 PROCEDURE — 82728 ASSAY OF FERRITIN: CPT

## 2025-07-02 PROCEDURE — 85025 COMPLETE CBC W/AUTO DIFF WBC: CPT

## 2025-07-02 PROCEDURE — 84466 ASSAY OF TRANSFERRIN: CPT

## 2025-07-03 ENCOUNTER — CLINICAL SUPPORT (OUTPATIENT)
Dept: LAB | Facility: HOSPITAL | Age: 60
End: 2025-07-03
Attending: ORTHOPAEDIC SURGERY
Payer: MEDICAID

## 2025-07-03 ENCOUNTER — RESULTS FOLLOW-UP (OUTPATIENT)
Dept: ORTHOPEDICS | Facility: CLINIC | Age: 60
End: 2025-07-03

## 2025-07-03 ENCOUNTER — OFFICE VISIT (OUTPATIENT)
Dept: ORTHOPEDICS | Facility: CLINIC | Age: 60
End: 2025-07-03
Payer: MEDICAID

## 2025-07-03 ENCOUNTER — HOSPITAL ENCOUNTER (OUTPATIENT)
Dept: RADIOLOGY | Facility: HOSPITAL | Age: 60
Discharge: HOME | End: 2025-07-03
Attending: ORTHOPAEDIC SURGERY
Payer: MEDICAID

## 2025-07-03 VITALS — BODY MASS INDEX: 29.77 KG/M2 | WEIGHT: 174.38 LBS | HEIGHT: 64 IN

## 2025-07-03 DIAGNOSIS — M25.562 CHRONIC PAIN OF LEFT KNEE: ICD-10-CM

## 2025-07-03 DIAGNOSIS — Z96.652 STATUS POST REVISION OF TOTAL REPLACEMENT OF LEFT KNEE: ICD-10-CM

## 2025-07-03 DIAGNOSIS — G89.29 CHRONIC PAIN OF LEFT KNEE: ICD-10-CM

## 2025-07-03 DIAGNOSIS — G58.8 OTHER MONONEUROPATHY: ICD-10-CM

## 2025-07-03 DIAGNOSIS — G58.8 OTHER MONONEUROPATHY: Primary | ICD-10-CM

## 2025-07-03 PROBLEM — G62.9 PERIPHERAL NEUROPATHY: Status: ACTIVE | Noted: 2025-07-03

## 2025-07-03 LAB
OHS QRS DURATION: 84 MS
OHS QTC CALCULATION: 390 MS

## 2025-07-03 PROCEDURE — 93010 ELECTROCARDIOGRAM REPORT: CPT | Mod: ,,, | Performed by: INTERNAL MEDICINE

## 2025-07-03 PROCEDURE — 3061F NEG MICROALBUMINURIA REV: CPT | Mod: CPTII,,, | Performed by: ORTHOPAEDIC SURGERY

## 2025-07-03 PROCEDURE — 3044F HG A1C LEVEL LT 7.0%: CPT | Mod: CPTII,,, | Performed by: ORTHOPAEDIC SURGERY

## 2025-07-03 PROCEDURE — 4010F ACE/ARB THERAPY RXD/TAKEN: CPT | Mod: CPTII,,, | Performed by: ORTHOPAEDIC SURGERY

## 2025-07-03 PROCEDURE — 1159F MED LIST DOCD IN RCRD: CPT | Mod: CPTII,,, | Performed by: ORTHOPAEDIC SURGERY

## 2025-07-03 PROCEDURE — 71045 X-RAY EXAM CHEST 1 VIEW: CPT | Mod: 26,,, | Performed by: RADIOLOGY

## 2025-07-03 PROCEDURE — 99999 PR PBB SHADOW E&M-EST. PATIENT-LVL V: CPT | Mod: PBBFAC,,, | Performed by: ORTHOPAEDIC SURGERY

## 2025-07-03 PROCEDURE — 71045 X-RAY EXAM CHEST 1 VIEW: CPT | Mod: TC,FY

## 2025-07-03 PROCEDURE — 3066F NEPHROPATHY DOC TX: CPT | Mod: CPTII,,, | Performed by: ORTHOPAEDIC SURGERY

## 2025-07-03 PROCEDURE — 99215 OFFICE O/P EST HI 40 MIN: CPT | Mod: PBBFAC,25,PN | Performed by: ORTHOPAEDIC SURGERY

## 2025-07-03 PROCEDURE — G2211 COMPLEX E/M VISIT ADD ON: HCPCS | Mod: ,,, | Performed by: ORTHOPAEDIC SURGERY

## 2025-07-03 PROCEDURE — 93005 ELECTROCARDIOGRAM TRACING: CPT

## 2025-07-03 PROCEDURE — 99215 OFFICE O/P EST HI 40 MIN: CPT | Mod: S$PBB,,, | Performed by: ORTHOPAEDIC SURGERY

## 2025-07-03 PROCEDURE — 3008F BODY MASS INDEX DOCD: CPT | Mod: CPTII,,, | Performed by: ORTHOPAEDIC SURGERY

## 2025-07-03 RX ORDER — SUZETRIGINE 50 MG/1
TABLET, FILM COATED ORAL
Qty: 29 TABLET | Refills: 0 | Status: SHIPPED | OUTPATIENT
Start: 2025-07-03 | End: 2025-07-18

## 2025-07-03 NOTE — PROGRESS NOTES
Subjective:      Patient ID: Venancio Barragan is a 60 y.o. male.    Chief Complaint: Pain and Post-op Evaluation of the Left Knee    Had persistent pain around the revision knee replacement.  Some improvement with the new pain medications however pain is still significant to him.  He would like to move forward with peripheral nerve stimulation      Social History     Occupational History    Not on file   Tobacco Use    Smoking status: Never    Smokeless tobacco: Never   Substance and Sexual Activity    Alcohol use: No    Drug use: No    Sexual activity: Yes     Partners: Female      Social Drivers of Health     Tobacco Use: Low Risk  (7/3/2025)    Patient History     Smoking Tobacco Use: Never     Smokeless Tobacco Use: Never     Passive Exposure: Not on file   Alcohol Use: Not At Risk (2/12/2025)    AUDIT-C     Frequency of Alcohol Consumption: Never     Average Number of Drinks: Patient does not drink     Frequency of Binge Drinking: Never   Financial Resource Strain: Low Risk  (2/12/2025)    Overall Financial Resource Strain (CARDIA)     Difficulty of Paying Living Expenses: Not hard at all   Food Insecurity: No Food Insecurity (2/12/2025)    Hunger Vital Sign     Worried About Running Out of Food in the Last Year: Never true     Ran Out of Food in the Last Year: Never true   Transportation Needs: No Transportation Needs (2/12/2025)    PRAPARE - Transportation     Lack of Transportation (Medical): No     Lack of Transportation (Non-Medical): No   Physical Activity: Insufficiently Active (2/12/2025)    Exercise Vital Sign     Days of Exercise per Week: 2 days     Minutes of Exercise per Session: 20 min   Stress: Stress Concern Present (2/12/2025)    [employer] [place] [employer] - Occupational Stress Questionnaire     Feeling of Stress : Very much   Housing Stability: Low Risk  (2/12/2025)    Housing Stability Vital Sign     Unable to Pay for Housing in the Last Year: No     Number of Times Moved in the  Last Year: Not on file     Homeless in the Last Year: No   Depression: Not at risk (4/14/2025)    Received from [facility]    PHQ-2     PHQ-2 Score: 0   Utilities: Not At Risk (2/12/2025)    [facility] Utilities     Threatened with loss of utilities: No   Health Literacy: Adequate Health Literacy (2/12/2025)     Health Literacy     Frequency of need for help with medical instructions: Never   Social Isolation: Not on file      Review of Systems   Constitutional: Negative for diaphoresis.   HENT:  Negative for ear discharge, nosebleeds and stridor.    Eyes:  Negative for photophobia.   Cardiovascular:  Negative for syncope.   Respiratory:  Negative for hemoptysis, shortness of breath and wheezing.    Neurological:  Negative for tremors.   Psychiatric/Behavioral: Negative.           Objective:    Ortho/SPM Exam       Assessment:       1. Other mononeuropathy    2. Chronic pain of left knee    3. Status post revision of total replacement of left knee          Plan:   Patient underwent complex revision total knee replacement and now has persistent pain.  Our only option at this I think we would be inappropriate and not ideal given that we have significant potential to improve his pain with peripheral nerve stimulation.  We had a lengthy conversation about the benefits and risks of peripheral nerve stimulation.  they understand that the 1st phase is a trial where we will place leads and do a test to see if implantation helps improve their pain.  If it does improve the pain symptoms then they will be scheduled for the permanent placement. With respect to the trial they understand that leads will be coming out of the skin and we will activate the leads after they are awake from .  Risks include bleeding, continued pain, damage to the nerves, injury to the bone and soft tissues.  they understand this and feel that the benefits outweigh the risks.  We  will go ahead and schedule this.   We will plan to treat the inferomedial deep geniculate, superomedial deep geniculate, superolateral deep geniculate nerves with a device such as Nalu.    it is medical necessary for the patient to undergo a Peripheral Nerve Stimulation (PNS) trial procedure for the treatment chronic pain.   The purpose of the trial procedure is to assess whether the patient is a viable candidate for a permanent implant of a peripheral nerve stimulator. The trial procedure allows patients to experience neurostimulation for a period of time to determine if it has a beneficial effect controlling their peripheral nerve pain.   The patient has undergone careful screening prior to my determination that peripheral nerve stimulation is appropriate as a treatment option.     The patient has tried and failed other conservative treatment options. We have exhausted all non surgical options and are only left with chronic narcotics as our next treatment option which is not recommended or ideal.    In my professional judgment the PNS therapy I recommend has several distinct advantages for this patient. PNS therapy has been proven clinically effective and offers the prospect of enabling chronic pain patients to return to activities of daily living and potentially reduce the use of pain medication/narcotics.    other insurance policies that list implantable peripheral nerve stimulators as medically necessary:    [employer] Commercial - Policy CPB 0011, effective date 3.2022 (reference below)  [employer] [place] Commercial - MP - 623 effective date 11.2021 (reference below)   [facility] Commercial - Implanted Peripheral Nerve Stimulator (PNS) for Pain Control - Effective  4.1.2016 (reference below)  CMS .7- effective date 1995 (reference below)  [place] - Policy [id number] effective date 11.1.2022 (reference below)  [facility] - Policy [id number] effective date 11.23.2022 (reference  below)    Attachment(s) 26 PNS research articles supporting the efficacy and necessity of PNS:    AETNA https://www.aetna.com/cpb/medical/data/1_99/0011.html     Damari Gonzales. Peripheral Nerve Stimulation: The Evolution in Pain Medicine. Biomedicines. 2021 Dec 23;10(1):18. doi: 10.3390/vysifzqvvogw08377295. PMID: 70559649; PMCID: [id number].    Abd-Dimitrios A. Wireless Peripheral Nerve Stimulation for Treatment of Peripheral Neuralgias. Neuromodulation. 2020 Aug;23(6):827-830. doi: 10.1111/ner.83997. Epub 2020 Mar 3. PMID: 64302702.    [employer] [place] https://al-policies.Spare to Share/portal/web/medical-policies/-/mp-623?inheritRedirect=true&redirect=https%3A%2F%2Fal-policies.Spare to Share%2Fportal%2Fweb%2Fal-policies%2Fhome%3Fp_p_id%3Dcom_liferay_portal_search_web_portlet_SearchPortlet%26p_p_lifecycle%3D0%26p_p_state%3Dmaximized%26p_p_mode%3Dview%26_com_liferay_portal_search_web_portlet_SearchPortlet_redirect%3Dhttps%253A%252F%252Fal-policies.Spare to Share%252Fportal%252Fweb%252Fal-policies%252Fhome%253Fp_p_id%26_com_liferay_portal_search_web_portlet_SearchPortlet_mvcPath%3D%252Fsearch.jsp%26_com_liferay_portal_search_web_portlet_SearchPortlet_keywords%3DMP%2B623%26_com_liferay_portal_search_web_portlet_SearchPortlet_formDate%[id number]%26_com_liferay_portal_search_web_portlet_SearchPortlet_scope%3Dthis-site   [facility] https://www.GettingHired.Advaxis/medical-policies/implanted-peripheral-nerve-stimulator-pns-pain-bryqoiv-7424-99-01     Mary S, Drew MY, Aleksandr VT, Jamil N, Joey EJ, Jason M, Juaquin V, Sanya LJ, Sheron A, D'Tucker RS. Implantable Peripheral Nerve Stimulation for Peripheral Neuropathic Pain: A Systematic Review of Prospective Studies. Biomedicines. 2022 Oct 17;10(10):2606. doi: 10.3390/gleiudctseph19149778. PMID: 11106808; PMCID: RIO2110704.    Sherif KAMARA, Elver I, Timur TIRADO. 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 Cureus. 2022 Mar  31;14(3):b94906. doi: 10.7759/cureus.17698. PMID: 77830832; PMCID: SQY1832302.     Baljinder MON, Zaheer RUBY, Juaquin V, Lottie RUBIO, Randall J. Peripheral Nerve Stimulation of the Saphenous and Superior Lateral Genicular Nerves for Chronic Pain After Knee Surgery. Orthop Rev (Dyana). 2021 May 31;13(2):22131. doi: 10.00898/001c.50477. PMID: 34178553; PMCID: NJA9124141.    CMS Medicare .7: Electrical Nerve Stimulators; https://www.cms.gov/medicare-coverage-database/view/ncd.aspx?HVIZv=993  Sy PRICE, Paris CROOKS, Melba RL, Gerald HANSON, Amanda YANEZ, Naman T, Akua L, Efren MUNGUIA, Koffi BRAN, Julee STAHL. Percutaneous Peripheral Nerve Stimulation for the Treatment of Chronic Pain Following Amputation. [facility]. 2019 Jul 1;184(7-8):p447-m885. doi: 10.1093/[facility]/dtc451. PMID: 76542338; PMCID: ASZ8964738.    Jose BARROS, Rosalba S, Gaby SM, Goldy MA, Jim PS, Eleonora IR, Koffi BRAN, Julee STAHL. Peripherally Induced Reconditioning of the Central Nervous System: A Proposed Mechanistic Theory for Sustained Relief of Chronic Pain with Percutaneous Peripheral Nerve Stimulation. J Pain Res. 2021 Mar 12;14:721-736. doi: 10.2147/JPR.E933308. PMID: 54161307; PMCID: XQH2128685.    Paris Hoyt, Rg SHINE, Pearl THAO, Jose SHINE, Lizette TJ, Jaci GOODE, Anamika DA, Sy PRICE, Saira SHINE, Julee STAHL. Percutaneous Peripheral Nerve Stimulation of the Medial Branch Nerves for the Treatment of Chronic Axial Back Pain in Patients After Radiofrequency Ablation. Pain Med. 2021 Mar 18;22(3):548-560. doi: 10.1093/pm/aknt128. Erratum in: Pain Med. 2021 May 06;: PMID: 29410270; PMCID: MDA8433396.    Rg SHINE, James S. Cluneal neuropathy: Background, diagnosis, and treatment. Pain Pract. 2022 Dec 19. doi: 10.1111/papr.36308. Epub ahead of print. PMID: 56294322.    Farrah PENA. Comparing neuromodulation modalities involving the suprascapular nerve in chronic refractory shoulder pain: retrospective case series and literature review. Clin Shoulder Elb. 2021  Mar;24(1):36-41. doi: 10.5397/cise.2021.80285. Epub 2021 Mar 2. PMID: 78318402; PMCID: YLK1064917.    Fred D, Lucas MONTERO, Ami LARRY. CDC Guideline for Prescribing Opioids for Chronic Pain -- [place], 2016. MMWR Recomm Rep 2016;65(No. RR-1):1-49. DOI: http://dx.doi.org/10.41371/mmwr.sq1708y9    Arslan OGDINEZ, kelsey Medel T. Peripheral Nerve Stimulation of the Brachial Plexus for Chronic Refractory CRPS Pain of the Upper Limb: Description of a New Technique and Case Series. Pain Med. 2020 Aug 1;21(Suppl 1):S18-S26. doi: 10.1093/pm/xsqb064. PMID: 39563576.    Antonio REESE. The Effect of Peripheral Neuromodulation on Pain From the Sacroiliac Joint: A Retrospective Cohort Study. Neuromodulation. 2019 Jul;22(5):661-666. doi: 10.1111/ner.46601. Epub 2018 Sep 20. PMID: 53671630.  Delilah GRAVES, Lian J, Howie CHAN. Safety of long-term electrical peripheral nerve stimulation: review of the state of the art. J Neuroeng Rehabil. 2019 Jan 18;16(1):13. doi: 10.1186/g83858-038-1839-7. Erratum in: J Neuroeng Rehabil. 2020 Lizandro 15;17(1):77. PMID: 93766691; PMCID: CNU8671137.    Randall J, Baljinder A, Danielle I, Declan O, Lottie AD. Peripheral Stimulation of the Saphenous and Superior Lateral Genicular Nerves for Chronic Knee Pain. Cureus. 2021 Apr 29;13(4):r08573. doi: 10.7759/cureus.24920. PMID: 50605496; PMCID: AHL1539576.    ALISON Marrufo, CHE Antony., ELIESER Arshad. et al. Peripheral Nerve Stimulation for Chronic Pain: A Systematic Review of Effectiveness and Safety. Pain Therapy 10, 985-1002 (2021). https://doi.org/10.1007/d92910-218-14772-7  Ethel CHAN, Andres A, Reji S, Amari M, Zenaida LE, Dangelo THOMAS, Mauricio BONILLA, Charlene G, Tyrese F. Effectiveness of Neurostimulation Technologies for the Management of Chronic Pain: A Systematic Review. Neuromodulation. 2020 Feb;23(2):150-157. doi: 10.1111/ner.44976. Epub 2019 Jul 16. PMID: 09365758.    Michaela BM, Marlen A, Alea AM, Erlinda R, Chemo S, Ricci A,  Sy SP, Vaishnavi RICH, Jet S, Kayden S, Callum DI, Dipika EJ, Chai D, Julee JW, Marah MON, Cesar H; PAINfRE Investigators. Percutaneous Peripheral Nerve Stimulation (Neuromodulation) for Postoperative Pain: A Randomized, Sham-controlled  Study. Anesthesiology. 2021 Jul 1;135(1):. doi: 10.1097/ALN.5192294120480351. PMID: 55756876; PMCID: CCX2501371.    Sid S, Eric MON, Noam TIRADO, Reba E, Haylee F, Gurinder T, Damion M, Jame B, Lupillo P, Eric MON, Estefania W, Nay A. Mechanistically informed non-invasive peripheral nerve stimulation for peripheral neuropathic pain: a randomised double-blind sham-controlled trial. J Transl Med. 2021 Nov 6;19(1):458. doi: 10.1186/c19593-150-26729-0. PMID: 28985782; PMCID: BDI6325173.    Dany H, Arsenio S, Pearl AH, Bhavesh E, Naya A, Chuy J, Abdiaziz K. Application of the novel Nalu Neurostimulation System for peripheral nerve stimulation. Pain Manag. 2022 Oct;12(7):795-804. doi: 10.2217/mof-0757-0900. Epub 2022 Aug 10. PMID: 18220459.    Lottie AD, Issac S, Michael ES, Nick A, Lottie AJ, Mima EM, Genie AA, Bret R, Rodolfo BM, Declan O, Danielle I, Sherley AN, Jessica RD. Peripheral Nerve Stimulation: A Review of Techniques and Clinical Efficacy. Pain Ther. 2021 Dec;10(2):961-972. doi: 10.1007/j38841-536-21221-4. Epub 2021 Jul 31. PMID: 63969339; PMCID: BVO0375233.    Юлия Olsen et al. Neuromodulation for chronic pain. The Lancet, Volume 397, Issue 24456, 2111 - 2124 (2021).   Tea MINT, Rg SHINE. Axillary Peripheral Nerve Stimulation for Chronic Shoulder Pain: A Retrospective Case Series. Neuromodulation. 2020 Aug;23(6):812-818. doi: 10.1111/ner.04816. Epub 2020 Jan 13. PMID: 32317733.    Olivia G, Candie GF, Josette JA, Andrew-Scot JL, Kqljáqjfo-ui-Psw-Peñas C, Ruy MJ. Effectiveness of percutaneous electrical nerve stimulation for musculoskeletal pain: A systematic review and meta-analysis. Eur J  Pain. 2020 Jul;24(6):9578-6591. doi: 10.1002/ejp.1559. Epub 2020 Apr 4. PMID: 44788017.    [facility] Policy https://[email or web address].[email or web address]/[email or web address]-policies/63955-[email or web address].pdf?[email or web address]&fp-anon-tw=[id number]&fp-sess-tu=[id number]    Christin LL, Anette S, Lucy ER. Implantable Neuromodulation Device in the Lower Limb: An Adjunctive Procedure in Patients with Continued Chronic Pain After Failed Revisional Microneurosurgical Procedure in a Nonreconstructable Zone of Injury. Clin Podiatr Med Surg. 2021 Jan;38(1S):e31-e43. doi: 10.1016/j.cpm.2021.09.002. Epub 2021 Nov 24. PMID: 39292867.    Julia N, D'Garrett RS, Jing JM, Arsenio S, Romulo D, Jos N, Julia-Dimitrios A, Derek A, Goldy MA, Jamari CM, Jose TR. Evidence-Based Clinical Guidelines from the American Society of Pain and Neuroscience for the Use of Implantable Peripheral Nerve Stimulation in the Treatment of Chronic Pain. J Pain Res. 2022 Aug 23;15:5259-3087. doi: 10.2147/JPR.R743889. PMID: 69862432; PMCID: WHV6407855.    Ti J, Jaci Z, Brad J, Suresh M, Raffi J, Sofia LOO, Abdiaziz KV, Julia-Dimitrios MON, Marcin E, Emmy H, Guillaume P, Dario J. Peripheral Nerve Stimulation in Pain Management: A Systematic Review. Pain Physician. 2021 Mar;24(2):R788-U826. PMID: 64274280; PMCID: QXX7387915.

## 2025-07-07 ENCOUNTER — TELEPHONE (OUTPATIENT)
Dept: PREADMISSION TESTING | Facility: HOSPITAL | Age: 60
End: 2025-07-07
Payer: MEDICAID

## 2025-07-07 NOTE — TELEPHONE ENCOUNTER
Good morning, Mr. Barragan is scheduled for stimulator placement on 8/7/25 with Dr. Menezes. Medication list indicates he is currently taking Eliquis. He will need recommendations on when to stop prior to surgery. Please advise. Thanks

## 2025-07-20 DIAGNOSIS — T84.84XD PAIN DUE TO TOTAL LEFT KNEE REPLACEMENT, SUBSEQUENT ENCOUNTER: ICD-10-CM

## 2025-07-20 DIAGNOSIS — Z96.652 STATUS POST REVISION OF TOTAL REPLACEMENT OF LEFT KNEE: ICD-10-CM

## 2025-07-20 DIAGNOSIS — Z96.652 PAIN DUE TO TOTAL LEFT KNEE REPLACEMENT, SUBSEQUENT ENCOUNTER: ICD-10-CM

## 2025-07-21 RX ORDER — SUZETRIGINE 50 MG/1
TABLET, FILM COATED ORAL
Qty: 29 TABLET | Refills: 0 | Status: SHIPPED | OUTPATIENT
Start: 2025-07-21 | End: 2025-08-05

## 2025-07-28 ENCOUNTER — PATIENT MESSAGE (OUTPATIENT)
Dept: PREADMISSION TESTING | Facility: HOSPITAL | Age: 60
End: 2025-07-28

## 2025-07-28 ENCOUNTER — HOSPITAL ENCOUNTER (OUTPATIENT)
Dept: PREADMISSION TESTING | Facility: HOSPITAL | Age: 60
Discharge: HOME | End: 2025-07-28
Attending: NURSE PRACTITIONER
Payer: MEDICAID

## 2025-07-28 ENCOUNTER — ANESTHESIA EVENT (OUTPATIENT)
Dept: SURGERY | Facility: HOSPITAL | Age: 60
End: 2025-07-28
Payer: MEDICAID

## 2025-07-28 NOTE — PROGRESS NOTES
Subjective:       Patient ID: Venancio Barragan is a 60 y.o. male.    Chief Complaint:   1. Acute deep vein thrombosis (DVT) of left peroneal vein        2. Acute deep vein thrombosis (DVT) of distal vein of left lower extremity        3. Iron deficiency anemia, unspecified iron deficiency anemia type          Current Treatment:  Eliquis 5mg po BID    Treatment History:  Ferrlecit IV x 5/16/2025, 5/23/2025, 5/30/2025, 6/6/2025, 6/13/2025, 6/20/2025    HPI: This is a 60 year old  male with diabetes, GERD, hyperlipidemia, and HTN who is seen in Hem/Onc for DVT. He presented to the ER for leg edema that began around 11/2024 following a knee revision. He was undergoing PT when his calf muscle began to hurt. US on 12/11/2024 noted an acute DVT in the left posterior tibial vein and peroneal vein. He had been taking baby ASA but was started on Eliquis. US doppler performed 2 weeks later noted resolution of the DVT in the tibial vein but persistent DVT in the peroneal vein with no new or progressing clots.    He denied history of thrombosis prior to this as well as symptoms of a new blood clot. He reported being very active prior to his knee surgery.    Interval History: Patient presents for follow up on Eliquis. He presents alone and reports adherence to Eliquis with no bleeding/bruising. He has no symptoms of anemia and states he had no symptoms prior to iron infusion. Hgb improved, Hct WNL since iron infusion. Iron levels WNL except mildly low saturation. Recommend OTC oral iron supplement. Advised him that oral iron can cause constipation. Discussed Geritol as an option. Discussed that iron deficiency is likely a result of surgery and iron supplementation will likely be short term. Advised him to continue Eliquis until he completes his current prescription bottle then stop; he verbalizes understanding. Will have him follow up in 3 months to review iron levels.     Reviewed labs with patient:   CBC:   Recent Labs    Lab 07/03/25  1122   WBC 5.15   RBC 4.96   HGB 12.5 L   HCT 40.8   Platelet Count 271   MCV 82   MCH 25.2 L   MCHC 30.6 L     CMP:  Recent Labs   Lab 03/27/25  1036 07/03/25  1121   Glucose 158 H 110   Calcium 8.7 9.6   Albumin 3.6  --    Protein Total 7.4  --    Sodium 142 140   Potassium 4.8 5.0   CO2 23 20 L   Chloride 109 109   BUN 21 H 19   Creatinine 1.4 1.4   ALP 74  --    ALT 24  --    AST 20  --    Bilirubin Total 0.5  --      Lab Results   Component Value Date    IRON 51 07/02/2025    TRANSFERRIN 226 07/02/2025    TIBC 334 07/02/2025    LABIRON 15 (L) 07/02/2025    FESATURATED 4 (L) 02/13/2025      Lab Results   Component Value Date    FERRITIN 112.0 07/02/2025     Social History     Socioeconomic History    Marital status:    Tobacco Use    Smoking status: Never    Smokeless tobacco: Never   Substance and Sexual Activity    Alcohol use: No    Drug use: No    Sexual activity: Yes     Partners: Female     Social Drivers of Health     Financial Resource Strain: Low Risk  (2/12/2025)    Overall Financial Resource Strain (CARDIA)     Difficulty of Paying Living Expenses: Not hard at all   Food Insecurity: No Food Insecurity (2/12/2025)    Hunger Vital Sign     Worried About Running Out of Food in the Last Year: Never true     Ran Out of Food in the Last Year: Never true   Transportation Needs: No Transportation Needs (2/12/2025)    PRAPARE - Transportation     Lack of Transportation (Medical): No     Lack of Transportation (Non-Medical): No   Physical Activity: Insufficiently Active (2/12/2025)    Exercise Vital Sign     Days of Exercise per Week: 2 days     Minutes of Exercise per Session: 20 min   Stress: Stress Concern Present (2/12/2025)    [employer] - Occupational Stress Questionnaire     Feeling of Stress : Very much   Housing Stability: Low Risk  (2/12/2025)    Housing Stability Vital Sign     Unable to Pay for Housing in the Last Year: No     Homeless in the Last  Year: No     Past Medical History:   Diagnosis Date    Diabetes mellitus     GERD (gastroesophageal reflux disease)     High cholesterol     Hypertension      Family History   Problem Relation Name Age of Onset    Diabetes Mother      No Known Problems Father       Past Surgical History:   Procedure Laterality Date    ANKLE SURGERY      right    ELBOW SURGERY Left     INCISION AND DRAINAGE OF HAND Left 2/8/2023    Procedure: INCISION AND DRAINAGE, HAND;  Surgeon: Adele Colon MD;  Location: [facility] [place] OR;  Service: Orthopedics;  Laterality: Left;    INSERTION, ELECTRODE LEAD, NEUROSTIMULATOR, PERIPHERAL Left 9/23/2024    Procedure: INSERTION,ELECTRODE LEAD,NEUROSTIMULATOR,PERIPHERAL;  Surgeon: Bony Menezes MD;  Location: [facility] OR;  Service: Pain Management;  Laterality: Left;  curonix, trial    KNEE SURGERY      REVISION OF KNEE ARTHROPLASTY Left 11/11/2024    Procedure: REVISION, ARTHROPLASTY, KNEE;  Surgeon: Bony Menezes MD;  Location: [facility] OR;  Service: Orthopedics;  Laterality: Left;  bmi - 26.78 marcin cement removal, depuy revision tka   Richard will be available by phone if needed for misonix -[phone]    TOTAL HIP ARTHROPLASTY      ines     Review of Systems   Constitutional:  Negative for appetite change and fatigue.   HENT:  Negative for mouth sores, rhinorrhea and sore throat.    Eyes: Negative.    Respiratory: Negative.     Cardiovascular: Negative.    Gastrointestinal:  Negative for constipation, diarrhea, nausea and vomiting.   Endocrine: Negative.    Genitourinary: Negative.    Musculoskeletal: Negative.  Positive for leg pain (left knee pain 7/10).   Integumentary:  Negative.   Allergic/Immunologic: Negative.    Neurological:  Negative for weakness and numbness.   Hematological: Negative.  Does not bruise/bleed easily.   Psychiatric/Behavioral: Negative.         Medication List with Changes/Refills   Current Medications    APIXABAN (ELIQUIS DVT-PE TREAT 30D START) 5 MG (74 TABS) DSPK    For the first 7  days take two 5 mg tablets twice daily.  After 7 days take one 5 mg tablet twice daily.    APIXABAN (ELIQUIS) 5 MG TAB    Take 1 tablet (5 mg total) by mouth 2 (two) times daily.    ASPIRIN (ECOTRIN) 81 MG EC TABLET    Take 1 tablet (81 mg total) by mouth 2 (two) times a day.    ATORVASTATIN (LIPITOR) 40 MG TABLET    Take 40 mg by mouth once daily.    DULAGLUTIDE (TRULICITY) 0.75 MG/0.5 ML PEN INJECTOR    INECT 0.5 ML INTO THE SKIN EVERY 7 DAYS    FAMOTIDINE (PEPCID) 20 MG TABLET    Take 1 tablet (20 mg total) by mouth 2 (two) times daily.    GABAPENTIN (NEURONTIN) 300 MG CAPSULE    Take 2 capsules (600 mg total) by mouth 2 (two) times daily.    METFORMIN (GLUCOPHAGE) 500 MG TABLET    Take 1,000 mg by mouth 2 (two) times daily with meals.     MULTIVITAMIN CAPSULE    Take 1 capsule by mouth once daily.    PROMETHAZINE (PHENERGAN) 25 MG TABLET    Take 1 tablet (25 mg total) by mouth every 6 (six) hours as needed for Nausea.    RAMIPRIL (ALTACE) 2.5 MG CAPSULE    Take 2.5 mg by mouth.    SEMAGLUTIDE (OZEMPIC) 0.25 MG OR 0.5 MG(2 MG/1.5 ML) PNIJ    Inject 0.25 mg into the skin.    SOLIFENACIN (VESICARE) 10 MG TABLET    Take 10 mg by mouth.    SUZETRIGINE (JOURNAVX) 50 MG TAB    Take 2 tablets (100 mg) by mouth for 1 day, THEN 1 tablet (50 mg) 2 (two) times daily for 14 days    TADALAFIL (CIALIS) 5 MG TABLET    Take 5 mg by mouth daily as needed.    WESTUSSIN DM 1-5-10 MG/5 ML SYRP    Take 10 mLs by mouth 3 (three) times daily.    ZOLPIDEM (AMBIEN) 10 MG TAB    Take 10 mg by mouth nightly as needed.     Objective:     Vitals:    07/29/25 0900   BP: 135/85   Pulse: 60   Temp: 97.9 °F (36.6 °C)     Physical Exam  Vitals reviewed.   Constitutional:       Appearance: Normal appearance.   HENT:      Head: Normocephalic.      Mouth/Throat:      Comments:     Eyes:      Extraocular Movements: Extraocular movements intact.      Pupils: Pupils are equal, round, and reactive to light.   Cardiovascular:      Rate and Rhythm: Normal  rate and regular rhythm.      Heart sounds: Normal heart sounds.   Pulmonary:      Effort: Pulmonary effort is normal.      Breath sounds: Normal breath sounds.   Abdominal:      General: Bowel sounds are normal.      Palpations: Abdomen is soft.      Comments: rounded     Genitourinary:     Comments: deferred    Musculoskeletal:         General: Normal range of motion.      Cervical back: Normal range of motion and neck supple.   Skin:     General: Skin is warm and dry.      Comments: Left forearm tattoo   Neurological:      Mental Status: He is alert and oriented to person, place, and time.   Psychiatric:         Behavior: Behavior normal.         Thought Content: Thought content normal.          (1) Restricted in physically strenuous activity, ambulatory and able to do work of light nature  Assessment:     Problem List Items Addressed This Visit          Hematology    Acute deep vein thrombosis (DVT) of distal vein of left lower extremity    Resolved.         Acute deep vein thrombosis (DVT) of left peroneal vein - Primary    Diagnosed on US in 12/2024 following knee revision. Persists in 3/2025. Will continue Eliquis for a total of 6 months.             Oncology    Iron deficiency anemia    Anemia dating back to 8/2023. Iron deficiency noted in 2/2024. Will treat with IV iron.          Plan:     Acute deep vein thrombosis (DVT) of left peroneal vein    Acute deep vein thrombosis (DVT) of distal vein of left lower extremity    Iron deficiency anemia, unspecified iron deficiency anemia type    Labs reviewed; iron deficiency improved; Hgb mildly low.   Start OTC oral iron supplement daily.   Continue Eliquis for a total of 6 months; stop upon completion of current prescription bottle.   Follow up in 12 weeks with iron profile, ferritin, CBC, and Comprehensive Metabolic Panel.    Route Chart for Scheduling    Med Onc Chart Routing      Follow up with physician    Follow up with [facility] 3 months.   Infusion scheduling  note    Injection scheduling note    Labs CBC, ferritin and iron and TIBC   Scheduling:  Preferred lab:  Lab interval:  in 3 months, 2 days prior   Imaging None      Pharmacy appointment No pharmacy appointment needed      Other referrals No nutrition appointment needed -        No additional referrals needed         I will review assessment/plan with collaborating physician.      JANAE Fortune

## 2025-07-28 NOTE — DISCHARGE INSTRUCTIONS
Your surgery is scheduled for 8/7/25.    Please report to [facility] on the 1st Floor at 1030 a.m.    THIS TIME IS SUBJECT TO CHANGE.  YOU WILL RECEIVE A PHONE CALL THE DAY BEFORE SURGERY BY 3:30 PM TO CONFIRM YOUR TIME OF ARRIVAL.  IF YOU HAVE NOT RECEIVED A PHONE CALL BY 3:30 PM THE DAY BEFORE YOUR SURGERY PLEASE CALL [phone]     INSTRUCTIONS IMPORTANT!!!  ¨Stop full meals 8 hours prior to arrival, but you can consume clear liquids up to 2 hours prior to arrival time. Clear liquids include Gatorade, water, soda, black coffee or tea (no milk or creamer), and clear juices only.       Please take Famotidine the morning of surgery. Trulicity Last dose on 7/28/25.          ____  Do not wear makeup, including mascara.  ____  No powder, lotions or creams to surgical area.  ____  Please remove all jewelry, including piercings and leave at home.  ____  No money or valuables needed. Please leave at home.  ____  Please bring any documents given by your doctor.  ____  If going home the same day, arrange for a ride home. You will not be able to             drive if Anesthesia was used.  ____  Children under 18 years require a parent / guardian present the entire time             they are in surgery / recovery.  ____  Wear loose fitting clothing. Allow for dressings, bandages.  ____  Stop Aspirin and blood thinners as directed by prescribing provider.  ____  Do not take any herbal, vitamins, and or supplements 2 weeks prior to surgery.  ____  Stop NSAIDS (Naproxen, Aleve, Voltaren, Celebrex, Melxoicam), Goody's, and BC powder 7 days prior to surgery.  ____  Wash the surgical area with Hibiclens or Dial Antibacterial bar soap the night before surgery, and again the             morning of surgery.  Be sure to rinse hibiclens or Dial Antibacterial bar soap off completely (if instructed by   nurse).  ____  If you take diabetic medication including Metformin, Glimepiride, Glipizide, Glyburide, Byetta, Januvia,  Actos, do not take am of surgery unless            instructed by Doctor.  ____  Call MD for temperature above 101 degrees or any other signs of infection such as Urinary (bladder) infection, Upper respiratory infection, skin boils,             etc.  ____ Do Not wear your contact lenses the day of your procedure.  You may wear your glasses.      ____ Do not shave surgical site for 3 days prior to surgery.  ____ Practice Good hand washing before, during, and after procedure.  ____ Hold the following medication for 3 days prior to surgery:    Invokana (Canagliflozin)     Synjardy XR (jardiance + metformin)  Farxiga (Dapagliflozin)     Segluroment (steglarto + metformin)  Jardiance (Empagliflozin)     Qtern (farxiga + saxagliptin)   Steglatro (Ertugliflozin)     Glyxambi (jardiance + linagliptin)  Benzavvy (Bexagliflozin)     Steglujan (steglarto + sitagliptin)  Inpefa (Sotagliflozin)      Xigduo (farxiga + metformin)   Invokamet/Invokamet XR (invokana + Metformin)       I have read or had read and explained to me, and understand the above information.  Additional comments or instructions:  For additional questions call [phone]      ANESTHESIA SIDE EFFECTS  -For the first 24 hours after surgery:  Do not drive, use heavy equipment, make important decisions, or drink alcohol  -It is normal to feel sleepy for several hours.  Rest until you are more awake.  -Have someone stay with you, if needed.  They can watch for problems and help keep you safe.  -Some possible post anesthesia side effects include: nausea and vomiting, sore throat and hoarseness, sleepiness, and dizziness.        Pre-Op Bathing Instructions    Before surgery, you can play an important role in your own health.    Because skin is not sterile, we need to be sure that your skin is as free of germs as possible. By following the instructions below, you can reduce the number of germs on your skin before surgery.    IMPORTANT: You will need to shower with a  special soap called Hibiclens*, available at any pharmacy.  If you are allergic to Chlorhexidine (the antiseptic in Hibiclens), use an antibacterial soap such as Dial Soap for your preoperative shower.  You will shower with Hibiclens both the night before your surgery and the morning of your surgery.  Do not use Hibiclens on the head, face or genitals to avoid injury to those areas.    STEP #1: THE NIGHT BEFORE YOUR SURGERY     Do not shave the area of your body where your surgery will be performed.  Shower and wash your hair and body as usual with your normal soap and shampoo.  Rinse your hair and body thoroughly after you shower to remove all soap residue.  With your hand, apply one packet of Hibiclens soap to the surgical site.   Wash the site gently for five (5) minutes. Do not scrub your skin too hard.   Do not wash with your regular soap after Hibiclens is used.  Rinse your body thoroughly.  Pat yourself dry with a clean, soft towel.  Do not use lotion, cream, or powder.  Wear clean clothes.    STEP #2: THE MORNING OF YOUR SURGERY     Repeat Step #1.    * Not to be used by people allergic to Chlorhexidine.

## 2025-07-28 NOTE — PRE-PROCEDURE INSTRUCTIONS
Wife.    Allergies, medical, surgical, family and psychosocial histories reviewed with patient. Periop plan of care reviewed. Preop instructions given, including medications to take and to hold. Hibiclens soap and instructions on use given. Time allotted for questions to be addressed.      Arrival time 1030.    Please take Famotidine the morning of surgery. Trulicity Last dose on 7/28/25.      Surgery instructions reviewed and surgery booklet sent or emailed to the patient via the portal.

## 2025-07-28 NOTE — ANESTHESIA PREPROCEDURE EVALUATION
[facility]  Anesthesia Pre-Operative Evaluation         Patient Name: Venancio Barragan  YOB: 1965  MRN: [id number]    SUBJECTIVE:     Pre-operative evaluation for Procedure(s) (LRB):  INSERTION,ELECTRODE LEAD,NEUROSTIMULATOR,PERIPHERAL (Left)     08/06/2025    Venancio Barragan is a 60 y.o. male w/ a significant PMHx of T2DM, GERD, HTN, DVT of RLE since 12/2024 (on Eliquis), obesity, DYLAN (Hg 12.5 7/3/25), who is s/p revision of left knee replacement 11/11/2024.    Off Eliquis for 1 week.       Patient now presents for the above procedure(s).    Echo Summary  No results found for this or any previous visit.       Prev airway:    Intubation     Date/Time: 11/11/2024 12:37 PM     Performed by: Marta Lyman CRNA  Authorized by: Adelso Antunez MD    Intubation:     Induction:  Intravenous    Intubated:  Postinduction    Mask Ventilation:  Easy with oral airway    Attempts:  1    Attempted By:  CRNA    Method of Intubation:  Video laryngoscopy    Blade:  Bowling 3    Laryngeal View Grade: Grade I - full view of cords      Difficult Airway Encountered?: No      Complications:  None    Airway Device:  Oral endotracheal tube    Airway Device Size:  7.5    Style/Cuff Inflation:  Cuffed (inflated to minimal occlusive pressure)    Tube secured:  22    Secured at:  The lips    Placement Verified By:  Capnometry    Complicating Factors:  None    Findings Post-Intubation:  BS equal bilateral and atraumatic/condition of teeth unchanged              Problem List[1]    Review of patient's allergies indicates:  No Known Allergies    Current Inpatient Medications:      Medications Ordered Prior to Encounter[2]    Past Surgical History:   Procedure Laterality Date    ANKLE SURGERY      right    ELBOW SURGERY Left     INCISION AND DRAINAGE OF HAND Left 2/8/2023    Procedure: INCISION AND DRAINAGE, HAND;  Surgeon: Adele Colon MD;  Location: [facility] OR;  Service: Orthopedics;  Laterality: Left;    INSERTION,  ELECTRODE LEAD, NEUROSTIMULATOR, PERIPHERAL Left 9/23/2024    Procedure: INSERTION,ELECTRODE LEAD,NEUROSTIMULATOR,PERIPHERAL;  Surgeon: Bony Menezes MD;  Location: [facility] OR;  Service: Pain Management;  Laterality: Left;  curonix, trial    KNEE SURGERY      REVISION OF KNEE ARTHROPLASTY Left 11/11/2024    Procedure: REVISION, ARTHROPLASTY, KNEE;  Surgeon: Bony Menezes MD;  Location: [facility] OR;  Service: Orthopedics;  Laterality: Left;  bmi - 26.78 marcin cement removal, depuy revision tka   Richard will be available by phone if needed for rux -[phone]    TOTAL HIP ARTHROPLASTY      ines       Social History:  Tobacco Use: Low Risk  (7/29/2025)    Patient History     Smoking Tobacco Use: Never     Smokeless Tobacco Use: Never     Passive Exposure: Not on file      Alcohol Use: Not At Risk (2/12/2025)    AUDIT-C     Frequency of Alcohol Consumption: Never     Average Number of Drinks: Patient does not drink     Frequency of Binge Drinking: Never        OBJECTIVE:     Vital Signs Range (Last 24H):         Significant Labs:  Lab Results   Component Value Date    WBC 5.15 07/03/2025    HGB 12.5 (L) 07/03/2025    HCT 40.8 07/03/2025     07/03/2025    CHOL 165 04/14/2025    TRIG 119 04/14/2025    HDL 59 04/14/2025    ALT 24 03/27/2025    AST 20 03/27/2025     07/03/2025    K 5.0 07/03/2025     07/03/2025    CREATININE 1.4 07/03/2025    BUN 19 07/03/2025    CO2 20 (L) 07/03/2025    TSH 1.75 04/14/2025    INR 1.0 12/28/2024    HGBA1C 6.7 (H) 04/14/2025       Diagnostic Studies: No relevant studies.    EKG:   Results for orders placed or performed in visit on 07/03/25   EKG 12-lead    Collection Time: 07/03/25  9:55 AM   Result Value Ref Range    QRS Duration 84 ms    [facility] QTC Calculation 390 ms    Narrative    Test Reason : G58.8,M25.562,G89.29,Z96.652,    Vent. Rate :  53 BPM     Atrial Rate :  53 BPM     P-R Int : 170 ms          QRS Dur :  84 ms      QT Int : 416 ms       P-R-T Axes :  54   7  34  degrees    QTcB Int : 390 ms    Sinus bradycardia  Otherwise normal ECG  When compared with ECG of 28-Dec-2024 14:25,  Vent. rate has decreased by  53 bpm  T wave amplitude has increased in Lateral leads  QT has shortened  Confirmed by Guero Troncoso ([id number]) on 7/3/2025 9:35:50 PM    Referred By: BONY SERRANO           Confirmed By: Guero Troncoso       2D ECHO:  TTE:  No results found for this or any previous visit.    AD:  No results found for this or any previous visit.    ASSESSMENT/PLAN:         Past Medical History:   Diagnosis Date    Diabetes mellitus     GERD (gastroesophageal reflux disease)     High cholesterol     Hypertension      Past Surgical History:   Procedure Laterality Date    ANKLE SURGERY      right    ELBOW SURGERY Left     INCISION AND DRAINAGE OF HAND Left 2/8/2023    Procedure: INCISION AND DRAINAGE, HAND;  Surgeon: Adele Colon MD;  Location: [facility] OR;  Service: Orthopedics;  Laterality: Left;    INSERTION, ELECTRODE LEAD, NEUROSTIMULATOR, PERIPHERAL Left 9/23/2024    Procedure: INSERTION,ELECTRODE LEAD,NEUROSTIMULATOR,PERIPHERAL;  Surgeon: Bony Serrano MD;  Location: [facility] OR;  Service: Pain Management;  Laterality: Left;  curonix, trial    KNEE SURGERY      REVISION OF KNEE ARTHROPLASTY Left 11/11/2024    Procedure: REVISION, ARTHROPLASTY, KNEE;  Surgeon: Bony Serrano MD;  Location: [facility] OR;  Service: Orthopedics;  Laterality: Left;  bmi - 26.78 marcin cement removal, depuy revision tka   Irchard will be available by phone if needed for misonix -[phone]    TOTAL HIP ARTHROPLASTY      ines     Review of patient's allergies indicates:  No Known Allergies    Current Outpatient Medications   Medication Instructions    apixaban (ELIQUIS DVT-PE TREAT 30D START) 5 mg (74 tabs) DsPk For the first 7 days take two 5 mg tablets twice daily.  After 7 days take one 5 mg tablet twice daily.    apixaban (ELIQUIS) 5 mg, Oral, 2 times daily    aspirin (ECOTRIN) 81 mg, Oral, 2 times daily     atorvastatin (LIPITOR) 40 mg, Daily    dulaglutide (TRULICITY) 0.75 mg/0.5 mL pen injector INECT 0.5 ML INTO THE SKIN EVERY 7 DAYS    famotidine (PEPCID) 20 mg, Oral, 2 times daily    gabapentin (NEURONTIN) 600 mg, Oral, 2 times daily    metFORMIN (GLUCOPHAGE) 1,000 mg, 2 times daily with meals    multivitamin capsule 1 capsule, Daily    OZEMPIC 0.25 mg    promethazine (PHENERGAN) 25 mg, Oral, Every 6 hours PRN    ramipriL (ALTACE) 2.5 mg    solifenacin (VESICARE) 10 mg    suzetrigine (JOURNAVX) 50 mg Tab Take 2 tablets (100 mg) by mouth for 1 day, THEN 1 tablet (50 mg) 2 (two) times daily for 14 days    tadalafiL (CIALIS) 5 mg, Daily PRN    WESTUSSIN DM 1-5-10 mg/5 mL Syrp 10 mLs, 3 times daily    zolpidem (AMBIEN) 10 mg, Nightly PRN       Pre-op Assessment    I have reviewed the Patient Summary Reports.     I have reviewed the Nursing Notes. I have reviewed the NPO Status.   I have reviewed the Medications.     Review of Systems  Anesthesia Hx:  No problems with previous Anesthesia   History of prior surgery of interest to airway management or planning:  Previous anesthesia: General          Denies Personal Hx of Anesthesia complications.                    Social:  Non-Smoker, No Alcohol Use       Hematology/Oncology:  Hematology Normal                                     Cardiovascular:  Exercise tolerance: good   Hypertension       Denies Angina.     hyperlipidemia              Deep Venous Thrombosis (DVT) (LLL after surgery 11/2024), Hx of DVT                  Pulmonary:  Pulmonary Normal      Denies Shortness of breath.   Denies Sleep Apnea.                Hepatic/GI:     GERD, well controlled    Taking GLP-1 Agonists Instructed to Hold for 7 Days           Musculoskeletal:  Arthritis               Neurological:  Denies TIA.  Denies CVA.    Denies Seizures.          Peripheral Neuropathy                          Endocrine:  Diabetes, type 2         Obesity / BMI > 30      Physical Exam  General: Cooperative,  Oriented, Well nourished and Alert    Airway:  Mallampati: II   Mouth Opening: Normal  TM Distance: Normal  Tongue: Normal  Neck ROM: Normal ROM    Dental:  Intact      Lab Results   Component Value Date    WBC 5.15 07/03/2025    HGB 12.5 (L) 07/03/2025    HCT 40.8 07/03/2025     07/03/2025    CHOL 165 04/14/2025    TRIG 119 04/14/2025    HDL 59 04/14/2025    ALT 24 03/27/2025    AST 20 03/27/2025     07/03/2025    K 5.0 07/03/2025     07/03/2025    CREATININE 1.4 07/03/2025    BUN 19 07/03/2025    CO2 20 (L) 07/03/2025    TSH 1.75 04/14/2025    INR 1.0 12/28/2024    HGBA1C 6.7 (H) 04/14/2025     Results for orders placed or performed in visit on 07/03/25   EKG 12-lead    Collection Time: 07/03/25  9:55 AM   Result Value Ref Range    QRS Duration 84 ms    [facility] QTC Calculation 390 ms    Narrative    Test Reason : G58.8,M25.562,G89.29,Z96.652,    Vent. Rate :  53 BPM     Atrial Rate :  53 BPM     P-R Int : 170 ms          QRS Dur :  84 ms      QT Int : 416 ms       P-R-T Axes :  54   7  34 degrees    QTcB Int : 390 ms    Sinus bradycardia  Otherwise normal ECG  When compared with ECG of 28-Dec-2024 14:25,  Vent. rate has decreased by  53 bpm  T wave amplitude has increased in Lateral leads  QT has shortened  Confirmed by Guero Troncoso ([id number]) on 7/3/2025 9:35:50 PM    Referred By: ANA LILIA SERRANO           Confirmed By: Guero Troncoso     XR CHEST 1 VIEW PRE-OP     CLINICAL HISTORY:  Other specified mononeuropathies     TECHNIQUE:  Single frontal view of the chest was performed.     COMPARISON:  N 12/28/2024 one     FINDINGS:  Heart size normal.  The lungs are clear.  No pleural effusion     Impression:     See above        Electronically signed by:Curtis Mckeon MD  Date:                                            07/03/2025  Time:                                           13:27    Anesthesia Plan  Type of Anesthesia, risks & benefits discussed:    Anesthesia Type: Gen Natural Airway, MAC  Intra-op  Monitoring Plan: Standard ASA Monitors  Post Op Pain Control Plan: multimodal analgesia  Induction:  IV  Informed Consent: Informed consent signed with the Patient and all parties understand the risks and agree with anesthesia plan.  All questions answered.   ASA Score: 3  Day of Surgery Review of History & Physical: H&P Update referred to the surgeon/provider.  Anesthesia Plan Notes: Anesthesia consent to be signed prior to surgery 8/7/25      Ready For Surgery From Anesthesia Perspective.     .           [1]   Patient Active Problem List  Diagnosis    DM type 2 with diabetic mixed hyperlipidemia    Elevated liver enzymes    Benign essential HTN    Increased prostate specific antigen (PSA) velocity    Insomnia, unspecified    Low testosterone in male    Obesity, diabetes, and hypertension syndrome    Osteoarthritis of knee    Status post open reduction and internal fixation (ORIF) of fracture    Cellulitis and abscess of hand, left with Foreign body     Chronic pain of left knee    Pain due to total left knee replacement    Postoperative stiffness of total knee replacement    Loose left total knee arthroplasty    Quadriceps weakness    Status post revision of total replacement of left knee    Type 2 diabetes mellitus without complication, without long-term current use of insulin    LISSY (acute kidney injury)    Impaired mobility and activities of daily living    Decreased range of motion (ROM) of left knee    Acute deep vein thrombosis (DVT) of distal vein of left lower extremity    Left foot pain    Acute deep vein thrombosis (DVT) of left peroneal vein    Pes anserinus bursitis of left knee    Iron deficiency anemia    Peripheral neuropathy   [2]   No current facility-administered medications on file prior to encounter.     Current Outpatient Medications on File Prior to Encounter   Medication Sig Dispense Refill    apixaban (ELIQUIS DVT-PE TREAT 30D START) 5 mg (74 tabs) DsPk For the first 7 days take two 5 mg  tablets twice daily.  After 7 days take one 5 mg tablet twice daily. 1 each 0    apixaban (ELIQUIS) 5 mg Tab Take 1 tablet (5 mg total) by mouth 2 (two) times daily. 60 tablet 3    aspirin (ECOTRIN) 81 MG EC tablet Take 1 tablet (81 mg total) by mouth 2 (two) times a day. 84 tablet 0    atorvastatin (LIPITOR) 40 MG tablet Take 40 mg by mouth once daily.      dulaglutide (TRULICITY) 0.75 mg/0.5 mL pen injector INECT 0.5 ML INTO THE SKIN EVERY 7 DAYS      famotidine (PEPCID) 20 MG tablet Take 1 tablet (20 mg total) by mouth 2 (two) times daily. 84 tablet 0    gabapentin (NEURONTIN) 300 MG capsule Take 2 capsules (600 mg total) by mouth 2 (two) times daily. 120 capsule 0    metformin (GLUCOPHAGE) 500 MG tablet Take 1,000 mg by mouth 2 (two) times daily with meals.       multivitamin capsule Take 1 capsule by mouth once daily.      promethazine (PHENERGAN) 25 MG tablet Take 1 tablet (25 mg total) by mouth every 6 (six) hours as needed for Nausea. 15 tablet 0    ramipriL (ALTACE) 2.5 MG capsule Take 2.5 mg by mouth.      semaglutide (OZEMPIC) 0.25 mg or 0.5 mg(2 mg/1.5 mL) PnIj Inject 0.25 mg into the skin.      solifenacin (VESICARE) 10 MG tablet Take 10 mg by mouth.      tadalafiL (CIALIS) 5 MG tablet Take 5 mg by mouth daily as needed.      WESTUSSIN DM 1-5-10 mg/5 mL Syrp Take 10 mLs by mouth 3 (three) times daily.      zolpidem (AMBIEN) 10 mg Tab Take 10 mg by mouth nightly as needed.

## 2025-07-29 ENCOUNTER — OFFICE VISIT (OUTPATIENT)
Dept: HEMATOLOGY/ONCOLOGY | Facility: CLINIC | Age: 60
End: 2025-07-29
Payer: MEDICAID

## 2025-07-29 VITALS
TEMPERATURE: 98 F | HEART RATE: 60 BPM | OXYGEN SATURATION: 99 % | HEIGHT: 65 IN | WEIGHT: 175.25 LBS | BODY MASS INDEX: 29.2 KG/M2 | SYSTOLIC BLOOD PRESSURE: 135 MMHG | DIASTOLIC BLOOD PRESSURE: 85 MMHG

## 2025-07-29 DIAGNOSIS — I82.452 ACUTE DEEP VEIN THROMBOSIS (DVT) OF LEFT PERONEAL VEIN: Primary | ICD-10-CM

## 2025-07-29 DIAGNOSIS — I82.4Z2 ACUTE DEEP VEIN THROMBOSIS (DVT) OF DISTAL VEIN OF LEFT LOWER EXTREMITY: ICD-10-CM

## 2025-07-29 DIAGNOSIS — D50.9 IRON DEFICIENCY ANEMIA, UNSPECIFIED IRON DEFICIENCY ANEMIA TYPE: ICD-10-CM

## 2025-07-29 PROCEDURE — 3066F NEPHROPATHY DOC TX: CPT | Mod: CPTII,,, | Performed by: NURSE PRACTITIONER

## 2025-07-29 PROCEDURE — 99999 PR PBB SHADOW E&M-EST. PATIENT-LVL IV: CPT | Mod: PBBFAC,,, | Performed by: NURSE PRACTITIONER

## 2025-07-29 PROCEDURE — 3061F NEG MICROALBUMINURIA REV: CPT | Mod: CPTII,,, | Performed by: NURSE PRACTITIONER

## 2025-07-29 PROCEDURE — 99214 OFFICE O/P EST MOD 30 MIN: CPT | Mod: S$PBB,,, | Performed by: NURSE PRACTITIONER

## 2025-07-29 PROCEDURE — 3008F BODY MASS INDEX DOCD: CPT | Mod: CPTII,,, | Performed by: NURSE PRACTITIONER

## 2025-07-29 PROCEDURE — 4010F ACE/ARB THERAPY RXD/TAKEN: CPT | Mod: CPTII,,, | Performed by: NURSE PRACTITIONER

## 2025-07-29 PROCEDURE — 3075F SYST BP GE 130 - 139MM HG: CPT | Mod: CPTII,,, | Performed by: NURSE PRACTITIONER

## 2025-07-29 PROCEDURE — 3044F HG A1C LEVEL LT 7.0%: CPT | Mod: CPTII,,, | Performed by: NURSE PRACTITIONER

## 2025-07-29 PROCEDURE — 3079F DIAST BP 80-89 MM HG: CPT | Mod: CPTII,,, | Performed by: NURSE PRACTITIONER

## 2025-07-29 PROCEDURE — 99214 OFFICE O/P EST MOD 30 MIN: CPT | Mod: PBBFAC | Performed by: NURSE PRACTITIONER

## 2025-07-29 PROCEDURE — 1160F RVW MEDS BY RX/DR IN RCRD: CPT | Mod: CPTII,,, | Performed by: NURSE PRACTITIONER

## 2025-07-29 PROCEDURE — 1159F MED LIST DOCD IN RCRD: CPT | Mod: CPTII,,, | Performed by: NURSE PRACTITIONER

## 2025-08-07 ENCOUNTER — ANESTHESIA (OUTPATIENT)
Dept: SURGERY | Facility: HOSPITAL | Age: 60
End: 2025-08-07
Payer: MEDICAID

## 2025-08-07 ENCOUNTER — HOSPITAL ENCOUNTER (OUTPATIENT)
Facility: HOSPITAL | Age: 60
Discharge: HOME | End: 2025-08-07
Attending: ORTHOPAEDIC SURGERY | Admitting: ORTHOPAEDIC SURGERY
Payer: MEDICAID

## 2025-08-07 VITALS
RESPIRATION RATE: 13 BRPM | DIASTOLIC BLOOD PRESSURE: 76 MMHG | HEART RATE: 65 BPM | HEIGHT: 64 IN | OXYGEN SATURATION: 95 % | WEIGHT: 174 LBS | TEMPERATURE: 98 F | SYSTOLIC BLOOD PRESSURE: 111 MMHG | BODY MASS INDEX: 29.71 KG/M2

## 2025-08-07 DIAGNOSIS — Z96.652 PAIN DUE TO TOTAL LEFT KNEE REPLACEMENT, SUBSEQUENT ENCOUNTER: Primary | ICD-10-CM

## 2025-08-07 DIAGNOSIS — M25.562 LEFT KNEE PAIN: ICD-10-CM

## 2025-08-07 DIAGNOSIS — T84.84XD PAIN DUE TO TOTAL LEFT KNEE REPLACEMENT, SUBSEQUENT ENCOUNTER: Primary | ICD-10-CM

## 2025-08-07 LAB — POCT GLUCOSE: 115 MG/DL (ref 70–110)

## 2025-08-07 PROCEDURE — 37000009 HC ANESTHESIA EA ADD 15 MINS: Performed by: ORTHOPAEDIC SURGERY

## 2025-08-07 PROCEDURE — C1778 LEAD, NEUROSTIMULATOR: HCPCS | Performed by: ORTHOPAEDIC SURGERY

## 2025-08-07 PROCEDURE — 71000015 HC POSTOP RECOV 1ST HR: Performed by: ORTHOPAEDIC SURGERY

## 2025-08-07 PROCEDURE — 63600175 PHARM REV CODE 636 W HCPCS: Performed by: ORTHOPAEDIC SURGERY

## 2025-08-07 PROCEDURE — 71000016 HC POSTOP RECOV ADDL HR: Performed by: ORTHOPAEDIC SURGERY

## 2025-08-07 PROCEDURE — 37000008 HC ANESTHESIA 1ST 15 MINUTES: Performed by: ORTHOPAEDIC SURGERY

## 2025-08-07 PROCEDURE — 36000706: Performed by: ORTHOPAEDIC SURGERY

## 2025-08-07 PROCEDURE — 64555 IMPLANT NEUROELECTRODES: CPT | Mod: LT,,, | Performed by: ORTHOPAEDIC SURGERY

## 2025-08-07 PROCEDURE — 36000707: Performed by: ORTHOPAEDIC SURGERY

## 2025-08-07 PROCEDURE — 63600175 PHARM REV CODE 636 W HCPCS

## 2025-08-07 PROCEDURE — 25000003 PHARM REV CODE 250

## 2025-08-07 PROCEDURE — 27201423 OPTIME MED/SURG SUP & DEVICES STERILE SUPPLY: Performed by: ORTHOPAEDIC SURGERY

## 2025-08-07 RX ORDER — LIDOCAINE HYDROCHLORIDE 10 MG/ML
INJECTION, SOLUTION INFILTRATION; PERINEURAL
Status: DISCONTINUED | OUTPATIENT
Start: 2025-08-07 | End: 2025-08-07 | Stop reason: HOSPADM

## 2025-08-07 RX ORDER — LIDOCAINE HYDROCHLORIDE 10 MG/ML
1 INJECTION, SOLUTION EPIDURAL; INFILTRATION; INTRACAUDAL; PERINEURAL ONCE
Status: DISCONTINUED | OUTPATIENT
Start: 2025-08-07 | End: 2025-08-07 | Stop reason: HOSPADM

## 2025-08-07 RX ORDER — PHENYLEPHRINE HCL IN 0.9% NACL 1 MG/10 ML
SYRINGE (ML) INTRAVENOUS
Status: DISCONTINUED | OUTPATIENT
Start: 2025-08-07 | End: 2025-08-07

## 2025-08-07 RX ORDER — LIDOCAINE HYDROCHLORIDE 20 MG/ML
INJECTION, SOLUTION EPIDURAL; INFILTRATION; INTRACAUDAL; PERINEURAL
Status: DISCONTINUED | OUTPATIENT
Start: 2025-08-07 | End: 2025-08-07

## 2025-08-07 RX ORDER — GLUCAGON 1 MG
1 KIT INJECTION
Status: DISCONTINUED | OUTPATIENT
Start: 2025-08-07 | End: 2025-08-07 | Stop reason: HOSPADM

## 2025-08-07 RX ORDER — HYDROMORPHONE HYDROCHLORIDE 2 MG/ML
0.2 INJECTION, SOLUTION INTRAMUSCULAR; INTRAVENOUS; SUBCUTANEOUS EVERY 5 MIN PRN
Status: DISCONTINUED | OUTPATIENT
Start: 2025-08-07 | End: 2025-08-07 | Stop reason: HOSPADM

## 2025-08-07 RX ORDER — FENTANYL CITRATE 50 UG/ML
INJECTION, SOLUTION INTRAMUSCULAR; INTRAVENOUS
Status: DISCONTINUED | OUTPATIENT
Start: 2025-08-07 | End: 2025-08-07

## 2025-08-07 RX ORDER — ACETAMINOPHEN 325 MG/1
325 TABLET ORAL
Qty: 84 TABLET | Refills: 0 | Status: SHIPPED | OUTPATIENT
Start: 2025-08-07 | End: 2025-08-21

## 2025-08-07 RX ORDER — CEFAZOLIN 2 G/1
2 INJECTION, POWDER, FOR SOLUTION INTRAMUSCULAR; INTRAVENOUS ONCE
Status: DISCONTINUED | OUTPATIENT
Start: 2025-08-07 | End: 2025-08-07 | Stop reason: HOSPADM

## 2025-08-07 RX ORDER — SODIUM CHLORIDE 0.9 % (FLUSH) 0.9 %
10 SYRINGE (ML) INJECTION EVERY 6 HOURS PRN
Status: DISCONTINUED | OUTPATIENT
Start: 2025-08-07 | End: 2025-08-07 | Stop reason: HOSPADM

## 2025-08-07 RX ORDER — CEFAZOLIN SODIUM 1 G/3ML
INJECTION, POWDER, FOR SOLUTION INTRAMUSCULAR; INTRAVENOUS
Status: DISCONTINUED | OUTPATIENT
Start: 2025-08-07 | End: 2025-08-07

## 2025-08-07 RX ORDER — OXYCODONE HYDROCHLORIDE 5 MG/1
5 TABLET ORAL
Status: DISCONTINUED | OUTPATIENT
Start: 2025-08-07 | End: 2025-08-07 | Stop reason: HOSPADM

## 2025-08-07 RX ORDER — ONDANSETRON HYDROCHLORIDE 2 MG/ML
4 INJECTION, SOLUTION INTRAVENOUS DAILY PRN
Status: DISCONTINUED | OUTPATIENT
Start: 2025-08-07 | End: 2025-08-07 | Stop reason: HOSPADM

## 2025-08-07 RX ORDER — PROPOFOL 10 MG/ML
VIAL (ML) INTRAVENOUS CONTINUOUS PRN
Status: DISCONTINUED | OUTPATIENT
Start: 2025-08-07 | End: 2025-08-07

## 2025-08-07 RX ORDER — DICLOFENAC SODIUM 75 MG/1
75 TABLET, DELAYED RELEASE ORAL 2 TIMES DAILY
Qty: 84 TABLET | Refills: 0 | Status: SHIPPED | OUTPATIENT
Start: 2025-08-07 | End: 2025-09-18

## 2025-08-07 RX ORDER — MIDAZOLAM HYDROCHLORIDE 1 MG/ML
INJECTION INTRAMUSCULAR; INTRAVENOUS
Status: DISCONTINUED | OUTPATIENT
Start: 2025-08-07 | End: 2025-08-07

## 2025-08-07 RX ADMIN — SODIUM CHLORIDE: 0.9 INJECTION, SOLUTION INTRAVENOUS at 12:08

## 2025-08-07 RX ADMIN — LIDOCAINE HYDROCHLORIDE 60 MG: 20 INJECTION, SOLUTION EPIDURAL; INFILTRATION; INTRACAUDAL; PERINEURAL at 12:08

## 2025-08-07 RX ADMIN — Medication 100 MCG: at 01:08

## 2025-08-07 RX ADMIN — CEFAZOLIN 2 G: 330 INJECTION, POWDER, FOR SOLUTION INTRAMUSCULAR; INTRAVENOUS at 12:08

## 2025-08-07 RX ADMIN — PROPOFOL 150 MCG/KG/MIN: 10 INJECTION, EMULSION INTRAVENOUS at 12:08

## 2025-08-07 RX ADMIN — MIDAZOLAM 2 MG: 1 INJECTION INTRAMUSCULAR; INTRAVENOUS at 12:08

## 2025-08-07 RX ADMIN — PROPOFOL 30 MG: 10 INJECTION, EMULSION INTRAVENOUS at 12:08

## 2025-08-07 RX ADMIN — FENTANYL CITRATE 50 MCG: 50 INJECTION INTRAMUSCULAR; INTRAVENOUS at 12:08

## 2025-08-07 NOTE — ANESTHESIA POSTPROCEDURE EVALUATION
Anesthesia Post Evaluation    Patient: Venancio Barragan    Procedure(s) Performed: Procedure(s) (LRB):  INSERTION,ELECTRODE LEAD,NEUROSTIMULATOR,PERIPHERAL (Left)    Final Anesthesia Type: general      Patient location during evaluation: PACU  Patient participation: Yes- Able to Participate  Level of consciousness: awake and alert  Post-procedure vital signs: reviewed and stable  Pain management: adequate  Airway patency: patent    PONV status at discharge: No PONV  Anesthetic complications: no      Cardiovascular status: stable  Respiratory status: room air  Hydration status: euvolemic  Follow-up not needed.              Vitals Value Taken Time   /76 08/07/25 14:50   Temp 36.7 °C (98.1 °F) 08/07/25 15:01   Pulse 65 08/07/25 15:01   Resp 13 08/07/25 15:01   SpO2 95 % 08/07/25 15:01         No case tracking events are documented in the log.      Pain/Holli Score: Holli Score: 10 (8/7/2025  2:50 PM)

## 2025-08-08 ENCOUNTER — PATIENT MESSAGE (OUTPATIENT)
Dept: ORTHOPEDICS | Facility: CLINIC | Age: 60
End: 2025-08-08
Payer: MEDICAID

## 2025-08-13 ENCOUNTER — PATIENT MESSAGE (OUTPATIENT)
Dept: ORTHOPEDICS | Facility: CLINIC | Age: 60
End: 2025-08-13
Payer: MEDICAID

## 2025-08-14 ENCOUNTER — OFFICE VISIT (OUTPATIENT)
Dept: ORTHOPEDICS | Facility: CLINIC | Age: 60
End: 2025-08-14
Payer: MEDICAID

## 2025-08-14 VITALS — HEIGHT: 64 IN | WEIGHT: 173.94 LBS | BODY MASS INDEX: 29.7 KG/M2

## 2025-08-14 DIAGNOSIS — T84.84XD PAIN DUE TO TOTAL LEFT KNEE REPLACEMENT, SUBSEQUENT ENCOUNTER: ICD-10-CM

## 2025-08-14 DIAGNOSIS — Z96.652 PAIN DUE TO TOTAL LEFT KNEE REPLACEMENT, SUBSEQUENT ENCOUNTER: ICD-10-CM

## 2025-08-14 DIAGNOSIS — Z96.659 STATUS POST REVISION OF TOTAL KNEE REPLACEMENT, UNSPECIFIED LATERALITY: ICD-10-CM

## 2025-08-14 DIAGNOSIS — M25.562 CHRONIC PAIN OF LEFT KNEE: ICD-10-CM

## 2025-08-14 DIAGNOSIS — G89.29 CHRONIC PAIN OF LEFT KNEE: ICD-10-CM

## 2025-08-14 DIAGNOSIS — G58.8 OTHER MONONEUROPATHY: Primary | ICD-10-CM

## 2025-08-14 PROCEDURE — 99999 PR PBB SHADOW E&M-EST. PATIENT-LVL IV: CPT | Mod: PBBFAC,,, | Performed by: ORTHOPAEDIC SURGERY

## 2025-08-14 PROCEDURE — 99214 OFFICE O/P EST MOD 30 MIN: CPT | Mod: PBBFAC,PN | Performed by: ORTHOPAEDIC SURGERY

## 2025-08-29 ENCOUNTER — HOSPITAL ENCOUNTER (EMERGENCY)
Facility: HOSPITAL | Age: 60
Discharge: HOME | End: 2025-08-29
Attending: EMERGENCY MEDICINE
Payer: MEDICAID

## 2025-08-29 VITALS
SYSTOLIC BLOOD PRESSURE: 120 MMHG | WEIGHT: 174.19 LBS | RESPIRATION RATE: 17 BRPM | DIASTOLIC BLOOD PRESSURE: 72 MMHG | BODY MASS INDEX: 29.02 KG/M2 | HEIGHT: 65 IN | TEMPERATURE: 98 F | OXYGEN SATURATION: 99 % | HEART RATE: 78 BPM

## 2025-08-29 DIAGNOSIS — G89.29 CHRONIC PAIN OF LEFT KNEE: Primary | ICD-10-CM

## 2025-08-29 DIAGNOSIS — N20.1 RIGHT URETERAL STONE: Primary | ICD-10-CM

## 2025-08-29 DIAGNOSIS — M25.562 CHRONIC PAIN OF LEFT KNEE: Primary | ICD-10-CM

## 2025-08-29 LAB
ABSOLUTE EOSINOPHIL (OHS): 0.03 K/UL
ABSOLUTE MONOCYTE (OHS): 0.59 K/UL (ref 0.3–1)
ABSOLUTE NEUTROPHIL COUNT (OHS): 6.37 K/UL (ref 1.8–7.7)
ALBUMIN SERPL BCP-MCNC: 3.9 G/DL (ref 3.5–5.2)
ALP SERPL-CCNC: 57 UNIT/L (ref 40–150)
ALT SERPL W/O P-5'-P-CCNC: 28 UNIT/L (ref 10–44)
AMORPH CRY URNS QL MICRO: ABNORMAL
ANION GAP (OHS): 12 MMOL/L (ref 8–16)
AST SERPL-CCNC: 37 UNIT/L (ref 11–45)
BASOPHILS # BLD AUTO: 0.02 K/UL
BASOPHILS NFR BLD AUTO: 0.2 %
BILIRUB SERPL-MCNC: 0.2 MG/DL (ref 0.1–1)
BILIRUB UR QL STRIP.AUTO: NEGATIVE
BUN SERPL-MCNC: 21 MG/DL (ref 6–20)
CALCIUM SERPL-MCNC: 8.7 MG/DL (ref 8.7–10.5)
CHLORIDE SERPL-SCNC: 107 MMOL/L (ref 95–110)
CLARITY UR: CLEAR
CO2 SERPL-SCNC: 23 MMOL/L (ref 23–29)
COLOR UR AUTO: YELLOW
CREAT SERPL-MCNC: 1.5 MG/DL (ref 0.5–1.4)
ERYTHROCYTE [DISTWIDTH] IN BLOOD BY AUTOMATED COUNT: 16.5 % (ref 11.5–14.5)
GFR SERPLBLD CREATININE-BSD FMLA CKD-EPI: 53 ML/MIN/1.73/M2
GLUCOSE SERPL-MCNC: 159 MG/DL (ref 70–110)
GLUCOSE UR QL STRIP: ABNORMAL
HCT VFR BLD AUTO: 37 % (ref 40–54)
HGB BLD-MCNC: 12.2 GM/DL (ref 14–18)
HGB UR QL STRIP: ABNORMAL
HYALINE CASTS #/AREA URNS LPF: 1 /LPF (ref 0–1)
IMM GRANULOCYTES # BLD AUTO: 0.03 K/UL (ref 0–0.04)
IMM GRANULOCYTES NFR BLD AUTO: 0.4 % (ref 0–0.5)
KETONES UR QL STRIP: NEGATIVE
LEUKOCYTE ESTERASE UR QL STRIP: NEGATIVE
LYMPHOCYTES # BLD AUTO: 1.23 K/UL (ref 1–4.8)
MCH RBC QN AUTO: 27.9 PG (ref 27–31)
MCHC RBC AUTO-ENTMCNC: 33 G/DL (ref 32–36)
MCV RBC AUTO: 85 FL (ref 82–98)
MICROSCOPIC COMMENT: ABNORMAL
NITRITE UR QL STRIP: NEGATIVE
NUCLEATED RBC (/100WBC) (OHS): 0 /100 WBC
PH UR STRIP: 6 [PH]
PLATELET # BLD AUTO: 252 K/UL (ref 150–450)
PMV BLD AUTO: 9.9 FL (ref 9.2–12.9)
POTASSIUM SERPL-SCNC: 4.7 MMOL/L (ref 3.5–5.1)
PROT SERPL-MCNC: 7 GM/DL (ref 6–8.4)
PROT UR QL STRIP: NEGATIVE
RBC # BLD AUTO: 4.37 M/UL (ref 4.6–6.2)
RBC #/AREA URNS HPF: 19 /HPF (ref 0–4)
RELATIVE EOSINOPHIL (OHS): 0.4 %
RELATIVE LYMPHOCYTE (OHS): 14.9 % (ref 18–48)
RELATIVE MONOCYTE (OHS): 7.1 % (ref 4–15)
RELATIVE NEUTROPHIL (OHS): 77 % (ref 38–73)
SODIUM SERPL-SCNC: 142 MMOL/L (ref 136–145)
SP GR UR STRIP: 1.02
UROBILINOGEN UR STRIP-ACNC: NEGATIVE EU/DL
WBC # BLD AUTO: 8.27 K/UL (ref 3.9–12.7)
WBC #/AREA URNS HPF: 1 /HPF (ref 0–5)

## 2025-08-29 PROCEDURE — 96375 TX/PRO/DX INJ NEW DRUG ADDON: CPT | Mod: ER

## 2025-08-29 PROCEDURE — 81003 URINALYSIS AUTO W/O SCOPE: CPT | Mod: ER | Performed by: EMERGENCY MEDICINE

## 2025-08-29 PROCEDURE — 80053 COMPREHEN METABOLIC PANEL: CPT | Mod: ER | Performed by: EMERGENCY MEDICINE

## 2025-08-29 PROCEDURE — 96361 HYDRATE IV INFUSION ADD-ON: CPT | Mod: ER

## 2025-08-29 PROCEDURE — 99285 EMERGENCY DEPT VISIT HI MDM: CPT | Mod: 25,ER

## 2025-08-29 PROCEDURE — 63600175 PHARM REV CODE 636 W HCPCS: Mod: ER | Performed by: EMERGENCY MEDICINE

## 2025-08-29 PROCEDURE — 96374 THER/PROPH/DIAG INJ IV PUSH: CPT | Mod: ER

## 2025-08-29 PROCEDURE — 86803 HEPATITIS C AB TEST: CPT | Performed by: EMERGENCY MEDICINE

## 2025-08-29 PROCEDURE — 87389 HIV-1 AG W/HIV-1&-2 AB AG IA: CPT | Performed by: EMERGENCY MEDICINE

## 2025-08-29 PROCEDURE — 25000003 PHARM REV CODE 250: Mod: ER | Performed by: EMERGENCY MEDICINE

## 2025-08-29 PROCEDURE — 85025 COMPLETE CBC W/AUTO DIFF WBC: CPT | Mod: ER | Performed by: EMERGENCY MEDICINE

## 2025-08-29 RX ORDER — KETOROLAC TROMETHAMINE 30 MG/ML
15 INJECTION, SOLUTION INTRAMUSCULAR; INTRAVENOUS
Status: COMPLETED | OUTPATIENT
Start: 2025-08-29 | End: 2025-08-29

## 2025-08-29 RX ORDER — HYDROCODONE BITARTRATE AND ACETAMINOPHEN 5; 325 MG/1; MG/1
2 TABLET ORAL
Refills: 0 | Status: COMPLETED | OUTPATIENT
Start: 2025-08-29 | End: 2025-08-29

## 2025-08-29 RX ORDER — ONDANSETRON HYDROCHLORIDE 2 MG/ML
4 INJECTION, SOLUTION INTRAVENOUS
Status: COMPLETED | OUTPATIENT
Start: 2025-08-29 | End: 2025-08-29

## 2025-08-29 RX ORDER — TAMSULOSIN HYDROCHLORIDE 0.4 MG/1
0.4 CAPSULE ORAL DAILY
Qty: 10 CAPSULE | Refills: 0 | Status: SHIPPED | OUTPATIENT
Start: 2025-08-29 | End: 2026-08-29

## 2025-08-29 RX ORDER — HYDROCODONE BITARTRATE AND ACETAMINOPHEN 10; 325 MG/1; MG/1
1 TABLET ORAL EVERY 8 HOURS PRN
Qty: 15 TABLET | Refills: 0 | Status: SHIPPED | OUTPATIENT
Start: 2025-08-29

## 2025-08-29 RX ORDER — TAMSULOSIN HYDROCHLORIDE 0.4 MG/1
0.4 CAPSULE ORAL
Status: COMPLETED | OUTPATIENT
Start: 2025-08-29 | End: 2025-08-29

## 2025-08-29 RX ORDER — ONDANSETRON 4 MG/1
4 TABLET, FILM COATED ORAL EVERY 8 HOURS PRN
Qty: 15 TABLET | Refills: 1 | Status: SHIPPED | OUTPATIENT
Start: 2025-08-29

## 2025-08-29 RX ADMIN — ONDANSETRON 4 MG: 2 INJECTION INTRAMUSCULAR; INTRAVENOUS at 02:08

## 2025-08-29 RX ADMIN — HYDROCODONE BITARTRATE AND ACETAMINOPHEN 2 TABLET: 5; 325 TABLET ORAL at 03:08

## 2025-08-29 RX ADMIN — TAMSULOSIN HYDROCHLORIDE 0.4 MG: 0.4 CAPSULE ORAL at 02:08

## 2025-08-29 RX ADMIN — KETOROLAC TROMETHAMINE 15 MG: 30 INJECTION, SOLUTION INTRAMUSCULAR at 02:08

## 2025-08-29 RX ADMIN — SODIUM CHLORIDE 1000 ML: 9 INJECTION, SOLUTION INTRAVENOUS at 02:08

## 2025-08-30 LAB
HCV AB SERPL QL IA: NORMAL
HIV 1+2 AB+HIV1 P24 AG SERPL QL IA: NORMAL
HOLD SPECIMEN: NORMAL

## 2025-09-01 LAB — HOLD SPECIMEN: NORMAL

## 2025-09-02 ENCOUNTER — ANESTHESIA EVENT (OUTPATIENT)
Dept: SURGERY | Facility: HOSPITAL | Age: 60
End: 2025-09-02
Payer: MEDICAID

## 2025-09-02 ENCOUNTER — ANESTHESIA (OUTPATIENT)
Dept: SURGERY | Facility: HOSPITAL | Age: 60
End: 2025-09-02
Payer: MEDICAID

## 2025-09-02 ENCOUNTER — HOSPITAL ENCOUNTER (OUTPATIENT)
Facility: HOSPITAL | Age: 60
Discharge: HOME | End: 2025-09-02
Attending: ORTHOPAEDIC SURGERY | Admitting: ORTHOPAEDIC SURGERY
Payer: MEDICAID

## 2025-09-02 VITALS
HEART RATE: 74 BPM | OXYGEN SATURATION: 97 % | TEMPERATURE: 98 F | DIASTOLIC BLOOD PRESSURE: 82 MMHG | HEIGHT: 65 IN | WEIGHT: 174 LBS | BODY MASS INDEX: 28.99 KG/M2 | RESPIRATION RATE: 16 BRPM | SYSTOLIC BLOOD PRESSURE: 150 MMHG

## 2025-09-02 DIAGNOSIS — Z96.659 STATUS POST REVISION OF TOTAL KNEE REPLACEMENT, UNSPECIFIED LATERALITY: Primary | ICD-10-CM

## 2025-09-02 DIAGNOSIS — T84.84XD PAIN DUE TO TOTAL LEFT KNEE REPLACEMENT, SUBSEQUENT ENCOUNTER: ICD-10-CM

## 2025-09-02 DIAGNOSIS — Z96.652 PAIN DUE TO TOTAL LEFT KNEE REPLACEMENT, SUBSEQUENT ENCOUNTER: ICD-10-CM

## 2025-09-02 DIAGNOSIS — M25.562 LEFT KNEE PAIN: ICD-10-CM

## 2025-09-02 LAB
POCT GLUCOSE: 102 MG/DL (ref 70–110)
POCT GLUCOSE: 104 MG/DL (ref 70–110)

## 2025-09-02 PROCEDURE — 71000033 HC RECOVERY, INTIAL HOUR: Performed by: ORTHOPAEDIC SURGERY

## 2025-09-02 PROCEDURE — 25000003 PHARM REV CODE 250: Performed by: NURSE ANESTHETIST, CERTIFIED REGISTERED

## 2025-09-02 PROCEDURE — 63600175 PHARM REV CODE 636 W HCPCS

## 2025-09-02 PROCEDURE — 64555 IMPLANT NEUROELECTRODES: CPT | Mod: LT,,, | Performed by: ORTHOPAEDIC SURGERY

## 2025-09-02 PROCEDURE — 36000707: Performed by: ORTHOPAEDIC SURGERY

## 2025-09-02 PROCEDURE — 63600175 PHARM REV CODE 636 W HCPCS: Performed by: NURSE ANESTHETIST, CERTIFIED REGISTERED

## 2025-09-02 PROCEDURE — 71000015 HC POSTOP RECOV 1ST HR: Performed by: ORTHOPAEDIC SURGERY

## 2025-09-02 PROCEDURE — 36000706: Performed by: ORTHOPAEDIC SURGERY

## 2025-09-02 PROCEDURE — 37000008 HC ANESTHESIA 1ST 15 MINUTES: Performed by: ORTHOPAEDIC SURGERY

## 2025-09-02 PROCEDURE — 37000009 HC ANESTHESIA EA ADD 15 MINS: Performed by: ORTHOPAEDIC SURGERY

## 2025-09-02 PROCEDURE — 63600175 PHARM REV CODE 636 W HCPCS: Performed by: ORTHOPAEDIC SURGERY

## 2025-09-02 PROCEDURE — C1778 LEAD, NEUROSTIMULATOR: HCPCS | Performed by: ORTHOPAEDIC SURGERY

## 2025-09-02 PROCEDURE — 71000016 HC POSTOP RECOV ADDL HR: Performed by: ORTHOPAEDIC SURGERY

## 2025-09-02 PROCEDURE — 27201423 OPTIME MED/SURG SUP & DEVICES STERILE SUPPLY: Performed by: ORTHOPAEDIC SURGERY

## 2025-09-02 RX ORDER — DICLOFENAC SODIUM 75 MG/1
75 TABLET, DELAYED RELEASE ORAL 2 TIMES DAILY
Qty: 84 TABLET | Refills: 0 | Status: SHIPPED | OUTPATIENT
Start: 2025-09-02 | End: 2025-10-14

## 2025-09-02 RX ORDER — PROPOFOL 10 MG/ML
VIAL (ML) INTRAVENOUS CONTINUOUS PRN
Status: DISCONTINUED | OUTPATIENT
Start: 2025-09-02 | End: 2025-09-02

## 2025-09-02 RX ORDER — MIDAZOLAM HYDROCHLORIDE 1 MG/ML
INJECTION INTRAMUSCULAR; INTRAVENOUS
Status: DISCONTINUED | OUTPATIENT
Start: 2025-09-02 | End: 2025-09-02

## 2025-09-02 RX ORDER — PROCHLORPERAZINE EDISYLATE 5 MG/ML
5 INJECTION INTRAMUSCULAR; INTRAVENOUS EVERY 30 MIN PRN
Status: DISCONTINUED | OUTPATIENT
Start: 2025-09-02 | End: 2025-09-02 | Stop reason: HOSPADM

## 2025-09-02 RX ORDER — HYDROMORPHONE HYDROCHLORIDE 2 MG/ML
0.5 INJECTION, SOLUTION INTRAMUSCULAR; INTRAVENOUS; SUBCUTANEOUS EVERY 5 MIN PRN
Refills: 0 | Status: DISCONTINUED | OUTPATIENT
Start: 2025-09-02 | End: 2025-09-02 | Stop reason: HOSPADM

## 2025-09-02 RX ORDER — ACETAMINOPHEN 10 MG/ML
INJECTION, SOLUTION INTRAVENOUS
Status: DISCONTINUED | OUTPATIENT
Start: 2025-09-02 | End: 2025-09-02

## 2025-09-02 RX ORDER — PHENYLEPHRINE HYDROCHLORIDE 10 MG/ML
INJECTION INTRAVENOUS
Status: DISCONTINUED | OUTPATIENT
Start: 2025-09-02 | End: 2025-09-02

## 2025-09-02 RX ORDER — ONDANSETRON HYDROCHLORIDE 2 MG/ML
INJECTION, SOLUTION INTRAVENOUS
Status: DISCONTINUED | OUTPATIENT
Start: 2025-09-02 | End: 2025-09-02

## 2025-09-02 RX ORDER — OXYCODONE HYDROCHLORIDE 5 MG/1
5 TABLET ORAL
Refills: 0 | Status: DISCONTINUED | OUTPATIENT
Start: 2025-09-02 | End: 2025-09-02 | Stop reason: HOSPADM

## 2025-09-02 RX ORDER — LIDOCAINE HYDROCHLORIDE 10 MG/ML
INJECTION, SOLUTION INFILTRATION; PERINEURAL
Status: DISCONTINUED | OUTPATIENT
Start: 2025-09-02 | End: 2025-09-02 | Stop reason: HOSPADM

## 2025-09-02 RX ORDER — PROPOFOL 10 MG/ML
VIAL (ML) INTRAVENOUS
Status: DISCONTINUED | OUTPATIENT
Start: 2025-09-02 | End: 2025-09-02

## 2025-09-02 RX ORDER — FENTANYL CITRATE 50 UG/ML
INJECTION, SOLUTION INTRAMUSCULAR; INTRAVENOUS
Status: DISCONTINUED | OUTPATIENT
Start: 2025-09-02 | End: 2025-09-02

## 2025-09-02 RX ORDER — ONDANSETRON HYDROCHLORIDE 2 MG/ML
4 INJECTION, SOLUTION INTRAVENOUS DAILY PRN
Status: DISCONTINUED | OUTPATIENT
Start: 2025-09-02 | End: 2025-09-02 | Stop reason: HOSPADM

## 2025-09-02 RX ORDER — ACETAMINOPHEN 325 MG/1
325 TABLET ORAL
Qty: 84 TABLET | Refills: 0 | Status: SHIPPED | OUTPATIENT
Start: 2025-09-02 | End: 2025-09-16

## 2025-09-02 RX ORDER — DEXAMETHASONE SODIUM PHOSPHATE 4 MG/ML
INJECTION, SOLUTION INTRA-ARTICULAR; INTRALESIONAL; INTRAMUSCULAR; INTRAVENOUS; SOFT TISSUE
Status: DISCONTINUED | OUTPATIENT
Start: 2025-09-02 | End: 2025-09-02

## 2025-09-02 RX ORDER — CEFAZOLIN SODIUM 1 G/3ML
INJECTION, POWDER, FOR SOLUTION INTRAMUSCULAR; INTRAVENOUS
Status: DISCONTINUED | OUTPATIENT
Start: 2025-09-02 | End: 2025-09-02

## 2025-09-02 RX ORDER — CEFAZOLIN 2 G/1
2 INJECTION, POWDER, FOR SOLUTION INTRAMUSCULAR; INTRAVENOUS ONCE
Status: DISCONTINUED | OUTPATIENT
Start: 2025-09-02 | End: 2025-09-02 | Stop reason: HOSPADM

## 2025-09-02 RX ORDER — LIDOCAINE HYDROCHLORIDE 20 MG/ML
INJECTION INTRAVENOUS
Status: DISCONTINUED | OUTPATIENT
Start: 2025-09-02 | End: 2025-09-02

## 2025-09-02 RX ORDER — PHENYLEPHRINE HCL IN 0.9% NACL 1 MG/10 ML
SYRINGE (ML) INTRAVENOUS
Status: DISCONTINUED | OUTPATIENT
Start: 2025-09-02 | End: 2025-09-02

## 2025-09-02 RX ADMIN — FENTANYL CITRATE 50 MCG: 50 INJECTION INTRAMUSCULAR; INTRAVENOUS at 02:09

## 2025-09-02 RX ADMIN — FENTANYL CITRATE 25 MCG: 50 INJECTION INTRAMUSCULAR; INTRAVENOUS at 03:09

## 2025-09-02 RX ADMIN — PHENYLEPHRINE HYDROCHLORIDE 100 MCG: 10 INJECTION INTRAVENOUS at 02:09

## 2025-09-02 RX ADMIN — PHENYLEPHRINE HYDROCHLORIDE 200 MCG: 10 INJECTION INTRAVENOUS at 03:09

## 2025-09-02 RX ADMIN — PROPOFOL 200 MG: 10 INJECTION, EMULSION INTRAVENOUS at 02:09

## 2025-09-02 RX ADMIN — GLYCOPYRROLATE 0.2 MG: 0.2 INJECTION, SOLUTION INTRAMUSCULAR; INTRAVITREAL at 02:09

## 2025-09-02 RX ADMIN — ONDANSETRON 8 MG: 2 INJECTION, SOLUTION INTRAMUSCULAR; INTRAVENOUS at 02:09

## 2025-09-02 RX ADMIN — SODIUM CHLORIDE, SODIUM LACTATE, POTASSIUM CHLORIDE, AND CALCIUM CHLORIDE: .6; .31; .03; .02 INJECTION, SOLUTION INTRAVENOUS at 03:09

## 2025-09-02 RX ADMIN — MIDAZOLAM 2 MG: 1 INJECTION INTRAMUSCULAR; INTRAVENOUS at 02:09

## 2025-09-02 RX ADMIN — CEFAZOLIN 2 G: 330 INJECTION, POWDER, FOR SOLUTION INTRAMUSCULAR; INTRAVENOUS at 02:09

## 2025-09-02 RX ADMIN — ACETAMINOPHEN 1000 MG: 10 INJECTION INTRAVENOUS at 02:09

## 2025-09-02 RX ADMIN — SODIUM CHLORIDE, SODIUM LACTATE, POTASSIUM CHLORIDE, AND CALCIUM CHLORIDE: .6; .31; .03; .02 INJECTION, SOLUTION INTRAVENOUS at 02:09

## 2025-09-02 RX ADMIN — DEXAMETHASONE SODIUM PHOSPHATE 8 MG: 4 INJECTION, SOLUTION INTRA-ARTICULAR; INTRALESIONAL; INTRAMUSCULAR; INTRAVENOUS; SOFT TISSUE at 02:09

## 2025-09-02 RX ADMIN — LIDOCAINE HYDROCHLORIDE 100 MG: 20 INJECTION, SOLUTION INTRAVENOUS at 02:09

## 2025-09-02 RX ADMIN — Medication 100 MCG: at 02:09

## 2025-09-02 RX ADMIN — PROPOFOL 200 MCG/KG/MIN: 10 INJECTION, EMULSION INTRAVENOUS at 02:09

## (undated) DEVICE — DRESSING XEROFORM FOIL PK 1X8

## (undated) DEVICE — PAD ABDOMINAL STERILE 5X9IN

## (undated) DEVICE — DRAPE CASSETTE 20 X 40

## (undated) DEVICE — SUT VICRYL PLUS 2-0 CT1 18

## (undated) DEVICE — DRESSING OPTIFOAM AG 4X12IN

## (undated) DEVICE — BANDAGE MATRIX HK LOOP 6IN 5YD

## (undated) DEVICE — SPONGE COTTON TRAY 4X4IN

## (undated) DEVICE — COVER BACK TBL HD 2-TIER 72IN

## (undated) DEVICE — DRAPE HAND STERILE

## (undated) DEVICE — GLOVE SENSICARE PI GRN 8

## (undated) DEVICE — DRESSING GAUZE XEROFORM 5X9

## (undated) DEVICE — DRAPE THREE-QTR REINF 53X77IN

## (undated) DEVICE — APPLICATOR CHLORAPREP ORN 26ML

## (undated) DEVICE — DRAPE C ARM 42 X 120 10/BX

## (undated) DEVICE — ALCOHOL 70% ISOP W/GREEN 16OZ

## (undated) DEVICE — DRAPE C-ARMOR EQUIPMENT COVER

## (undated) DEVICE — DRAPE TIBURON ORTHOPEDIC SPLIT

## (undated) DEVICE — BNDG COFLEX FOAM LF2 ST 6X5YD

## (undated) DEVICE — SOCKINETTE IMPERVIOUS 12X48IN

## (undated) DEVICE — STAPLER SKIN ROTATING HEAD

## (undated) DEVICE — PACK BASIC SETUP SC BR

## (undated) DEVICE — GLOVE SENSICARE PI SURG 7.5

## (undated) DEVICE — GAUZE SPONGE 4X4 12PLY

## (undated) DEVICE — GOWN POLY REINF BRTH SLV XL

## (undated) DEVICE — COVER LIGHT HANDLE 80/CA

## (undated) DEVICE — SUT STRATAFIX PDS 1 CTX 18IN

## (undated) DEVICE — DRAPE EXTREMITY STD 89X128IN

## (undated) DEVICE — DRESSING TEGADERM 2 3/8 X 2.75

## (undated) DEVICE — ELECTRODE REM PLYHSV RETURN 9

## (undated) DEVICE — Device

## (undated) DEVICE — SYR 10CC LUER LOCK

## (undated) DEVICE — BLADE SAW RECIP 76MMX12.5MM

## (undated) DEVICE — ELECTRODE BLD 1 INCH TEFLON

## (undated) DEVICE — TOWEL OR DISP STRL BLUE 4/PK

## (undated) DEVICE — SUPPORT ULNA NERVE PROTECTOR

## (undated) DEVICE — DRAPE U SPLIT SHEET 54X76IN

## (undated) DEVICE — SKINMARKER W/RULER DEVON

## (undated) DEVICE — SOL IRR NACL .9% 3000ML

## (undated) DEVICE — TOWEL OR XRAY BLUE 17X26IN

## (undated) DEVICE — COVER OVERHEAD SURG LT BLUE

## (undated) DEVICE — SUT X424H ETHIBOND 0-0

## (undated) DEVICE — DRESSING COVER AQUACEL AG SURG

## (undated) DEVICE — BANDAGE MATRIX HK LOOP 4IN 5YD

## (undated) DEVICE — GLOVE BIOGEL SZ 8 1/2

## (undated) DEVICE — KIT MX BNE CEM REVOLTN W/BRKWY

## (undated) DEVICE — DRAPE T EXTRM SURG 121X128X90

## (undated) DEVICE — COVER PROXIMA MAYO STAND

## (undated) DEVICE — MANIFOLD 4 PORT

## (undated) DEVICE — GLOVE SURGICAL LATEX SZ 7

## (undated) DEVICE — SUT ETHILON 3-0 PS2 18 BLK

## (undated) DEVICE — CONTAINER SPECIMEN STRL 3OZ

## (undated) DEVICE — UNDERGLOVES BIOGEL PI SIZE 8.5

## (undated) DEVICE — DURAPREP SURG SCRUB 26ML

## (undated) DEVICE — NDL HYPO STD REG BVL 18GX1.5IN

## (undated) DEVICE — SUT CTD VICRYL 2.0

## (undated) DEVICE — GLOVE SURGICAL LATEX SZ 8

## (undated) DEVICE — DRAPE C-ARM/MOBILE XRAY 44X80

## (undated) DEVICE — SIGMA LCS HIGH PERFORMANCE INSTRUMENTS STERILE QUICK DRILL PINS
Type: IMPLANTABLE DEVICE | Site: KNEE | Status: NON-FUNCTIONAL
Brand: SIGMA LCS HIGH PERFORMANCE
Removed: 2024-11-11

## (undated) DEVICE — BNDG COFLEX FOAM LF2 ST 4X5YD

## (undated) DEVICE — INTERPULSE SET

## (undated) DEVICE — KIT EVACUATOR 3-SPRING 1/8 DRN

## (undated) DEVICE — DRAPE STERI U-SHAPED 47X51IN

## (undated) DEVICE — SYR 30CC LUER LOCK

## (undated) DEVICE — TRAY FOLEY 16FR INFECTION CONT

## (undated) DEVICE — GAUZE WOVEN STRL 8PLY 2X2IN

## (undated) DEVICE — DRAPE INCISE IOBAN 2 23X23IN

## (undated) DEVICE — GLOVE BIOGEL PI ORTHO PRO SZ 8

## (undated) DEVICE — SUT VICRYL 1 OB 36 CTX

## (undated) DEVICE — PACK SURGERY START

## (undated) DEVICE — ADHESIVE DERMABOND ADVANCED

## (undated) DEVICE — NDL HYPO REG 25G X 1 1/2

## (undated) DEVICE — GOWN POLY REINF BRTH SLV LG

## (undated) DEVICE — ADAPTER DUAL IRRIGATION

## (undated) DEVICE — PROBE TUBESET SONICONE NEXUS

## (undated) DEVICE — YANKAUER OPEN TIP W/O VENT

## (undated) DEVICE — HEADREST ROUND DISP FOAM 9IN

## (undated) DEVICE — NDL SAFETY 25G X 1.5 ECLIPSE

## (undated) DEVICE — BLADE OSTEOTOME 12MM

## (undated) DEVICE — ADHESIVE MASTISOL VIAL 48/BX

## (undated) DEVICE — BANDAGE MATRIX HK LOOP 3IN 5YD

## (undated) DEVICE — NOZZLE BNE INJ CEM FEM POST 65

## (undated) DEVICE — SOL NS 1000CC

## (undated) DEVICE — DRESSING AQUACEL SACRAL 9 X 9

## (undated) DEVICE — CLOSURE SKIN STERI STRIP 1/2X4

## (undated) DEVICE — SCRUB 10% POVIDONE IODINE 4OZ

## (undated) DEVICE — UNDERGLOVES BIOGEL PI SIZE 8

## (undated) DEVICE — DRESSING TRANS 4X4 3/4

## (undated) DEVICE — BLADE 90X13X1.27MM

## (undated) DEVICE — ELECTRODE BLADE INSULATED 1 IN

## (undated) DEVICE — GLOVE BIOGEL ECLIPSE SZ 8.5